# Patient Record
Sex: MALE | Race: WHITE | NOT HISPANIC OR LATINO | Employment: OTHER | URBAN - METROPOLITAN AREA
[De-identification: names, ages, dates, MRNs, and addresses within clinical notes are randomized per-mention and may not be internally consistent; named-entity substitution may affect disease eponyms.]

---

## 2017-01-10 DIAGNOSIS — E11.21 TYPE 2 DIABETES MELLITUS WITH DIABETIC NEPHROPATHY, WITHOUT LONG-TERM CURRENT USE OF INSULIN (H): Primary | ICD-10-CM

## 2017-01-12 ENCOUNTER — TELEPHONE (OUTPATIENT)
Dept: EDUCATION SERVICES | Facility: CLINIC | Age: 77
End: 2017-01-12

## 2017-01-12 ENCOUNTER — ALLIED HEALTH/NURSE VISIT (OUTPATIENT)
Dept: EDUCATION SERVICES | Facility: CLINIC | Age: 77
End: 2017-01-12
Payer: COMMERCIAL

## 2017-01-12 DIAGNOSIS — E11.21 TYPE 2 DIABETES MELLITUS WITH DIABETIC NEPHROPATHY, WITHOUT LONG-TERM CURRENT USE OF INSULIN (H): Primary | ICD-10-CM

## 2017-01-12 PROCEDURE — G0108 DIAB MANAGE TRN  PER INDIV: HCPCS

## 2017-01-12 NOTE — PATIENT INSTRUCTIONS
My Diabetes Care Goals:    Check blood sugars fasting in the morning and before and 2 hours after dinner meal daily.      Follow up:    Call ([phone]), e-mail ([email or web address]), or send [employer] message with questions, concerns or if follow-up is needed.     Bring blood glucose meter and logbook with you to all doctor and follow-up appointments.     [place] [facility] for the [facility] Area:  For Your Diabetes Education and Nutrition Appointments Call:  [phone]   For Diabetes Education or Nutrition Related Questions:   Phone: [phone]  E-mail: [email or web address]  Fax: [phone]   If you need a medication refill please contact your pharmacy. Please allow 3 business days for your refills to be completed.    Instructions for emailing the Diabetes Educators    If you need to communicate a non-urgent message to a Diabetes Educator via email, please send to [email or web address].    Please follow the following email guidelines:    Subject line: Secure: your clinic name (example: Secure: [place])  In the email please include: First name, middle initial, last name and date of birth.    We will be in touch with you within one (1) business day.

## 2017-01-12 NOTE — MR AVS SNAPSHOT
After Visit Summary   1/12/2017    Jose Francisco Gonzaelz    MRN: [id number]           Patient Information     Date Of Birth          1940        Visit Information        Provider Department      1/12/2017 8:30 AM  [facility]        Today's Diagnoses     Type 2 diabetes mellitus with diabetic nephropathy, without long-term current use of insulin (H)    -  1       Care Instructions    My Diabetes Care Goals:    Check blood sugars fasting in the morning and before and 2 hours after dinner meal daily.      Follow up:    Call ([phone]), e-mail ([email or web address]), or send [employer] message with questions, concerns or if follow-up is needed.     Bring blood glucose meter and logbook with you to all doctor and follow-up appointments.     [place] [facility] for the [facility] Area:  For Your Diabetes Education and Nutrition Appointments Call:  [phone]   For Diabetes Education or Nutrition Related Questions:   Phone: [phone]  E-mail: [email or web address]  Fax: [phone]   If you need a medication refill please contact your pharmacy. Please allow 3 business days for your refills to be completed.    Instructions for emailing the Diabetes Educators    If you need to communicate a non-urgent message to a Diabetes Educator via email, please send to [email or web address].    Please follow the following email guidelines:    Subject line: Secure: your clinic name (example: Secure: [place])  In the email please include: First name, middle initial, last name and date of birth.    We will be in touch with you within one (1) business day.           Follow-ups after your visit        Who to contact     If you have questions or need follow up information about today's clinic visit or your schedule please contact [facility] directly at [phone].  Normal or non-critical lab and imaging results will be communicated to  "you by MyChart, letter or phone within 4 business days after the clinic has received the results. If you do not hear from us within 7 days, please contact the clinic through [email or web address] or phone. If you have a critical or abnormal lab result, we will notify you by phone as soon as possible.  Submit refill requests through [employer] or call your pharmacy and they will forward the refill request to us. Please allow 3 business days for your refill to be completed.          Additional Information About Your Visit        [email or web address] Information     [employer] lets you send messages to your doctor, view your test results, renew your prescriptions, schedule appointments and more. To sign up, go to [email or web address] . Click on \"Log in\" on the left side of the screen, which will take you to the Welcome page. Then click on \"Sign up Now\" on the right side of the page.     You will be asked to enter the access code listed below, as well as some personal information. Please follow the directions to create your username and password.     Your access code is: [email or web address]  Expires: 2017  9:07 AM     Your access code will  in 90 days. If you need help or a new code, please call your [place] clinic or [phone].        Care EveryWhere ID     This is your Care EveryWhere ID. This could be used by other organizations to access your [place] medical records  [id number]         Blood Pressure from Last 3 Encounters:   16 118/68   16 124/70   10/19/16 150/80    Weight from Last 3 Encounters:   16 81.194 kg (179 lb)   16 81.194 kg (179 lb)   10/19/16 81.647 kg (180 lb)              Today, you had the following     No orders found for display       Primary Care Provider Office Phone # Fax #    Kvng Richardson [phone]       [facility] [address]  [facility] [address]        Thank you!     Thank you for choosing [facility]  for your care. Our goal is " always to provide you with excellent care. Hearing back from our patients is one way we can continue to improve our services. Please take a few minutes to complete the written survey that you may receive in the mail after your visit with us. Thank you!             Your Updated Medication List - Protect others around you: Learn how to safely use, store and throw away your medicines at www.disposemymeds.org.          This list is accurate as of: 1/12/17  9:07 AM.  Always use your most recent med list.                   Brand Name Dispense Instructions for use    amLODIPine 2.5 MG tablet    NORVASC    90 tablet    Take 1 tablet (2.5 mg) by mouth daily       aspirin 81 MG tablet      Take  by mouth daily.       atorvastatin 20 MG tablet    LIPITOR    90 tablet    TAKE 1 TABLET BY MOUTH EVERY DAY       B-D U/F 31G X 8 MM   Generic drug:  insulin pen needle     300 each    USE DAILY AS DIRECTED       blood glucose lancets standard    no brand specified    1 Box    Use to test blood sugar 2 times daily or as directed.                                        To use with his glucometer       glipiZIDE 10 MG 24 hr tablet    GLUCOTROL XL    90 tablet    Take 1 tablet (10 mg) by mouth daily       HYDROXYCHLOROQUINE SULFATE PO      Taking 2 a day       insulin glargine 100 UNIT/ML injection    LANTUS SOLOSTAR    45 mL    Inject 26 Units Subcutaneous At Bedtime       lisinopril 10 MG tablet    PRINIVIL/ZESTRIL    90 tablet    Take 1 tablet (10 mg) by mouth daily       tamsulosin 0.4 MG capsule    FLOMAX    30 capsule    Take 1 capsule (0.4 mg) by mouth daily

## 2017-01-12 NOTE — TELEPHONE ENCOUNTER
Pt called regarding vm left and scheduling a follow up. Is not sure when he will be back from [place] so will call to schedule at a later date when he knows his schedule more. Has the phone number to schedule and e-mail as well if he has any questions in the meantime.      Aiyana Ko) Alexander, RD LD CDE

## 2017-01-12 NOTE — PROGRESS NOTES
Diabetes Self Management Training: Individual Review Visit    Jose Francisco Gonzalez presents today for education, evaluation of glucose control and initiation of meal time insulin related to Type 2 diabetes.    He is accompanied by self    Patient's diabetes management related comments/concerns: diet changes that he has been making     Patient's emotional response to diabetes: expresses readiness to learn and concern for health and well-being    Patient would like this visit to be focused around the following diabetes-related behaviors and goals: Assistance with making lifestyle changes and Healthy Eating    ASSESSMENT:  Patient Problem List and Family Medical History reviewed for relevant medical history, current medical status, and diabetes risk factors.    Current Diabetes Management per Patient:  Taking diabetes medications?   yes:     Diabetes Medication(s)     Insulin Sig    insulin glargine (LANTUS SOLOSTAR) 100 UNIT/ML PEN Inject 26 Units Subcutaneous At Bedtime -- patient taking 28 units at bedtime    Sulfonylureas Sig    glipiZIDE (GLUCOTROL XL) 10 MG 24 hr tablet Take 1 tablet (10 mg) by mouth daily          Past Diabetes Education: Yes    Patient glucose self monitoring as follows: two times daily.   BG meter: Accu-Chek meter (did not bring today)  BG results: 120-135 in the mornings lately per pt (prior to this month they were higher and over 200 more often) and reports his after dinner numbers are ~160-170 (were closer to 250 previously)    BG values are: In goal  Patient's most recent A1C      9.0   12/7/2016 is not meeting goal of <7.0    Nutrition:  Patient eats 3 meals per day. Loves sweets and sugar and used to eat a lot of bread but has reduced this in the past month. Reports he does not eat much but drinks too much coffee. Eating more fruit or vegetables now.     Breakfast - coffee and roll or donut but has not been doing this for a month and NOW he is having a banana and 1/2 grapefruit and coffee  "with splenda  Lunch - vegetables (carrots, jicama, celery) with sugar fee jello and 1 lena cookie OR pea soup with 4 crackers and cup of tea  Dinner - split pea soup with 10 crackers and vegetables OR 3 tacos with lettuce and cheese and hamburger with water  Snacks - has stopped snacking this month or if he has a snack it is usually nuts    Beverages: water, coffee, tea    Cultural/Sikh diet restrictions: No     Biggest Challenge to Healthy Eating: motivation    Physical Activity:    Plays tennis and golf and vacations in [place] for about 2-3 months. Walks with his wife.     Diabetes Risk Factors:  age over 45 years, hypertension and hyperlipidemia    Diabetes Complications:  Acute Complications: At risk for hypoglycemia? yes  Patient carries a carbohydrate source with them regularly: No     Vitals:  There were no vitals taken for this visit.  Estimated body mass index is 24.98 kg/(m^2) as calculated from the following:    Height as of 12/12/16: 1.803 m (5' 11\").    Weight as of 12/12/16: 81.194 kg (179 lb).   Last 3 BP:   BP Readings from Last 3 Encounters:   12/12/16 118/68   12/07/16 124/70   10/19/16 150/80       History   Smoking status     Former Smoker -- 1.00 packs/day for 20 years     Types: Cigarettes     Quit date: 01/23/1993   Smokeless tobacco     Never Used       Labs:  A1C      9.0   12/7/2016  GLC      356   12/7/2016  LDL       51   1/25/2016  LDL       78   12/9/2014  HDL CHOLESTEROL   Date Value Ref Range Status   01/25/2016 51 >39 mg/dL Final   ]  GFR ESTIMATE   Date Value Ref Range Status   12/07/2016 66 >60 mL/min/1.7m2 Final     GFR ESTIMATE IF BLACK   Date Value Ref Range Status   12/07/2016 80 >60 mL/min/1.7m2 Final     CR     1.08   12/7/2016  No results found for this basename: microalbumin    Socio/Economic Considerations:    Support system: spouse/significant other    Health Beliefs and Attitudes:   Patient Activation Measure Survey Score:  No flowsheet data found.    Stage of " Change: ACTION (Actively working towards change)      Diabetes knowledge and skills assessment:     Patient is knowledgeable in diabetes management concepts related to: Healthy Eating, Being Active, Monitoring and Taking Medication    Patient needs further education on the following diabetes management concepts: Healthy Eating, Being Active, Monitoring, Problem Solving and Reducing Risks    Barriers to Learning Assessment: No Barriers identified    Based on learning assessment above, most appropriate setting for further diabetes education would be: Individual setting.    INTERVENTION:     Education provided today on:  AADE Self-Care Behaviors:  Healthy Eating: consistency in amount, composition, and timing of food intake and portion control. Praised him on his diet changes the past month and encouraged him to continue. He feels motivated to continue changes if it means he can hold off on before meal insulin.     Being Active: relationship to blood glucose. Encouraged him to continue his exercise and activity which he enjoys doing.     Monitoring: individual blood glucose targets and frequency of monitoring. Reviewed that his current blood sugars are much closer to his goals.     Taking Medication: action of prescribed medication, side effects of prescribed medications and when to take medications. Educated on short acting insulin with when to take it, how to store it, expiration of it.     Problem Solving: low blood glucose - causes, signs/symptoms, treatment and prevention and carrying a carbohydrate source at all times.     Opportunities for ongoing education and support in diabetes-self management were discussed.    Pt verbalized understanding of concepts discussed and recommendations provided today.       Education Materials Provided:  Hypoglycemia Signs and Symptoms    PLAN:  See Patient Instructions for co-developed, patient-stated behavior change goals.  Meal Plan Recommendation: eat 3 meals a day, have small  snacks between meals, if needed, use portion control and Aim for 4-5 carb servings at meals and 1-2 carb servings at snacks  Exercise / activity plan: Continue to include activity and exercise as able.   Check blood sugars fasting, before supper, and 2 hours after the start of meals (supper)  Keep a blood glucose record for next visit.  Patient's blood sugars are much improved with diet changes. Will discuss need for short acting insulin with MD and update patient with decision. Pt educated on short acting insulin just in case as well as hypoglycemia treatment and sx during visit today. UPDATE: MD agreed to have pt continue his long acting insulin at this time and hold off on starting meal time insulin given improvement in blood sugar results. Pt updated via VM on cell phone (pt okay with messages being left).   AVS printed and provided to patient today.    FOLLOW-UP:  Chart routed to referring provider.    Ongoing plan for education and support: Written resources (magazines, books, etc.) and follow-up visit with diabetes educator as needed. Provided pt with number to call to schedule follow up appointment and encouraged him to schedule in the next month or two to review his blood sugar results.     Aiyana Munoz RD (Gray) LD CDE    Time Spent: 45 minutes  Encounter Type: Individual

## 2017-01-23 DIAGNOSIS — I10 HYPERTENSION GOAL BP (BLOOD PRESSURE) < 140/80: Primary | Chronic | ICD-10-CM

## 2017-01-24 DIAGNOSIS — E11.21 TYPE 2 DIABETES MELLITUS WITH DIABETIC NEPHROPATHY, WITHOUT LONG-TERM CURRENT USE OF INSULIN (H): Primary | ICD-10-CM

## 2017-01-24 NOTE — TELEPHONE ENCOUNTER
glipiZIDE (GLUCOTROL XL) 10 MG 24 hr tablet         Last Written Prescription Date: 1/25/16  Last Fill Quantity: 90, # refills: 3  Last Office Visit with G, P or [facility] prescribing provider:  12/7/16        BP Readings from Last 3 Encounters:   12/12/16 118/68   12/07/16 124/70   10/19/16 150/80     MICROL       68   9/19/2016  No results found for this basename: microalbumin  CREATININE   Date Value Ref Range Status   12/07/2016 1.08 0.66 - 1.25 mg/dL Final   ]  GFR ESTIMATE   Date Value Ref Range Status   12/07/2016 66 >60 mL/min/1.7m2 Final   09/19/2016 83 >60 mL/min/1.7m2 Final   04/13/2016 42* >60 mL/min/1.7m2 Final     GFR ESTIMATE IF BLACK   Date Value Ref Range Status   12/07/2016 80 >60 mL/min/1.7m2 Final   09/19/2016 >90 >60 mL/min/1.7m2 Final   04/13/2016 51* >60 mL/min/1.7m2 Final     CHOL      118   1/25/2016  HDL       51   1/25/2016  LDL       51   1/25/2016  LDL       78   12/9/2014  TRIG       79   1/25/2016  No results found for this basename: cholhdlratio  AST       31   9/19/2016  ALT       34   9/19/2016  A1C      9.0   12/7/2016  A1C      8.2   9/19/2016  A1C      7.3   4/13/2016  A1C      7.7   1/25/2016  A1C      7.5   9/22/2015  POTASSIUM   Date Value Ref Range Status   12/07/2016 4.6 3.4 - 5.3 mmol/L Final

## 2017-01-25 RX ORDER — LISINOPRIL 10 MG/1
TABLET ORAL
Qty: 90 TABLET | Refills: 2 | Status: SHIPPED | OUTPATIENT
Start: 2017-01-25 | End: 2017-10-13

## 2017-01-25 RX ORDER — GLIPIZIDE 10 MG/1
10 TABLET, FILM COATED, EXTENDED RELEASE ORAL DAILY
Qty: 90 TABLET | Refills: 1 | Status: SHIPPED | OUTPATIENT
Start: 2017-01-25 | End: 2017-08-13

## 2017-01-25 NOTE — TELEPHONE ENCOUNTER
Prescription approved per [facility] Refill Protocol.  Belén Black RN- Triage [employer]

## 2017-01-25 NOTE — TELEPHONE ENCOUNTER
lisinopril (PRINIVIL,ZESTRIL) 10 MG tablet     Last Written Prescription Date: 1/26/16  Last Fill Quantity: 90, # refills: 3  Last Office Visit with [facility] or [facility] prescribing provider: 12/12/16    Prescription approved per [facility] Refill Protocol.  Belén Black RN- Triage [employer]         POTASSIUM   Date Value Ref Range Status   12/07/2016 4.6 3.4 - 5.3 mmol/L Final     CREATININE   Date Value Ref Range Status   12/07/2016 1.08 0.66 - 1.25 mg/dL Final     BP Readings from Last 3 Encounters:   12/12/16 118/68   12/07/16 124/70   10/19/16 150/80

## 2017-02-17 DIAGNOSIS — E78.5 HYPERLIPIDEMIA LDL GOAL <100: ICD-10-CM

## 2017-02-17 RX ORDER — ATORVASTATIN CALCIUM 20 MG/1
TABLET, FILM COATED ORAL
Qty: 90 TABLET | Refills: 0 | Status: SHIPPED | OUTPATIENT
Start: 2017-02-17 | End: 2017-05-02

## 2017-02-17 NOTE — TELEPHONE ENCOUNTER
atorvastatin (LIPITOR) 20 MG     Last Written Prescription Date: 6/15/16  Last Fill Quantity: 90, # refills: 2  Last Office Visit with G, P or [facility] prescribing provider: 12/7/16       Lab Results   Component Value Date    CHOL 118 01/25/2016     Lab Results   Component Value Date    HDL 51 01/25/2016     Lab Results   Component Value Date    LDL 51 01/25/2016    LDL 78 12/09/2014     Lab Results   Component Value Date    TRIG 79 01/25/2016     No results found for: VIVEK

## 2017-02-22 ENCOUNTER — TELEPHONE (OUTPATIENT)
Dept: FAMILY MEDICINE | Facility: CLINIC | Age: 77
End: 2017-02-22

## 2017-02-22 DIAGNOSIS — E11.21 TYPE 2 DIABETES MELLITUS WITH DIABETIC NEPHROPATHY, WITHOUT LONG-TERM CURRENT USE OF INSULIN (H): Primary | ICD-10-CM

## 2017-02-22 NOTE — TELEPHONE ENCOUNTER
Patient was called. Reports he was using samples now needing refills. Test QID per diabetic educators recommendation. Had increased A1C on last OV  With provider.   DME order was printed and given to provider for approval.

## 2017-02-22 NOTE — TELEPHONE ENCOUNTER
Reason for Call: Medication refill:    Do you use a [facility]?  Name of the pharmacy and phone number for the current request: [facility] [address] [place], [place] - [address]    Name of the medication requested: Diabetic Test Strips - Verio One Touch     Other request: Patient states that he has not had a Rx for the test strip in some time, due to him being part of a program that gave him the supplies that he needed. He is currently in [place], however he is requesting that the Rx be sent to the local preferred pharmacy & they will transfer the Rx to the appropriate location.     Can we leave a detailed message on this number? YES    Phone number patient can be reached at: Cell number on file:    Telephone Information:   Mobile [phone]     Best Time: Anytime    Call taken on 2/22/2017 at 9:35 AM by Elvin Cox

## 2017-03-14 ENCOUNTER — TELEPHONE (OUTPATIENT)
Dept: FAMILY MEDICINE | Facility: CLINIC | Age: 77
End: 2017-03-14

## 2017-03-14 NOTE — TELEPHONE ENCOUNTER
Reason for Call:  Form, our goal is to have forms completed with 72 hours, however, some forms may require a visit or additional information.    Type of letter, form or note:  Case    Who is the form from?: Case (if other please explain)    Where did the form come from: form was faxed in    What clinic location was the form placed at?: [facility]    Where the form was placed: Dr's Box: Kvng Richardson MD    What number is listed as a contact on the form?: Case, fax # [phone]       Additional comments: Diabetic Form - Lancets    Call taken on 3/14/2017 at 3:35 PM by PEDRO FISH

## 2017-03-20 NOTE — TELEPHONE ENCOUNTER
Form reviewed, completed, and signed by Dr. Kvng Richardson and faxed to [facility].  [facility].  Emily Soler TC

## 2017-05-02 ENCOUNTER — OFFICE VISIT (OUTPATIENT)
Dept: FAMILY MEDICINE | Facility: CLINIC | Age: 77
End: 2017-05-02
Payer: COMMERCIAL

## 2017-05-02 VITALS
BODY MASS INDEX: 24.78 KG/M2 | SYSTOLIC BLOOD PRESSURE: 136 MMHG | DIASTOLIC BLOOD PRESSURE: 78 MMHG | WEIGHT: 177 LBS | HEIGHT: 71 IN | TEMPERATURE: 97.9 F | OXYGEN SATURATION: 97 % | HEART RATE: 81 BPM | RESPIRATION RATE: 20 BRPM

## 2017-05-02 DIAGNOSIS — I10 HYPERTENSION GOAL BP (BLOOD PRESSURE) < 140/80: Chronic | ICD-10-CM

## 2017-05-02 DIAGNOSIS — N40.1 BENIGN NON-NODULAR PROSTATIC HYPERPLASIA WITH LOWER URINARY TRACT SYMPTOMS: ICD-10-CM

## 2017-05-02 DIAGNOSIS — E78.5 HYPERLIPIDEMIA LDL GOAL <100: ICD-10-CM

## 2017-05-02 DIAGNOSIS — Z79.4 TYPE 2 DIABETES MELLITUS WITH DIABETIC NEPHROPATHY, WITH LONG-TERM CURRENT USE OF INSULIN (H): ICD-10-CM

## 2017-05-02 DIAGNOSIS — Z79.4 TYPE 2 DIABETES MELLITUS WITH DIABETIC NEPHROPATHY, WITH LONG-TERM CURRENT USE OF INSULIN (H): Primary | ICD-10-CM

## 2017-05-02 DIAGNOSIS — E11.21 TYPE 2 DIABETES MELLITUS WITH DIABETIC NEPHROPATHY, WITH LONG-TERM CURRENT USE OF INSULIN (H): ICD-10-CM

## 2017-05-02 DIAGNOSIS — E11.21 TYPE 2 DIABETES MELLITUS WITH DIABETIC NEPHROPATHY, WITH LONG-TERM CURRENT USE OF INSULIN (H): Primary | ICD-10-CM

## 2017-05-02 LAB — HBA1C MFR BLD: 7.4 % (ref 4.3–6)

## 2017-05-02 PROCEDURE — 36415 COLL VENOUS BLD VENIPUNCTURE: CPT | Performed by: INTERNAL MEDICINE

## 2017-05-02 PROCEDURE — 80053 COMPREHEN METABOLIC PANEL: CPT | Performed by: INTERNAL MEDICINE

## 2017-05-02 PROCEDURE — 83721 ASSAY OF BLOOD LIPOPROTEIN: CPT | Performed by: INTERNAL MEDICINE

## 2017-05-02 PROCEDURE — 83036 HEMOGLOBIN GLYCOSYLATED A1C: CPT | Performed by: INTERNAL MEDICINE

## 2017-05-02 PROCEDURE — 99214 OFFICE O/P EST MOD 30 MIN: CPT | Performed by: INTERNAL MEDICINE

## 2017-05-02 RX ORDER — HYDROXYCHLOROQUINE SULFATE 200 MG/1
200 TABLET, FILM COATED ORAL DAILY
Qty: 30 TABLET | COMMUNITY
Start: 2017-05-02 | End: 2021-06-10

## 2017-05-02 RX ORDER — FINASTERIDE 5 MG/1
5 TABLET, FILM COATED ORAL DAILY
Qty: 90 TABLET | Refills: 3 | Status: SHIPPED | OUTPATIENT
Start: 2017-05-02 | End: 2017-11-01

## 2017-05-02 RX ORDER — ATORVASTATIN CALCIUM 20 MG/1
20 TABLET, FILM COATED ORAL DAILY
Qty: 90 TABLET | Refills: 3 | Status: SHIPPED | OUTPATIENT
Start: 2017-05-02 | End: 2018-05-09

## 2017-05-02 RX ORDER — BLOOD SUGAR DIAGNOSTIC
STRIP MISCELLANEOUS
Qty: 200 STRIP | Refills: 11 | OUTPATIENT
Start: 2017-05-02

## 2017-05-02 RX ORDER — TADALAFIL 5 MG/1
5 TABLET ORAL DAILY
Qty: 30 TABLET | Refills: 5 | Status: SHIPPED | OUTPATIENT
Start: 2017-05-02 | End: 2017-11-07

## 2017-05-02 NOTE — LETTER
[facility]  [address]  [address]  [place] [id number] [phone]                                                                                                           Jose Francisco Carlos  [address]  [place] [facility] [address]    May 3, 2017      Dear Jose Francisco,    The results of your recent tests were reviewed and are enclosed.          Your lab results are either good, or else stable, including the electrolytes,cholesterol,kidney and liver function.       Your diabetes control is better.       The A1C of 7.4 correlates to an average blood sugar of approximately 162.      Thank you for choosing [facility].  We appreciate the opportunity to serve you and look forward to supporting your healthcare needs in the future.    If you have any questions or concerns, please call me or my staff at [phone].      Sincerely,    Kvng Richardson MD    Results for orders placed or performed in visit on 05/02/17   Comprehensive metabolic panel (BMP + Alb, Alk Phos, ALT, AST, Total. Bili, TP)   Result Value Ref Range    Sodium 131 (L) 133 - 144 mmol/L    Potassium 4.2 3.4 - 5.3 mmol/L    Chloride 94 94 - 109 mmol/L    Carbon Dioxide 29 20 - 32 mmol/L    Anion Gap 8 3 - 14 mmol/L    Glucose 214 (H) 70 - 99 mg/dL    Urea Nitrogen 9 7 - 30 mg/dL    Creatinine 0.75 0.66 - 1.25 mg/dL    GFR Estimate >90  Non  GFR Calc   >60 mL/min/1.7m2    GFR Estimate If Black >90   GFR Calc   >60 mL/min/1.7m2    Calcium 8.9 8.5 - 10.1 mg/dL    Bilirubin Total 0.4 0.2 - 1.3 mg/dL    Albumin 3.9 3.4 - 5.0 g/dL    Protein Total 7.8 6.8 - 8.8 g/dL    Alkaline Phosphatase 72 40 - 150 U/L    ALT 22 0 - 70 U/L    AST 21 0 - 45 U/L   Hemoglobin A1c   Result Value Ref Range    Hemoglobin A1C 7.4 (H) 4.3 - 6.0 %   LDL cholesterol direct   Result Value Ref Range    LDL Cholesterol Direct 70 <100 mg/dL

## 2017-05-02 NOTE — TELEPHONE ENCOUNTER
One touch verio IQ test strips      Last Written Prescription Date: 5/2/17  Last Fill Quantity: 100,  # refills: 11   Last Office Visit with G, P or [facility] prescribing provider: 5/2/17

## 2017-05-02 NOTE — NURSING NOTE
"Chief Complaint   Patient presents with     Lipids     Hypertension     Diabetes       Initial /78 (BP Location: Right arm, Patient Position: Chair, Cuff Size: Adult Large)  Pulse 81  Temp 97.9  F (36.6  C)  Resp 20  Ht 5' 11\" (1.803 m)  Wt 177 lb (80.3 kg)  SpO2 97%  BMI 24.69 kg/m2 Estimated body mass index is 24.69 kg/(m^2) as calculated from the following:    Height as of this encounter: 5' 11\" (1.803 m).    Weight as of this encounter: 177 lb (80.3 kg).  Medication Reconciliation: complete   Soledad Wong LPN  "

## 2017-05-02 NOTE — PATIENT INSTRUCTIONS
I will let you know your lab results.   Today we added finasteride to gradually reduce the size of your prostate.

## 2017-05-02 NOTE — MR AVS SNAPSHOT
"              After Visit Summary   5/2/2017    Jose Francisco Gonzalez    MRN: [id number]           Patient Information     Date Of Birth          1940        Visit Information        Provider Department      5/2/2017 11:30 AM Kvng Richardson MD [facility]        Today's Diagnoses     Type 2 diabetes mellitus with diabetic nephropathy, with long-term current use of insulin (H)    -  1    Hyperlipidemia LDL goal <100        Benign non-nodular prostatic hyperplasia with lower urinary tract symptoms          Care Instructions    I will let you know your lab results.   Today we added finasteride to gradually reduce the size of your prostate.                  Follow-ups after your visit        Who to contact     If you have questions or need follow up information about today's clinic visit or your schedule please contact [facility] directly at [phone].  Normal or non-critical lab and imaging results will be communicated to you by MyChart, letter or phone within 4 business days after the clinic has received the results. If you do not hear from us within 7 days, please contact the clinic through MyChart or phone. If you have a critical or abnormal lab result, we will notify you by phone as soon as possible.  Submit refill requests through [employer] or call your pharmacy and they will forward the refill request to us. Please allow 3 business days for your refill to be completed.          Additional Information About Your Visit        Care EveryWhere ID     This is your Care EveryWhere ID. This could be used by other organizations to access your [facility] medical records  [id number]        Your Vitals Were     Pulse Temperature Respirations Height Pulse Oximetry BMI (Body Mass Index)    81 97.9  F (36.6  C) 20 5' 11\" (1.803 m) 97% 24.69 kg/m2       Blood Pressure from Last 3 Encounters:   05/02/17 136/78   12/12/16 118/68   12/07/16 124/70    Weight from Last 3 " Encounters:   05/02/17 177 lb (80.3 kg)   12/12/16 179 lb (81.2 kg)   12/07/16 179 lb (81.2 kg)              We Performed the Following     Comprehensive metabolic panel (BMP + Alb, Alk Phos, ALT, AST, Total. Bili, TP)     Hemoglobin A1c     LDL cholesterol direct          Today's Medication Changes          These changes are accurate as of: 5/2/17 12:09 PM.  If you have any questions, ask your nurse or doctor.               Start taking these medicines.        Dose/Directions    blood glucose monitoring test strip   Commonly known as:  ONE TOUCH VERIO IQ   Used for:  Type 2 diabetes mellitus with diabetic nephropathy, with long-term current use of insulin (H)   Started by:  Kvng Richardson MD        Use to test blood sugars 3 times daily or as directed.   Quantity:  100 strip   Refills:  11       finasteride 5 MG tablet   Commonly known as:  PROSCAR   Used for:  Benign non-nodular prostatic hyperplasia with lower urinary tract symptoms   Started by:  Kvng Richardson MD        Dose:  5 mg   Take 1 tablet (5 mg) by mouth daily   Quantity:  90 tablet   Refills:  3       tadalafil 5 MG tablet   Commonly known as:  CIALIS   Used for:  Benign non-nodular prostatic hyperplasia with lower urinary tract symptoms   Started by:  Kvng Richardson MD        Dose:  5 mg   Take 1 tablet (5 mg) by mouth daily Never use with nitroglycerin, terazosin or doxazosin.   Quantity:  30 tablet   Refills:  5         These medicines have changed or have updated prescriptions.        Dose/Directions    atorvastatin 20 MG tablet   Commonly known as:  LIPITOR   This may have changed:  See the new instructions.   Used for:  Hyperlipidemia LDL goal <100   Changed by:  Kvng Richardson MD        Dose:  20 mg   Take 1 tablet (20 mg) by mouth daily   Quantity:  90 tablet   Refills:  3       hydroxychloroquine 200 MG tablet   Commonly known as:  PLAQUENIL   This may have changed:    - medication strength  - how much to take  - when to take this  -  additional instructions   Changed by:  Kvng Richardson MD        Dose:  200 mg   Take 1 tablet (200 mg) by mouth daily   Quantity:  30 tablet   Refills:  0            Where to get your medicines      These medications were sent to [facility] [address] [facility] [address] AT [facility] [address], [facility] [address]     Phone:  [phone]     atorvastatin 20 MG tablet    blood glucose monitoring test strip    finasteride 5 MG tablet    tadalafil 5 MG tablet                Primary Care Provider Office Phone # Fax #    Kvng Richardson [phone]       [facility] [address] [facility] [address]        Thank you!     Thank you for choosing [facility]  for your care. Our goal is always to provide you with excellent care. Hearing back from our patients is one way we can continue to improve our services. Please take a few minutes to complete the written survey that you may receive in the mail after your visit with us. Thank you!             Your Updated Medication List - Protect others around you: Learn how to safely use, store and throw away your medicines at www.disposemymeds.org.          This list is accurate as of: 5/2/17 12:09 PM.  Always use your most recent med list.                   Brand Name Dispense Instructions for use    amLODIPine 2.5 MG tablet    NORVASC    90 tablet    Take 1 tablet (2.5 mg) by mouth daily       aspirin 81 MG tablet      Take  by mouth daily.       atorvastatin 20 MG tablet    LIPITOR    90 tablet    Take 1 tablet (20 mg) by mouth daily       B-D U/F 31G X 8 MM   Generic drug:  insulin pen needle     300 each    USE DAILY AS DIRECTED       blood glucose lancets standard    no brand specified    1 Box    Use to test blood sugar 2 times daily or as directed.                                        To use with his glucometer       blood glucose monitoring test strip     ONE TOUCH VERIO IQ    100 strip    Use to test blood sugars 3 times daily or as directed.       finasteride 5 MG tablet    PROSCAR    90 tablet    Take 1 tablet (5 mg) by mouth daily       glipiZIDE 10 MG 24 hr tablet    GLUCOTROL XL    90 tablet    Take 1 tablet (10 mg) by mouth daily       hydroxychloroquine 200 MG tablet    PLAQUENIL    30 tablet    Take 1 tablet (200 mg) by mouth daily       insulin glargine 100 UNIT/ML injection    LANTUS SOLOSTAR    45 mL    Inject 26 Units Subcutaneous At Bedtime       lisinopril 10 MG tablet    PRINIVIL/ZESTRIL    90 tablet    TAKE 1 TABLET BY MOUTH EVERY DAY       order for DME     400 each    Test strips for pt's glucometer, brand as covered by insurance Test QID and prn. He is on insulin. Patients preferred brand-Verio One Touch.       tadalafil 5 MG tablet    CIALIS    30 tablet    Take 1 tablet (5 mg) by mouth daily Never use with nitroglycerin, terazosin or doxazosin.

## 2017-05-02 NOTE — PROGRESS NOTES
SUBJECTIVE:                                                    Jose Francisco Gonzalez is a 76 year old male who presents to clinic today for the following health issues:        Diabetes Follow-up    Patient is checking blood sugars: twice daily.    Blood sugar testing frequency justification: Uncontrolled diabetes  Results are as follows:         am -          suppertime -     Diabetic concerns: None and other - was in ER in [place] for Hypoglycemia 2/2017- around 70     Symptoms of hypoglycemia (low blood sugar): shaky, dizzy, confusion, sweating     Paresthesias (numbness or burning in feet) or sores: No     Date of last diabetic eye exam: unknown     Hyperlipidemia Follow-Up      Rate your low fat/cholesterol diet?: good    Taking statin?  Yes, no muscle aches from statin    Other lipid medications/supplements?:  none     Hypertension Follow-up      Outpatient blood pressures are being checked at home.  Results are ok.    Low Salt Diet: no added salt         Amount of exercise or physical activity: occ.    Problems taking medications regularly: No    Medication side effects: none    Diet: low salt, low fat/cholesterol and diabetic    Had nocturnal hypoglycemia requiring an ER visit.           Supper had been light that day; and he had not checked glucoses for a couple of days.                       now checking glucoses 2-4 x per day.                      Ongoing urgency,frequency,and nocturia.                  We reviewed urology notes.      Went to [facility]; balance is better.      Problem list and histories reviewed & adjusted, as indicated.  Additional history: as documented    Patient Active Problem List    Diagnosis Date Noted     Personal history of tobacco use, presenting hazards to health 01/23/2014     Priority: High     Quit 1993; CXR 11/10;       US neg for AAA in 2/14       NSAID long-term use 01/22/2014     Priority: High     Chronic aleve; bid     Quit approx 2015; hydroxychlorquine helps        Hyperlipidemia LDL goal <100 11/02/2012     Priority: High     Was on fenofibrate plus simva, in [facility] study; LDL 84 in Lipitor 20 in 05/2012; 97 in 3/13       Hyponatremia 11/02/2012     Priority: High           Chronic;                 Up to 133 in 12/2011 and 134 in 05/2012.   130 in 11/12; 127 in 8/13; 128 in 1/14, 130 in 5/14 and 12/14                      Cortisol and TFT's nml                              Up to 133 in 1/16       Hypertensive heart disease without heart failure 11/02/2012     Priority: High     Neg stress echo in 10/10;              but LVH and BP up to 230 with exercise.        Had STOCK.         Problem list name updated by automated process. Provider to review       Type 2 diabetes mellitus with diabetic nephropathy, with long-term current use of insulin (H) 11/02/2012     Priority: High     Stop metformin in 11/15 due to loose bowels.        Hypertension goal BP (blood pressure) < 140/80 11/02/2012     Priority: High     Mild-moderate LVH on echo in 10/10; mild LVH on echo 1/14; maintain BP < 135/85       Arthropathy      Priority: High     palindromic rheumatism; this is a remote Dx;          In 11/14, Dx of RA; hydroxychloroquine added by Dr. Dior  Problem list name updated by automated process. Provider to review       Urinary urgency 10/21/2016     Priority: Medium     flomax per urology in 10/16;;       if not helpful,RTC for a cysto.           (flomax not helpful)      Cysto neg in 12/16, except for obstructing prostate; try flomax again       Premature atrial contractions 01/25/2016     Priority: Medium     on Holter 7/15, and ECG 1/16       Syncope 07/30/2015     Priority: Medium     Holter shows PAC's,and a 10 beat run of SVT       Chest discomfort 07/30/2015     Priority: Medium     Stress echo nml in 7/15       Sialoadenitis 06/13/2015     Priority: Medium     Microalbuminuria 12/11/2014     Priority: Medium     100 in 12/14;                Resume lisinopril 10 mg per  day in 1/16; 212 in 4/16, 61 in 9/16       Nasal crusting 03/13/2013     Priority: Medium     See 's note 5/14; small anterior perforation, with dessicative rhinitis       Allergic rhinitis 11/02/2012     Priority: Medium     Problem list name updated by automated process. Provider to review       Polyp of nasal cavity 11/02/2012     Priority: Medium     Pyogenic granuloma removed 2009       Achilles bursitis or tendinitis 11/02/2012     Priority: Medium     Impotence of organic origin 11/02/2012     Priority: Medium     Total T 427, free 1.9, in 7/13       Non-toxic nodular goiter 11/02/2012     Priority: Medium     TFT's nml in 7/13  Problem list name updated by automated process. Provider to review       Other psoriasis 11/02/2012     Priority: Medium     Preventive measure 03/13/2013     Priority: Low     APRIMA DATA BASE UNDER THE 3/13/13 NOTE  PSA 0.82 in 12/11,1.56 in 7/13; 1.56 in 9/15, 0.95 in 9/16                  Colonoscopy in 11/10, neg         Past Surgical History:   Procedure Laterality Date     ENT SURGERY  11/2009    nasal; removal of pyogenic granuloma L     EYE SURGERY  murray 15 and22 2009    cataract     HC TOOTH EXTRACTION W/FORCEP       TONSILLECTOMY         Current Outpatient Prescriptions   Medication Sig Dispense Refill     hydroxychloroquine (PLAQUENIL) 200 MG tablet Take 1 tablet (200 mg) by mouth daily 30 tablet      atorvastatin (LIPITOR) 20 MG tablet Take 1 tablet (20 mg) by mouth daily 90 tablet 3     blood glucose monitoring (ONE TOUCH VERIO IQ) test strip Use to test blood sugars 3 times daily or as directed. 100 strip 11     finasteride (PROSCAR) 5 MG tablet Take 1 tablet (5 mg) by mouth daily 90 tablet 3     tadalafil (CIALIS) 5 MG tablet Take 1 tablet (5 mg) by mouth daily Never use with nitroglycerin, terazosin or doxazosin. 30 tablet 5     order for DME Test strips for pt's glucometer, brand as covered by insurance Test QID and prn. He is on insulin. Patients preferred  "brand-Verio One Touch. 400 each 1     lisinopril (PRINIVIL/ZESTRIL) 10 MG tablet TAKE 1 TABLET BY MOUTH EVERY DAY 90 tablet 2     glipiZIDE (GLUCOTROL XL) 10 MG 24 hr tablet Take 1 tablet (10 mg) by mouth daily 90 tablet 1     insulin glargine (LANTUS SOLOSTAR) 100 UNIT/ML PEN Inject 26 Units Subcutaneous At Bedtime 45 mL 3     amLODIPine (NORVASC) 2.5 MG tablet Take 1 tablet (2.5 mg) by mouth daily 90 tablet 3     B-D U/F 31G X 8 MM insulin pen needle USE DAILY AS DIRECTED 300 each 1     blood glucose (NO BRAND SPECIFIED) lancets standard Use to test blood sugar 2 times daily or as directed.                                        To use with his glucometer 1 Box 12     aspirin 81 MG tablet Take  by mouth daily.               No Known Allergies  BP Readings from Last 3 Encounters:   05/02/17 136/78   12/12/16 118/68   12/07/16 124/70    Wt Readings from Last 3 Encounters:   05/02/17 177 lb (80.3 kg)   12/12/16 179 lb (81.2 kg)   12/07/16 179 lb (81.2 kg)                    Reviewed and updated as needed this visit by clinical staff  Tobacco  Allergies  Meds  Med Hx  Surg Hx  Fam Hx  Soc Hx      Reviewed and updated as needed this visit by Provider         ROS:  CONSTITUTIONAL:NEGATIVE for fever, chills, change in weight  RESP:NEGATIVE for significant cough or SOB  CV: NEGATIVE for chest pain, palpitations or peripheral edema    OBJECTIVE:                                                    /78 (BP Location: Right arm, Patient Position: Chair, Cuff Size: Adult Large)  Pulse 81  Temp 97.9  F (36.6  C)  Resp 20  Ht 5' 11\" (1.803 m)  Wt 177 lb (80.3 kg)  SpO2 97%  BMI 24.69 kg/m2  Body mass index is 24.69 kg/(m^2).  GENERAL APPEARANCE: healthy, alert and no distress  CV: regular rates and rhythm, normal S1 S2, no S3 or S4 and no murmur, click or rub  PSYCH: mentation appears normal and affect normal/bright    Diagnostic test results:  pending     ASSESSMENT/PLAN:                                         "                ICD-10-CM    1. Type 2 diabetes mellitus with diabetic nephropathy, with long-term current use of insulin (H) E11.21 blood glucose monitoring (ONE TOUCH VERIO IQ) test strip    Z79.4 Comprehensive metabolic panel (BMP + Alb, Alk Phos, ALT, AST, Total. Bili, TP)     Hemoglobin A1c   2. Hyperlipidemia LDL goal <100 E78.5 atorvastatin (LIPITOR) 20 MG tablet     Comprehensive metabolic panel (BMP + Alb, Alk Phos, ALT, AST, Total. Bili, TP)     LDL cholesterol direct   3. Benign non-nodular prostatic hyperplasia with lower urinary tract symptoms N40.1 finasteride (PROSCAR) 5 MG tablet     tadalafil (CIALIS) 5 MG tablet    see cysto and urology notes fall 2016; flomax not helpful; add finasteride in 5/17   4. Hypertension goal BP (blood pressure) < 140/80 I10        Summary and implications:  We reviewed multiple issues.                    Ok to cut back insulin if has a light supper,etc.       Continue an hs snack.            Check labs.           Follow up with Provider - 6 months or prn   Patient Instructions   I will let you know your lab results.   Today we added finasteride to gradually reduce the size of your prostate.                Kvng Richardson MD  [facility]   Results for orders placed or performed in visit on 05/02/17   Comprehensive metabolic panel (BMP + Alb, Alk Phos, ALT, AST, Total. Bili, TP)   Result Value Ref Range    Sodium 131 (L) 133 - 144 mmol/L    Potassium 4.2 3.4 - 5.3 mmol/L    Chloride 94 94 - 109 mmol/L    Carbon Dioxide 29 20 - 32 mmol/L    Anion Gap 8 3 - 14 mmol/L    Glucose 214 (H) 70 - 99 mg/dL    Urea Nitrogen 9 7 - 30 mg/dL    Creatinine 0.75 0.66 - 1.25 mg/dL    GFR Estimate >90  Non  GFR Calc   >60 mL/min/1.7m2    GFR Estimate If Black >90   GFR Calc   >60 mL/min/1.7m2    Calcium 8.9 8.5 - 10.1 mg/dL    Bilirubin Total 0.4 0.2 - 1.3 mg/dL    Albumin 3.9 3.4 - 5.0 g/dL    Protein Total 7.8 6.8 - 8.8 g/dL     Alkaline Phosphatase 72 40 - 150 U/L    ALT 22 0 - 70 U/L    AST 21 0 - 45 U/L   Hemoglobin A1c   Result Value Ref Range    Hemoglobin A1C 7.4 (H) 4.3 - 6.0 %   LDL cholesterol direct   Result Value Ref Range    LDL Cholesterol Direct 70 <100 mg/dL            Letter sent.                    Your lab results are either good, or else stable, including the electrolytes,cholesterol,kidney and liver function.       Your diabetes control is better.       The A1C of 7.4 correlates to an average blood sugar of approximately 162.

## 2017-05-03 LAB
ALBUMIN SERPL-MCNC: 3.9 G/DL (ref 3.4–5)
ALP SERPL-CCNC: 72 U/L (ref 40–150)
ALT SERPL W P-5'-P-CCNC: 22 U/L (ref 0–70)
ANION GAP SERPL CALCULATED.3IONS-SCNC: 8 MMOL/L (ref 3–14)
AST SERPL W P-5'-P-CCNC: 21 U/L (ref 0–45)
BILIRUB SERPL-MCNC: 0.4 MG/DL (ref 0.2–1.3)
BUN SERPL-MCNC: 9 MG/DL (ref 7–30)
CALCIUM SERPL-MCNC: 8.9 MG/DL (ref 8.5–10.1)
CHLORIDE SERPL-SCNC: 94 MMOL/L (ref 94–109)
CO2 SERPL-SCNC: 29 MMOL/L (ref 20–32)
CREAT SERPL-MCNC: 0.75 MG/DL (ref 0.66–1.25)
GFR SERPL CREATININE-BSD FRML MDRD: ABNORMAL ML/MIN/1.7M2
GLUCOSE SERPL-MCNC: 214 MG/DL (ref 70–99)
LDLC SERPL DIRECT ASSAY-MCNC: 70 MG/DL
POTASSIUM SERPL-SCNC: 4.2 MMOL/L (ref 3.4–5.3)
PROT SERPL-MCNC: 7.8 G/DL (ref 6.8–8.8)
SODIUM SERPL-SCNC: 131 MMOL/L (ref 133–144)

## 2017-05-08 ENCOUNTER — TRANSFERRED RECORDS (OUTPATIENT)
Dept: HEALTH INFORMATION MANAGEMENT | Facility: CLINIC | Age: 77
End: 2017-05-08

## 2017-05-10 ENCOUNTER — TRANSFERRED RECORDS (OUTPATIENT)
Dept: HEALTH INFORMATION MANAGEMENT | Facility: CLINIC | Age: 77
End: 2017-05-10

## 2017-07-01 DIAGNOSIS — E11.9 DIABETES MELLITUS (H): ICD-10-CM

## 2017-07-03 RX ORDER — PEN NEEDLE, DIABETIC 31 GX5/16"
NEEDLE, DISPOSABLE MISCELLANEOUS
Qty: 300 EACH | Refills: 0 | Status: SHIPPED | OUTPATIENT
Start: 2017-07-03 | End: 2018-12-13

## 2017-07-03 NOTE — TELEPHONE ENCOUNTER
Pen needles      Last Written Prescription Date: 7-  Last Fill Quantity: 300,  # refills: 1   Last Office Visit with [facility], [facility] or [facility] prescribing provider: 5-2-17                                             Prescription approved per [facility] Refill Protocol.

## 2017-09-20 DIAGNOSIS — I10 HYPERTENSION GOAL BP (BLOOD PRESSURE) < 140/80: Chronic | ICD-10-CM

## 2017-09-21 RX ORDER — AMLODIPINE BESYLATE 2.5 MG/1
TABLET ORAL
Qty: 90 TABLET | Refills: 1 | Status: SHIPPED | OUTPATIENT
Start: 2017-09-21 | End: 2018-01-30

## 2017-09-21 NOTE — TELEPHONE ENCOUNTER
AMLODIPINE BESYLATE 2.5MG TABLETS      Last Written Prescription Date: 9/19/2016  Last Fill Quantity: 90, # refills: 3  Last Office Visit with G, P or [facility] prescribing provider: 5/2/2017       Potassium   Date Value Ref Range Status   05/02/2017 4.2 3.4 - 5.3 mmol/L Final     Creatinine   Date Value Ref Range Status   05/02/2017 0.75 0.66 - 1.25 mg/dL Final     BP Readings from Last 3 Encounters:   05/02/17 136/78   12/12/16 118/68   12/07/16 124/70

## 2017-10-09 ENCOUNTER — TELEPHONE (OUTPATIENT)
Dept: FAMILY MEDICINE | Facility: CLINIC | Age: 77
End: 2017-10-09

## 2017-10-09 NOTE — TELEPHONE ENCOUNTER
Patient states that he would like to see urology again. He states that he would like to stay with urologic physicians in [place]. Does he need a new referral for this? Please advise and call patient if he does.

## 2017-10-09 NOTE — TELEPHONE ENCOUNTER
Called patient and let him know that he does not need a new referral and that he can call and schedule an appt with them.

## 2017-10-13 DIAGNOSIS — I10 HYPERTENSION GOAL BP (BLOOD PRESSURE) < 140/80: Chronic | ICD-10-CM

## 2017-10-14 NOTE — TELEPHONE ENCOUNTER
lisinopril (PRINIVIL/ZESTRIL) 10 MG tablet     Last Written Prescription Date: 01/25/2017  Last Fill Quantity: 90, # refills: 2  Last Office Visit with G, [facility] or [facility] prescribing provider: 05/02/2017       Potassium   Date Value Ref Range Status   05/02/2017 4.2 3.4 - 5.3 mmol/L Final     Creatinine   Date Value Ref Range Status   05/02/2017 0.75 0.66 - 1.25 mg/dL Final     BP Readings from Last 3 Encounters:   05/02/17 136/78   12/12/16 118/68   12/07/16 124/70

## 2017-10-16 RX ORDER — LISINOPRIL 10 MG/1
TABLET ORAL
Qty: 90 TABLET | Refills: 1 | Status: SHIPPED | OUTPATIENT
Start: 2017-10-16 | End: 2018-04-24

## 2017-11-01 ENCOUNTER — OFFICE VISIT (OUTPATIENT)
Dept: UROLOGY | Facility: CLINIC | Age: 77
End: 2017-11-01
Payer: COMMERCIAL

## 2017-11-01 VITALS
WEIGHT: 175 LBS | HEART RATE: 90 BPM | SYSTOLIC BLOOD PRESSURE: 110 MMHG | BODY MASS INDEX: 23.7 KG/M2 | DIASTOLIC BLOOD PRESSURE: 80 MMHG | HEIGHT: 72 IN

## 2017-11-01 DIAGNOSIS — R39.15 URINARY URGENCY: Primary | ICD-10-CM

## 2017-11-01 LAB
ALBUMIN UR-MCNC: 30 MG/DL
APPEARANCE UR: CLEAR
BILIRUB UR QL STRIP: NEGATIVE
COLOR UR AUTO: YELLOW
GLUCOSE UR STRIP-MCNC: 500 MG/DL
HGB UR QL STRIP: NEGATIVE
KETONES UR STRIP-MCNC: NEGATIVE MG/DL
LEUKOCYTE ESTERASE UR QL STRIP: NEGATIVE
NITRATE UR QL: NEGATIVE
PH UR STRIP: 7 PH (ref 5–7)
RESIDUAL VOLUME (RV) (EXTERNAL): 78
SOURCE: ABNORMAL
SP GR UR STRIP: 1.02 (ref 1–1.03)
UROBILINOGEN UR STRIP-ACNC: 0.2 EU/DL (ref 0.2–1)

## 2017-11-01 PROCEDURE — 81003 URINALYSIS AUTO W/O SCOPE: CPT | Performed by: UROLOGY

## 2017-11-01 PROCEDURE — 99213 OFFICE O/P EST LOW 20 MIN: CPT | Mod: 25 | Performed by: UROLOGY

## 2017-11-01 PROCEDURE — 51798 US URINE CAPACITY MEASURE: CPT | Performed by: UROLOGY

## 2017-11-01 ASSESSMENT — PAIN SCALES - GENERAL: PAINLEVEL: NO PAIN (0)

## 2017-11-01 NOTE — NURSING NOTE
Chief Complaint   Patient presents with     Incontinence     Patient is here to follow up on incontinence.     Estella Mon LPN 3:00 PM November 1, 2017

## 2017-11-01 NOTE — MR AVS SNAPSHOT
After Visit Summary   11/1/2017    Jose Francisco Gonzalez    MRN: [id number]           Patient Information     Date Of Birth          1940        Visit Information        Provider Department      11/1/2017 2:50 PM Kit Landeros MD [facility] [place]        Today's Diagnoses     Urinary urgency    -  1       Follow-ups after your visit        Follow-up notes from your care team     Return in about 1 year (around 11/1/2018) for Physical Exam.      Your next 10 appointments already scheduled     Nov 07, 2017  7:30 AM CST   Office Visit with Kvng Richardson MD   [facility] ([facility]    [address] [place] [id number] [phone]           Bring a current list of meds and any records pertaining to this visit. For Physicals, please bring immunization records and any forms needing to be filled out. Please arrive 10 minutes early to complete paperwork.              Who to contact     If you have questions or need follow up information about today's clinic visit or your schedule please contact [facility] directly at [phone].  Normal or non-critical lab and imaging results will be communicated to you by MyChart, letter or phone within 4 business days after the clinic has received the results. If you do not hear from us within 7 days, please contact the clinic through MyChart or phone. If you have a critical or abnormal lab result, we will notify you by phone as soon as possible.  Submit refill requests through [employer] or call your pharmacy and they will forward the refill request to us. Please allow 3 business days for your refill to be completed.          Additional Information About Your Visit        MyChart Information     [employer] lets you send messages to your doctor, view your test results, renew your prescriptions, schedule  "appointments and more. To sign up, go to [email or web address] . Click on \"Log in\" on the left side of the screen, which will take you to the Welcome page. Then click on \"Sign up Now\" on the right side of the page.     You will be asked to enter the access code listed below, as well as some personal information. Please follow the directions to create your username and password.     Your access code is: [email or web address]  Expires: 2018  3:22 PM     Your access code will  in 90 days. If you need help or a new code, please call your [place] clinic or [phone].        Care EveryWhere ID     This is your Care EveryWhere ID. This could be used by other organizations to access your [place] medical records  [id number]        Your Vitals Were     Pulse Height BMI (Body Mass Index)             90 1.816 m (5' 11.5\") 24.07 kg/m2          Blood Pressure from Last 3 Encounters:   17 110/80   17 136/78   16 118/68    Weight from Last 3 Encounters:   17 79.4 kg (175 lb)   17 80.3 kg (177 lb)   16 81.2 kg (179 lb)              We Performed the Following     MEASURE POST-VOID RESIDUAL URINE/BLADDER CAPACITY, US NON-IMAGING     UA without Microscopic        Primary Care Provider Office Phone # Fax #    Kvng Richardson [phone] [address] [place] [address]        Equal Access to Services     [facility]: Hadii samara rdoriguez hadasho Soomaali, waaxda luqadaha, qaybta kaalmada adeegphyllis, teodora valladares . So [facility] [phone].    ATENCIÓN: Si habla español, tiene a quevedo disposición servicios gratuitos de asistencia lingüística. Llame al [phone].    We comply with applicable federal civil rights laws and [place] laws. We do not discriminate on the basis of race, color, national origin, age, disability, sex, sexual orientation, or gender identity.            Thank you!     Thank you for choosing [facility] " [facility] [place]  for your care. Our goal is always to provide you with excellent care. Hearing back from our patients is one way we can continue to improve our services. Please take a few minutes to complete the written survey that you may receive in the mail after your visit with us. Thank you!             Your Updated Medication List - Protect others around you: Learn how to safely use, store and throw away your medicines at www.disposemymeds.org.          This list is accurate as of: 11/1/17  3:22 PM.  Always use your most recent med list.                   Brand Name Dispense Instructions for use Diagnosis    amLODIPine 2.5 MG tablet    NORVASC    90 tablet    TAKE 1 TABLET BY MOUTH EVERY DAY    Hypertension goal BP (blood pressure) < 140/80       aspirin 81 MG tablet      Take  by mouth daily.        atorvastatin 20 MG tablet    LIPITOR    90 tablet    Take 1 tablet (20 mg) by mouth daily    Hyperlipidemia LDL goal <100       B-D U/F 31G X 8 MM   Generic drug:  insulin pen needle     300 each    USE DAILY AS DIRECTED    Diabetes mellitus (H)       blood glucose lancets standard    no brand specified    1 Box    Use to test blood sugar 2 times daily or as directed.                                        To use with his glucometer    Type 2 diabetes mellitus with diabetic nephropathy (H)       blood glucose monitoring test strip    ONE TOUCH VERIO IQ    100 strip    Use to test blood sugars 3 times daily or as directed.    Type 2 diabetes mellitus with diabetic nephropathy, with long-term current use of insulin (H)       glipiZIDE XL 10 MG 24 hr tablet   Generic drug:  glipiZIDE     90 tablet    TAKE 1 TABLET(10 MG) BY MOUTH DAILY    Type 2 diabetes mellitus with diabetic nephropathy, without long-term current use of insulin (H)       hydroxychloroquine 200 MG tablet    PLAQUENIL    30 tablet    Take 1 tablet (200 mg) by mouth daily        insulin glargine 100 UNIT/ML injection    LANTUS SOLOSTAR     45 mL    Inject 26 Units Subcutaneous At Bedtime    Type 2 diabetes mellitus without complication (H)       lisinopril 10 MG tablet    PRINIVIL/ZESTRIL    90 tablet    TAKE 1 TABLET BY MOUTH EVERY DAY    Hypertension goal BP (blood pressure) < 140/80       order for DME     400 each    Test strips for pt's glucometer, brand as covered by insurance Test QID and prn. He is on insulin. Patients preferred brand-Verio One Touch.    Type 2 diabetes mellitus with diabetic nephropathy, without long-term current use of insulin (H)       tadalafil 5 MG tablet    CIALIS    30 tablet    Take 1 tablet (5 mg) by mouth daily Never use with nitroglycerin, terazosin or doxazosin.    Benign non-nodular prostatic hyperplasia with lower urinary tract symptoms

## 2017-11-01 NOTE — LETTER
11/1/2017       RE: Jose Francisco Gonzalez  [address]  [place] [address]     Dear Colleague,    Thank you for referring your patient, Jose Francisco Gonzalez, to the [facility] at [facility]. Please see a copy of my visit note below.    Jose Francisco Gonzalez is a 77-year-old male with urinary urgency and occasional urge incontinence. His urgency gets severe when he drives in the driveway or hears running water. He is still drinking caffeine. Flomax did not help. He's been taking Proscar without any change. Last year cystoscopy revealed some lateral lobe enlargement of the prostate with some trabeculation of the bladder wall. He has a normal urinalysis today in a 78 cc postvoid residual  Other past medical history: Arthropathy, hyperlipidemia, history of hyponatremia, rheumatoid arthritis, salivary gland inflammation, history of syncope, type 2 diabetes, former smoker  Family history: Coronary artery disease  Medications: Amlodipine, low-dose aspirin, Lipitor, glipizide XL, Plaquenil, insulin, lisinopril, Cialis 5 mg daily  Allergies: None  Exam: Normal appearance, normal vital signs, alert and oriented, normocephalic, normal respirations, neuro grossly intact. Normal sphincter tone, no rectal mass or impaction, benign feeling prostate, normal seminal vesicles  Assessment: Urinary urgency and occasional urge incontinence-discussed eliminating caffeine entirely. Discussed oral medication for overactive bladder muscle. Discussed BPH and changes in bladder wall with aging  Plan: Eliminate caffeine. Trial of tolterodine if needed. Yearly digital rectal exam, urinalysis and postvoid residual otherwise    Again, thank you for allowing me to participate in the care of your patient.      Sincerely,    Kit Landeros MD

## 2017-11-01 NOTE — PROGRESS NOTES
Jose Francisco Gonzalez is a 77-year-old male with urinary urgency and occasional urge incontinence. His urgency gets severe when he drives in the driveway or hears running water. He is still drinking caffeine. Flomax did not help. He's been taking Proscar without any change. Last year cystoscopy revealed some lateral lobe enlargement of the prostate with some trabeculation of the bladder wall. He has a normal urinalysis today in a 78 cc postvoid residual  Other past medical history: Arthropathy, hyperlipidemia, history of hyponatremia, rheumatoid arthritis, salivary gland inflammation, history of syncope, type 2 diabetes, former smoker  Family history: Coronary artery disease  Medications: Amlodipine, low-dose aspirin, Lipitor, glipizide XL, Plaquenil, insulin, lisinopril, Cialis 5 mg daily  Allergies: None  Exam: Normal appearance, normal vital signs, alert and oriented, normocephalic, normal respirations, neuro grossly intact. Normal sphincter tone, no rectal mass or impaction, benign feeling prostate, normal seminal vesicles  Assessment: Urinary urgency and occasional urge incontinence-discussed eliminating caffeine entirely. Discussed oral medication for overactive bladder muscle. Discussed BPH and changes in bladder wall with aging  Plan: Eliminate caffeine. Trial of tolterodine if needed. Yearly digital rectal exam, urinalysis and postvoid residual otherwise

## 2017-11-04 DIAGNOSIS — E11.9 TYPE 2 DIABETES MELLITUS WITHOUT COMPLICATION (H): ICD-10-CM

## 2017-11-07 ENCOUNTER — OFFICE VISIT (OUTPATIENT)
Dept: FAMILY MEDICINE | Facility: CLINIC | Age: 77
End: 2017-11-07
Payer: COMMERCIAL

## 2017-11-07 VITALS
BODY MASS INDEX: 23.94 KG/M2 | WEIGHT: 171 LBS | TEMPERATURE: 97.9 F | HEIGHT: 71 IN | RESPIRATION RATE: 20 BRPM | DIASTOLIC BLOOD PRESSURE: 70 MMHG | HEART RATE: 93 BPM | SYSTOLIC BLOOD PRESSURE: 122 MMHG | OXYGEN SATURATION: 98 %

## 2017-11-07 DIAGNOSIS — N40.1 BENIGN NON-NODULAR PROSTATIC HYPERPLASIA WITH LOWER URINARY TRACT SYMPTOMS: ICD-10-CM

## 2017-11-07 DIAGNOSIS — F43.21 ADJUSTMENT DISORDER WITH DEPRESSED MOOD: ICD-10-CM

## 2017-11-07 DIAGNOSIS — R39.15 URINARY URGENCY: ICD-10-CM

## 2017-11-07 DIAGNOSIS — E11.21 TYPE 2 DIABETES MELLITUS WITH DIABETIC NEPHROPATHY, WITH LONG-TERM CURRENT USE OF INSULIN (H): Primary | ICD-10-CM

## 2017-11-07 DIAGNOSIS — Z23 NEED FOR PROPHYLACTIC VACCINATION AND INOCULATION AGAINST INFLUENZA: ICD-10-CM

## 2017-11-07 DIAGNOSIS — Z79.4 TYPE 2 DIABETES MELLITUS WITH DIABETIC NEPHROPATHY, WITH LONG-TERM CURRENT USE OF INSULIN (H): Primary | ICD-10-CM

## 2017-11-07 DIAGNOSIS — I10 HYPERTENSION GOAL BP (BLOOD PRESSURE) < 140/80: Chronic | ICD-10-CM

## 2017-11-07 LAB
ALBUMIN SERPL-MCNC: 3.8 G/DL (ref 3.4–5)
ALP SERPL-CCNC: 66 U/L (ref 40–150)
ALT SERPL W P-5'-P-CCNC: 27 U/L (ref 0–70)
ANION GAP SERPL CALCULATED.3IONS-SCNC: 9 MMOL/L (ref 3–14)
AST SERPL W P-5'-P-CCNC: 26 U/L (ref 0–45)
BILIRUB SERPL-MCNC: 0.5 MG/DL (ref 0.2–1.3)
BUN SERPL-MCNC: 11 MG/DL (ref 7–30)
CALCIUM SERPL-MCNC: 8.9 MG/DL (ref 8.5–10.1)
CHLORIDE SERPL-SCNC: 92 MMOL/L (ref 94–109)
CO2 SERPL-SCNC: 27 MMOL/L (ref 20–32)
CREAT SERPL-MCNC: 0.7 MG/DL (ref 0.66–1.25)
GFR SERPL CREATININE-BSD FRML MDRD: >90 ML/MIN/1.7M2
GLUCOSE SERPL-MCNC: 177 MG/DL (ref 70–99)
HBA1C MFR BLD: 7.2 % (ref 4.3–6)
POTASSIUM SERPL-SCNC: 4.2 MMOL/L (ref 3.4–5.3)
PROT SERPL-MCNC: 7.6 G/DL (ref 6.8–8.8)
SODIUM SERPL-SCNC: 128 MMOL/L (ref 133–144)

## 2017-11-07 PROCEDURE — 83036 HEMOGLOBIN GLYCOSYLATED A1C: CPT | Performed by: INTERNAL MEDICINE

## 2017-11-07 PROCEDURE — G0008 ADMIN INFLUENZA VIRUS VAC: HCPCS | Performed by: INTERNAL MEDICINE

## 2017-11-07 PROCEDURE — 80053 COMPREHEN METABOLIC PANEL: CPT | Performed by: INTERNAL MEDICINE

## 2017-11-07 PROCEDURE — 90662 IIV NO PRSV INCREASED AG IM: CPT | Performed by: INTERNAL MEDICINE

## 2017-11-07 PROCEDURE — 36415 COLL VENOUS BLD VENIPUNCTURE: CPT | Performed by: INTERNAL MEDICINE

## 2017-11-07 PROCEDURE — 99214 OFFICE O/P EST MOD 30 MIN: CPT | Mod: 25 | Performed by: INTERNAL MEDICINE

## 2017-11-07 ASSESSMENT — PATIENT HEALTH QUESTIONNAIRE - PHQ9: SUM OF ALL RESPONSES TO PHQ QUESTIONS 1-9: 5

## 2017-11-07 NOTE — MR AVS SNAPSHOT
After Visit Summary   11/7/2017    Jose Francisco Gonzalez    MRN: [id number]           Patient Information     Date Of Birth          1940        Visit Information        Provider Department      11/7/2017 7:30 AM Kvng Richardson MD [facility]        Today's Diagnoses     Type 2 diabetes mellitus with diabetic nephropathy, with long-term current use of insulin (H)    -  1    Hypertension goal BP (blood pressure) < 140/80        Adjustment disorder with depressed mood        Need for prophylactic vaccination and inoculation against influenza        Benign non-nodular prostatic hyperplasia with lower urinary tract symptoms          Care Instructions    Call to schedule a mental health referral.                                   I will let you know your lab results.   Consider cutting back your insulin a bit to avoid hypoglycemia.           Follow-ups after your visit        Additional Services     MENTAL HEALTH REFERRAL       Your provider has referred you to: [facility]: [facility] - [facility] [phone]   [email or web address]   *Referral from [facility] Provider required - Consultation Model - medication management & future refills will be returned to [facility] PCP upon completion of evaluation  *Please call to schedule an appointment.    All scheduling is subject to the client's specific insurance plan & benefits, provider/location availability, and provider clinical specialities.  Please arrive 15 minutes early for your first appointment and bring your completed paperwork.    Please be aware that coverage of these services is subject to the terms and limitations of your health insurance plan.  Call member services at your health plan with any benefit or coverage questions.                  Who to contact     If you have questions or need follow  "up information about today's clinic visit or your schedule please contact [facility] directly at [phone].  Normal or non-critical lab and imaging results will be communicated to you by MyChart, letter or phone within 4 business days after the clinic has received the results. If you do not hear from us within 7 days, please contact the clinic through [employer] or phone. If you have a critical or abnormal lab result, we will notify you by phone as soon as possible.  Submit refill requests through [employer] or call your pharmacy and they will forward the refill request to us. Please allow 3 business days for your refill to be completed.          Additional Information About Your Visit        MyCharVertra Information     [employer] lets you send messages to your doctor, view your test results, renew your prescriptions, schedule appointments and more. To sign up, go to [email or web address] . Click on \"Log in\" on the left side of the screen, which will take you to the Welcome page. Then click on \"Sign up Now\" on the right side of the page.     You will be asked to enter the access code listed below, as well as some personal information. Please follow the directions to create your username and password.     Your access code is: [email or web address]  Expires: 2018  2:22 PM     Your access code will  in 90 days. If you need help or a new code, please call your [place] clinic or [phone].        Care EveryWhere ID     This is your Care EveryWhere ID. This could be used by other organizations to access your [place] medical records  [id number]        Your Vitals Were     Pulse Temperature Respirations Height Pulse Oximetry BMI (Body Mass Index)    93 97.9  F (36.6  C) 20 5' 11\" (1.803 m) 98% 23.85 kg/m2       Blood Pressure from Last 3 Encounters:   17 122/70   17 110/80   17 136/78    Weight from Last 3 Encounters:   17 171 lb (77.6 kg)   17 175 lb (79.4 kg) "   05/02/17 177 lb (80.3 kg)              We Performed the Following     Comprehensive metabolic panel (BMP + Alb, Alk Phos, ALT, AST, Total. Bili, TP)     Hemoglobin A1c     MENTAL HEALTH REFERRAL        Primary Care Provider Office Phone # Fax #    Kvng Richardson [phone] [address] [facility] [address]        Equal Access to Services     [facility] : Hadii aad ku hadasho Soomaali, waaxda luqadaha, qaybta kaalmada adeegyada, waxay idiin hayaan adeeg kharash la'aan ah. So [facility] [phone].    ATENCIÓN: Si habla español, tiene a quevedo disposición servicios gratuitos de asistencia lingüística. Llame al [phone].    We comply with applicable federal civil rights laws and [place] laws. We do not discriminate on the basis of race, color, national origin, age, disability, sex, sexual orientation, or gender identity.            Thank you!     Thank you for choosing [facility]  for your care. Our goal is always to provide you with excellent care. Hearing back from our patients is one way we can continue to improve our services. Please take a few minutes to complete the written survey that you may receive in the mail after your visit with us. Thank you!             Your Updated Medication List - Protect others around you: Learn how to safely use, store and throw away your medicines at www.disposemymeds.org.          This list is accurate as of: 11/7/17  8:00 AM.  Always use your most recent med list.                   Brand Name Dispense Instructions for use Diagnosis    amLODIPine 2.5 MG tablet    NORVASC    90 tablet    TAKE 1 TABLET BY MOUTH EVERY DAY    Hypertension goal BP (blood pressure) < 140/80       aspirin 81 MG tablet      Take  by mouth daily.        atorvastatin 20 MG tablet    LIPITOR    90 tablet    Take 1 tablet (20 mg) by mouth daily    Hyperlipidemia LDL goal <100       B-D U/F 31G X 8 MM   Generic drug:  insulin pen needle     300  each    USE DAILY AS DIRECTED    Diabetes mellitus (H)       blood glucose lancets standard    no brand specified    1 Box    Use to test blood sugar 2 times daily or as directed.                                        To use with his glucometer    Type 2 diabetes mellitus with diabetic nephropathy (H)       blood glucose monitoring test strip    ONETOUCH VERIO IQ    100 strip    Use to test blood sugars 3 times daily or as directed.    Type 2 diabetes mellitus with diabetic nephropathy, with long-term current use of insulin (H)       glipiZIDE XL 10 MG 24 hr tablet   Generic drug:  glipiZIDE     90 tablet    TAKE 1 TABLET(10 MG) BY MOUTH DAILY    Type 2 diabetes mellitus with diabetic nephropathy, without long-term current use of insulin (H)       hydroxychloroquine 200 MG tablet    PLAQUENIL    30 tablet    Take 1 tablet (200 mg) by mouth daily        insulin glargine 100 UNIT/ML injection    LANTUS SOLOSTAR    45 mL    Inject 26 Units Subcutaneous At Bedtime    Type 2 diabetes mellitus without complication (H)       lisinopril 10 MG tablet    PRINIVIL/ZESTRIL    90 tablet    TAKE 1 TABLET BY MOUTH EVERY DAY    Hypertension goal BP (blood pressure) < 140/80       order for DME     400 each    Test strips for pt's glucometer, brand as covered by insurance Test QID and prn. He is on insulin. Patients preferred brand-Verio One Touch.    Type 2 diabetes mellitus with diabetic nephropathy, without long-term current use of insulin (H)

## 2017-11-07 NOTE — PATIENT INSTRUCTIONS
Call to schedule a mental health referral.                                   I will let you know your lab results.   Consider cutting back your insulin a bit to avoid hypoglycemia.

## 2017-11-07 NOTE — PROGRESS NOTES
SUBJECTIVE:   Jose Francisco Gonzalez is a 77 year old male who presents to clinic today for the following health issues:      Diabetes Follow-up      Patient is checking blood sugars: every other day    Diabetic concerns: None     Symptoms of hypoglycemia (low blood sugar): none     Paresthesias (numbness or burning in feet) or sores: No     Date of last diabetic eye exam: unknown    Hyperlipidemia Follow-Up      Rate your low fat/cholesterol diet?: good    Taking statin?  Yes, no muscle aches from statin    Other lipid medications/supplements?:  none    Hypertension Follow-up      Outpatient blood pressures are being checked at home.    Low Salt Diet: no added salt        Amount of exercise or physical activity: occ.    Problems taking medications regularly: No    Medication side effects: none    Diet: low salt, low fat/cholesterol and diabetic    Discuss other multiple issues and referral?      Lots of stress; alcoholic son.                 PHQ 9 = 5 today.                He would like to see a counselor.     He has concerns regarding issues of codependency.               He is not suicidal.                                                                   26 units lantus; some recent hypoglycemia; 50 or so.               Home BP is ok.                   BP was higher when stress was higher.                                We reviewed the urology note. He has cut way back on caffeine.      Problem list and histories reviewed & adjusted, as indicated.      Current Outpatient Prescriptions   Medication Sig Dispense Refill     lisinopril (PRINIVIL/ZESTRIL) 10 MG tablet TAKE 1 TABLET BY MOUTH EVERY DAY 90 tablet 1     amLODIPine (NORVASC) 2.5 MG tablet TAKE 1 TABLET BY MOUTH EVERY DAY 90 tablet 1     GLIPIZIDE XL 10 MG 24 hr tablet TAKE 1 TABLET(10 MG) BY MOUTH DAILY 90 tablet 1     B-D U/F 31G X 8 MM insulin pen needle USE DAILY AS DIRECTED 300 each 0     hydroxychloroquine (PLAQUENIL) 200 MG tablet Take 1 tablet (200 mg)  "by mouth daily 30 tablet      atorvastatin (LIPITOR) 20 MG tablet Take 1 tablet (20 mg) by mouth daily 90 tablet 3     blood glucose monitoring (ONE TOUCH VERIO IQ) test strip Use to test blood sugars 3 times daily or as directed. 100 strip 11     order for DME Test strips for pt's glucometer, brand as covered by insurance Test QID and prn. He is on insulin. Patients preferred brand-Verio One Touch. 400 each 1     insulin glargine (LANTUS SOLOSTAR) 100 UNIT/ML PEN Inject 26 Units Subcutaneous At Bedtime 45 mL 3     blood glucose (NO BRAND SPECIFIED) lancets standard Use to test blood sugar 2 times daily or as directed.                                        To use with his glucometer 1 Box 12     aspirin 81 MG tablet Take  by mouth daily.       No Known Allergies  BP Readings from Last 3 Encounters:   11/07/17 122/70   11/01/17 110/80   05/02/17 136/78    Wt Readings from Last 3 Encounters:   11/07/17 171 lb (77.6 kg)   11/01/17 175 lb (79.4 kg)   05/02/17 177 lb (80.3 kg)                      Reviewed and updated as needed this visit by clinical staff       Reviewed and updated as needed this visit by Provider         ROS:  CONSTITUTIONAL:NEGATIVE for fever, chills; weight is down 6 pounds since May  RESP:NEGATIVE for significant cough or SOB  CV: NEGATIVE for chest pain, palpitations or peripheral edema    OBJECTIVE:                                                    /70 (BP Location: Left arm, Patient Position: Chair, Cuff Size: Adult Large)  Pulse 93  Temp 97.9  F (36.6  C)  Resp 20  Ht 5' 11\" (1.803 m)  Wt 171 lb (77.6 kg)  SpO2 98%  BMI 23.85 kg/m2  Body mass index is 23.85 kg/(m^2).  GENERAL APPEARANCE: alert, no distress and fatigued  RESP: no rales or rhonchi  CV: regular rates and rhythm, normal S1 S2, no S3 or S4 and no murmur, click or rub  PSYCH: fatigued and worried    Diagnostic test results:  Pending       ASSESSMENT/PLAN:                                                        ICD-10-CM  "   1. Type 2 diabetes mellitus with diabetic nephropathy, with long-term current use of insulin (H) E11.21 Comprehensive metabolic panel (BMP + Alb, Alk Phos, ALT, AST, Total. Bili, TP)    Z79.4 Hemoglobin A1c   2. Hypertension goal BP (blood pressure) < 140/80 I10    3. Adjustment disorder with depressed mood F43.21 MENTAL HEALTH REFERRAL   4. Benign non-nodular prostatic hyperplasia with lower urinary tract symptoms N40.1    5. Urinary urgency R39.15    6. Need for prophylactic vaccination and inoculation against influenza Z23        Summary and implications:  We reviewed multiple issues.            Check labs and adjust medications as indicated.    Patient Instructions   Call to schedule a mental health referral.                                   I will let you know your lab results.   Consider cutting back your insulin a bit to avoid hypoglycemia.       Kvng Richardson MD  [facility]                   Results for orders placed or performed in visit on 11/07/17   Comprehensive metabolic panel (BMP + Alb, Alk Phos, ALT, AST, Total. Bili, TP)   Result Value Ref Range    Sodium 128 (L) 133 - 144 mmol/L    Potassium 4.2 3.4 - 5.3 mmol/L    Chloride 92 (L) 94 - 109 mmol/L    Carbon Dioxide 27 20 - 32 mmol/L    Anion Gap 9 3 - 14 mmol/L    Glucose 177 (H) 70 - 99 mg/dL    Urea Nitrogen 11 7 - 30 mg/dL    Creatinine 0.70 0.66 - 1.25 mg/dL    GFR Estimate >90 >60 mL/min/1.7m2    GFR Estimate If Black >90 >60 mL/min/1.7m2    Calcium 8.9 8.5 - 10.1 mg/dL    Bilirubin Total 0.5 0.2 - 1.3 mg/dL    Albumin 3.8 3.4 - 5.0 g/dL    Protein Total 7.6 6.8 - 8.8 g/dL    Alkaline Phosphatase 66 40 - 150 U/L    ALT 27 0 - 70 U/L    AST 26 0 - 45 U/L   Hemoglobin A1c   Result Value Ref Range    Hemoglobin A1C 7.2 (H) 4.3 - 6.0 %     Letter sent.                   Your diabetes control is better, and is very adequate for age 77.       The A1C of 7.2 correlates to an average blood sugar of approximately 156.                    Your other lab results are stable. Keep taking the same medications.  As usual, the sodium level is a bit low.           Try to cut back on fluid intake, by 8 ounces per day.

## 2017-11-07 NOTE — PROGRESS NOTES

## 2017-11-07 NOTE — NURSING NOTE
"Chief Complaint   Patient presents with     Lipids     Hypertension     Diabetes       Initial /70 (BP Location: Left arm, Patient Position: Chair, Cuff Size: Adult Large)  Pulse 93  Temp 97.9  F (36.6  C)  Resp 20  Ht 5' 11\" (1.803 m)  Wt 171 lb (77.6 kg)  SpO2 98%  BMI 23.85 kg/m2 Estimated body mass index is 23.85 kg/(m^2) as calculated from the following:    Height as of this encounter: 5' 11\" (1.803 m).    Weight as of this encounter: 171 lb (77.6 kg).  Medication Reconciliation: complete   Soledad Wong LPN  "

## 2017-11-07 NOTE — LETTER
November 8, 2017      Jose Francisco Gonzalez  [address]  [facility] [address]        Dear ,    We are writing to inform you of your test results.              Your diabetes control is better, and is very adequate for age 77.       The A1C of 7.2 correlates to an average blood sugar of approximately 156.                   Your other lab results are stable. Keep taking the same medications.  As usual, the sodium level is a bit low.           Try to cut back on fluid intake, by 8 ounces per day.        Resulted Orders   Comprehensive metabolic panel (BMP + Alb, Alk Phos, ALT, AST, Total. Bili, TP)   Result Value Ref Range    Sodium 128 (L) 133 - 144 mmol/L    Potassium 4.2 3.4 - 5.3 mmol/L    Chloride 92 (L) 94 - 109 mmol/L    Carbon Dioxide 27 20 - 32 mmol/L    Anion Gap 9 3 - 14 mmol/L    Glucose 177 (H) 70 - 99 mg/dL      Comment:      Non Fasting    Urea Nitrogen 11 7 - 30 mg/dL    Creatinine 0.70 0.66 - 1.25 mg/dL    GFR Estimate >90 >60 mL/min/1.7m2      Comment:      Non  GFR Calc    GFR Estimate If Black >90 >60 mL/min/1.7m2      Comment:       GFR Calc    Calcium 8.9 8.5 - 10.1 mg/dL    Bilirubin Total 0.5 0.2 - 1.3 mg/dL    Albumin 3.8 3.4 - 5.0 g/dL    Protein Total 7.6 6.8 - 8.8 g/dL    Alkaline Phosphatase 66 40 - 150 U/L    ALT 27 0 - 70 U/L    AST 26 0 - 45 U/L   Hemoglobin A1c   Result Value Ref Range    Hemoglobin A1C 7.2 (H) 4.3 - 6.0 %       If you have any questions or concerns, please call the clinic at the number listed above.       Sincerely,        Kvng Richardson MD

## 2017-11-10 ENCOUNTER — TELEPHONE (OUTPATIENT)
Dept: FAMILY MEDICINE | Facility: CLINIC | Age: 77
End: 2017-11-10

## 2017-11-10 DIAGNOSIS — E11.21 TYPE 2 DIABETES MELLITUS WITH DIABETIC NEPHROPATHY, WITH LONG-TERM CURRENT USE OF INSULIN (H): Primary | ICD-10-CM

## 2017-11-10 DIAGNOSIS — Z79.4 TYPE 2 DIABETES MELLITUS WITH DIABETIC NEPHROPATHY, WITH LONG-TERM CURRENT USE OF INSULIN (H): Primary | ICD-10-CM

## 2017-11-10 RX ORDER — INSULIN GLARGINE 100 [IU]/ML
INJECTION, SOLUTION SUBCUTANEOUS
Qty: 15 ML | Refills: 11 | Status: SHIPPED | OUTPATIENT
Start: 2017-11-10 | End: 2019-01-07

## 2017-11-27 ENCOUNTER — TELEPHONE (OUTPATIENT)
Dept: FAMILY MEDICINE | Facility: CLINIC | Age: 77
End: 2017-11-27

## 2017-11-27 NOTE — TELEPHONE ENCOUNTER
Reason for Call:  Form, our goal is to have forms completed with 72 hours, however, some forms may require a visit or additional information.    Type of letter, form or note:  medical    Who is the form from?: Home care    Where did the form come from: form was faxed in    What clinic location was the form placed at?: [facility]    Where the form was placed: 's Box: Kvng Richardson MD    What number is listed as a contact on the form?: [phone] phone [phone]       Additional comments: [employer] diabetic form  test strips and lancets     Call taken on 11/27/2017 at 4:32 PM by Ida Aviles

## 2018-01-24 ENCOUNTER — TRANSFERRED RECORDS (OUTPATIENT)
Dept: HEALTH INFORMATION MANAGEMENT | Facility: CLINIC | Age: 78
End: 2018-01-24

## 2018-02-01 ENCOUNTER — TELEPHONE (OUTPATIENT)
Dept: UROLOGY | Facility: CLINIC | Age: 78
End: 2018-02-01

## 2018-02-01 ENCOUNTER — TELEPHONE (OUTPATIENT)
Dept: FAMILY MEDICINE | Facility: CLINIC | Age: 78
End: 2018-02-01

## 2018-02-01 DIAGNOSIS — E11.21 TYPE 2 DIABETES MELLITUS WITH DIABETIC NEPHROPATHY, WITH LONG-TERM CURRENT USE OF INSULIN (H): ICD-10-CM

## 2018-02-01 DIAGNOSIS — Z79.4 TYPE 2 DIABETES MELLITUS WITH DIABETIC NEPHROPATHY, WITH LONG-TERM CURRENT USE OF INSULIN (H): ICD-10-CM

## 2018-02-01 NOTE — TELEPHONE ENCOUNTER
Prior Authorization Request    1. Prior Authorization for the medication Lantus inj solostar        Requesting Provider: Kvng Richardson          Pt name: Jose Francisco Brisenogren        Pt : 1940        Pt MRN: [id number]        Last Office Visit: 2017           Insurance: Payor: TONIE / Plan: BCBS PLATINUM BLUE / Product Type: PPO /         Insurance ID Number: [unfilled]        Prior Auth Contact Phone number:     BIN#:   PCN#:     I believe the formulary is basaglar        To be completed by provider:     2.   Refuse or Start Prior Auth:

## 2018-02-01 NOTE — TELEPHONE ENCOUNTER
I have already ordered Basaglar; as of 11/17.                    Please get more information regarding this issue.

## 2018-02-01 NOTE — TELEPHONE ENCOUNTER
What sort of test strips do they want him to use?    Please get more information regarding this issue.

## 2018-02-01 NOTE — TELEPHONE ENCOUNTER
Patient called nurse line and LM. Returned patient's phone call and LM. Will wait for a return phone call.    Tarsha Vidal LPN

## 2018-02-01 NOTE — TELEPHONE ENCOUNTER
Prior Authorization Request    1. Prior Authorization for the medication blood glucose monitoring (ONE TOUCH VERIO IQ) test strip        Requesting Provider: Kvng Richardson          Pt name: Jose Francisco Gonzalez        Pt : 1940        Pt MRN: [id number]        Last Office Visit: 2018           Insurance: Payor: TONIE / Plan: BCBS PLATINUM BLUE / Product Type: PPO /         Insurance ID Number: [unfilled] [id number]        Prior Auth Contact Phone number:[phone]     BIN#:   PCN#:            To be completed by provider:

## 2018-02-02 DIAGNOSIS — R32 URINARY INCONTINENCE: Primary | ICD-10-CM

## 2018-02-02 RX ORDER — TOLTERODINE 4 MG/1
4 CAPSULE, EXTENDED RELEASE ORAL DAILY
Qty: 90 CAPSULE | Refills: 3 | Status: SHIPPED | OUTPATIENT
Start: 2018-02-02 | End: 2018-04-10

## 2018-02-02 NOTE — TELEPHONE ENCOUNTER
Patient called and spoke with this nurse. He is requesting a RX for Urinary Incontinence. Per last office visit note with MD, patient may start a trial of Tolterodine 4mg. Patient was informed and requested med be sent to his [facility] pharmacy on [address]. Rx was e-prescribed. Per MD- He is aware Med will need a prior authorization, but does not think patient will tolerate Oxybutinen.  Also, per MD-patient should discontinue all caffeine.     Tarsha Vidal LPN

## 2018-02-05 ENCOUNTER — TELEPHONE (OUTPATIENT)
Dept: FAMILY MEDICINE | Facility: CLINIC | Age: 78
End: 2018-02-05

## 2018-02-05 NOTE — TELEPHONE ENCOUNTER
Patient Contact    Attempt # 1    Was call answered?  No.  Left message on voicemail with information to call me back.

## 2018-02-05 NOTE — TELEPHONE ENCOUNTER
atorvastatin (LIPITOR) 20 MG tablet 90 tablet 3 5/2/2017  No      Sig: Take 1 tablet (20 mg) by mouth daily     Class: E-Prescribe     Route: Oral     Order: [id number]     E-Prescribing Status: Receipt confirmed by pharmacy (5/2/2017 12:03 PM CDT)     Patient has enough refills from us through May of this year. Should call pharmacy and ask for a vacation override.

## 2018-02-05 NOTE — TELEPHONE ENCOUNTER
The patient his leaving Thursday for two months.      Can he get enough of his cholesterol medication to take with him?  It is difficult to fill out of state.    His number is [phone].  Please all him back

## 2018-02-05 NOTE — TELEPHONE ENCOUNTER
Patient is taking basaglar. Called pharmacy to verify. Ignoring this PA request as it is not needed.

## 2018-02-06 ENCOUNTER — TELEPHONE (OUTPATIENT)
Dept: UROLOGY | Facility: CLINIC | Age: 78
End: 2018-02-06

## 2018-02-06 NOTE — TELEPHONE ENCOUNTER
Prior Authorization Medication Request:    Medication/Dose: Tolterodine 4mg   Diagnosis/ICD Code: Urinary Urgency  New/Renewal/Insurance Change PA: New  Previously Tried and Failed Therapies: Per MD-patient would not be a good candidate for alternative medication.   Insurance ID (If Provided):   Insurance Phone:    Any other Additional Information from Fax Request:

## 2018-02-06 NOTE — TELEPHONE ENCOUNTER
Patient spoke with this nurse and requested a medication for Urinary Urgency. Per MD's note Tolterodine 4mg, has been sent to preferred pharmacy. (A PA is currently pending).     Tarsha Vidal LPN

## 2018-02-06 NOTE — TELEPHONE ENCOUNTER
Patient Contact    Attempt # 2    Was call answered?  No.  Left message on voicemail with information to call triage back.

## 2018-02-07 NOTE — TELEPHONE ENCOUNTER
Prior Authorization Approval    Authorization Effective Date: 11/9/2017  Authorization Expiration Date: 2/7/2019  Medication: Tolterodine 4mg-APPROVED  Approved Dose/Quantity:  Reference #: [id number]   Insurance Company: [employer] - Phone [phone] Fax [phone]  Expected CoPay: $73.91     CoPay Card Available:      Foundation Assistance Needed:    Which Pharmacy is filling the prescription (Not needed for infusion/clinic administered): [facility] [address] [place] [address] [address] [facility] [address] [facility]  Pharmacy Notified: Yes  Patient Notified: Yes    Please add approval letter when received from insurance.

## 2018-02-22 DIAGNOSIS — E11.9 TYPE 2 DIABETES MELLITUS WITHOUT COMPLICATION (H): ICD-10-CM

## 2018-02-23 RX ORDER — INSULIN GLARGINE 100 [IU]/ML
INJECTION, SOLUTION SUBCUTANEOUS
Qty: 15 ML | Refills: 1 | Status: SHIPPED | OUTPATIENT
Start: 2018-02-23 | End: 2018-03-05

## 2018-02-23 NOTE — TELEPHONE ENCOUNTER
"Requested Prescriptions   Pending Prescriptions Disp Refills     LANTUS SOLOSTAR 100 UNIT/ML soln [Pharmacy Med Name: LANTUS SOLOSTAR PEN INJ 5 X 3ML] 15 mL 0      Last Written Prescription Date:  11/7/17  Last Fill Quantity: 24 ml,  # refills: 1   Last office visit: 11/7/2017 with prescribing provider:     Future Office Visit:     Sig: ADMINISTER 26 UNITS UNDER THE SKIN AT BEDTIME    Long Acting Insulin Protocol Failed    2/22/2018  3:15 PM       Failed - Microalbumin on file in past 12 months    Recent Labs   Lab Test  09/19/16   1547   MICROL  68   UMALCR  61.17*            Passed - Blood pressure less than 140/90 in past 6 months    BP Readings from Last 3 Encounters:   11/07/17 122/70   11/01/17 110/80   05/02/17 136/78                Passed - LDL on file in past 12 months    Recent Labs   Lab Test  05/02/17   1207   LDL  70            Passed - Serum creatinine on file in past 12 months    Recent Labs   Lab Test  11/07/17   0801   CR  0.70            Passed - HgbA1C in past 3 or 6 months    Recent Labs   Lab Test  11/07/17   0801   A1C  7.2*            Passed - Patient is age 18 or older       Passed - Recent visit with authorizing provider's specialty in past 6 months    Patient had office visit in the last 6 months or has a visit in the next 30 days with authorizing provider.  See \"Patient Info\" tab in inbasket, or \"Choose Columns\" in Meds & Orders section of the refill encounter.              "

## 2018-03-03 ENCOUNTER — TELEPHONE (OUTPATIENT)
Dept: FAMILY MEDICINE | Facility: CLINIC | Age: 78
End: 2018-03-03

## 2018-03-03 NOTE — TELEPHONE ENCOUNTER
MESSAGE: DRUG NOT COVERED BY PATIENT PLAN. THE PREFERRED ALTERNATIVE IS LEVEMIRINJFLEXTOUC, BASAGLARINJUINT, TRESIBAFLEXINJUNIT. PLEASE CALL/FAX THE PHARMACY TO CHANGE MEDICATION ALONG WITH STRENGTH, DIRECTIONS, QUANTITY, AND REFILLS.

## 2018-03-04 NOTE — TELEPHONE ENCOUNTER
basaglar is already on his medication list.                    Please get more information regarding this issue.

## 2018-03-05 NOTE — TELEPHONE ENCOUNTER
Verified that Basaglar approval dated 11/2017 was received. Left message asking pt to call traige back. Triage please verify pt is using Basaglar.

## 2018-03-05 NOTE — TELEPHONE ENCOUNTER
Pt states he still has lantus until April when back to [place]. He will fill Basaglar and contact clinic if any side effects or concerns.

## 2018-04-09 ENCOUNTER — TELEPHONE (OUTPATIENT)
Dept: FAMILY MEDICINE | Facility: CLINIC | Age: 78
End: 2018-04-09

## 2018-04-09 NOTE — TELEPHONE ENCOUNTER
Our goal is to have forms completed within 72 hours, however some forms may require a visit or additional information.    What clinic location was the form placed at [facility] [place]    Who is the form from? Insurance  Where did the form come from? Faxed to clinic   The form was placed in the inbox of Kvng Richardson MD      Please fax to [phone]    Additional comments: Case- diabetic written order for test strips- needs missing info on form with initial and date by the changes    Call take on 4/9/2018 at 7:45 AM by Kalie Barnett

## 2018-04-10 ENCOUNTER — OFFICE VISIT (OUTPATIENT)
Dept: FAMILY MEDICINE | Facility: CLINIC | Age: 78
End: 2018-04-10
Payer: COMMERCIAL

## 2018-04-10 ENCOUNTER — RADIANT APPOINTMENT (OUTPATIENT)
Dept: GENERAL RADIOLOGY | Facility: CLINIC | Age: 78
End: 2018-04-10
Attending: INTERNAL MEDICINE
Payer: COMMERCIAL

## 2018-04-10 VITALS
SYSTOLIC BLOOD PRESSURE: 136 MMHG | BODY MASS INDEX: 23.66 KG/M2 | RESPIRATION RATE: 20 BRPM | DIASTOLIC BLOOD PRESSURE: 80 MMHG | WEIGHT: 169 LBS | OXYGEN SATURATION: 100 % | TEMPERATURE: 97.8 F | HEART RATE: 89 BPM | HEIGHT: 71 IN

## 2018-04-10 DIAGNOSIS — R32 URINARY INCONTINENCE, UNSPECIFIED TYPE: ICD-10-CM

## 2018-04-10 DIAGNOSIS — E11.21 TYPE 2 DIABETES MELLITUS WITH DIABETIC NEPHROPATHY, WITH LONG-TERM CURRENT USE OF INSULIN (H): Primary | ICD-10-CM

## 2018-04-10 DIAGNOSIS — S30.0XXA CONTUSION OF LOWER BACK, INITIAL ENCOUNTER: ICD-10-CM

## 2018-04-10 DIAGNOSIS — I10 HYPERTENSION GOAL BP (BLOOD PRESSURE) < 140/80: Chronic | ICD-10-CM

## 2018-04-10 DIAGNOSIS — E78.5 HYPERLIPIDEMIA LDL GOAL <100: Chronic | ICD-10-CM

## 2018-04-10 DIAGNOSIS — E87.1 HYPONATREMIA: ICD-10-CM

## 2018-04-10 DIAGNOSIS — Z79.4 TYPE 2 DIABETES MELLITUS WITH DIABETIC NEPHROPATHY, WITH LONG-TERM CURRENT USE OF INSULIN (H): Primary | ICD-10-CM

## 2018-04-10 DIAGNOSIS — R26.89 POOR BALANCE: ICD-10-CM

## 2018-04-10 LAB — HBA1C MFR BLD: 7.4 % (ref 0–6.4)

## 2018-04-10 PROCEDURE — 80053 COMPREHEN METABOLIC PANEL: CPT | Performed by: INTERNAL MEDICINE

## 2018-04-10 PROCEDURE — 82043 UR ALBUMIN QUANTITATIVE: CPT | Performed by: INTERNAL MEDICINE

## 2018-04-10 PROCEDURE — 99214 OFFICE O/P EST MOD 30 MIN: CPT | Performed by: INTERNAL MEDICINE

## 2018-04-10 PROCEDURE — 72100 X-RAY EXAM L-S SPINE 2/3 VWS: CPT | Mod: FY

## 2018-04-10 PROCEDURE — 83721 ASSAY OF BLOOD LIPOPROTEIN: CPT | Performed by: INTERNAL MEDICINE

## 2018-04-10 PROCEDURE — 83036 HEMOGLOBIN GLYCOSYLATED A1C: CPT | Performed by: INTERNAL MEDICINE

## 2018-04-10 PROCEDURE — 36415 COLL VENOUS BLD VENIPUNCTURE: CPT | Performed by: INTERNAL MEDICINE

## 2018-04-10 PROCEDURE — 84443 ASSAY THYROID STIM HORMONE: CPT | Performed by: INTERNAL MEDICINE

## 2018-04-10 RX ORDER — TOLTERODINE 2 MG/1
2 CAPSULE, EXTENDED RELEASE ORAL DAILY
Qty: 30 CAPSULE | Refills: 11 | Status: SHIPPED | OUTPATIENT
Start: 2018-04-10 | End: 2018-04-10

## 2018-04-10 NOTE — PROGRESS NOTES
"  SUBJECTIVE:   Jose Francisco Gonzalez is a 77 year old male who presents to clinic today for the following health issues:      Diabetes Follow-up      Patient is checking blood sugars:BID-TID    Diabetic concerns: None     Symptoms of hypoglycemia (low blood sugar): none     Paresthesias (numbness or burning in feet) or sores: No     Date of last diabetic eye exam: unknown    Hyperlipidemia Follow-Up      Rate your low fat/cholesterol diet?: good    Taking statin?  Yes, no muscle aches from statin    Other lipid medications/supplements?:  none    Hypertension Follow-up      Outpatient blood pressures are being checked at home.    Low Salt Diet: no added salt    BP Readings from Last 2 Encounters:   11/07/17 122/70   11/01/17 110/80     Hemoglobin A1C (%)   Date Value   11/07/2017 7.2 (H)   05/02/2017 7.4 (H)     LDL Cholesterol Calculated (mg/dL)   Date Value   01/25/2016 51     LDL Cholesterol Direct (mg/dL)   Date Value   05/02/2017 70   12/09/2014 78       Amount of exercise or physical activity: occ.    Problems taking medications regularly: No    Medication side effects: none    Diet: low salt, low fat/cholesterol and diabetic        Also check \"tailbone area\", fell about 2 weeks ago on [place], and having discomfort still.    Problem list and histories reviewed & adjusted, as indicated.                             Adjusts his insulin dose; checks TID, or sometimes BID. Tries to avoid hypoglycemia.                                    C/o dry mouth.   detrol 4 mg.                             He is still having sacral area pain pain after falling onto his bottom on the tennis court about 2 weeks ago.     He wants a referral back to the dizziness and balance clinic, for some further physical therapy.                     He relates that his alcoholic son is in remission.          Current Outpatient Prescriptions   Medication Sig Dispense Refill     glipiZIDE (GLUCOTROL XL) 10 MG 24 hr tablet TAKE 1 TABLET(10 MG) BY " "MOUTH DAILY 90 tablet 1     amLODIPine (NORVASC) 2.5 MG tablet TAKE 1 TABLET BY MOUTH EVERY DAY 90 tablet 2     blood glucose monitoring (ONETOUCH VERIO IQ) test strip Use to test blood sugars 3 times daily or as directed. 300 strip 3     tolterodine (DETROL LA) 4 MG 24 hr capsule Take 1 capsule (4 mg) by mouth daily 90 capsule 3     BASAGLAR 100 UNIT/ML injection 26 units per day or as directed 15 mL 11     lisinopril (PRINIVIL/ZESTRIL) 10 MG tablet TAKE 1 TABLET BY MOUTH EVERY DAY 90 tablet 1     B-D U/F 31G X 8 MM insulin pen needle USE DAILY AS DIRECTED 300 each 0     hydroxychloroquine (PLAQUENIL) 200 MG tablet Take 1 tablet (200 mg) by mouth daily 30 tablet      atorvastatin (LIPITOR) 20 MG tablet Take 1 tablet (20 mg) by mouth daily 90 tablet 3     order for DME Test strips for pt's glucometer, brand as covered by insurance Test QID and prn. He is on insulin. Patients preferred brand-Verio One Touch. 400 each 1     blood glucose (NO BRAND SPECIFIED) lancets standard Use to test blood sugar 2 times daily or as directed.                                        To use with his glucometer 1 Box 12     aspirin 81 MG tablet Take  by mouth daily.       No Known Allergies  BP Readings from Last 3 Encounters:   04/10/18 136/80   11/07/17 122/70   11/01/17 110/80    Wt Readings from Last 3 Encounters:   04/10/18 169 lb (76.7 kg)   11/07/17 171 lb (77.6 kg)   11/01/17 175 lb (79.4 kg)                    Reviewed and updated as needed this visit by clinical staff       Reviewed and updated as needed this visit by Provider         ROS:  CONSTITUTIONAL:NEGATIVE for fever, chills, change in weight  RESP:NEGATIVE for significant cough or SOB  CV: NEGATIVE for chest pain, palpitations or peripheral edema    OBJECTIVE:                                                    /80 (BP Location: Left arm, Patient Position: Chair, Cuff Size: Adult Large)  Pulse 89  Temp 97.8  F (36.6  C)  Resp 20  Ht 5' 11\" (1.803 m)  Wt 169 " lb (76.7 kg)  SpO2 100%  BMI 23.57 kg/m2  Body mass index is 23.57 kg/(m^2).  GENERAL APPEARANCE: alert and no distress  CV: regular rates and rhythm, normal S1 S2, no S3 or S4 and no murmur, click or rub  MS: No tenderness to palpation over the lumbar spine or sacrum    Diagnostic test results:  Pending  Xray -I do not see any sacral fractures or compression fractures, official report pending     ASSESSMENT/PLAN:                                                        ICD-10-CM    1. Type 2 diabetes mellitus with diabetic nephropathy, with long-term current use of insulin (H) E11.21 HEMOGLOBIN A1C    Z79.4 Albumin Random Urine Quantitative with Creat Ratio     Comprehensive metabolic panel (BMP + Alb, Alk Phos, ALT, AST, Total. Bili, TP)   2. Urinary incontinence, unspecified type R32 tolterodine (DETROL LA) 2 MG 24 hr capsule   3. Hyponatremia E87.1    4. Contusion of lower back, initial encounter S30.0XXA XR Lumbar Spine 2/3 Views   5. Hypertension goal BP (blood pressure) < 140/80 I10    6. Poor balance R26.89 NEUROLOGY ADULT REFERRAL    has been to [facility]   7. Hyperlipidemia LDL goal <100 E78.5 TSH WITH FREE T4 REFLEX     LDL cholesterol direct        Summary and implications:  We reviewed multiple issues.          Check labs and adjust medications as indicated.             Patient Instructions   I will let you know your lab results.            Try a donut cushion.                              You are planning to try a lower dose of tolterodine, to see if you have less dry mouth, but still good results with respect to your urinary symptoms.      Kvng Richardson MD  [facility]   Results for orders placed or performed in visit on 04/10/18   HEMOGLOBIN A1C   Result Value Ref Range    Hemoglobin A1C 7.4 (H) 0 - 6.4 %   Albumin Random Urine Quantitative with Creat Ratio   Result Value Ref Range    Creatinine Urine 62 mg/dL    Albumin Urine mg/L 30 mg/L    Albumin Urine mg/g Cr 49.11 (H)  0 - 17 mg/g Cr   TSH WITH FREE T4 REFLEX   Result Value Ref Range    TSH 1.19 0.40 - 4.00 mU/L   Comprehensive metabolic panel (BMP + Alb, Alk Phos, ALT, AST, Total. Bili, TP)   Result Value Ref Range    Sodium 128 (L) 133 - 144 mmol/L    Potassium 4.0 3.4 - 5.3 mmol/L    Chloride 93 (L) 94 - 109 mmol/L    Carbon Dioxide 30 20 - 32 mmol/L    Anion Gap 5 3 - 14 mmol/L    Glucose 128 (H) 70 - 99 mg/dL    Urea Nitrogen 11 7 - 30 mg/dL    Creatinine 0.67 0.66 - 1.25 mg/dL    GFR Estimate >90 >60 mL/min/1.7m2    GFR Estimate If Black >90 >60 mL/min/1.7m2    Calcium 8.8 8.5 - 10.1 mg/dL    Bilirubin Total 0.5 0.2 - 1.3 mg/dL    Albumin 4.0 3.4 - 5.0 g/dL    Protein Total 8.1 6.8 - 8.8 g/dL    Alkaline Phosphatase 95 40 - 150 U/L    ALT 21 0 - 70 U/L    AST 26 0 - 45 U/L   LDL cholesterol direct   Result Value Ref Range    LDL Cholesterol Direct 72 <100 mg/dL         Recent Results (from the past 48 hour(s))   XR Lumbar Spine 2/3 Views    Narrative    XR LUMBAR SPINE 2-3 VIEWS 4/10/2018 4:10 PM    COMPARISON: None.    HISTORY: Low back contusion.      Impression    IMPRESSION: Vertebral heights are preserved without evidence of  fracture. Multilevel bridging endplate spurs are seen. No significant  listhesis identified.    IRWIN MONTANEZ MD             Letter sent.            Your diabetes control is stable and adequate.       The A1C of 7.4 correlates to an average blood sugar of approximately 162.             Most of your other lab results are normal or stable,including the liver,kidney,thyroid,and the LDL cholesterol level.  The sodium remains low; try to restrict your water intake a bit more.

## 2018-04-10 NOTE — NURSING NOTE
"Chief Complaint   Patient presents with     Hypertension     Lipids     Diabetes       Initial /80 (BP Location: Left arm, Patient Position: Chair, Cuff Size: Adult Large)  Pulse 89  Temp 97.8  F (36.6  C)  Resp 20  Ht 5' 11\" (1.803 m)  Wt 169 lb (76.7 kg)  SpO2 100%  BMI 23.57 kg/m2 Estimated body mass index is 23.57 kg/(m^2) as calculated from the following:    Height as of this encounter: 5' 11\" (1.803 m).    Weight as of this encounter: 169 lb (76.7 kg).  Medication Reconciliation: complete   Soledad Wong LPN  "

## 2018-04-10 NOTE — MR AVS SNAPSHOT
After Visit Summary   4/10/2018    Jose Francisco Gonzalez    MRN: [id number]           Patient Information     Date Of Birth          1940        Visit Information        Provider Department      4/10/2018 3:15 PM Kvng Richardson MD [facility]        Today's Diagnoses     Type 2 diabetes mellitus with diabetic nephropathy, with long-term current use of insulin (H)    -  1    Urinary incontinence, unspecified type        Hyponatremia        Contusion of lower back, initial encounter        Hypertension goal BP (blood pressure) < 140/80        Poor balance        Hyperlipidemia LDL goal <100          Care Instructions    I will let you know your lab results.            Try a donut cushion.           Follow-ups after your visit        Additional Services     NEUROLOGY ADULT REFERRAL       Your provider has referred you for the following:   Consult at [facility] [phone]   [email or web address]           PLEASE RESUME ANOTHER COURSE OF PHYSICAL THERAPY; EVALUATE AND TREAT.   Please be aware that coverage of these services is subject to the terms and limitations of your health insurance plan.  Call member services at your health plan with any benefit or coverage questions.      Please bring the following with you to your appointment:    (1) Any X-Rays, CTs or MRIs which have been performed.  Contact the facility where they were done to arrange for  prior to your scheduled appointment.    (2) List of current medications  (3) This referral request   (4) Any documents/labs given to you for this referral                  Who to contact     If you have questions or need follow up information about today's clinic visit or your schedule please contact [facility] directly at [phone].  Normal or non-critical lab and imaging results will be communicated to you by MyChart, letter or phone  "within 4 business days after the clinic has received the results. If you do not hear from us within 7 days, please contact the clinic through Zazoo or phone. If you have a critical or abnormal lab result, we will notify you by phone as soon as possible.  Submit refill requests through Zazoo or call your pharmacy and they will forward the refill request to us. Please allow 3 business days for your refill to be completed.          Additional Information About Your Visit        Zazoo Information     Zazoo lets you send messages to your doctor, view your test results, renew your prescriptions, schedule appointments and more. To sign up, go to [email or web address] . Click on \"Log in\" on the left side of the screen, which will take you to the Welcome page. Then click on \"Sign up Now\" on the right side of the page.     You will be asked to enter the access code listed below, as well as some personal information. Please follow the directions to create your username and password.     Your access code is: [email or web address]  Expires: 2018  4:11 PM     Your access code will  in 90 days. If you need help or a new code, please call your [facility] clinic or [phone].        Care EveryWhere ID     This is your Care EveryWhere ID. This could be used by other organizations to access your [facility] medical records  [id number]        Your Vitals Were     Pulse Temperature Respirations Height Pulse Oximetry BMI (Body Mass Index)    89 97.8  F (36.6  C) 20 5' 11\" (1.803 m) 100% 23.57 kg/m2       Blood Pressure from Last 3 Encounters:   04/10/18 136/80   17 122/70   17 110/80    Weight from Last 3 Encounters:   04/10/18 169 lb (76.7 kg)   17 171 lb (77.6 kg)   17 175 lb (79.4 kg)              We Performed the Following     Albumin Random Urine Quantitative with Creat Ratio     Comprehensive metabolic panel (BMP + Alb, Alk Phos, ALT, AST, Total. Bili, TP)     HEMOGLOBIN A1C     LDL cholesterol " direct     NEUROLOGY ADULT REFERRAL     TSH WITH FREE T4 REFLEX          Today's Medication Changes          These changes are accurate as of 4/10/18  4:11 PM.  If you have any questions, ask your nurse or doctor.               These medicines have changed or have updated prescriptions.        Dose/Directions    tolterodine 2 MG 24 hr capsule   Commonly known as:  DETROL LA   This may have changed:    - medication strength  - how much to take   Used for:  Urinary incontinence, unspecified type   Changed by:  Kvng Richardson MD        Dose:  2 mg   Take 1 capsule (2 mg) by mouth daily   Quantity:  30 capsule   Refills:  11            Where to get your medicines      These medications were sent to [facility] [id number] [place] [address] [address] AT [facility] [address] [facility] [address]     Phone:  [phone]     tolterodine 2 MG 24 hr capsule                Primary Care Provider Office Phone # Fax #    Kvng Richardson [phone] [address] [facility] [address]        Equal Access to Services     [facility]: Hadii aad ku hadasho Soomaali, waaxda luqadaha, qaybta kaalmada adeegyada, waxay georges valladares . So [facility] [phone].    ATENCIÓN: Si habla español, tiene a quevedo disposición servicios grataroldoos de asistencia lingüística. Jair al [phone].    We comply with applicable federal civil rights laws and [place] laws. We do not discriminate on the basis of race, color, national origin, age, disability, sex, sexual orientation, or gender identity.            Thank you!     Thank you for choosing [facility]  for your care. Our goal is always to provide you with excellent care. Hearing back from our patients is one way we can continue to improve our services. Please take a few minutes to complete the written survey that you may receive in the mail after your visit with us. Thank  you!             Your Updated Medication List - Protect others around you: Learn how to safely use, store and throw away your medicines at www.disposemymeds.org.          This list is accurate as of 4/10/18  4:11 PM.  Always use your most recent med list.                   Brand Name Dispense Instructions for use Diagnosis    amLODIPine 2.5 MG tablet    NORVASC    90 tablet    TAKE 1 TABLET BY MOUTH EVERY DAY    Hypertension goal BP (blood pressure) < 140/80       aspirin 81 MG tablet      Take  by mouth daily.        atorvastatin 20 MG tablet    LIPITOR    90 tablet    Take 1 tablet (20 mg) by mouth daily    Hyperlipidemia LDL goal <100       B-D U/F 31G X 8 MM   Generic drug:  insulin pen needle     300 each    USE DAILY AS DIRECTED    Diabetes mellitus (H)       BASAGLAR 100 UNIT/ML injection     15 mL    26 units per day or as directed    Type 2 diabetes mellitus with diabetic nephropathy, with long-term current use of insulin (H)       blood glucose lancets standard    no brand specified    1 Box    Use to test blood sugar 2 times daily or as directed.                                        To use with his glucometer    Type 2 diabetes mellitus with diabetic nephropathy (H)       blood glucose monitoring test strip    ONETOUCH VERIO IQ    300 strip    Use to test blood sugars 3 times daily or as directed.    Type 2 diabetes mellitus with diabetic nephropathy, with long-term current use of insulin (H)       glipiZIDE 10 MG 24 hr tablet    GLUCOTROL XL    90 tablet    TAKE 1 TABLET(10 MG) BY MOUTH DAILY    Type 2 diabetes mellitus with diabetic nephropathy, without long-term current use of insulin (H)       hydroxychloroquine 200 MG tablet    PLAQUENIL    30 tablet    Take 1 tablet (200 mg) by mouth daily        lisinopril 10 MG tablet    PRINIVIL/ZESTRIL    90 tablet    TAKE 1 TABLET BY MOUTH EVERY DAY    Hypertension goal BP (blood pressure) < 140/80       order for DME     400 each    Test strips for pt's  glucometer, brand as covered by insurance Test QID and prn. He is on insulin. Patients preferred brand-Verio One Touch.    Type 2 diabetes mellitus with diabetic nephropathy, without long-term current use of insulin (H)       tolterodine 2 MG 24 hr capsule    DETROL LA    30 capsule    Take 1 capsule (2 mg) by mouth daily    Urinary incontinence, unspecified type

## 2018-04-10 NOTE — LETTER
April 12, 2018      Jose Francisco Gonzalez  [address]  [facility] [address]        Dear ,    We are writing to inform you of your test results.          Your diabetes control is stable and adequate.       The A1C of 7.4 correlates to an average blood sugar of approximately 162.             Most of your other lab results are normal or stable,including the liver,kidney,thyroid,and the LDL cholesterol level.  The sodium remains low; try to restrict your water intake a bit more.     Resulted Orders   HEMOGLOBIN A1C   Result Value Ref Range    Hemoglobin A1C 7.4 (H) 0 - 6.4 %      Comment:      Normal <5.7% Prediabetes 5.7-6.4%  Diabetes 6.5% or higher - adopted from ADA   consensus guidelines.     Albumin Random Urine Quantitative with Creat Ratio   Result Value Ref Range    Creatinine Urine 62 mg/dL    Albumin Urine mg/L 30 mg/L    Albumin Urine mg/g Cr 49.11 (H) 0 - 17 mg/g Cr   TSH WITH FREE T4 REFLEX   Result Value Ref Range    TSH 1.19 0.40 - 4.00 mU/L   Comprehensive metabolic panel (BMP + Alb, Alk Phos, ALT, AST, Total. Bili, TP)   Result Value Ref Range    Sodium 128 (L) 133 - 144 mmol/L    Potassium 4.0 3.4 - 5.3 mmol/L    Chloride 93 (L) 94 - 109 mmol/L    Carbon Dioxide 30 20 - 32 mmol/L    Anion Gap 5 3 - 14 mmol/L    Glucose 128 (H) 70 - 99 mg/dL    Urea Nitrogen 11 7 - 30 mg/dL    Creatinine 0.67 0.66 - 1.25 mg/dL    GFR Estimate >90 >60 mL/min/1.7m2      Comment:      Non  GFR Calc    GFR Estimate If Black >90 >60 mL/min/1.7m2      Comment:       GFR Calc    Calcium 8.8 8.5 - 10.1 mg/dL    Bilirubin Total 0.5 0.2 - 1.3 mg/dL    Albumin 4.0 3.4 - 5.0 g/dL    Protein Total 8.1 6.8 - 8.8 g/dL    Alkaline Phosphatase 95 40 - 150 U/L    ALT 21 0 - 70 U/L    AST 26 0 - 45 U/L   LDL cholesterol direct   Result Value Ref Range    LDL Cholesterol Direct 72 <100 mg/dL      Comment:      Desirable:       <100 mg/dl        Recent Results (from the past 48 hour(s))    XR Lumbar Spine 2/3 Views    Narrative    XR LUMBAR SPINE 2-3 VIEWS 4/10/2018 4:10 PM    COMPARISON: None.    HISTORY: Low back contusion.      Impression    IMPRESSION: Vertebral heights are preserved without evidence of  fracture. Multilevel bridging endplate spurs are seen. No significant  listhesis identified.    IRWIN MONTANEZ MD       If you have any questions or concerns, please call the clinic at the number listed above.       Sincerely,        Kvng Richardson MD

## 2018-04-10 NOTE — PATIENT INSTRUCTIONS
I will let you know your lab results.            Try a donut cushion.                              You are planning to try a lower dose of tolterodine, to see if you have less dry mouth, but still good results with respect to your urinary symptoms.

## 2018-04-11 ENCOUNTER — TELEPHONE (OUTPATIENT)
Dept: FAMILY MEDICINE | Facility: CLINIC | Age: 78
End: 2018-04-11

## 2018-04-11 DIAGNOSIS — R26.89 POOR BALANCE: Primary | ICD-10-CM

## 2018-04-11 RX ORDER — TOLTERODINE 2 MG/1
CAPSULE, EXTENDED RELEASE ORAL
Qty: 90 CAPSULE | Refills: 11 | Status: SHIPPED | OUTPATIENT
Start: 2018-04-11 | End: 2019-04-24

## 2018-04-11 NOTE — TELEPHONE ENCOUNTER
I called [facility] and they state that the patient has to be re-evaluated before they start or consider PT. Please advise if patient should go through with this or go elsewhere for PT.

## 2018-04-11 NOTE — TELEPHONE ENCOUNTER
He only wants physical therapy.  He has been evaluated by their physician in the past.  He does not want to go through the entire evaluation process again.Please let them know.

## 2018-04-11 NOTE — TELEPHONE ENCOUNTER
"Requested Prescriptions   Pending Prescriptions Disp Refills     tolterodine (DETROL LA) 2 MG 24 hr capsule [Pharmacy Med Name: TOLTERODINE TART 2MG ER CAPSULES] 90 capsule 11    Last Written Prescription Date:  04/10/2018  Last Fill Quantity: 30,  # refills: 11   Last office visit: 4/10/2018 with prescribing provider:  Dr. Richardson   Future Office Visit:   Sig: TAKE ONE CAPSULE BY MOUTH DAILY.    Muscarinic Antagonists (Urinary Incontinence Agents) Passed    4/10/2018  4:16 PM       Passed - Recent (12 mo) or future (30 days) visit within the authorizing provider's specialty    Patient had office visit in the last 12 months or has a visit in the next 30 days with authorizing provider or within the authorizing provider's specialty.  See \"Patient Info\" tab in inbasket, or \"Choose Columns\" in Meds & Orders section of the refill encounter.           Passed - Patient does not have a diagnosis of glaucoma on the problem list    If glaucoma diagnosis is new, refer refill to physician.         Passed - Patient is 18 years of age or older        "

## 2018-04-12 LAB
ALBUMIN SERPL-MCNC: 4 G/DL (ref 3.4–5)
ALP SERPL-CCNC: 95 U/L (ref 40–150)
ALT SERPL W P-5'-P-CCNC: 21 U/L (ref 0–70)
ANION GAP SERPL CALCULATED.3IONS-SCNC: 5 MMOL/L (ref 3–14)
AST SERPL W P-5'-P-CCNC: 26 U/L (ref 0–45)
BILIRUB SERPL-MCNC: 0.5 MG/DL (ref 0.2–1.3)
BUN SERPL-MCNC: 11 MG/DL (ref 7–30)
CALCIUM SERPL-MCNC: 8.8 MG/DL (ref 8.5–10.1)
CHLORIDE SERPL-SCNC: 93 MMOL/L (ref 94–109)
CO2 SERPL-SCNC: 30 MMOL/L (ref 20–32)
CREAT SERPL-MCNC: 0.67 MG/DL (ref 0.66–1.25)
CREAT UR-MCNC: 62 MG/DL
GFR SERPL CREATININE-BSD FRML MDRD: >90 ML/MIN/1.7M2
GLUCOSE SERPL-MCNC: 128 MG/DL (ref 70–99)
LDLC SERPL DIRECT ASSAY-MCNC: 72 MG/DL
MICROALBUMIN UR-MCNC: 30 MG/L
MICROALBUMIN/CREAT UR: 49.11 MG/G CR (ref 0–17)
POTASSIUM SERPL-SCNC: 4 MMOL/L (ref 3.4–5.3)
PROT SERPL-MCNC: 8.1 G/DL (ref 6.8–8.8)
SODIUM SERPL-SCNC: 128 MMOL/L (ref 133–144)
TSH SERPL DL<=0.005 MIU/L-ACNC: 1.19 MU/L (ref 0.4–4)

## 2018-04-24 DIAGNOSIS — I10 HYPERTENSION GOAL BP (BLOOD PRESSURE) < 140/80: Chronic | ICD-10-CM

## 2018-04-25 RX ORDER — LISINOPRIL 10 MG/1
TABLET ORAL
Qty: 90 TABLET | Refills: 3 | Status: SHIPPED | OUTPATIENT
Start: 2018-04-25 | End: 2021-06-10

## 2018-04-25 NOTE — TELEPHONE ENCOUNTER
"Requested Prescriptions   Pending Prescriptions Disp Refills     lisinopril (PRINIVIL/ZESTRIL) 10 MG tablet [Pharmacy Med Name: LISINOPRIL 10MG TABLETS] 90 tablet 0    Last Written Prescription Date:  10/16/2017  Last Fill Quantity: 90,  # refills: 1   Last office visit: 4/10/2018 with prescribing provider:  Dr. Richardson   Future Office Visit:   Sig: TAKE 1 TABLET BY MOUTH EVERY DAY    ACE Inhibitors (Including Combos) Protocol Passed    4/24/2018  9:48 AM       Passed - Blood pressure under 140/90 in past 12 months    BP Readings from Last 3 Encounters:   04/10/18 136/80   11/07/17 122/70   11/01/17 110/80                Passed - Recent (12 mo) or future (30 days) visit within the authorizing provider's specialty    Patient had office visit in the last 12 months or has a visit in the next 30 days with authorizing provider or within the authorizing provider's specialty.  See \"Patient Info\" tab in inbasket, or \"Choose Columns\" in Meds & Orders section of the refill encounter.           Passed - Patient is age 18 or older       Passed - Normal serum creatinine on file in past 12 months    Recent Labs   Lab Test  04/10/18   1609   CR  0.67            Passed - Normal serum potassium on file in past 12 months    Recent Labs   Lab Test  04/10/18   1609   POTASSIUM  4.0             "

## 2018-05-09 DIAGNOSIS — E78.5 HYPERLIPIDEMIA LDL GOAL <100: ICD-10-CM

## 2018-05-09 NOTE — TELEPHONE ENCOUNTER
"Requested Prescriptions   Pending Prescriptions Disp Refills     atorvastatin (LIPITOR) 20 MG tablet [Pharmacy Med Name: ATORVASTATIN 20MG TABLETS]  Last Written Prescription Date:  5/2/17  Last Fill Quantity: 90,  # refills: 3   Last office visit: 4/10/2018 with prescribing provider:  Debra   Future Office Visit:     90 tablet 0     Sig: TAKE 1 TABLET(20 MG) BY MOUTH DAILY    Statins Protocol Passed    5/9/2018  9:27 AM       Passed - LDL on file in past 12 months    Recent Labs   Lab Test  04/10/18   1609   LDL  72            Passed - No abnormal creatine kinase in past 12 months    No lab results found.            Passed - Recent (12 mo) or future (30 days) visit within the authorizing provider's specialty    Patient had office visit in the last 12 months or has a visit in the next 30 days with authorizing provider or within the authorizing provider's specialty.  See \"Patient Info\" tab in inbasket, or \"Choose Columns\" in Meds & Orders section of the refill encounter.           Passed - Patient is age 18 or older          "

## 2018-05-10 RX ORDER — ATORVASTATIN CALCIUM 20 MG/1
TABLET, FILM COATED ORAL
Qty: 90 TABLET | Refills: 3 | Status: SHIPPED | OUTPATIENT
Start: 2018-05-10 | End: 2019-05-29

## 2018-06-21 ENCOUNTER — TRANSFERRED RECORDS (OUTPATIENT)
Dept: HEALTH INFORMATION MANAGEMENT | Facility: CLINIC | Age: 78
End: 2018-06-21

## 2018-10-09 ENCOUNTER — OFFICE VISIT (OUTPATIENT)
Dept: FAMILY MEDICINE | Facility: CLINIC | Age: 78
End: 2018-10-09
Payer: COMMERCIAL

## 2018-10-09 VITALS
HEIGHT: 71 IN | HEART RATE: 86 BPM | RESPIRATION RATE: 20 BRPM | WEIGHT: 175 LBS | TEMPERATURE: 97.5 F | DIASTOLIC BLOOD PRESSURE: 78 MMHG | SYSTOLIC BLOOD PRESSURE: 130 MMHG | OXYGEN SATURATION: 97 % | BODY MASS INDEX: 24.5 KG/M2

## 2018-10-09 DIAGNOSIS — E87.1 HYPONATREMIA: ICD-10-CM

## 2018-10-09 DIAGNOSIS — Z79.4 TYPE 2 DIABETES MELLITUS WITH DIABETIC NEPHROPATHY, WITH LONG-TERM CURRENT USE OF INSULIN (H): Primary | ICD-10-CM

## 2018-10-09 DIAGNOSIS — Z23 NEED FOR PROPHYLACTIC VACCINATION AND INOCULATION AGAINST INFLUENZA: ICD-10-CM

## 2018-10-09 DIAGNOSIS — R39.15 URINARY URGENCY: ICD-10-CM

## 2018-10-09 DIAGNOSIS — E11.21 TYPE 2 DIABETES MELLITUS WITH DIABETIC NEPHROPATHY, WITH LONG-TERM CURRENT USE OF INSULIN (H): Primary | ICD-10-CM

## 2018-10-09 DIAGNOSIS — I10 HYPERTENSION GOAL BP (BLOOD PRESSURE) < 140/80: Chronic | ICD-10-CM

## 2018-10-09 LAB
HBA1C MFR BLD: 7.7 % (ref 0–5.6)
VIT B12 SERPL-MCNC: 388 PG/ML (ref 193–986)

## 2018-10-09 PROCEDURE — G0008 ADMIN INFLUENZA VIRUS VAC: HCPCS | Performed by: INTERNAL MEDICINE

## 2018-10-09 PROCEDURE — 90662 IIV NO PRSV INCREASED AG IM: CPT | Performed by: INTERNAL MEDICINE

## 2018-10-09 PROCEDURE — 83036 HEMOGLOBIN GLYCOSYLATED A1C: CPT | Performed by: INTERNAL MEDICINE

## 2018-10-09 PROCEDURE — 99214 OFFICE O/P EST MOD 30 MIN: CPT | Performed by: INTERNAL MEDICINE

## 2018-10-09 PROCEDURE — 80053 COMPREHEN METABOLIC PANEL: CPT | Performed by: INTERNAL MEDICINE

## 2018-10-09 PROCEDURE — 82607 VITAMIN B-12: CPT | Performed by: INTERNAL MEDICINE

## 2018-10-09 PROCEDURE — 36415 COLL VENOUS BLD VENIPUNCTURE: CPT | Performed by: INTERNAL MEDICINE

## 2018-10-09 RX ORDER — ANTIOX #8/OM3/DHA/EPA/LUT/ZEAX 250-2.5 MG
2 CAPSULE ORAL DAILY
COMMUNITY
Start: 2018-10-09 | End: 2023-01-01

## 2018-10-09 NOTE — PROGRESS NOTES
SUBJECTIVE:   Jose Francisco Gonzalez is a 78 year old male who presents to clinic today for the following health issues:      Diabetes Follow-up    Patient is checking blood sugars: 2 times daily.    Blood sugar testing frequency justification: Uncontrolled diabetes  Results are as follows:         am - ok              suppertime - variable    Diabetic concerns: None     Symptoms of hypoglycemia (low blood sugar): none     Paresthesias (numbness or burning in feet) or sores: No     Date of last diabetic eye exam: 10/8/2018    Diabetes Management Resources    Hyperlipidemia Follow-Up      Rate your low fat/cholesterol diet?: good    Taking statin?  Yes, no muscle aches from statin    Other lipid medications/supplements?:  none    Hypertension Follow-up      Outpatient blood pressures are being checked at home.  Results are good.    Low Salt Diet: no added salt    BP Readings from Last 2 Encounters:   04/10/18 136/80   11/07/17 122/70     Hemoglobin A1C (%)   Date Value   04/10/2018 7.4 (H)   11/07/2017 7.2 (H)     LDL Cholesterol Calculated (mg/dL)   Date Value   01/25/2016 51     LDL Cholesterol Direct (mg/dL)   Date Value   04/10/2018 72   05/02/2017 70       Amount of exercise or physical activity: None    Problems taking medications regularly: No    Medication side effects: none    Diet: low salt, low fat/cholesterol and diabetic                        78th birthday [place]          Going to [facility] soon.        Balance problems.         Plans on [employer].        Tennis is worse; golf is ok.                                     Basaglar dose variable.                                  Has ? about 81 mg ASA.               Still takes hydroxychloroquine; eye exam yesterday.        Alcoholic son is doing well.                                                                      Problem list and histories reviewed & adjusted, as indicated.      Current Outpatient Prescriptions   Medication Sig Dispense Refill      amLODIPine (NORVASC) 2.5 MG tablet TAKE 1 TABLET BY MOUTH EVERY DAY 90 tablet 2     aspirin 81 MG tablet Take  by mouth daily.       atorvastatin (LIPITOR) 20 MG tablet TAKE 1 TABLET(20 MG) BY MOUTH DAILY 90 tablet 3     B-D U/F 31G X 8 MM insulin pen needle USE DAILY AS DIRECTED 300 each 0     BASAGLAR 100 UNIT/ML injection 26 units per day or as directed 15 mL 11     blood glucose (NO BRAND SPECIFIED) lancets standard Use to test blood sugar 2 times daily or as directed.                                        To use with his glucometer 1 Box 12     blood glucose monitoring (ONETOUCH VERIO IQ) test strip Use to test blood sugars 3 times daily or as directed. 300 strip 3     glipiZIDE (GLUCOTROL XL) 10 MG 24 hr tablet TAKE 1 TABLET(10 MG) BY MOUTH DAILY 90 tablet 1     hydroxychloroquine (PLAQUENIL) 200 MG tablet Take 1 tablet (200 mg) by mouth daily 30 tablet      lisinopril (PRINIVIL/ZESTRIL) 10 MG tablet TAKE 1 TABLET BY MOUTH EVERY DAY 90 tablet 3     Multiple Vitamins-Minerals (PRESERVISION AREDS 2) CAPS 2 per day       order for DME Test strips for pt's glucometer, brand as covered by insurance Test QID and prn. He is on insulin. Patients preferred brand-Verio One Touch. 400 each 1     tolterodine (DETROL LA) 2 MG 24 hr capsule TAKE ONE CAPSULE BY MOUTH DAILY. 90 capsule 11     No Known Allergies  Recent Labs   Lab Test  04/10/18   1609  11/07/17   0801  05/02/17   1207   01/25/16   0948   12/14/11   1518   A1C  7.4*  7.2*  7.4*   < >  7.7*   < >   --    LDL  72   --   70   --   51   < >  68.8   HDL   --    --    --    --   51   --   49   TRIG   --    --    --    --   79   --   86   ALT  21  27  22   < >  22   < >   --    CR  0.67  0.70  0.75   < >  1.00   < >   --    GFRESTIMATED  >90  >90  >90  Non African American GFR Calc     < >  72   < >   --    GFRESTBLACK  >90  >90  >90  African American GFR Calc     < >  88   < >   --    POTASSIUM  4.0  4.2  4.2   < >  4.1   < >   --    TSH  1.19   --    --    --    "1.53   < >   --     < > = values in this interval not displayed.      BP Readings from Last 3 Encounters:   10/09/18 130/78   04/10/18 136/80   11/07/17 122/70    Wt Readings from Last 3 Encounters:   10/09/18 175 lb (79.4 kg)   04/10/18 169 lb (76.7 kg)   11/07/17 171 lb (77.6 kg)                    Reviewed and updated as needed this visit by clinical staff       Reviewed and updated as needed this visit by Provider         ROS:  CONSTITUTIONAL:NEGATIVE for fever, chills, change in weight  RESP:NEGATIVE for significant cough or SOB  CV: NEGATIVE for chest pain, palpitations or peripheral edema    OBJECTIVE:                                                    /78 (BP Location: Left arm, Patient Position: Chair, Cuff Size: Adult Large)  Pulse 86  Temp 97.5  F (36.4  C)  Resp 20  Ht 5' 11\" (1.803 m)  Wt 175 lb (79.4 kg)  SpO2 97%  BMI 24.41 kg/m2  Body mass index is 24.41 kg/(m^2).  GENERAL APPEARANCE: alert and no distress  RESP: lungs clear to auscultation - no rales, rhonchi or wheezes  CV: regular rates and rhythm, normal S1 S2, no S3 or S4 and no murmur, click or rub    Diagnostic test results:  pending     ASSESSMENT/PLAN:                                                        ICD-10-CM    1. Type 2 diabetes mellitus with diabetic nephropathy, with long-term current use of insulin (H) E11.21     Z79.4    2. Hypertension goal BP (blood pressure) < 140/80 I10    3. Hyponatremia E87.1    4. Urinary urgency R39.15    5. Screening for diabetic peripheral neuropathy Z13.89 FOOT EXAM  NO CHARGE [06726.114]   6. Need for prophylactic vaccination and inoculation against influenza Z23        Summary and implications:  Multiple issues.               Overall stable.  Check labs and adjust medications as indicated.    Follow up with Provider - 6 months   Patient Instructions   I will let you know your lab results.                                                                                                     "                                     Consider getting the shingles vaccine (Shingrix) at your pharmacy.     Have a good,safe winter.                                        Kvng Richardson MD  [facility]          Results for orders placed or performed in visit on 10/09/18   HEMOGLOBIN A1C   Result Value Ref Range    Hemoglobin A1C 7.7 (H) 0 - 5.6 %   Comprehensive metabolic panel (BMP + Alb, Alk Phos, ALT, AST, Total. Bili, TP)   Result Value Ref Range    Sodium 127 (L) 133 - 144 mmol/L    Potassium 4.2 3.4 - 5.3 mmol/L    Chloride 91 (L) 94 - 109 mmol/L    Carbon Dioxide 29 20 - 32 mmol/L    Anion Gap 7 3 - 14 mmol/L    Glucose 240 (H) 70 - 99 mg/dL    Urea Nitrogen 13 7 - 30 mg/dL    Creatinine 0.71 0.66 - 1.25 mg/dL    GFR Estimate >90 >60 mL/min/1.7m2    GFR Estimate If Black >90 >60 mL/min/1.7m2    Calcium 8.8 8.5 - 10.1 mg/dL    Bilirubin Total 0.7 0.2 - 1.3 mg/dL    Albumin 3.8 3.4 - 5.0 g/dL    Protein Total 8.0 6.8 - 8.8 g/dL    Alkaline Phosphatase 72 40 - 150 U/L    ALT 24 0 - 70 U/L    AST 26 0 - 45 U/L   Vitamin B12   Result Value Ref Range    Vitamin B12 388 193 - 986 pg/mL     Letter sent.        Your diabetes control is not quite as good.       The A1C of 7.7 correlates to an average blood sugar of approximately 171.       This is considered acceptable, however, for age 78.        Your other lab results are normal or stable,including the liver,kidney, B12, and potassium levels.            The sodium level is chronically low.        Please remember to minimize your water intake.

## 2018-10-09 NOTE — NURSING NOTE
Prior to injection verified patient identity using patient's name and date of birth.  Due to injection administration, patient instructed to remain in clinic for 15 minutes  afterwards, and to report any adverse reaction to me immediately.  Soledad Wong LPN

## 2018-10-09 NOTE — PATIENT INSTRUCTIONS
I will let you know your lab results.                                                                                                                                         Consider getting the shingles vaccine (Shingrix) at your pharmacy.     Have a good,safe winter.

## 2018-10-09 NOTE — LETTER
October 10, 2018      Jose Francisco Gonzalez  [address]  [facility] [address]        Dear ,    We are writing to inform you of your test results.            Your diabetes control is not quite as good.       The A1C of 7.7 correlates to an average blood sugar of approximately 171.       This is considered acceptable, however, for age 78.                  Your other lab results are normal or stable,including the liver,kidney, B12, and potassium levels.            The sodium level is chronically low.        Please remember to minimize your water intake.    Resulted Orders   HEMOGLOBIN A1C   Result Value Ref Range    Hemoglobin A1C 7.7 (H) 0 - 5.6 %      Comment:      Normal <5.7% Prediabetes 5.7-6.4%  Diabetes 6.5% or higher - adopted from ADA   consensus guidelines.     Comprehensive metabolic panel (BMP + Alb, Alk Phos, ALT, AST, Total. Bili, TP)   Result Value Ref Range    Sodium 127 (L) 133 - 144 mmol/L    Potassium 4.2 3.4 - 5.3 mmol/L    Chloride 91 (L) 94 - 109 mmol/L    Carbon Dioxide 29 20 - 32 mmol/L    Anion Gap 7 3 - 14 mmol/L    Glucose 240 (H) 70 - 99 mg/dL    Urea Nitrogen 13 7 - 30 mg/dL    Creatinine 0.71 0.66 - 1.25 mg/dL    GFR Estimate >90 >60 mL/min/1.7m2      Comment:      Non  GFR Calc    GFR Estimate If Black >90 >60 mL/min/1.7m2      Comment:       GFR Calc    Calcium 8.8 8.5 - 10.1 mg/dL    Bilirubin Total 0.7 0.2 - 1.3 mg/dL    Albumin 3.8 3.4 - 5.0 g/dL    Protein Total 8.0 6.8 - 8.8 g/dL    Alkaline Phosphatase 72 40 - 150 U/L    ALT 24 0 - 70 U/L    AST 26 0 - 45 U/L   Vitamin B12   Result Value Ref Range    Vitamin B12 388 193 - 986 pg/mL       If you have any questions or concerns, please call the clinic at the number listed above.       Sincerely,        Kvng Richardson MD

## 2018-10-09 NOTE — MR AVS SNAPSHOT
After Visit Summary   10/9/2018    Jose Francisco Gonzalez    MRN: [id number]           Patient Information     Date Of Birth          1940        Visit Information        Provider Department      10/9/2018 9:30 AM Kvng Richardson MD [facility]        Today's Diagnoses     Type 2 diabetes mellitus with diabetic nephropathy, with long-term current use of insulin (H)    -  1    Hypertension goal BP (blood pressure) < 140/80        Hyponatremia        Urinary urgency        Need for prophylactic vaccination and inoculation against influenza          Care Instructions    I will let you know your lab results.                                                                                                                                         Consider getting the shingles vaccine (Shingrix) at your pharmacy.     Have a good,safe winter.                                            Follow-ups after your visit        Follow-up notes from your care team     Return in about 6 months (around 4/9/2019) for BP Recheck, diabetes, labs will be needed.      Who to contact     If you have questions or need follow up information about today's clinic visit or your schedule please contact [facility] directly at [phone].  Normal or non-critical lab and imaging results will be communicated to you by MyChart, letter or phone within 4 business days after the clinic has received the results. If you do not hear from us within 7 days, please contact the clinic through MyChart or phone. If you have a critical or abnormal lab result, we will notify you by phone as soon as possible.  Submit refill requests through [employer] or call your pharmacy and they will forward the refill request to us. Please allow 3 business days for your refill to be completed.          Additional Information About Your Visit        Care EveryWhere ID     This is your Care EveryWhere ID. This  "could be used by other organizations to access your [facility] medical records  [id number]        Your Vitals Were     Pulse Temperature Respirations Height Pulse Oximetry BMI (Body Mass Index)    86 97.5  F (36.4  C) 20 5' 11\" (1.803 m) 97% 24.41 kg/m2       Blood Pressure from Last 3 Encounters:   10/09/18 130/78   04/10/18 136/80   11/07/17 122/70    Weight from Last 3 Encounters:   10/09/18 175 lb (79.4 kg)   04/10/18 169 lb (76.7 kg)   11/07/17 171 lb (77.6 kg)              We Performed the Following     Comprehensive metabolic panel (BMP + Alb, Alk Phos, ALT, AST, Total. Bili, TP)     HEMOGLOBIN A1C     Vitamin B12        Primary Care Provider Office Phone # Fax #    Kvng Richardson [phone] [address] [facility] [address]        Equal Access to Services     WAYNE MAYORGA : Hadii aad ku hadasho Soomaali, waaxda luqadaha, qaybta kaalmada adeegyada, waxay idiin hayaan helena manceraarahector la'josey . So [facility] [phone].    ATENCIÓN: Si habla español, tiene a quevedo disposición servicios gratuitos de asistencia lingüística. Jair al [phone].    We comply with applicable federal civil rights laws and [place] laws. We do not discriminate on the basis of race, color, national origin, age, disability, sex, sexual orientation, or gender identity.            Thank you!     Thank you for choosing [facility]  for your care. Our goal is always to provide you with excellent care. Hearing back from our patients is one way we can continue to improve our services. Please take a few minutes to complete the written survey that you may receive in the mail after your visit with us. Thank you!             Your Updated Medication List - Protect others around you: Learn how to safely use, store and throw away your medicines at www.disposemymeds.org.          This list is accurate as of 10/9/18  9:51 AM.  Always use your most recent med list.                   Brand Name " Dispense Instructions for use Diagnosis    amLODIPine 2.5 MG tablet    NORVASC    90 tablet    TAKE 1 TABLET BY MOUTH EVERY DAY    Hypertension goal BP (blood pressure) < 140/80       aspirin 81 MG tablet      Take  by mouth daily.        atorvastatin 20 MG tablet    LIPITOR    90 tablet    TAKE 1 TABLET(20 MG) BY MOUTH DAILY    Hyperlipidemia LDL goal <100       B-D U/F 31G X 8 MM   Generic drug:  insulin pen needle     300 each    USE DAILY AS DIRECTED    Diabetes mellitus (H)       BASAGLAR 100 UNIT/ML injection     15 mL    26 units per day or as directed    Type 2 diabetes mellitus with diabetic nephropathy, with long-term current use of insulin (H)       blood glucose lancets standard    no brand specified    1 Box    Use to test blood sugar 2 times daily or as directed.                                        To use with his glucometer    Type 2 diabetes mellitus with diabetic nephropathy (H)       blood glucose monitoring test strip    ONETOUCH VERIO IQ    300 strip    Use to test blood sugars 3 times daily or as directed.    Type 2 diabetes mellitus with diabetic nephropathy, with long-term current use of insulin (H)       glipiZIDE 10 MG 24 hr tablet    GLUCOTROL XL    90 tablet    TAKE 1 TABLET(10 MG) BY MOUTH DAILY    Type 2 diabetes mellitus with diabetic nephropathy, without long-term current use of insulin (H)       hydroxychloroquine 200 MG tablet    PLAQUENIL    30 tablet    Take 1 tablet (200 mg) by mouth daily        lisinopril 10 MG tablet    PRINIVIL/ZESTRIL    90 tablet    TAKE 1 TABLET BY MOUTH EVERY DAY    Hypertension goal BP (blood pressure) < 140/80       order for DME     400 each    Test strips for pt's glucometer, brand as covered by insurance Test QID and prn. He is on insulin. Patients preferred brand-Verio One Touch.    Type 2 diabetes mellitus with diabetic nephropathy, without long-term current use of insulin (H)       PRESERVISION AREDS 2 Caps      2 per day        tolterodine 2 MG  24 hr capsule    DETROL LA    90 capsule    TAKE ONE CAPSULE BY MOUTH DAILY.    Urinary incontinence, unspecified type

## 2018-10-10 LAB
ALBUMIN SERPL-MCNC: 3.8 G/DL (ref 3.4–5)
ALP SERPL-CCNC: 72 U/L (ref 40–150)
ALT SERPL W P-5'-P-CCNC: 24 U/L (ref 0–70)
ANION GAP SERPL CALCULATED.3IONS-SCNC: 7 MMOL/L (ref 3–14)
AST SERPL W P-5'-P-CCNC: 26 U/L (ref 0–45)
BILIRUB SERPL-MCNC: 0.7 MG/DL (ref 0.2–1.3)
BUN SERPL-MCNC: 13 MG/DL (ref 7–30)
CALCIUM SERPL-MCNC: 8.8 MG/DL (ref 8.5–10.1)
CHLORIDE SERPL-SCNC: 91 MMOL/L (ref 94–109)
CO2 SERPL-SCNC: 29 MMOL/L (ref 20–32)
CREAT SERPL-MCNC: 0.71 MG/DL (ref 0.66–1.25)
GFR SERPL CREATININE-BSD FRML MDRD: >90 ML/MIN/1.7M2
GLUCOSE SERPL-MCNC: 240 MG/DL (ref 70–99)
POTASSIUM SERPL-SCNC: 4.2 MMOL/L (ref 3.4–5.3)
PROT SERPL-MCNC: 8 G/DL (ref 6.8–8.8)
SODIUM SERPL-SCNC: 127 MMOL/L (ref 133–144)

## 2018-12-13 DIAGNOSIS — E11.9 DIABETES MELLITUS (H): ICD-10-CM

## 2018-12-13 NOTE — TELEPHONE ENCOUNTER
"Requested Prescriptions   Pending Prescriptions Disp Refills     insulin pen needle (B-D U/F) 31G X 8 MM miscellaneous  Last Written Prescription Date:  7/3/2017  Last Fill Quantity: 300 each,  # refills: 0   Last office visit: 10/9/2018 with prescribing provider:  Debra   Future Office Visit:   300 each 0     Sig: Use pen needles daily or as directed.    Diabetic Supplies Protocol Passed - 12/13/2018  9:58 AM       Passed - Patient is 18 years of age or older       Passed - Recent (6 mo) or future (30 days) visit within the authorizing provider's specialty    Patient had office visit in the last 6 months or has a visit in the next 30 days with authorizing provider.  See \"Patient Info\" tab in inbasket, or \"Choose Columns\" in Meds & Orders section of the refill encounter.               "

## 2019-01-07 DIAGNOSIS — Z79.4 TYPE 2 DIABETES MELLITUS WITH DIABETIC NEPHROPATHY, WITH LONG-TERM CURRENT USE OF INSULIN (H): ICD-10-CM

## 2019-01-07 DIAGNOSIS — E11.21 TYPE 2 DIABETES MELLITUS WITH DIABETIC NEPHROPATHY, WITH LONG-TERM CURRENT USE OF INSULIN (H): ICD-10-CM

## 2019-01-08 NOTE — TELEPHONE ENCOUNTER
"BASAGLAR 100 U/ML KWIKPEN INJ 3ML  Last Written Prescription Date:  11/10/17  Last Fill Quantity: 15ml,  # refills: 11   Last office visit: 10/9/2018 with prescribing provider:  10/09/18   Future Office Visit:    Requested Prescriptions   Pending Prescriptions Disp Refills     insulin glargine (BASAGLAR KWIKPEN) 100 UNIT/ML pen [Pharmacy Med Name: BASAGLAR 100 U/ML KWIKPEN INJ 3ML]  0     Sig: INJECT 26 UNITS DAILY OR AS DIRECTED    Long Acting Insulin Protocol Passed - 1/7/2019 10:54 AM       Passed - Blood pressure less than 140/90 in past 6 months    BP Readings from Last 3 Encounters:   10/09/18 130/78   04/10/18 136/80   11/07/17 122/70                Passed - LDL on file in past 12 months    Recent Labs   Lab Test 04/10/18  1609   LDL 72            Passed - Microalbumin on file in past 12 months    Recent Labs   Lab Test 04/10/18  1623   MICROL 30   UMALCR 49.11*            Passed - Serum creatinine on file in past 12 months    Recent Labs   Lab Test 10/09/18  1000   CR 0.71            Passed - HgbA1C in past 3 or 6 months    If HgbA1C is 8 or greater, it needs to be on file within the past 3 months.  If less than 8, must be on file within the past 6 months.     Recent Labs   Lab Test 10/09/18  1000   A1C 7.7*            Passed - Medication is active on med list       Passed - Patient is age 18 or older       Passed - Recent (6 mo) or future (30 days) visit within the authorizing provider's specialty    Patient had office visit in the last 6 months or has a visit in the next 30 days with authorizing provider or within the authorizing provider's specialty.  See \"Patient Info\" tab in inbasket, or \"Choose Columns\" in Meds & Orders section of the refill encounter.              "

## 2019-01-10 RX ORDER — INSULIN GLARGINE 100 [IU]/ML
INJECTION, SOLUTION SUBCUTANEOUS
Qty: 15 ML | Refills: 1 | Status: SHIPPED | OUTPATIENT
Start: 2019-01-10 | End: 2019-06-11

## 2019-04-24 ENCOUNTER — OFFICE VISIT (OUTPATIENT)
Dept: FAMILY MEDICINE | Facility: CLINIC | Age: 79
End: 2019-04-24
Payer: COMMERCIAL

## 2019-04-24 VITALS
DIASTOLIC BLOOD PRESSURE: 80 MMHG | HEART RATE: 79 BPM | SYSTOLIC BLOOD PRESSURE: 120 MMHG | RESPIRATION RATE: 20 BRPM | OXYGEN SATURATION: 100 % | BODY MASS INDEX: 24.22 KG/M2 | TEMPERATURE: 97.5 F | WEIGHT: 173 LBS | HEIGHT: 71 IN

## 2019-04-24 DIAGNOSIS — Z13.89 SCREENING FOR DIABETIC PERIPHERAL NEUROPATHY: ICD-10-CM

## 2019-04-24 DIAGNOSIS — E11.21 TYPE 2 DIABETES MELLITUS WITH DIABETIC NEPHROPATHY, WITH LONG-TERM CURRENT USE OF INSULIN (H): Primary | ICD-10-CM

## 2019-04-24 DIAGNOSIS — I10 HYPERTENSION GOAL BP (BLOOD PRESSURE) < 140/80: Chronic | ICD-10-CM

## 2019-04-24 DIAGNOSIS — R26.89 POOR BALANCE: ICD-10-CM

## 2019-04-24 DIAGNOSIS — Z79.4 TYPE 2 DIABETES MELLITUS WITH DIABETIC NEPHROPATHY, WITH LONG-TERM CURRENT USE OF INSULIN (H): Primary | ICD-10-CM

## 2019-04-24 DIAGNOSIS — E87.1 HYPONATREMIA: ICD-10-CM

## 2019-04-24 LAB — HBA1C MFR BLD: 7 % (ref 0–5.6)

## 2019-04-24 PROCEDURE — 36415 COLL VENOUS BLD VENIPUNCTURE: CPT | Performed by: INTERNAL MEDICINE

## 2019-04-24 PROCEDURE — 99207 C FOOT EXAM  NO CHARGE: CPT | Performed by: INTERNAL MEDICINE

## 2019-04-24 PROCEDURE — 99214 OFFICE O/P EST MOD 30 MIN: CPT | Performed by: INTERNAL MEDICINE

## 2019-04-24 PROCEDURE — 80053 COMPREHEN METABOLIC PANEL: CPT | Performed by: INTERNAL MEDICINE

## 2019-04-24 PROCEDURE — 83036 HEMOGLOBIN GLYCOSYLATED A1C: CPT | Performed by: INTERNAL MEDICINE

## 2019-04-24 ASSESSMENT — MIFFLIN-ST. JEOR: SCORE: 1516.85

## 2019-04-24 NOTE — LETTER
April 25, 2019      Jose Francisco Gonzalez  [address]  [facility] [address]        Dear ,    We are writing to inform you of your test results.           Your diabetes control is better, and is acceptable.       The A1C of 7 correlates to an average blood sugar of approximately 150.               The sodium level is better;you should keep eating pretzels and similar foods.     Your other lab results are normal,including the liver,kidney,and potassium levels.      Results for orders placed or performed in visit on 04/24/19   Comprehensive metabolic panel (BMP + Alb, Alk Phos, ALT, AST, Total. Bili, TP)   Result Value Ref Range    Sodium 132 (L) 133 - 144 mmol/L    Potassium 4.1 3.4 - 5.3 mmol/L    Chloride 96 94 - 109 mmol/L    Carbon Dioxide 28 20 - 32 mmol/L    Anion Gap 8 3 - 14 mmol/L    Glucose 112 (H) 70 - 99 mg/dL    Urea Nitrogen 10 7 - 30 mg/dL    Creatinine 0.76 0.66 - 1.25 mg/dL    GFR Estimate 85 >60 mL/min/[1.73_m2]    GFR Estimate If Black >90 >60 mL/min/[1.73_m2]    Calcium 9.2 8.5 - 10.1 mg/dL    Bilirubin Total 0.6 0.2 - 1.3 mg/dL    Albumin 4.0 3.4 - 5.0 g/dL    Protein Total 8.0 6.8 - 8.8 g/dL    Alkaline Phosphatase 62 40 - 150 U/L    ALT 27 0 - 70 U/L    AST 30 0 - 45 U/L   Hemoglobin A1c   Result Value Ref Range    Hemoglobin A1C 7.0 (H) 0 - 5.6 %         If you have any questions or concerns, please call the clinic at the number listed above.       Sincerely,        Kvng Richardson MD

## 2019-04-24 NOTE — PROGRESS NOTES
"  SUBJECTIVE:   Jose Francisco Gonzalez is a 78 year old male who presents to clinic today for the following   health issues:      Diabetes Follow-up    Patient is checking blood sugars: two times daily.    Blood sugar testing frequency justification: Uncontrolled diabetes          Diabetic concerns: None     Symptoms of hypoglycemia (low blood sugar): none     Paresthesias (numbness or burning in feet) or sores: No, but \"cold sensation\"     Date of last diabetic eye exam: 10/2018    BP Readings from Last 2 Encounters:   10/09/18 130/78   04/10/18 136/80     Hemoglobin A1C (%)   Date Value   10/09/2018 7.7 (H)   04/10/2018 7.4 (H)     LDL Cholesterol Calculated (mg/dL)   Date Value   01/25/2016 51     LDL Cholesterol Direct (mg/dL)   Date Value   04/10/2018 72   05/02/2017 70       Diabetes Management Resources    Amount of exercise or physical activity: None    Problems taking medications regularly: No    Medication side effects: none    Diet: low salt, low fat/cholesterol and diabetic        Hyperlipidemia Follow-Up      Rate your low fat/cholesterol diet?: good    Taking statin?  Yes, no muscle aches from statin    Other lipid medications/supplements?:  none    Hypertension Follow-up      Outpatient blood pressures are being checked at home- very occ.    Low Salt Diet: no added salt              Balance is poor.                    Stopped playing tennis.      Walks and golfs.               Quit taking detrol; SE of dry mouth.                       Glucoses are \"good\".          Has cut back Basaglar to as low as 15 units; usually 20 or so.                       Sometime the full 26 units.                     Has not gone back to [facility].     He does some of their exercises.              He also went to [employer] class.                       Reviewed  and updated as needed this visit by clinical staff  Allergies         Reviewed and updated as needed this visit by Provider         Current Outpatient Medications   Medication " Sig Dispense Refill     amLODIPine (NORVASC) 2.5 MG tablet TAKE 1 TABLET BY MOUTH EVERY DAY 90 tablet 2     aspirin 81 MG tablet Take  by mouth daily.       atorvastatin (LIPITOR) 20 MG tablet TAKE 1 TABLET(20 MG) BY MOUTH DAILY 90 tablet 3     blood glucose (NO BRAND SPECIFIED) lancets standard Use to test blood sugar 2 times daily or as directed.                                        To use with his glucometer 1 Box 12     blood glucose monitoring (ONETOUCH VERIO IQ) test strip Use to test blood sugars 3 times daily or as directed. 300 strip 3     glipiZIDE (GLUCOTROL XL) 10 MG 24 hr tablet TAKE 1 TABLET(10 MG) BY MOUTH DAILY 90 tablet 0     hydroxychloroquine (PLAQUENIL) 200 MG tablet Take 1 tablet (200 mg) by mouth daily 30 tablet      insulin glargine (BASAGLAR KWIKPEN) 100 UNIT/ML pen INJECT 26 UNITS DAILY OR AS DIRECTED 15 mL 1     insulin pen needle (B-D U/F) 31G X 8 MM miscellaneous USE DAILY AS DIRECTED 50 each 0     lisinopril (PRINIVIL/ZESTRIL) 10 MG tablet TAKE 1 TABLET BY MOUTH EVERY DAY 90 tablet 3     Multiple Vitamins-Minerals (PRESERVISION AREDS 2) CAPS 2 per day       order for DME Test strips for pt's glucometer, brand as covered by insurance Test QID and prn. He is on insulin. Patients preferred brand-Verio One Touch. 400 each 1     No Known Allergies  Recent Labs   Lab Test 10/09/18  1000 04/10/18  1609 11/07/17  0801 05/02/17  1207  01/25/16  0948  12/14/11  1518   A1C 7.7* 7.4* 7.2* 7.4*   < > 7.7*   < >  --    LDL  --  72  --  70  --  51   < > 68.8   HDL  --   --   --   --   --  51  --  49   TRIG  --   --   --   --   --  79  --  86   ALT 24 21 27 22   < > 22   < >  --    CR 0.71 0.67 0.70 0.75   < > 1.00   < >  --    GFRESTIMATED >90 >90 >90 >90  Non African American GFR Calc     < > 72   < >  --    GFRESTBLACK >90 >90 >90 >90  African American GFR Calc     < > 88   < >  --    POTASSIUM 4.2 4.0 4.2 4.2   < > 4.1   < >  --    TSH  --  1.19  --   --   --  1.53   < >  --     < > = values in  "this interval not displayed.      BP Readings from Last 3 Encounters:   04/24/19 120/80   10/09/18 130/78   04/10/18 136/80    Wt Readings from Last 3 Encounters:   04/24/19 78.5 kg (173 lb)   10/09/18 79.4 kg (175 lb)   04/10/18 76.7 kg (169 lb)                    ROS:  Constitutional, HEENT, cardiovascular, pulmonary, gi and gu systems are negative, except as otherwise noted.    OBJECTIVE:                                                    /80 (BP Location: Left arm, Patient Position: Chair, Cuff Size: Adult Regular)   Pulse 79   Temp 97.5  F (36.4  C)   Resp 20   Ht 1.803 m (5' 11\")   Wt 78.5 kg (173 lb)   SpO2 100%   BMI 24.13 kg/m    Body mass index is 24.13 kg/m .  GENERAL APPEARANCE: healthy, alert and no distress  RESP: lungs clear to auscultation - no rales, rhonchi or wheezes  CV: regular rates and rhythm, normal S1 S2, no S3 or S4 and no murmur, click or rub  DIABETIC FOOT EXAM: normal DP and PT pulses and no trophic changes or ulcerative lesions    Diagnostic test results:    pending     ASSESSMENT/PLAN:                                                        ICD-10-CM    1. Type 2 diabetes mellitus with diabetic nephropathy, with long-term current use of insulin (H) E11.21 Comprehensive metabolic panel (BMP + Alb, Alk Phos, ALT, AST, Total. Bili, TP)    Z79.4 Hemoglobin A1c   2. Hyponatremia E87.1 Comprehensive metabolic panel (BMP + Alb, Alk Phos, ALT, AST, Total. Bili, TP)   3. Hypertension goal BP (blood pressure) < 140/80 I10    4. Screening for diabetic peripheral neuropathy Z13.89 FOOT EXAM  NO CHARGE [73269.114]       Summary and implications:  We reviewed multiple issues.           We reviewed all of the issues on the diagnoses list.           Overall, he is stable.           Check labs and adjust medications as indicated.        Continue balance exercises.    He plans to see Rheumatology soon.                      Patient Instructions   I will let you know your lab results.  Have a " good spring and summer!      Return in about 6 months (around 10/24/2019) for diabetes follow up, BP Recheck, labs will be needed.      Kvng Richardson MD  [facility]   Results for orders placed or performed in visit on 04/24/19   Comprehensive metabolic panel (BMP + Alb, Alk Phos, ALT, AST, Total. Bili, TP)   Result Value Ref Range    Sodium 132 (L) 133 - 144 mmol/L    Potassium 4.1 3.4 - 5.3 mmol/L    Chloride 96 94 - 109 mmol/L    Carbon Dioxide 28 20 - 32 mmol/L    Anion Gap 8 3 - 14 mmol/L    Glucose 112 (H) 70 - 99 mg/dL    Urea Nitrogen 10 7 - 30 mg/dL    Creatinine 0.76 0.66 - 1.25 mg/dL    GFR Estimate 85 >60 mL/min/[1.73_m2]    GFR Estimate If Black >90 >60 mL/min/[1.73_m2]    Calcium 9.2 8.5 - 10.1 mg/dL    Bilirubin Total 0.6 0.2 - 1.3 mg/dL    Albumin 4.0 3.4 - 5.0 g/dL    Protein Total 8.0 6.8 - 8.8 g/dL    Alkaline Phosphatase 62 40 - 150 U/L    ALT 27 0 - 70 U/L    AST 30 0 - 45 U/L   Hemoglobin A1c   Result Value Ref Range    Hemoglobin A1C 7.0 (H) 0 - 5.6 %     Letter sent.                              Your diabetes control is better, and is acceptable.       The A1C of 7 correlates to an average blood sugar of approximately 150.               The sodium level is better;you should keep eating pretzels and similar foods.     Your other lab results are normal,including the liver,kidney,and potassium levels.

## 2019-04-25 LAB
ALBUMIN SERPL-MCNC: 4 G/DL (ref 3.4–5)
ALP SERPL-CCNC: 62 U/L (ref 40–150)
ALT SERPL W P-5'-P-CCNC: 27 U/L (ref 0–70)
ANION GAP SERPL CALCULATED.3IONS-SCNC: 8 MMOL/L (ref 3–14)
AST SERPL W P-5'-P-CCNC: 30 U/L (ref 0–45)
BILIRUB SERPL-MCNC: 0.6 MG/DL (ref 0.2–1.3)
BUN SERPL-MCNC: 10 MG/DL (ref 7–30)
CALCIUM SERPL-MCNC: 9.2 MG/DL (ref 8.5–10.1)
CHLORIDE SERPL-SCNC: 96 MMOL/L (ref 94–109)
CO2 SERPL-SCNC: 28 MMOL/L (ref 20–32)
CREAT SERPL-MCNC: 0.76 MG/DL (ref 0.66–1.25)
GFR SERPL CREATININE-BSD FRML MDRD: 85 ML/MIN/{1.73_M2}
GLUCOSE SERPL-MCNC: 112 MG/DL (ref 70–99)
POTASSIUM SERPL-SCNC: 4.1 MMOL/L (ref 3.4–5.3)
PROT SERPL-MCNC: 8 G/DL (ref 6.8–8.8)
SODIUM SERPL-SCNC: 132 MMOL/L (ref 133–144)

## 2019-05-09 ENCOUNTER — TRANSFERRED RECORDS (OUTPATIENT)
Dept: HEALTH INFORMATION MANAGEMENT | Facility: CLINIC | Age: 79
End: 2019-05-09

## 2019-05-09 LAB — RETINOPATHY: NEGATIVE

## 2019-05-13 DIAGNOSIS — E11.21 TYPE 2 DIABETES MELLITUS WITH DIABETIC NEPHROPATHY, WITHOUT LONG-TERM CURRENT USE OF INSULIN (H): ICD-10-CM

## 2019-05-13 NOTE — TELEPHONE ENCOUNTER
"GLIPIZIDE ER 10MG TABLETS  Last Written Prescription Date:  02/07/2019  Last Fill Quantity: 90,  # refills: 0   Last office visit: 4/24/2019 with prescribing provider:  04/24/2019   Future Office Visit:    Requested Prescriptions   Pending Prescriptions Disp Refills     glipiZIDE (GLUCOTROL XL) 10 MG 24 hr tablet [Pharmacy Med Name: GLIPIZIDE ER 10MG TABLETS] 90 tablet 0     Sig: TAKE 1 TABLET(10 MG) BY MOUTH DAILY       Sulfonylurea Agents Failed - 5/13/2019 10:47 AM        Failed - Patient has documented LDL within the past 12 mos.     Recent Labs   Lab Test 04/10/18  1609   LDL 72             Passed - Blood pressure less than 140/90 in past 6 months     BP Readings from Last 3 Encounters:   04/24/19 120/80   10/09/18 130/78   04/10/18 136/80                 Passed - Patient has had a Microalbumin in the past 15 mos.     Recent Labs   Lab Test 04/10/18  1623   MICROL 30   UMALCR 49.11*             Passed - Patient has documented A1c within the specified period of time.     If HgbA1C is 8 or greater, it needs to be on file within the past 3 months.  If less than 8, must be on file within the past 6 months.     Recent Labs   Lab Test 04/24/19  1338   A1C 7.0*             Passed - Medication is active on med list        Passed - Patient is age 18 or older        Passed - Patient has a recent creatinine (normal) within the past 12 mos.     Recent Labs   Lab Test 04/24/19  1338   CR 0.76             Passed - Recent (6 mo) or future (30 days) visit within the authorizing provider's specialty     Patient had office visit in the last 6 months or has a visit in the next 30 days with authorizing provider or within the authorizing provider's specialty.  See \"Patient Info\" tab in inbasket, or \"Choose Columns\" in Meds & Orders section of the refill encounter.              "

## 2019-05-15 RX ORDER — GLIPIZIDE 10 MG/1
TABLET, FILM COATED, EXTENDED RELEASE ORAL
Qty: 90 TABLET | Refills: 4 | Status: SHIPPED | OUTPATIENT
Start: 2019-05-15 | End: 2020-07-20

## 2019-05-17 ENCOUNTER — NURSE TRIAGE (OUTPATIENT)
Dept: FAMILY MEDICINE | Facility: CLINIC | Age: 79
End: 2019-05-17

## 2019-05-17 NOTE — TELEPHONE ENCOUNTER
Additional Information    Negative: Passed out (i.e., fainted, collapsed and was not responding)    Negative: Shock suspected (e.g., cold/pale/clammy skin, too weak to stand, low BP, rapid pulse)    Negative: Sounds like a life-threatening emergency to the triager    Negative: Major injury to the back (e.g., MVA, fall > 10 feet or 3 meters, penetrating injury, etc.)    Negative: Pain in the upper back over the ribs (rib cage) that radiates (travels) into the chest    Negative: Pain in the upper back over the ribs (rib cage) and worsened by coughing (or clearly increases with breathing)    Negative: SEVERE back pain of sudden onset and age > 60    Negative: SEVERE abdominal pain (e.g., excruciating)    Negative: Abdominal pain and age > 60    Negative: Unable to urinate (or only a few drops) and bladder feels very full    Negative: Loss of bladder or bowel control (urine or bowel incontinence; wetting self, leaking stool) of new onset    Negative: Numbness (loss of sensation) in groin or rectal area    Negative: Pain radiates into groin, scrotum    Negative: Blood in urine (red, pink, or tea-colored)    Negative: Vomiting and pain over lower ribs of back (i.e., flank - kidney area)    Negative: Weakness of a leg or foot (e.g., unable to bear weight, dragging foot)    Negative: Patient sounds very sick or weak to the triager    Negative: Fever > 100.5 F (38.1 C) and flank pain    Negative: Pain or burning with passing urine (urination)    Negative: SEVERE back pain (e.g., excruciating, unable to do any normal activities) and not improved after pain medicine and CARE ADVICE    Negative: Numbness in an arm or hand (i.e., loss of sensation) and upper back pain    Negative: Numbness in a leg or foot (i.e., loss of sensation)    Negative: High-risk adult (e.g., history of cancer, history of HIV, or history of IV drug abuse)    Negative: Painful rash with multiple small blisters grouped together (i.e., dermatomal  "distribution or 'band' or 'stripe')    Negative: Pain radiates into the thigh or further down the leg, and in both legs    Negative: Age > 50 and no history of prior similar back pain    MODERATE back pain (e.g., interferes with normal activities) and present > 3 days    Answer Assessment - Initial Assessment Questions  1. ONSET: \"When did the pain begin?\"       Monday and had BM 4 days ago  2. LOCATION: \"Where does it hurt?\" (upper, mid or lower back)      Low back  3. SEVERITY: \"How bad is the pain?\"  (e.g., Scale 1-10; mild, moderate, or severe)    - MILD (1-3): doesn't interfere with normal activities     - MODERATE (4-7): interferes with normal activities or awakens from sleep     - SEVERE (8-10): excruciating pain, unable to do any normal activities       5- moderate  4. PATTERN: \"Is the pain constant?\" (e.g., yes, no; constant, intermittent)       constant  5. RADIATION: \"Does the pain shoot into your legs or elsewhere?\"      none  6. CAUSE:  \"What do you think is causing the back pain?\"       constipation  7. BACK OVERUSE:  \"Any recent lifting of heavy objects, strenuous work or exercise?\"      Works when trying to defecate.  8. MEDICATIONS: \"What have you taken so far for the pain?\" (e.g., nothing, acetaminophen, NSAIDS)      Ibuprofen and aleve  9. NEUROLOGIC SYMPTOMS: \"Do you have any weakness, numbness, or problems with bowel/bladder control?\"      Denies numbness or weaknesss   10. OTHER SYMPTOMS: \"Do you have any other symptoms?\" (e.g., fever, abdominal pain, burning with urination, blood in urine)        Urinary frequency  11. PREGNANCY: \"Is there any chance you are pregnant?\" (e.g., yes, no; LMP)        No    Protocols used: BACK PAIN-A-OH    Pt reports back pain and last BM 05/13/2018. Also notes constipation and moderate pain when defecating. He has passed gas and denies abdominal distention. He has tried laxative an suppository without relief. Pt felt warm this morning but unclear of temperature. " Huddled with Physician Shayna Evans, pt was advised to try OTC mag citrate and advised he seek care at ED if symptoms fail to improve in the next 2 hours or before if worsening pain. Pt verbalized understanding and agreement with plan.

## 2019-05-17 NOTE — TELEPHONE ENCOUNTER
Call back from patient this afternoon.     States he did complete the medication regimen. He has had full relief after taking the medication with multiple BM's. Talked with patient about long term home care things he can use to keep him regular. If he has not had a BM x 2-3 days he needs to update provider ASAP.     No further follow up needed at this time.

## 2019-05-22 ENCOUNTER — OFFICE VISIT (OUTPATIENT)
Dept: FAMILY MEDICINE | Facility: CLINIC | Age: 79
End: 2019-05-22
Payer: COMMERCIAL

## 2019-05-22 ENCOUNTER — TELEPHONE (OUTPATIENT)
Dept: FAMILY MEDICINE | Facility: CLINIC | Age: 79
End: 2019-05-22

## 2019-05-22 VITALS
HEART RATE: 103 BPM | BODY MASS INDEX: 23.87 KG/M2 | SYSTOLIC BLOOD PRESSURE: 124 MMHG | RESPIRATION RATE: 14 BRPM | HEIGHT: 71 IN | TEMPERATURE: 98.2 F | WEIGHT: 170.5 LBS | DIASTOLIC BLOOD PRESSURE: 66 MMHG | OXYGEN SATURATION: 98 %

## 2019-05-22 DIAGNOSIS — S39.012A STRAIN OF MUSCLE, FASCIA AND TENDON OF LOWER BACK, INITIAL ENCOUNTER: ICD-10-CM

## 2019-05-22 DIAGNOSIS — Z79.4 TYPE 2 DIABETES MELLITUS WITH DIABETIC NEPHROPATHY, WITH LONG-TERM CURRENT USE OF INSULIN (H): ICD-10-CM

## 2019-05-22 DIAGNOSIS — E11.21 TYPE 2 DIABETES MELLITUS WITH DIABETIC NEPHROPATHY, WITH LONG-TERM CURRENT USE OF INSULIN (H): ICD-10-CM

## 2019-05-22 DIAGNOSIS — K59.00 CONSTIPATION, UNSPECIFIED CONSTIPATION TYPE: Primary | ICD-10-CM

## 2019-05-22 PROCEDURE — 99213 OFFICE O/P EST LOW 20 MIN: CPT | Performed by: FAMILY MEDICINE

## 2019-05-22 RX ORDER — PSYLLIUM HUSK/CALCIUM CARB 1 G-60 MG
0.5 CAPSULE ORAL
COMMUNITY

## 2019-05-22 ASSESSMENT — MIFFLIN-ST. JEOR: SCORE: 1505.51

## 2019-05-22 NOTE — TELEPHONE ENCOUNTER
"JUDIE Temple called complaining of back pain/ pressure. Last week Thursday he called reporting constipation. He was advised to take mag citrate and reports he was able to have a BM and had relief. Over the weekend he felt \"I was stopped again\" and took remaining mag citrate solution. Last BM was this morning but notes gas and pressure pain pressure on back. Also note urinary frequency. OV was scheduled today. JUDIE temple has OV today at 4:30 PM  "

## 2019-05-22 NOTE — PATIENT INSTRUCTIONS
Patient Education     Constipation (Adult)  Constipation means that you have bowel movements that are less frequent than usual. Stools often become very hard and difficult to pass.  Constipation is very common. At some point in life, it affects almost everyone. Since everyone's bowel habits are different, what is constipation to one person may not be to another. Your healthcare provider may do tests to diagnose constipation. It depends on what he or she finds when evaluating you.    Symptoms of constipation include:    Abdominal pain    Bloating    Vomiting    Painful bowel movements    Itching, swelling, bleeding, or pain around the anus  Causes  Constipation can have many causes. These include:    Diet low in fiber    Too much dairy    Not drinking enough liquids    Lack of exercise or physical activity (especially true for older adults)    Changes in lifestyle or daily routine, including pregnancy, aging, work, and travel    Frequent use or misuse of laxatives    Ignoring the urge to have a bowel movement or delaying it until later    Medicines, such as certain prescription pain medicines, iron supplements, antacids, certain antidepressants, and calcium supplements    Diseases like irritable bowel syndrome, bowel obstructions, stroke, diabetes, thyroid disease, Parkinson disease, hemorrhoids, and colon cancer  Complications  Potential complications of constipation can include:    Hemorrhoids    Rectal bleeding from hemorrhoids or anal fissures (skin tears)    Hernias    Dependency on laxatives    Chronic constipation    Fecal impaction, a severe form of constipation in which a large amount of hard stool is in your rectum that you can't pass    Bowel obstruction or perforation  Home care  All treatment should be done after talking with your healthcare provider. This is especially true if you have another medical problems, are taking prescription medicines, or are an older adult. Treatment most often involves  lifestyle changes. You may also need medicines. Your healthcare provider will tell you which will work best for you. Follow the advice below to help avoid this problem in the future.  Lifestyle changes  These lifestyle changes can help prevent constipation:    Diet. Eat a high-fiber diet, with fresh fruit and vegetables, and reduce dairy intake, meats, and processed foods    Fluids. It's important to get enough fluids each day. Drink plenty of water when you eat more fiber. If you are on diet that limits the amount of fluid you can have, talk about this with your healthcare provider.    Regular exercise. Check with your healthcare provider first.  Medicines  Take any medicines as directed. Some laxatives are safe to use only every now and then. Others can be taken on a regular basis. While laxatives don't cause bowel dependence, they are treating the symptoms. So your constipation may return if you don't make other changes. Talk with your healthcare provider or pharmacist if you have questions.  Prescription pain medicines can cause constipation. If you are taking this kind of medicine, ask your healthcare provider if you should also take a stool softener.  Medicines you may take to treat constipation include:    Fiber supplements    Stool softeners    Laxatives    Enemas    Rectal suppositories  Follow-up care  Follow up with your healthcare provider if symptoms don't get better in the next few days. You may need to have more tests or see a specialist.  Call 911  Call 911 if any of these occur:    Trouble breathing    Stiff, rigid abdomen that is severely painful to touch    Confusion    Fainting or loss of consciousness    Rapid heart rate    Chest pain  When to seek medical advice  Call your healthcare provider right away if any of these occur:    Fever of 100.4 F (38 C) or higher, or as directed by your healthcare provider    Failure to resume normal bowel movements    Pain in your abdomen or back gets  worse    Nausea or vomiting    Swelling in your abdomen    Blood in the stool    Black, tarry stool    Involuntary weight loss    Weakness  Date Last Reviewed: 6/1/2018 2000-2018 [employer]. [address] [place] [address], [place] [address]. All rights reserved. This information is not intended as a substitute for professional medical care. Always follow your healthcare professional's instructions.           Patient Education     Discharge Instructions: Eating a High-Fiber Diet    Your healthcare provider has prescribed a high-fiber diet for you. Fiber is what gives strength and structure to plants. Most grains, beans, vegetables, and fruits contain fiber. Foods rich in fiber are often low in calories and fat, but they fill you up more. These foods may also reduce the risk of certain health problems.  There are two types of fiber:    Insoluble fiber. This is found in whole-grains, cereals, and certain fruits and vegetables (such as apple skins, corn, and beans). Insoluble fiber is made up mainly of plant cell walls. It may prevent constipation and reduce the risk of certain types of cancer.    Soluble fiber. This type of fiber is found in oats, beans, nuts, and certain fruits and vegetables (such as strawberries and peas). Soluble fiber turns to gel in the digestive system, slowing the movement of the digestive tract. It helps control blood sugar levels and can reduce cholesterol, which may help lower the risk of heart disease. Soluble fiber can also help control appetite.   Home care    Know how much fiber you need a day. The recommended daily amount of fiber is 25 grams for women and 38 grams for men. After age 50, daily fiber needs drop to 21 grams for women and 30 grams for men.    Ask your healthcare provider about a fiber supplement. (Always take fiber supplements with a large glass of water.)    Keep track of how much fiber you eat.    Eat a variety of foods high in fiber.    Learn to read and  understand food labels.    Ask your healthcare provider how much water you should be drinking.    Look for these high-fiber foods:  ? Whole-grain breads and cereals    6 ounces a day give you about 18 grams of fiber (1 ounce is equal to 1 slice of bread, 1 cup of dry cereal, or 1/2 cup of cooked rice).    Include wheat and oat bran cereals, whole-wheat muffins or toast, and corn tortillas in your meals.  ? Fruits     2 cups a day give you about 8 grams of fiber.    Apples, oranges, strawberries, pears, and bananas are good sources.    Fruit juice does not contain as much fiber as the fruit it was made from.  ? Vegetables    2  cups a day give you about 11 grams of fiber. Add asparagus, carrots, broccoli, peas, and corn to your meals.  ? Legumes    1/4 cup a day (in place of meat) gives you about 4 grams of fiber. Try navy beans, lentils, chickpeas, and soybeans.  ? Seeds     A small handful of seeds gives you about 3 grams of fiber. Try sunflower seeds.  Follow-up  Make a follow-up appointment, or as advised. Ask your healthcare provider if seeing a registered dietitian may help you plan a high fiber diet.  Date Last Reviewed: 6/1/2017 2000-2018 [employer]. [address] [place] [address]. All rights reserved. This information is not intended as a substitute for professional medical care. Always follow your healthcare professional's instructions.           Patient Education     Polyethylene Glycol powder  Brand Names: GaviLax, GIALAX, GlycoLax, MiraLax, Smooth LAX, [employer]  What is this medicine?  POLYETHYLENE GLYCOL 3350 (avila ee ETH i ranjith; GLYE col) powder is a laxative used to treat constipation. It increases the amount of water in the stool. Bowel movements become easier and more frequent.  How should I use this medicine?  Take this medicine by mouth. The bottle has a measuring cap that is marked with a line. Pour the powder into the cap up to the marked line (the dose is about 1  heaping tablespoon). Add the powder in the cap to a full glass (4 to 8 ounces or 120 to 240 ml) of water, juice, soda, coffee or tea. Mix the powder well. Drink the solution. Take exactly as directed. Do not take your medicine more often than directed.  Talk to your pediatrician regarding the use of this medicine in children. Special care may be needed.  What side effects may I notice from receiving this medicine?  Side effects that you should report to your doctor or health care professional as soon as possible:    diarrhea    difficulty breathing    itching of the skin, hives, or skin rash    severe bloating, pain, or distension of the stomach    vomiting  Side effects that usually do not require medical attention (report to your doctor or health care professional if they continue or are bothersome):    bloating or gas    lower abdominal discomfort or cramps    nausea  What may interact with this medicine?  Interactions are not expected.  What if I miss a dose?  If you miss a dose, take it as soon as you can. If it is almost time for your next dose, take only that dose. Do not take double or extra doses.  Where should I keep my medicine?  Keep out of the reach of children.  Store between 15 and 30 degrees C (59 and 86 degrees F). Throw away any unused medicine after the expiration date.  What should I tell my health care provider before I take this medicine?  They need to know if you have any of these conditions:    a history of blockage of the stomach or intestine    current abdomen distension or pain    difficulty swallowing    diverticulitis, ulcerative colitis, or other chronic bowel disease    phenylketonuria    an unusual or allergic reaction to polyethylene glycol, other medicines, dyes, or preservatives    pregnant or trying to get pregnant    breast-feeding  What should I watch for while using this medicine?  Do not use for more than 2 weeks without advice from your doctor or health care professional. It  can take 2 to 4 days to have a bowel movement and to experience improvement in constipation. See your health care professional for any changes in bowel habits, including constipation, that are severe or last longer than three weeks.  Always take this medicine with plenty of water.  NOTE:This sheet is a summary. It may not cover all possible information. If you have questions about this medicine, talk to your doctor, pharmacist, or health care provider. Copyright  2018 [employer]

## 2019-05-22 NOTE — TELEPHONE ENCOUNTER
Reason for Call:  Other call back    Detailed comments: Patient talked with nurse last week about symptoms. The patient did what he was told for relief. Patient got relief but states now it has come back. Please call to advise. Patient states he did the medicine again had a bowel movement but now feels like everything is building back up. Patient states lots of gas    Phone Number Patient can be reached at: Cell number on file:    Telephone Information:   Mobile [phone]       Best Time: any    Can we leave a detailed message on this number? YES    Call taken on 5/22/2019 at 2:09 PM by CUONG CARUSO

## 2019-05-22 NOTE — LETTER
My Depression Action Plan  Name: Jose Francisco Gonzalez   Date of Birth 1940  Date: 5/22/2019    My doctor: Kvng Richardson   My clinic: [address] [address]  [phone]          GREEN    ZONE   Good Control    What it looks like:     Things are going generally well. You have normal up s and down s. You may even feel depressed from time to time, but bad moods usually last less than a day.   What you need to do:  1. Continue to care for yourself (see self care plan)  2. Check your depression survival kit and update it as needed  3. Follow your physician s recommendations including any medication.  4. Do not stop taking medication unless you consult with your physician first.           YELLOW         ZONE Getting Worse    What it looks like:     Depression is starting to interfere with your life.     It may be hard to get out of bed; you may be starting to isolate yourself from others.    Symptoms of depression are starting to last most all day and this has happened for several days.     You may have suicidal thoughts but they are not constant.   What you need to do:     1. Call your care team, your response to treatment will improve if you keep your care team informed of your progress. Yellow periods are signs an adjustment may need to be made.     2. Continue your self-care, even if you have to fake it!    3. Talk to someone in your support network    4. Open up your depression survival kit           RED    ZONE Medical Alert - Get Help    What it looks like:     Depression is seriously interfering with your life.     You may experience these or other symptoms: You can t get out of bed most days, can t work or engage in other necessary activities, you have trouble taking care of basic hygiene, or basic responsibilities, thoughts of suicide or death that will not go away, self-injurious behavior.     What you need to  do:  1. Call your care team and request a same-day appointment. If they are not available (weekends or after hours) call your local crisis line, emergency room or 911.            Depression Self Care Plan / Survival Kit    Self-Care for Depression  Here s the deal. Your body and mind are really not as separate as most people think.  What you do and think affects how you feel and how you feel influences what you do and think. This means if you do things that people who feel good do, it will help you feel better.  Sometimes this is all it takes.  There is also a place for medication and therapy depending on how severe your depression is, so be sure to consult with your medical provider and/ or Behavioral Health Consultant if your symptoms are worsening or not improving.     In order to better manage my stress, I will:    Exercise  Get some form of exercise, every day. This will help reduce pain and release endorphins, the  feel good  chemicals in your brain. This is almost as good as taking antidepressants!  This is not the same as joining a gym and then never going! (they count on that by the way ) It can be as simple as just going for a walk or doing some gardening, anything that will get you moving.      Hygiene   Maintain good hygiene (Get out of bed in the morning, Make your bed, Brush your teeth, Take a shower, and Get dressed like you were going to work, even if you are unemployed).  If your clothes don't fit try to get ones that do.    Diet  I will strive to eat foods that are good for me, drink plenty of water, and avoid excessive sugar, caffeine, alcohol, and other mood-altering substances.  Some foods that are helpful in depression are: complex carbohydrates, B vitamins, flaxseed, fish or fish oil, fresh fruits and vegetables.    Psychotherapy  I agree to participate in Individual Therapy (if recommended).    Medication  If prescribed medications, I agree to take them.  Missing doses can result in serious  side effects.  I understand that drinking alcohol, or other illicit drug use, may cause potential side effects.  I will not stop my medication abruptly without first discussing it with my provider.    Staying Connected With Others  I will stay in touch with my friends, family members, and my primary care provider/team.    Use your imagination  Be creative.  We all have a creative side; it doesn t matter if it s oil painting, sand castles, or mud pies! This will also kick up the endorphins.    Witness Beauty  (AKA stop and smell the roses) Take a look outside, even in mid-winter. Notice colors, textures. Watch the squirrels and birds.     Service to others  Be of service to others.  There is always someone else in need.  By helping others we can  get out of ourselves  and remember the really important things.  This also provides opportunities for practicing all the other parts of the program.    Humor  Laugh and be silly!  Adjust your TV habits for less news and crime-drama and more comedy.    Control your stress  Try breathing deep, massage therapy, biofeedback, and meditation. Find time to relax each day.     My support system    Clinic Contact:  Phone number:    Contact 1:  Phone number:    Contact 2:  Phone number:    Taoism/:  Phone number:    Therapist:  Phone number:    Local crisis center:    Phone number:    Other community support:  Phone number:

## 2019-05-22 NOTE — PROGRESS NOTES
Subjective     Jose Francisco Gonzalez is a 78 year old male who presents to clinic today for the following health issues:    HPI   Constipation      Duration: Ongoing problem    Description:       Frequency of bowel movements: usually daily with changes recently       Consistency of stool: Hard followed by loose stools    Intensity:  moderate    Accompanying signs and symptoms: Low back pain       Abdominal pain: YES--low abdominal discomfort last week       Rectal pain: no        Blood in stool: no        Nausea/vomitting: no     History:        Similar problems in past: YES    Precipitating or alleviating factors: Metamucil nightly       Medications worsening symptoms: no     Therapies tried and outcome: Magnesium Citrate       Chronic laxative use: YES    Constipation better over the past couple of days but still having small/hard stools.  Mag citrate helped when he used it yesterday.  Stools are not painful when defecating.  However, feels some relief with his back pain when he stools.  Has arthritis in his hands but does not think he has ever had arthritis issues in his spine or back.    Reviewed and updated as needed this visit by Provider  Tobacco  Allergies  Meds  Problems  Med Hx  Surg Hx  Fam Hx         Review of Systems   ROS COMP: CONSTITUTIONAL: NEGATIVE for fever, chills, change in weight  INTEGUMENTARY/SKIN: NEGATIVE for worrisome rashes, moles or lesions  EYES: NEGATIVE for vision changes or irritation  ENT/MOUTH: NEGATIVE for ear, mouth and throat problems  RESP: NEGATIVE for significant cough or SOB  CV: NEGATIVE for chest pain, palpitations or peripheral edema  GI: NEGATIVE for nausea or heartburn.    : NEGATIVE for frequency, dysuria, or hematuria  MUSCULOSKELETAL: +low back pain, arthritis pain in hands/fingers  NEURO: NEGATIVE for weakness, dizziness or paresthesias  HEME: NEGATIVE for bleeding problems      Objective    /66 (Patient Position: Sitting, Cuff Size: Adult Regular)    "Pulse 103   Temp 98.2  F (36.8  C) (Tympanic)   Resp 14   Ht 1.803 m (5' 11\")   Wt 77.3 kg (170 lb 8 oz)   SpO2 98%   BMI 23.78 kg/m    Body mass index is 23.78 kg/m .  Physical Exam   GENERAL: healthy, alert and no distress  EYES: Eyes grossly normal to inspection, PERRL and conjunctivae and sclerae normal  HENT: ear canals and TM's normal, nose and mouth without ulcers or lesions  NECK: no adenopathy, no asymmetry, masses, or scars and thyroid normal to palpation  RESP: lungs clear to auscultation - no rales, rhonchi or wheezes  CV: regular rate and rhythm, normal S1 S2, no S3 or S4, no murmur, click or rub, no peripheral edema and peripheral pulses strong  ABDOMEN: soft, without hepatosplenomegaly or masses and bowel sounds normal.  +tenderness diffusely with moderate palpation; no rebound tenderness or peritoneal signs.   MS: no gross musculoskeletal defects noted, no edema.  +tenderness in lumbar paraspinal musculature (mostly on left side).  No spinal tenderness or palpable step-offs.   SKIN: no suspicious lesions or rashes  NEURO: Mentation intact and speech normal  PSYCH: mentation appears normal, affect normal/bright    Diagnostic Test Results:  Labs reviewed in Epic  none         Assessment & Plan   Problem List Items Addressed This Visit     Type 2 diabetes mellitus with diabetic nephropathy, with long-term current use of insulin (H)      Other Visit Diagnoses     Constipation, unspecified constipation type    -  Primary    Relevant Medications    Psyllium-Calcium (METAMUCIL PLUS CALCIUM) CAPS    Strain of muscle, fascia and tendon of lower back, initial encounter             1.  Constipation: acute on chronic  Still making gas and having bowel movements.  Discussed obtaining xray of abdomen but pt elected to defer it to next OV if he is still having issues.  Recommended that he continue Metamucil but add Miralax daily (and may increase it to BID if needed)  Will stop Miralax if he starts to get " diarrhea.  Will also keep up fluids/hydration and get regular exercise to help keep bowels moving.  Will also add probiotics/yogurt to diet.      2.  Diabetes, HgbA1c 7%  DM appears to be well controlled.  Though would consider gastroparesis may be contributing to #1.    3.  Low back muscular strain: new?  Recommended RICE therapy with frequent warm compress applications to the low back, Tylenol PRN, Icy Hot, and light/easy stretching of low back (demonstrated knee to chest stretches during the OV)  RTC in 1 week to re-check.        See Patient Instructions  Return in about 1 week (around 5/29/2019) for constipation, back pain.    Roselyn Guzman, DO  [facility]

## 2019-05-28 ENCOUNTER — OFFICE VISIT (OUTPATIENT)
Dept: FAMILY MEDICINE | Facility: CLINIC | Age: 79
End: 2019-05-28
Payer: COMMERCIAL

## 2019-05-28 VITALS
DIASTOLIC BLOOD PRESSURE: 80 MMHG | HEART RATE: 94 BPM | TEMPERATURE: 98.4 F | SYSTOLIC BLOOD PRESSURE: 136 MMHG | HEIGHT: 71 IN | WEIGHT: 167 LBS | OXYGEN SATURATION: 98 % | RESPIRATION RATE: 16 BRPM | BODY MASS INDEX: 23.38 KG/M2

## 2019-05-28 DIAGNOSIS — R19.4 CHANGE IN BOWEL HABITS: ICD-10-CM

## 2019-05-28 DIAGNOSIS — F33.42 RECURRENT MAJOR DEPRESSION IN COMPLETE REMISSION (H): ICD-10-CM

## 2019-05-28 DIAGNOSIS — Z00.00 ENCOUNTER FOR MEDICARE ANNUAL WELLNESS EXAM: Primary | ICD-10-CM

## 2019-05-28 PROCEDURE — 99397 PER PM REEVAL EST PAT 65+ YR: CPT | Performed by: FAMILY MEDICINE

## 2019-05-28 PROCEDURE — 99213 OFFICE O/P EST LOW 20 MIN: CPT | Mod: 25 | Performed by: FAMILY MEDICINE

## 2019-05-28 ASSESSMENT — ACTIVITIES OF DAILY LIVING (ADL): CURRENT_FUNCTION: NO ASSISTANCE NEEDED

## 2019-05-28 ASSESSMENT — PATIENT HEALTH QUESTIONNAIRE - PHQ9: SUM OF ALL RESPONSES TO PHQ QUESTIONS 1-9: 4

## 2019-05-28 ASSESSMENT — MIFFLIN-ST. JEOR: SCORE: 1489.64

## 2019-05-28 NOTE — PROGRESS NOTES
"colonosSUBJECTIVE:   Jose Francisco Gonzalez is a 78 year old male who presents for Preventive Visit.    Are you in the first 12 months of your Medicare coverage?  No    Healthy Habits:     In general, how would you rate your overall health?  Fair    Frequency of exercise:  1 day/week    Duration of exercise:  30-45 minutes    Do you usually eat at least 4 servings of fruit and vegetables a day, include whole grains    & fiber and avoid regularly eating high fat or \"junk\" foods?  Yes    Taking medications regularly:  Yes    Barriers to taking medications:  None    Medication side effects:  None    Ability to successfully perform activities of daily living:  No assistance needed    Home Safety:  No safety concerns identified    Hearing Impairment:  Find that men's voices are easier to understand than woman's and difficulty understanding soft or whispered speech    In the past 6 months, have you been bothered by leaking of urine? Yes    In general, how would you rate your overall mental or emotional health?  Good      PHQ-2 Total Score: 0    Additional concerns today:  Yes (Constipation)    Do you feel safe in your environment? Yes    Do you have a Health Care Directive? Yes: Advance Directive has been received and scanned.    Fall risk     Cognitive Screening   1) Repeat 3 items (Leader, Season, Table)    2) Clock draw: NORMAL  3) 3 item recall: Recalls 3 objects  Results: 3 items recalled: COGNITIVE IMPAIRMENT LESS LIKELY    Mini-CogTM Copyright KENDRICK Alexandre. Licensed by the author for use in [facility]; reprinted with permission ([email or web address]). All rights reserved.      Do you have sleep apnea, excessive snoring or daytime drowsiness?: yes    Reviewed and updated as needed this visit by clinical staff  Tobacco  Allergies  Meds  Problems  Med Hx  Surg Hx  Fam Hx  Soc Hx          Reviewed and updated as needed this visit by Provider        Social History     Tobacco Use     Smoking status: Former Smoker     " Packs/day: 1.00     Years: 20.00     Pack years: 20.00     Types: Cigarettes     Last attempt to quit: 1993     Years since quittin.3     Smokeless tobacco: Never Used   Substance Use Topics     Alcohol use: Yes     Comment: occ     Alcohol Use 2019   Prescreen: >3 drinks/day or >7 drinks/week? No   No flowsheet data found.      Constipation/Diarrhea:Change in Bowel Habits for 2 weeks        Description:       Frequency of bowel movements: 2-3 days       Colonoscopy neg  ?   consistency of stool: Loose and Explosive post Mag citrate and conts to a lesser degree     Intensity:  moderate    Accompanying signs and symptoms: Back Pain and Gas       Abdominal pain: YES       Rectal pain: no        Blood in stool: no        Nausea/vomitting: no     History:        Similar problems in past: no     Precipitating or alleviating factors: None        Medications worsening symptoms: YES    Therapies tried and outcome: Stool Softener, Metamucil-2 caps a d        Chronic laxative use: no     Depression and Anxiety Follow-Up    How are you doing with your depression since your last visit? Improved remission    How are you doing with your anxiety since your last visit?  Improved     Are you having other symptoms that might be associated with depression or anxiety? No    Have you had a significant life event? No     Do you have any concerns with your use of alcohol or other drugs? No    Social History     Tobacco Use     Smoking status: Former Smoker     Packs/day: 1.00     Years: 20.00     Pack years: 20.00     Types: Cigarettes     Last attempt to quit: 1993     Years since quittin.3     Smokeless tobacco: Never Used   Substance Use Topics     Alcohol use: Yes     Comment: occ     Drug use: No     PHQ 2017   PHQ-9 Total Score 5 4   Q9: Thoughts of better off dead/self-harm past 2 weeks Not at all Not at all     No flowsheet data found.            Current providers sharing in care for  "this patient include:   Patient Care Team:  Kvng Richardson MD as PCP - General (Internal Medicine)  Kvng Richardson MD as Assigned PCP    The following health maintenance items are reviewed in Epic and correct as of today:  Health Maintenance   Topic Date Due     ADVANCED DIRECTIVE PLANNING  1940     ZOSTER IMMUNIZATION (1 of 2) 06/03/1988     MEDICARE ANNUAL WELLNESS VISIT  06/03/2003     DTAP/TDAP/TD IMMUNIZATION (2 - Td) 02/26/2016     LIPID  01/25/2017     PHQ-9  05/07/2018     DIABETIC EYE EXAM  01/24/2019     MICROALBUMIN  04/10/2019     A1C  10/24/2019     TSH W/FREE T4 REFLEX  04/10/2020     BMP  04/24/2020     DIABETIC FOOT EXAM  04/24/2020     FALL RISK ASSESSMENT  05/22/2020     DEPRESSION ACTION PLAN  Completed     INFLUENZA VACCINE  Completed     PNEUMOCOCCAL IMMUNIZATION 65+ LOW/MEDIUM RISK  Completed     IPV IMMUNIZATION  Aged Out     MENINGITIS IMMUNIZATION  Aged Out           Review of Systems  CONSTITUTIONAL: NEGATIVE for fever, chills, change in weight  INTEGUMENTARY/SKIN: NEGATIVE for worrisome rashes, moles or lesions  EYES: NEGATIVE for vision changes or irritation  ENT/MOUTH: NEGATIVE for ear, mouth and throat problems  RESP: NEGATIVE for significant cough or SOB  BREAST: NEGATIVE for masses, tenderness or discharge  CV: NEGATIVE for chest pain, palpitations or peripheral edema  GI: NEGATIVE for nausea, abdominal pain, heartburn, POS change in bowel habits  : NEGATIVE for frequency, dysuria, or hematuria  MUSCULOSKELETAL: NEGATIVE for significant arthralgias or myalgia  NEURO: NEGATIVE for weakness, dizziness or paresthesias  ENDOCRINE: NEGATIVE for temperature intolerance, skin/hair changes  HEME: NEGATIVE for bleeding problems  PSYCHIATRIC: NEGATIVE for changes in mood or affect    OBJECTIVE:   There were no vitals taken for this visit. Estimated body mass index is 23.78 kg/m  as calculated from the following:    Height as of 5/22/19: 1.803 m (5' 11\").    Weight as of 5/22/19: " 77.3 kg (170 lb 8 oz).  Physical Exam  GENERAL: healthy, alert and no distress  EYES: Eyes grossly normal to inspection, PERRL and conjunctivae and sclerae normal  NECK: no adenopathy, no asymmetry, masses, or scars and thyroid normal to palpation  RESP: lungs clear to auscultation - no rales, rhonchi or wheezes  BREAST: normal without masses, tenderness or nipple discharge and no palpable axillary masses or adenopathy  CV: regular rate and rhythm, normal S1 S2, no S3 or S4, no murmur, click or rub, no peripheral edema and peripheral pulses strong  ABDOMEN: soft, nontender, no hepatosplenomegaly, no masses and bowel sounds normal  MS: no gross musculoskeletal defects noted, no edema  SKIN: no suspicious lesions or rashes  NEURO: Normal strength and tone, mentation intact and speech normal  PSYCH: mentation appears normal, affect normal/bright  LYMPH: no cervical, supraclavicular, axillary, or inguinal adenopathy    Diagnostic Test Results:  Labs reviewed in Epic  Results for orders placed or performed in visit on 04/24/19   Comprehensive metabolic panel (BMP + Alb, Alk Phos, ALT, AST, Total. Bili, TP)   Result Value Ref Range    Sodium 132 (L) 133 - 144 mmol/L    Potassium 4.1 3.4 - 5.3 mmol/L    Chloride 96 94 - 109 mmol/L    Carbon Dioxide 28 20 - 32 mmol/L    Anion Gap 8 3 - 14 mmol/L    Glucose 112 (H) 70 - 99 mg/dL    Urea Nitrogen 10 7 - 30 mg/dL    Creatinine 0.76 0.66 - 1.25 mg/dL    GFR Estimate 85 >60 mL/min/[1.73_m2]    GFR Estimate If Black >90 >60 mL/min/[1.73_m2]    Calcium 9.2 8.5 - 10.1 mg/dL    Bilirubin Total 0.6 0.2 - 1.3 mg/dL    Albumin 4.0 3.4 - 5.0 g/dL    Protein Total 8.0 6.8 - 8.8 g/dL    Alkaline Phosphatase 62 40 - 150 U/L    ALT 27 0 - 70 U/L    AST 30 0 - 45 U/L   Hemoglobin A1c   Result Value Ref Range    Hemoglobin A1C 7.0 (H) 0 - 5.6 %       ASSESSMENT / PLAN:       ICD-10-CM    1. Encounter for Medicare annual wellness exam Z00.00    2. Change in bowel habits since mid May , 2019  "R19.4 GASTROENTEROLOGY ADULT REF PROCEDURE ONLY     CANCELED: GASTROENTEROLOGY ADULT REF PROCEDURE ONLY   3. 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  We need a Dx by then     End of Life Planning:  Patient currently has an advanced directive: Yes.  Practitioner is supportive of decision.    COUNSELING:  Reviewed preventive health counseling, as reflected in patient instructions       Regular exercise       Healthy diet/nutrition       Vision screening    Estimated body mass index is 23.78 kg/m  as calculated from the following:    Height as of 5/22/19: 1.803 m (5' 11\").    Weight as of 5/22/19: 77.3 kg (170 lb 8 oz).         reports that he quit smoking about 26 years ago. His smoking use included cigarettes. He has a 20.00 pack-year smoking history. He has never used smokeless tobacco.      Appropriate preventive services were discussed with this patient, including applicable screening as appropriate for cardiovascular disease, diabetes, osteopenia/osteoporosis, and glaucoma.  As appropriate for age/gender, discussed screening for colorectal cancer, prostate cancer, breast cancer, and cervical cancer. Checklist reviewing preventive services available has been given to the patient.    Reviewed patients plan of care and provided an AVS. The Basic Care Plan (routine screening as documented in Health Maintenance) for Jose Francisco meets the Care Plan requirement. This Care Plan has been established and reviewed with the Patient    Patient Instructions       Patient Education   Personalized Prevention Plan  You are due for the preventive services outlined below.  Your care team is available to assist you in scheduling these services.  If you have already completed any of these items, please share that information with your care team to update in your medical record.  Health Maintenance Due   Topic Date Due     " Discuss Advance Directive Planning  1940     Zoster (Shingles) Vaccine (1 of 2) 06/03/1988     Annual Wellness Visit  06/03/2003     Diptheria Tetanus Pertussis (DTAP/TDAP/TD) Vaccine (2 - Td) 02/26/2016     Cholesterol Lab  01/25/2017     Depression Assessment  05/07/2018     Eye Exam  01/24/2019     Kidney Function Urine Test  04/10/2019     Preventive Health Recommendations  See your health care provider every year to    Review health changes.     Discuss preventive care.      Review your medicines if your doctor has prescribed any.    Talk with your health care provider about whether you should have a test to screen for prostate cancer (PSA).    Every 3 years, have a diabetes test (fasting glucose). If you are at risk for diabetes, you should have this test more often.    Every 5 years, have a cholesterol test. Have this test more often if you are at risk for high cholesterol or heart disease.     Every 10 years, have a colonoscopy. Or, have a yearly FIT test (stool test). These exams will check for colon cancer.    Talk to with your health care provider about screening for Abdominal Aortic Aneurysm if you have a family history of AAA or have a history of smoking.    Shots:     Get a flu shot each year.     Get a tetanus shot every 10 years.     Talk to your doctor about your pneumonia vaccines. There are now two you should receive - Pneumovax (PPSV 23) and Prevnar (PCV 13).    Talk to your pharmacist about a shingles vaccine.     Talk to your doctor about the hepatitis B vaccine.    Nutrition:     Eat at least 5 servings of fruits and vegetables each day.     Eat whole-grain bread, whole-wheat pasta and brown rice instead of white grains and rice.     Get adequate Calcium and Vitamin D.     Lifestyle    Exercise for at least 150 minutes a week (30 minutes a day, 5 days a week). This will help you control your weight and prevent disease.     Limit alcohol to one drink per day.     No smoking.     Wear  sunscreen to prevent skin cancer.     See your dentist every six months for an exam and cleaning.     See your eye doctor every 1 to 2 years to screen for conditions such as glaucoma, macular degeneration and cataracts.    Personalized Prevention Plan  You are due for the preventive services outlined below.  Your care team is available to assist you in scheduling these services.  If you have already completed any of these items, please share that information with your care team to update in your medical record.  Health Maintenance Due   Topic Date Due     Discuss Advance Directive Planning  1940     Zoster (Shingles) Vaccine (1 of 2) 06/03/1988     Annual Wellness Visit  06/03/2003     Diptheria Tetanus Pertussis (DTAP/TDAP/TD) Vaccine (2 - Td) 02/26/2016     Cholesterol Lab  01/25/2017     Depression Assessment  05/07/2018     Eye Exam  01/24/2019     Kidney Function Urine Test  04/10/2019      1. .Please do your breast exam every mo, when you  Change the  calendar page or set an alarm on your cell phone Do a  visual check for dimples, inversion or indentation or any different position of the nipple Feel manually  for any 1cm or larger  size mass ie about the size of an almond Be sure to cover the entire area of both breasts : this extends back to the back on either side and from the collar bone to the bottom of the breasts where you can begin to feel ribs.  Men are advised to do this exam also as they now have a higher rate of breast cancer , like women do .     2.     .    Counseling Resources:  ATP IV Guidelines  Pooled Cohorts Equation Calculator  Breast Cancer Risk Calculator  FRAX Risk Assessment  ICSI Preventive Guidelines  Dietary Guidelines for Americans, 2010  USDA's MyPlate  ASA Prophylaxis  Lung CA Screening    Claudine Lee MD  [facility]    Identified Health Risks:

## 2019-05-28 NOTE — NURSING NOTE
"Chief Complaint   Patient presents with     Medicare Visit     Constipation     /80   Pulse 94   Temp 98.4  F (36.9  C) (Tympanic)   Resp 16   Ht 1.803 m (5' 11\")   Wt 75.8 kg (167 lb)   SpO2 98%   BMI 23.29 kg/m   Estimated body mass index is 23.29 kg/m  as calculated from the following:    Height as of this encounter: 1.803 m (5' 11\").    Weight as of this encounter: 75.8 kg (167 lb).  BP completed using cuff size: regular   Lary Patterson CMA    Health Maintenance Due   Topic Date Due     ADVANCED DIRECTIVE PLANNING  1940     ZOSTER IMMUNIZATION (1 of 2) 06/03/1988     MEDICARE ANNUAL WELLNESS VISIT  06/03/2003     DTAP/TDAP/TD IMMUNIZATION (2 - Td) 02/26/2016     LIPID  01/25/2017     PHQ-9  05/07/2018     DIABETIC EYE EXAM  01/24/2019     MICROALBUMIN  04/10/2019     Health Maintenance reviewed at today's visit patient asked to schedule/complete:   Routine Health Visit: Completed at today's visit.  Depression:  Completed at today's visit.  Diabetes:  Patient agrees to schedule  Immunizations:  Patient agrees to schedule    "

## 2019-05-28 NOTE — PATIENT INSTRUCTIONS
Patient Education   Personalized Prevention Plan  You are due for the preventive services outlined below.  Your care team is available to assist you in scheduling these services.  If you have already completed any of these items, please share that information with your care team to update in your medical record.  Health Maintenance Due   Topic Date Due     Discuss Advance Directive Planning  1940     Zoster (Shingles) Vaccine (1 of 2) 06/03/1988     Annual Wellness Visit  06/03/2003     Diptheria Tetanus Pertussis (DTAP/TDAP/TD) Vaccine (2 - Td) 02/26/2016     Cholesterol Lab  01/25/2017     Depression Assessment  05/07/2018     Eye Exam  01/24/2019     Kidney Function Urine Test  04/10/2019     Preventive Health Recommendations  See your health care provider every year to    Review health changes.     Discuss preventive care.      Review your medicines if your doctor has prescribed any.    Talk with your health care provider about whether you should have a test to screen for prostate cancer (PSA).    Every 3 years, have a diabetes test (fasting glucose). If you are at risk for diabetes, you should have this test more often.    Every 5 years, have a cholesterol test. Have this test more often if you are at risk for high cholesterol or heart disease.     Every 10 years, have a colonoscopy. Or, have a yearly FIT test (stool test). These exams will check for colon cancer.    Talk to with your health care provider about screening for Abdominal Aortic Aneurysm if you have a family history of AAA or have a history of smoking.    Shots:     Get a flu shot each year.     Get a tetanus shot every 10 years.     Talk to your doctor about your pneumonia vaccines. There are now two you should receive - Pneumovax (PPSV 23) and Prevnar (PCV 13).    Talk to your pharmacist about a shingles vaccine.     Talk to your doctor about the hepatitis B vaccine.    Nutrition:     Eat at least 5 servings of fruits and vegetables each  day.     Eat whole-grain bread, whole-wheat pasta and brown rice instead of white grains and rice.     Get adequate Calcium and Vitamin D.     Lifestyle    Exercise for at least 150 minutes a week (30 minutes a day, 5 days a week). This will help you control your weight and prevent disease.     Limit alcohol to one drink per day.     No smoking.     Wear sunscreen to prevent skin cancer.     See your dentist every six months for an exam and cleaning.     See your eye doctor every 1 to 2 years to screen for conditions such as glaucoma, macular degeneration and cataracts.    Personalized Prevention Plan  You are due for the preventive services outlined below.  Your care team is available to assist you in scheduling these services.  If you have already completed any of these items, please share that information with your care team to update in your medical record.  Health Maintenance Due   Topic Date Due     Discuss Advance Directive Planning  1940     Zoster (Shingles) Vaccine (1 of 2) 06/03/1988     Annual Wellness Visit  06/03/2003     Diptheria Tetanus Pertussis (DTAP/TDAP/TD) Vaccine (2 - Td) 02/26/2016     Cholesterol Lab  01/25/2017     Depression Assessment  05/07/2018     Eye Exam  01/24/2019     Kidney Function Urine Test  04/10/2019      1. .Please do your breast exam every mo, when you  Change the  calendar page or set an alarm on your cell phone Do a  visual check for dimples, inversion or indentation or any different position of the nipple Feel manually  for any 1cm or larger  size mass ie about the size of an almond Be sure to cover the entire area of both breasts : this extends back to the back on either side and from the collar bone to the bottom of the breasts where you can begin to feel ribs.  Men are advised to do this exam also as they now have a higher rate of breast cancer , like women do .     2.

## 2019-05-29 DIAGNOSIS — E78.5 HYPERLIPIDEMIA LDL GOAL <100: ICD-10-CM

## 2019-05-29 PROBLEM — R19.4 CHANGE IN BOWEL HABITS: Status: ACTIVE | Noted: 2019-05-29

## 2019-05-29 PROBLEM — F33.42 RECURRENT MAJOR DEPRESSION IN COMPLETE REMISSION (H): Status: ACTIVE | Noted: 2019-05-29

## 2019-05-29 NOTE — TELEPHONE ENCOUNTER
"Requested Prescriptions   Pending Prescriptions Disp Refills     atorvastatin (LIPITOR) 20 MG tablet [Pharmacy Med Name: ATORVASTATIN 20MG TABLETS]  Last Written Prescription Date:  5/10/2018  Last Fill Quantity: 90 tablet,  # refills: 3   Last office visit: 5/28/2019 with prescribing provider:  CELESTE Lee   Future Office Visit:     90 tablet 0     Sig: TAKE 1 TABLET(20 MG) BY MOUTH DAILY       Statins Protocol Failed - 5/29/2019 10:39 AM        Failed - LDL on file in past 12 months     Recent Labs   Lab Test 04/10/18  1609   LDL 72             Passed - No abnormal creatine kinase in past 12 months     No lab results found.             Passed - Recent (12 mo) or future (30 days) visit within the authorizing provider's specialty     Patient had office visit in the last 12 months or has a visit in the next 30 days with authorizing provider or within the authorizing provider's specialty.  See \"Patient Info\" tab in inbasket, or \"Choose Columns\" in Meds & Orders section of the refill encounter.              Passed - Medication is active on med list        Passed - Patient is age 18 or older           "

## 2019-05-31 DIAGNOSIS — E78.5 HYPERLIPIDEMIA LDL GOAL <100: ICD-10-CM

## 2019-05-31 RX ORDER — ATORVASTATIN CALCIUM 20 MG/1
TABLET, FILM COATED ORAL
Qty: 30 TABLET | Refills: 0 | Status: SHIPPED | OUTPATIENT
Start: 2019-05-31 | End: 2019-05-31

## 2019-05-31 RX ORDER — ATORVASTATIN CALCIUM 20 MG/1
TABLET, FILM COATED ORAL
Qty: 90 TABLET | Refills: 4 | Status: SHIPPED | OUTPATIENT
Start: 2019-05-31 | End: 2020-05-25

## 2019-05-31 NOTE — TELEPHONE ENCOUNTER
"Requested Prescriptions   Pending Prescriptions Disp Refills     atorvastatin (LIPITOR) 20 MG tablet [Pharmacy Med Name: ATORVASTATIN 20MG TABLETS]  Last Written Prescription Date:  5-31-19  Last Fill Quantity: 30tab,  # refills: 0   Last office visit: 5/28/2019 with prescribing provider:     Future Office Visit:     90 tablet 0     Sig: TAKE 1 TABLET(20MG) BY MOUTH DAILY       Statins Protocol Failed - 5/31/2019  9:55 AM        Failed - LDL on file in past 12 months     Recent Labs   Lab Test 04/10/18  1609   LDL 72             Passed - No abnormal creatine kinase in past 12 months     No lab results found.             Passed - Recent (12 mo) or future (30 days) visit within the authorizing provider's specialty     Patient had office visit in the last 12 months or has a visit in the next 30 days with authorizing provider or within the authorizing provider's specialty.  See \"Patient Info\" tab in inbasket, or \"Choose Columns\" in Meds & Orders section of the refill encounter.              Passed - Medication is active on med list        Passed - Patient is age 18 or older          "

## 2019-05-31 NOTE — TELEPHONE ENCOUNTER
Pt is overdue for fasting labs. Juli refill given for 30 day supply. Pharmacy is requesting approval for 90 day supply. Please advice on refill.

## 2019-06-07 ENCOUNTER — THERAPY VISIT (OUTPATIENT)
Dept: PHYSICAL THERAPY | Facility: CLINIC | Age: 79
End: 2019-06-07
Payer: COMMERCIAL

## 2019-06-07 ENCOUNTER — OFFICE VISIT (OUTPATIENT)
Dept: FAMILY MEDICINE | Facility: CLINIC | Age: 79
End: 2019-06-07
Payer: COMMERCIAL

## 2019-06-07 VITALS
HEIGHT: 71 IN | TEMPERATURE: 96.5 F | BODY MASS INDEX: 23.1 KG/M2 | SYSTOLIC BLOOD PRESSURE: 138 MMHG | DIASTOLIC BLOOD PRESSURE: 80 MMHG | RESPIRATION RATE: 18 BRPM | OXYGEN SATURATION: 98 % | HEART RATE: 113 BPM | WEIGHT: 165 LBS

## 2019-06-07 DIAGNOSIS — M54.50 ACUTE BILATERAL LOW BACK PAIN WITHOUT SCIATICA: ICD-10-CM

## 2019-06-07 DIAGNOSIS — R80.9 MICROALBUMINURIA: ICD-10-CM

## 2019-06-07 DIAGNOSIS — Z12.5 SCREENING FOR PROSTATE CANCER: ICD-10-CM

## 2019-06-07 DIAGNOSIS — M54.50 ACUTE BILATERAL LOW BACK PAIN WITHOUT SCIATICA: Primary | ICD-10-CM

## 2019-06-07 DIAGNOSIS — E78.00 HYPERCHOLESTEROLEMIA: ICD-10-CM

## 2019-06-07 DIAGNOSIS — F33.42 RECURRENT MAJOR DEPRESSION IN COMPLETE REMISSION (H): ICD-10-CM

## 2019-06-07 DIAGNOSIS — I10 BENIGN ESSENTIAL HYPERTENSION: ICD-10-CM

## 2019-06-07 DIAGNOSIS — R19.4 CHANGE IN BOWEL HABITS: ICD-10-CM

## 2019-06-07 LAB
ALT SERPL W P-5'-P-CCNC: 27 U/L (ref 0–70)
BASOPHILS # BLD AUTO: 0 10E9/L (ref 0–0.2)
BASOPHILS NFR BLD AUTO: 0.2 %
CHOLEST SERPL-MCNC: 131 MG/DL
CREAT UR-MCNC: 50 MG/DL
DIFFERENTIAL METHOD BLD: ABNORMAL
EOSINOPHIL # BLD AUTO: 0 10E9/L (ref 0–0.7)
EOSINOPHIL NFR BLD AUTO: 0.3 %
ERYTHROCYTE [DISTWIDTH] IN BLOOD BY AUTOMATED COUNT: 12.6 % (ref 10–15)
HCT VFR BLD AUTO: 44.8 % (ref 40–53)
HDLC SERPL-MCNC: 67 MG/DL
HGB BLD-MCNC: 16.2 G/DL (ref 13.3–17.7)
LDLC SERPL CALC-MCNC: 53 MG/DL
LYMPHOCYTES # BLD AUTO: 0.7 10E9/L (ref 0.8–5.3)
LYMPHOCYTES NFR BLD AUTO: 7.9 %
MCH RBC QN AUTO: 31.3 PG (ref 26.5–33)
MCHC RBC AUTO-ENTMCNC: 36.2 G/DL (ref 31.5–36.5)
MCV RBC AUTO: 87 FL (ref 78–100)
MICROALBUMIN UR-MCNC: 68 MG/L
MICROALBUMIN/CREAT UR: 135.82 MG/G CR (ref 0–17)
MONOCYTES # BLD AUTO: 0.7 10E9/L (ref 0–1.3)
MONOCYTES NFR BLD AUTO: 7.2 %
NEUTROPHILS # BLD AUTO: 7.6 10E9/L (ref 1.6–8.3)
NEUTROPHILS NFR BLD AUTO: 84.4 %
NONHDLC SERPL-MCNC: 64 MG/DL
PLATELET # BLD AUTO: 321 10E9/L (ref 150–450)
PSA SERPL-ACNC: 1.47 UG/L (ref 0–4)
RBC # BLD AUTO: 5.18 10E12/L (ref 4.4–5.9)
TRIGL SERPL-MCNC: 55 MG/DL
WBC # BLD AUTO: 9 10E9/L (ref 4–11)

## 2019-06-07 PROCEDURE — 82043 UR ALBUMIN QUANTITATIVE: CPT | Performed by: FAMILY MEDICINE

## 2019-06-07 PROCEDURE — 84460 ALANINE AMINO (ALT) (SGPT): CPT | Performed by: FAMILY MEDICINE

## 2019-06-07 PROCEDURE — 85025 COMPLETE CBC W/AUTO DIFF WBC: CPT | Performed by: FAMILY MEDICINE

## 2019-06-07 PROCEDURE — G0103 PSA SCREENING: HCPCS | Performed by: FAMILY MEDICINE

## 2019-06-07 PROCEDURE — 97110 THERAPEUTIC EXERCISES: CPT | Mod: GP | Performed by: PHYSICAL THERAPIST

## 2019-06-07 PROCEDURE — 36415 COLL VENOUS BLD VENIPUNCTURE: CPT | Performed by: FAMILY MEDICINE

## 2019-06-07 PROCEDURE — 99214 OFFICE O/P EST MOD 30 MIN: CPT | Performed by: FAMILY MEDICINE

## 2019-06-07 PROCEDURE — 97161 PT EVAL LOW COMPLEX 20 MIN: CPT | Mod: GP | Performed by: PHYSICAL THERAPIST

## 2019-06-07 PROCEDURE — 80061 LIPID PANEL: CPT | Performed by: FAMILY MEDICINE

## 2019-06-07 ASSESSMENT — PATIENT HEALTH QUESTIONNAIRE - PHQ9
5. POOR APPETITE OR OVEREATING: SEVERAL DAYS
SUM OF ALL RESPONSES TO PHQ QUESTIONS 1-9: 3

## 2019-06-07 ASSESSMENT — ANXIETY QUESTIONNAIRES
1. FEELING NERVOUS, ANXIOUS, OR ON EDGE: SEVERAL DAYS
3. WORRYING TOO MUCH ABOUT DIFFERENT THINGS: NOT AT ALL
2. NOT BEING ABLE TO STOP OR CONTROL WORRYING: NOT AT ALL
5. BEING SO RESTLESS THAT IT IS HARD TO SIT STILL: NOT AT ALL
6. BECOMING EASILY ANNOYED OR IRRITABLE: SEVERAL DAYS
IF YOU CHECKED OFF ANY PROBLEMS ON THIS QUESTIONNAIRE, HOW DIFFICULT HAVE THESE PROBLEMS MADE IT FOR YOU TO DO YOUR WORK, TAKE CARE OF THINGS AT HOME, OR GET ALONG WITH OTHER PEOPLE: SOMEWHAT DIFFICULT
7. FEELING AFRAID AS IF SOMETHING AWFUL MIGHT HAPPEN: NOT AT ALL
GAD7 TOTAL SCORE: 3

## 2019-06-07 ASSESSMENT — MIFFLIN-ST. JEOR: SCORE: 1475.57

## 2019-06-07 NOTE — LETTER
June 12, 2019      Jose Francisco Gonzalez  [address]  [facility] [id number]        Dear ,    We are writing to inform you of your test results.      They are all normal     THE FOLLOWING ARE EXPLANATIONS OF SOME OF OUR LAB TESTS     YOU DID NOT NECESSARILY HAVE ALL OF THESE DONE     Hgb is the blood iron level   WBC means White Blood Cells   Platelets are small blood    cells that help with forming the blood clots along with other blood factors.   Electrolytes are Sodium, Potassium, Calcium, Magnesium, Phosphorus.   Liver tests are: AST, ALT, Bilirubin, Alkaline Phosphatase.   Kidney tests are Creatinine, GFR.###your urine shows protein from aging and the mild effects of medical diseases     HDL Choles   terol - is the good cholesterol and it is good to have it high.   LDL cholesterol is the bad cholesterol and it is good to have it low.   It is recommended to have LDL less than 130 for people with hypertension and to have it less than 100 for people with   heart disease, diabetes and chronic kidney disease.   Triglycerides are another type of lipid that can cause heart disease, like the cholesterol and should be kept low   Thyroid tests are TSH, T4, T3   Glucose is sugar.   A1c is a test that gives us an idea    about how well was controlled the diabetes for the last 3 months.   PSA stands for Prostate Specific Antigen and it can be elevated with prostate cancer or prostate inflammation.     Please continue on the same medications  Resulted Orders   Lipid panel reflex to direct LDL Fasting   Result Value Ref Range    Cholesterol 131 <200 mg/dL    Triglycerides 55 <150 mg/dL      Comment:      Non Fasting    HDL Cholesterol 67 >39 mg/dL    LDL Cholesterol Calculated 53 <100 mg/dL      Comment:      Desirable:       <100 mg/dl    Non HDL Cholesterol 64 <130 mg/dL   Albumin Random Urine Quantitative with Creat Ratio   Result Value Ref Range    Creatinine Urine 50 mg/dL    Albumin Urine mg/L 68 mg/L     Albumin Urine mg/g Cr 135.82 (H) 0 - 17 mg/g Cr   ALT   Result Value Ref Range    ALT 27 0 - 70 U/L   Prostate spec antigen screen   Result Value Ref Range    PSA 1.47 0 - 4 ug/L      Comment:      Assay Method:  Chemiluminescence using Siemens Vista analyzer   CBC with platelets differential   Result Value Ref Range    WBC 9.0 4.0 - 11.0 10e9/L    RBC Count 5.18 4.4 - 5.9 10e12/L    Hemoglobin 16.2 13.3 - 17.7 g/dL    Hematocrit 44.8 40.0 - 53.0 %    MCV 87 78 - 100 fl    MCH 31.3 26.5 - 33.0 pg    MCHC 36.2 31.5 - 36.5 g/dL    RDW 12.6 10.0 - 15.0 %    Platelet Count 321 150 - 450 10e9/L    % Neutrophils 84.4 %    % Lymphocytes 7.9 %    % Monocytes 7.2 %    % Eosinophils 0.3 %    % Basophils 0.2 %    Absolute Neutrophil 7.6 1.6 - 8.3 10e9/L    Absolute Lymphocytes 0.7 (L) 0.8 - 5.3 10e9/L    Absolute Monocytes 0.7 0.0 - 1.3 10e9/L    Absolute Eosinophils 0.0 0.0 - 0.7 10e9/L    Absolute Basophils 0.0 0.0 - 0.2 10e9/L    Diff Method Automated Method        If you have any questions or concerns, please call the clinic at the number listed above.       Sincerely,        Claudine Lee MD

## 2019-06-07 NOTE — PROGRESS NOTES
[facility] Initial Evaluation  Subjective:  The history is provided by the patient.   Jose Francisco Gonzalez is a 79 year old male with a lumbar condition.  Condition occurred with:  Insidious onset (Maybe from fall on May 12th).  Condition occurred: for unknown reasons (Patient has had some GI issues. ).  This is a new condition  Order 6/7/2019  Date of onset 3.5 weeks ago  .    Patient reports pain:  Lumbar spine right and lumbar spine left.  Radiates to:  No radiation.  Pain is described as aching and is intermittent and reported as 2/10.  Associated symptoms:  Loss of balance and loss of motion/stiffness. Pain is worse in the A.M..  Symptoms are exacerbated by lying down, carrying, lifting, sitting and standing Relieved by: Tylenol.  Since onset symptoms are gradually worsening.  Special tests:  X-ray (previous).      General health as reported by patient is good.  Pertinent medical history includes:  Concussions/dizziness, diabetes and rheumatoid arthritis (changes in bowel and bladder).  Medical allergies: no.  Other surgeries include:  No.  Current medications:  None as reported by the patient.  Current occupation is Retired  .        Barriers include:  None as reported by the patient.    Red flags:  None as reported by the patient (Patient has been dealing with constipation. ).                        Objective:  System         Lumbar/SI Evaluation    Lumbar Myotomes:    T12-L3 (Hip Flex):  Left: 5-    Right: 5-  L2-4 (Quads):  Left:  5    Right:  5  L4 (Ankle DF):  Left:  5    Right:  5  L5 (Great Toe Ext): Left: 5    Right: 5   S1 (Toe Raise):  Left: 5    Right: 5  Lumbar DTR's:    L4 (Quad):  Left:  1   Right:  1  S1 (Achilles):  Left:  1   Right:  1    Lumbar Dermtomes:        L2 Left:  Normal-light touch     L2 Right:  Normal-light touch  L3 Left:  Normal-light touch     L3 Right:  Normal-light touch  L4 Left:  Normal-light touch       L4 Right:  Normal-light touch  L5 Left:  Normal-light  touch     L5 Right:  Normal-light touch  S1 Left:  Normal-light touch     S1 Right:  Normal-light touch                                                           Anita Lumbar Evaluation    Posture:    Standing: fair  Lordosis: Reduced  Lateral Shift: no      Movement Loss:  Flexion (Flex): mod  Extension (EXT): mod  Side Glide R (SG R): mod  Side Glide L (SG L): mod  Test Movements:  FIS: During: no effect  After: no effect      EIS: During: increases  After: no worse      THALIA: During: increases  After: no worse    Repeat THALIA: During: increases  After: no worse  Mechanical Response: IncROM          Conclusion: derangement and other  Principle of Treatment:    Flexion: RFIL 1 x 10 reps, 3x per day                                             ROS    Assessment/Plan:    Patient is a 79 year old male with lumbar complaints.    Patient has the following significant findings with corresponding treatment plan.                Diagnosis 1:  Low back pain without sciatica   Pain -  hot/cold therapy, manual therapy and home program  Decreased ROM/flexibility - manual therapy, therapeutic exercise and home program  Decreased joint mobility - manual therapy, therapeutic exercise and home program  Decreased strength - therapeutic exercise, therapeutic activities and home program  Impaired muscle performance - neuro re-education and home program  Decreased function - therapeutic activities and home program    Therapy Evaluation Codes:   1) History comprised of:   Personal factors that impact the plan of care:      Age.    Comorbidity factors that impact the plan of care are:      Bowel/bladder changes, Diabetes, Dizziness and Rheumatoid arthritis.     Medications impacting care: None.  2) Examination of Body Systems comprised of:   Body structures and functions that impact the plan of care:      Lumbar spine.   Activity limitations that impact the plan of care are:      Bending, Lifting, Sitting, Squatting/kneeling and  Standing.  3) Clinical presentation characteristics are:   Stable/Uncomplicated.  4) Decision-Making    Low complexity using standardized patient assessment instrument and/or measureable assessment of functional outcome.  Cumulative Therapy Evaluation is: Low complexity.    Previous and current functional limitations:  (See Goal Flow Sheet for this information)    Short term and Long term goals: (See Goal Flow Sheet for this information)     Communication ability:  Patient appears to be able to clearly communicate and understand verbal and written communication and follow directions correctly.  Treatment Explanation - The following has been discussed with the patient:   RX ordered/plan of care  Anticipated outcomes  Possible risks and side effects  This patient would benefit from PT intervention to resume normal activities.   Rehab potential is good.    Frequency:  1 X week, once daily  Duration:  for 6 weeks  Discharge Plan:  Achieve all LTG.  Independent in home treatment program.  Reach maximal therapeutic benefit.    Please refer to the daily flowsheet for treatment today, total treatment time and time spent performing 1:1 timed codes.

## 2019-06-07 NOTE — PATIENT INSTRUCTIONS
1. Walking realigns the back    2. ICE  decreases inflammation & kills the pain coming from the nerves     WARM SOAKS/PACKS  Relax & loosen up tight muscles to reduce the pain of the        muscle tightness & spasm    3. No difference was  Noted by patients in a double blind study when given codeine, tylenol ( acetaminophen) or ibuprofen (all in identical pills). They felt no difference in pain relief. Since ibuprofen and the NSAIDs  causes kidney damage, esophageal damage with heartburn, and can increase the risk of esophageal and stomach cancer and ulcers,and colonic strictures. They also cause increased risk of heart attack .   I recommend that you use tylenol(acetaminophen) for pain. Use the acetaminophen ES  Which has 500mgm/tablet You can take up to 2 tablets 4 times a day as need for pain.  If this is not enough, you can add in ibuprofen or aleve(naprosyn) with 2 glasses of fluid and some food-to protect the stomach and esophagus. Please let us know if you are continuing to take ibuprofen or aleve, as we will need to periodically check your kidney function with a blood test.     3. Take the miralax every day to be sure and increase as need     4. You have the colonoscopy in 8-19

## 2019-06-07 NOTE — PROGRESS NOTES
Subjective     Jose Francisco Gonzalez is a 79 year old male who presents to clinic today for the following health issues:    HPI     Constipation/Diarrhea:      Description:       Change in Bowel Habits for 3-4 weeks       Frequency of bowel movements: 2-3 days  Then explosive when takes suppos       Colonoscopy neg  ? 2007  consistency of stool: Loose and Explosive post Mag citrate     Intensity:  moderate    Accompanying signs and symptoms: Lower Back Pain, Bloating and Gas       Abdominal pain: YES       Rectal pain: no        Blood in stool: no        Nausea/vomitting: no     History:        Similar problems in past: no     Precipitating or alleviating factors: None        Medications worsening symptoms: YES    Therapies tried and outcome: Stool Softener, Metamucil-2 caps a d        Chronic laxative use: no     Back Pain : Bilat Lat MM       Duration: since mid 5-19         Specific cause: none known     Description:   Location of pain: low back bilateral  Character of pain: dull ache  Pain radiation:none  New numbness or weakness in legs, not attributed to pain:  no     Intensity: Currently 3/10    History:   Pain interferes with job: Not applicable,   History of back problems: no prior back problems  Contusion from a fall in 4-18 without residual   Any previous MRI or X-rays: Yes--at here .  Date 4-18: normal with only slight spurring   Sees a specialist for back pain:  No  Therapies tried without relief: 0    Alleviating factors:   Improved by: 0      Precipitating factors:  Worsened by: Lifting, Bending, Standing and Sitting          Accompanying Signs & Symptoms:  Risk of Fracture:  None  Risk of Cauda Equina:  None  Risk of Infection:  None  Risk of Cancer:  None  Risk of Ankylosing Spondylitis:  Onset at age <35, male, AND morning back stiffness. no                   Depression and Anxiety Follow-Up      How are you doing with your depression since your last visit? Improved remission    How are you doing with  your anxiety since your last visit?  Improved     Are you having other symptoms that might be associated with depression or anxiety? No    Have you had a significant life event? No     Do you have any concerns with your use of alcohol or other drugs? No    Social History     Tobacco Use     Smoking status: Former Smoker     Packs/day: 1.00     Years: 20.00     Pack years: 20.00     Types: Cigarettes     Last attempt to quit: 1993     Years since quittin.3     Smokeless tobacco: Never Used   Substance Use Topics     Alcohol use: Yes     Comment: occ     Drug use: No     PHQ 2017   PHQ-9 Total Score 5 4                          3   Q9: Thoughts of better off dead/self-harm past 2 weeks Not at all Not at all   CARIDAD                                                                                                                                              3          Hypertension Follow-up      Do you check your blood pressure regularly outside of the clinic? Yes     microalbuminuria    Are you following a low salt diet? Yes    Are your blood pressures ever more than 140 on the top number (systolic) OR more   than 90 on the bottom number (diastolic), for example 140/90? No  BP Readings from Last 2 Encounters:   19 136/80   19 124/66     Hemoglobin A1C (%)   Date Value   2019 7.0 (H)   10/09/2018 7.7 (H)     LDL Cholesterol Calculated (mg/dL)   Date Value   2016 51     LDL Cholesterol Direct (mg/dL)   Date Value   04/10/2018 72   2017 70       Amount of exercise or physical activity: 6-7 days/week for an average of greater than 60 minutes    Problems taking medications regularly: No    Medication side effects: none    Diet: diabetic    Hyperlipidemia Follow-Up      Are you having any of the following symptoms? (Select all that apply)  No complaints of shortness of breath, chest pain or pressure.  No increased sweating or nausea with activity.  No  left-sided neck or arm pain.  No complaints of pain in calves when walking 1-2 blocks.    Are you regularly taking any medication or supplement to lower your cholesterol?   Yes- artoravastatin 20mgm    Are you having muscle aches or other side effects that you think could be caused by your cholesterol lowering medication?  No              Reviewed and updated as needed this visit by Provider         Review of Systems   ROS COMP: CONSTITUTIONAL: NEGATIVE for fever, chills, change in weight  INTEGUMENTARY/SKIN: NEGATIVE for worrisome rashes, moles or lesions  EYES: NEGATIVE for vision changes or irritation  ENT/MOUTH: NEGATIVE for ear, mouth and throat problems  RESP: NEGATIVE for significant cough or SOB  BREAST: NEGATIVE for masses, tenderness or discharge  CV: NEGATIVE for chest pain, palpitations or peripheral edema  GI: NEGATIVE for nausea, abdominal pain, heartburn, POS for  change in bowel habits, POSITIVE for , constipation, diarrhea and gas or bloating  : NEGATIVE for frequency, dysuria, or hematuria  MUSCULOSKELETAL:NEGATIVE for significant arthralgias or myalgia, POSITIVE  for  and back pain  NEURO: NEGATIVE for weakness, dizziness or paresthesias  ENDOCRINE: NEGATIVE for temperature intolerance, skin/hair changes  HEME: NEGATIVE for bleeding problems  PSYCHIATRIC: NEGATIVE for changes in mood or affect      Objective    There were no vitals taken for this visit.  There is no height or weight on file to calculate BMI.  Physical Exam   GENERAL: healthy, alert, LBP = distress, frail and elderly  EYES: Eyes grossly normal to inspection, PERRL and conjunctivae and sclerae normal  RESP: lungs clear to auscultation - no rales, rhonchi or wheezes  CV: regular rate and rhythm, normal S1 S2, no S3 or S4, no murmur, click or rub, no peripheral edema and peripheral pulses strong  MS: no gross musculoskeletal defects noted, no edema  POS stiff back on arising   SKIN: no suspicious lesions or rashes  NEURO: Normal  strength and tone, mentation intact and speech normal  PSYCH: mentation appears normal, confused at times and slow to comprehend , affect normal/bright, anxious, fatigued and appearance well groomed    Diagnostic Test Results:  Labs reviewed in Epic  Results for orders placed or performed in visit on 04/24/19   Comprehensive metabolic panel (BMP + Alb, Alk Phos, ALT, AST, Total. Bili, TP)   Result Value Ref Range    Sodium 132 (L) 133 - 144 mmol/L    Potassium 4.1 3.4 - 5.3 mmol/L    Chloride 96 94 - 109 mmol/L    Carbon Dioxide 28 20 - 32 mmol/L    Anion Gap 8 3 - 14 mmol/L    Glucose 112 (H) 70 - 99 mg/dL    Urea Nitrogen 10 7 - 30 mg/dL    Creatinine 0.76 0.66 - 1.25 mg/dL    GFR Estimate 85 >60 mL/min/[1.73_m2]    GFR Estimate If Black >90 >60 mL/min/[1.73_m2]    Calcium 9.2 8.5 - 10.1 mg/dL    Bilirubin Total 0.6 0.2 - 1.3 mg/dL    Albumin 4.0 3.4 - 5.0 g/dL    Protein Total 8.0 6.8 - 8.8 g/dL    Alkaline Phosphatase 62 40 - 150 U/L    ALT 27 0 - 70 U/L    AST 30 0 - 45 U/L   Hemoglobin A1c   Result Value Ref Range    Hemoglobin A1C 7.0 (H) 0 - 5.6 %           Assessment & Plan       ICD-10-CM    1. Acute bilateral low back pain without sciatica since mid May, 2019  M54.5 [facility]   2. Change in bowel habits since mid May , 2019 R19.4    3. Hypercholesterolemia E78.00 Lipid panel reflex to direct LDL Fasting     ALT   4. Microalbuminuria R80.9 Albumin Random Urine Quantitative with Creat Ratio   5. Benign essential hypertension I10 CBC with platelets differential   6. Screening for prostate cancer Z12.5 Prostate spec antigen screen   7. 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 Walking realigns the back    2. ICE  decreases inflammation & kills the pain coming from the nerves     WARM SOAKS/PACKS  Relax & loosen up tight muscles to reduce the pain of the        muscle tightness & spasm    3. No difference was  Noted by patients  in a double blind study when given codeine, tylenol ( acetaminophen) or ibuprofen (all in identical pills). They felt no difference in pain relief. Since ibuprofen and the NSAIDs  causes kidney damage, esophageal damage with heartburn, and can increase the risk of esophageal and stomach cancer and ulcers,and colonic strictures. They also cause increased risk of heart attack .   I recommend that you use tylenol(acetaminophen) for pain. Use the acetaminophen ES  Which has 500mgm/tablet You can take up to 2 tablets 4 times a day as need for pain.  If this is not enough, you can add in ibuprofen or aleve(naprosyn) with 2 glasses of fluid and some food-to protect the stomach and esophagus. Please let us know if you are continuing to take ibuprofen or aleve, as we will need to periodically check your kidney function with a blood test.     3. Take the miralax every day to be sure and increase as need     4. You have the colonoscopy in 8-19       he needs to get in to PT a sap as has a flight to [place] in 9 d     No follow-ups on file.    Claudine Lee MD  [facility]

## 2019-06-07 NOTE — NURSING NOTE
"Chief Complaint   Patient presents with     Back Pain     /80   Pulse 113   Temp 96.5  F (35.8  C) (Tympanic)   Resp 18   Ht 1.803 m (5' 11\")   Wt 74.8 kg (165 lb)   SpO2 98%   BMI 23.01 kg/m   Estimated body mass index is 23.01 kg/m  as calculated from the following:    Height as of this encounter: 1.803 m (5' 11\").    Weight as of this encounter: 74.8 kg (165 lb).  BP completed using cuff size: regular   Lary Patterson CMA    Health Maintenance Due   Topic Date Due     CARIDAD ASSESSMENT  1940     ADVANCED DIRECTIVE PLANNING  1940     ZOSTER IMMUNIZATION (1 of 2) 06/03/1988     DTAP/TDAP/TD IMMUNIZATION (2 - Td) 02/26/2016     LIPID  01/25/2017     MICROALBUMIN  04/10/2019     Health Maintenance reviewed at today's visit patient asked to schedule/complete:   Immunizations:  Patient agrees to schedule    "

## 2019-06-08 ASSESSMENT — ANXIETY QUESTIONNAIRES: GAD7 TOTAL SCORE: 3

## 2019-06-11 ENCOUNTER — ANCILLARY PROCEDURE (OUTPATIENT)
Dept: GENERAL RADIOLOGY | Facility: CLINIC | Age: 79
End: 2019-06-11
Attending: INTERNAL MEDICINE
Payer: COMMERCIAL

## 2019-06-11 ENCOUNTER — OFFICE VISIT (OUTPATIENT)
Dept: FAMILY MEDICINE | Facility: CLINIC | Age: 79
End: 2019-06-11
Payer: COMMERCIAL

## 2019-06-11 VITALS
DIASTOLIC BLOOD PRESSURE: 80 MMHG | HEART RATE: 94 BPM | WEIGHT: 165 LBS | HEIGHT: 71 IN | BODY MASS INDEX: 23.1 KG/M2 | TEMPERATURE: 97.5 F | RESPIRATION RATE: 20 BRPM | OXYGEN SATURATION: 96 % | SYSTOLIC BLOOD PRESSURE: 160 MMHG

## 2019-06-11 DIAGNOSIS — S32.010A CLOSED COMPRESSION FRACTURE OF FIRST LUMBAR VERTEBRA, INITIAL ENCOUNTER: ICD-10-CM

## 2019-06-11 DIAGNOSIS — I10 HYPERTENSION GOAL BP (BLOOD PRESSURE) < 140/80: Chronic | ICD-10-CM

## 2019-06-11 DIAGNOSIS — R10.84 ABDOMINAL PAIN, GENERALIZED: ICD-10-CM

## 2019-06-11 DIAGNOSIS — R19.4 CHANGE IN BOWEL HABITS: ICD-10-CM

## 2019-06-11 DIAGNOSIS — M54.50 ACUTE BILATERAL LOW BACK PAIN WITHOUT SCIATICA: ICD-10-CM

## 2019-06-11 DIAGNOSIS — Z79.4 TYPE 2 DIABETES MELLITUS WITH DIABETIC NEPHROPATHY, WITH LONG-TERM CURRENT USE OF INSULIN (H): ICD-10-CM

## 2019-06-11 DIAGNOSIS — S39.012D LOW BACK STRAIN, SUBSEQUENT ENCOUNTER: ICD-10-CM

## 2019-06-11 DIAGNOSIS — M54.50 ACUTE BILATERAL LOW BACK PAIN WITHOUT SCIATICA: Primary | ICD-10-CM

## 2019-06-11 DIAGNOSIS — E11.21 TYPE 2 DIABETES MELLITUS WITH DIABETIC NEPHROPATHY, WITH LONG-TERM CURRENT USE OF INSULIN (H): ICD-10-CM

## 2019-06-11 LAB
ALBUMIN UR-MCNC: ABNORMAL MG/DL
APPEARANCE UR: CLEAR
BILIRUB UR QL STRIP: NEGATIVE
COLOR UR AUTO: YELLOW
GLUCOSE UR STRIP-MCNC: NEGATIVE MG/DL
HGB UR QL STRIP: NEGATIVE
KETONES UR STRIP-MCNC: NEGATIVE MG/DL
LEUKOCYTE ESTERASE UR QL STRIP: NEGATIVE
NITRATE UR QL: NEGATIVE
PH UR STRIP: 7 PH (ref 5–7)
RBC #/AREA URNS AUTO: ABNORMAL /HPF
SOURCE: ABNORMAL
SP GR UR STRIP: 1.01 (ref 1–1.03)
UROBILINOGEN UR STRIP-ACNC: 0.2 EU/DL (ref 0.2–1)
WBC #/AREA URNS AUTO: ABNORMAL /HPF

## 2019-06-11 PROCEDURE — 81001 URINALYSIS AUTO W/SCOPE: CPT | Performed by: INTERNAL MEDICINE

## 2019-06-11 PROCEDURE — 72100 X-RAY EXAM L-S SPINE 2/3 VWS: CPT | Mod: FY

## 2019-06-11 PROCEDURE — 99214 OFFICE O/P EST MOD 30 MIN: CPT | Performed by: INTERNAL MEDICINE

## 2019-06-11 RX ORDER — METOPROLOL SUCCINATE 50 MG/1
50 TABLET, EXTENDED RELEASE ORAL DAILY
Qty: 90 TABLET | Refills: 3 | Status: SHIPPED | OUTPATIENT
Start: 2019-06-11 | End: 2020-05-04

## 2019-06-11 RX ORDER — TRAMADOL HYDROCHLORIDE 50 MG/1
50 TABLET ORAL EVERY 6 HOURS PRN
Qty: 30 TABLET | Refills: 1 | Status: SHIPPED | OUTPATIENT
Start: 2019-06-11 | End: 2019-06-25

## 2019-06-11 ASSESSMENT — MIFFLIN-ST. JEOR: SCORE: 1475.57

## 2019-06-11 NOTE — RESULT ENCOUNTER NOTE
.  Please see attached lab results  They are all normal     THE FOLLOWING ARE EXPLANATIONS OF SOME OF OUR LAB TESTS     YOU DID NOT NECESSARILY HAVE ALL OF THESE DONE     Hgb is the blood iron level  WBC means White Blood Cells  Platelets are small blood   cells that help with forming the blood clots along with other blood factors.  Electrolytes are Sodium, Potassium, Calcium, Magnesium, Phosphorus.  Liver tests are: AST, ALT, Bilirubin, Alkaline Phosphatase.  Kidney tests are Creatinine, GFR.###your urine shows protein from aging and the mild effects of medical diseases     HDL Choles  terol - is the good cholesterol and it is good to have it high.  LDL cholesterol is the bad cholesterol and it is good to have it low.  It is recommended to have LDL less than 130 for people with hypertension and to have it less than 100 for people with   heart disease, diabetes and chronic kidney disease.  Triglycerides are another type of lipid that can cause heart disease, like the cholesterol and should be kept low   Thyroid tests are TSH, T4, T3  Glucose is sugar.  A1c is a test that gives us an idea   about how well was controlled the diabetes for the last 3 months.   PSA stands for Prostate Specific Antigen and it can be elevated with prostate cancer or prostate inflammation.    Please continue on the same medications

## 2019-06-11 NOTE — PATIENT INSTRUCTIONS
Let's do this: to keep your bowels moving, use both metamucil and the miralax.                             Add tramadol 50 mg with one Tylenol tablet, every 6 hrs as needed for pain relief.                    Add metoprolol to lower your blood pressure and slow your pulse a bit.                                Call for a CT scan appointment;         [phone].

## 2019-06-11 NOTE — PROGRESS NOTES
[facility] Initial Evaluation  Subjective:                                       Pertinent medical history includes:  Diabetes, concussions/dizziness and rheumatoid arthritis.  Medical allergies: no.        Employment status: Retired.                                  Objective:  System    Physical Exam    General     ROS    Assessment/Plan:

## 2019-06-11 NOTE — TELEPHONE ENCOUNTER
"Requested Prescriptions   Pending Prescriptions Disp Refills     insulin glargine (BASAGLAR KWIKPEN) 100 UNIT/ML pen [Pharmacy Med Name: BASAGLAR 100 U/ML KWIKPEN INJ 3ML]  Last Written Prescription Date:  1/10/2019  Last Fill Quantity: 15 mL,  # refills: 1   Last office visit: 6/11/2019 with prescribing provider:  Debra   Future Office Visit:      0     Sig: INJECT 26 UNITS UNDER THE SKIN DAILY OR AS DIRECTED       Long Acting Insulin Protocol Failed - 6/11/2019  2:42 PM        Failed - Blood pressure less than 140/90 in past 6 months     BP Readings from Last 3 Encounters:   06/11/19 160/80   06/07/19 138/80   05/28/19 136/80                 Passed - LDL on file in past 12 months     Recent Labs   Lab Test 06/07/19  0945   LDL 53             Passed - Microalbumin on file in past 12 months     Recent Labs   Lab Test 06/07/19  0944   MICROL 68   UMALCR 135.82*             Passed - Serum creatinine on file in past 12 months     Recent Labs   Lab Test 04/24/19  1338   CR 0.76             Passed - HgbA1C in past 3 or 6 months     If HgbA1C is 8 or greater, it needs to be on file within the past 3 months.  If less than 8, must be on file within the past 6 months.     Recent Labs   Lab Test 04/24/19  1338   A1C 7.0*             Passed - Medication is active on med list        Passed - Patient is age 18 or older        Passed - Recent (6 mo) or future (30 days) visit within the authorizing provider's specialty     Patient had office visit in the last 6 months or has a visit in the next 30 days with authorizing provider or within the authorizing provider's specialty.  See \"Patient Info\" tab in inbasket, or \"Choose Columns\" in Meds & Orders section of the refill encounter.               "

## 2019-06-12 ENCOUNTER — HOSPITAL ENCOUNTER (OUTPATIENT)
Dept: CT IMAGING | Facility: CLINIC | Age: 79
Discharge: HOME | End: 2019-06-12
Attending: INTERNAL MEDICINE | Admitting: INTERNAL MEDICINE
Payer: COMMERCIAL

## 2019-06-12 DIAGNOSIS — R19.4 CHANGE IN BOWEL HABITS: ICD-10-CM

## 2019-06-12 DIAGNOSIS — M54.50 ACUTE BILATERAL LOW BACK PAIN WITHOUT SCIATICA: ICD-10-CM

## 2019-06-12 DIAGNOSIS — R10.84 ABDOMINAL PAIN, GENERALIZED: ICD-10-CM

## 2019-06-12 LAB
CREAT BLD-MCNC: 0.7 MG/DL (ref 0.66–1.25)
GFR SERPL CREATININE-BSD FRML MDRD: >90 ML/MIN/{1.73_M2}

## 2019-06-12 PROCEDURE — 74177 CT ABD & PELVIS W/CONTRAST: CPT

## 2019-06-12 PROCEDURE — 82565 ASSAY OF CREATININE: CPT

## 2019-06-12 PROCEDURE — 25000128 H RX IP 250 OP 636: Performed by: INTERNAL MEDICINE

## 2019-06-12 PROCEDURE — 25000125 ZZHC RX 250: Performed by: INTERNAL MEDICINE

## 2019-06-12 RX ORDER — IOPAMIDOL 755 MG/ML
83 INJECTION, SOLUTION INTRAVASCULAR ONCE
Status: COMPLETED | OUTPATIENT
Start: 2019-06-12 | End: 2019-06-12

## 2019-06-12 RX ADMIN — SODIUM CHLORIDE 65 ML: 9 INJECTION, SOLUTION INTRAVENOUS at 18:59

## 2019-06-12 RX ADMIN — IOPAMIDOL 83 ML: 755 INJECTION, SOLUTION INTRAVENOUS at 18:59

## 2019-06-13 ENCOUNTER — THERAPY VISIT (OUTPATIENT)
Dept: PHYSICAL THERAPY | Facility: CLINIC | Age: 79
End: 2019-06-13
Payer: COMMERCIAL

## 2019-06-13 ENCOUNTER — TELEPHONE (OUTPATIENT)
Dept: FAMILY MEDICINE | Facility: CLINIC | Age: 79
End: 2019-06-13

## 2019-06-13 DIAGNOSIS — M54.50 ACUTE BILATERAL LOW BACK PAIN WITHOUT SCIATICA: Primary | ICD-10-CM

## 2019-06-13 PROCEDURE — 97140 MANUAL THERAPY 1/> REGIONS: CPT | Mod: GP | Performed by: PHYSICAL THERAPIST

## 2019-06-13 PROCEDURE — 97112 NEUROMUSCULAR REEDUCATION: CPT | Mod: GP | Performed by: PHYSICAL THERAPIST

## 2019-06-13 PROCEDURE — 97110 THERAPEUTIC EXERCISES: CPT | Mod: GP | Performed by: PHYSICAL THERAPIST

## 2019-06-13 NOTE — TELEPHONE ENCOUNTER
CT scan interpretation shows  Acute vs. subacute compression deformity of L1. Some increased  sclerosis is seen in the vertebral body    This shows a compression of vertebrae but not sure if new or old    Dr Richardson is out of clinic next week

## 2019-06-13 NOTE — TELEPHONE ENCOUNTER
Reason for Call:  Request for results:    Name of test or procedure:   CT scan of abdomen    Date of test of procedure: 06/12/2019    Location of the test or procedure:   [facility]    OK to leave the result message on voice mail or with a family member? YES    Phone number Patient can be reached at:  Cell number on file:    Telephone Information:   Mobile [phone]       Additional comments:   Patient stated Dr Richardson told him he would keep an eye out for the results        Call taken on 6/13/2019 at 9:58 AM by PRAMOD SOTO

## 2019-06-14 RX ORDER — INSULIN GLARGINE 100 [IU]/ML
INJECTION, SOLUTION SUBCUTANEOUS
Qty: 30 ML | Refills: 11 | Status: SHIPPED | OUTPATIENT
Start: 2019-06-14 | End: 2019-07-24

## 2019-06-14 NOTE — TELEPHONE ENCOUNTER
Routing refill request to provider for review/approval because:  BP out of range:  Above 140/90

## 2019-06-18 ENCOUNTER — HOSPITAL ENCOUNTER (OUTPATIENT)
Dept: MRI IMAGING | Facility: CLINIC | Age: 79
Discharge: HOME | End: 2019-06-18
Attending: INTERNAL MEDICINE | Admitting: INTERNAL MEDICINE
Payer: COMMERCIAL

## 2019-06-18 DIAGNOSIS — S32.010D CLOSED COMPRESSION FRACTURE OF L1 LUMBAR VERTEBRA WITH ROUTINE HEALING, SUBSEQUENT ENCOUNTER: Primary | ICD-10-CM

## 2019-06-18 DIAGNOSIS — S32.010A CLOSED COMPRESSION FRACTURE OF FIRST LUMBAR VERTEBRA, INITIAL ENCOUNTER: ICD-10-CM

## 2019-06-18 PROCEDURE — 72148 MRI LUMBAR SPINE W/O DYE: CPT

## 2019-06-20 ENCOUNTER — TRANSFERRED RECORDS (OUTPATIENT)
Dept: HEALTH INFORMATION MANAGEMENT | Facility: CLINIC | Age: 79
End: 2019-06-20

## 2019-06-24 DIAGNOSIS — E11.21 TYPE 2 DIABETES MELLITUS WITH DIABETIC NEPHROPATHY, WITH LONG-TERM CURRENT USE OF INSULIN (H): ICD-10-CM

## 2019-06-24 DIAGNOSIS — Z79.4 TYPE 2 DIABETES MELLITUS WITH DIABETIC NEPHROPATHY, WITH LONG-TERM CURRENT USE OF INSULIN (H): ICD-10-CM

## 2019-06-25 DIAGNOSIS — M54.50 ACUTE BILATERAL LOW BACK PAIN WITHOUT SCIATICA: ICD-10-CM

## 2019-06-25 DIAGNOSIS — S39.012D LOW BACK STRAIN, SUBSEQUENT ENCOUNTER: ICD-10-CM

## 2019-06-25 RX ORDER — TRAMADOL HYDROCHLORIDE 50 MG/1
50 TABLET ORAL EVERY 6 HOURS PRN
Qty: 30 TABLET | Refills: 1 | Status: SHIPPED | OUTPATIENT
Start: 2019-06-25 | End: 2019-10-02

## 2019-06-25 NOTE — TELEPHONE ENCOUNTER
"Requested Prescriptions   Pending Prescriptions Disp Refills     blood glucose (ONETOUCH VERIO IQ) test strip [Pharmacy Med Name: ONE TOUCH VERIO TEST ST(NEW)100'S] 300 strip 0     Sig: USE TO TEST BLOOD SUGARS THREE TIMES DAILY OR AS DIRECTED   Last Written Prescription Date:  2/2/2018  Last Fill Quantity: 300,  # refills: 3   Last office visit: 6/11/2019 with prescribing provider:  6/11/2019   Future Office Visit:   Next 5 appointments (look out 90 days)    Jul 02, 2019 10:00 AM CDT  [facility] [address] [place] [id number]  [phone]             Diabetic Supplies Protocol Passed - 6/24/2019  7:23 PM        Passed - Medication is active on med list        Passed - Patient is 18 years of age or older        Passed - Recent (6 mo) or future (30 days) visit within the authorizing provider's specialty     Patient had office visit in the last 6 months or has a visit in the next 30 days with authorizing provider.  See \"Patient Info\" tab in inbasket, or \"Choose Columns\" in Meds & Orders section of the refill encounter.              "

## 2019-06-25 NOTE — TELEPHONE ENCOUNTER
Patient states that he has 9 pills left, but is still in significant pain. Would PCP like patient to return to clinic?     RX [place] reviewed:  reviewed- no concerns    [place]:  [email or web address]  Routing refill request to provider for review/approval because:  Drug not on the [facility] refill protocol

## 2019-06-25 NOTE — TELEPHONE ENCOUNTER
Reason for Call:  Medication or medication refill:    Do you use a [facility]?  Name of the pharmacy and phone number for the current request:  [facility]    Name of the medication requested: traMADol (ULTRAM) 50 MG tablet ()    Other request: states has 9 left but is still in significant pain    Can we leave a detailed message on this number? YES    Phone number patient can be reached at: Home number on file [phone] (home)    Best Time:     Call taken on 2019 at 1:43 PM by COLLIN MROA

## 2019-06-26 NOTE — TELEPHONE ENCOUNTER
Prescription approved per [facility] Refill Protocol.    Rx printed, signed by Dr. Swanson, and faxed to pharmacy.

## 2019-06-27 ENCOUNTER — TELEPHONE (OUTPATIENT)
Dept: FAMILY MEDICINE | Facility: CLINIC | Age: 79
End: 2019-06-27

## 2019-06-27 NOTE — TELEPHONE ENCOUNTER
Prior Authorization Retail Medication Request    Medication/Dose:   ICD code (if different than what is on RX):     Previously Tried and Failed:     Rationale:       Insurance Name:     Insurance ID:         Pharmacy Information (if different than what is on RX)  Name:  [facility]  Phone:  [phone]    [facility] KUNZ: [id number]

## 2019-07-01 ENCOUNTER — TELEPHONE (OUTPATIENT)
Dept: FAMILY MEDICINE | Facility: CLINIC | Age: 79
End: 2019-07-01

## 2019-07-01 NOTE — TELEPHONE ENCOUNTER
blood glucose (ONETOUCH VERIO IQ) test strip    ASCENIA IS THE 2019 PREFERRED PRODUCT    PA WAS PUT IN  ALREADY

## 2019-07-02 NOTE — TELEPHONE ENCOUNTER
PA Initiation    Medication: blood glucose (ONETOUCH VERIO IQ) test strip  Insurance Company: [employer] - Phone [phone] Fax [phone]  Pharmacy Filling the Rx: [facility] [address] [place] [address] [address] AT [facility] OF [address]  Filling Pharmacy Phone:  [phone]  Filling Pharmacy Fax:    Start Date: 7/2/2019

## 2019-07-02 NOTE — TELEPHONE ENCOUNTER
Will contact patient to verify change, but is there any reason patient should not switch test strips? Please advise.

## 2019-07-02 NOTE — TELEPHONE ENCOUNTER
Pt very reluctant to switch test strips as he states that he is comfortable using these and is afraid to switch. Please start PA. Thanks so much!!

## 2019-07-02 NOTE — TELEPHONE ENCOUNTER
Insurance prefers Contour test strips - can the patient change? Will need a new meter and lancets then. If not, please provide rationale and I will submit with prior authorization.

## 2019-07-03 NOTE — TELEPHONE ENCOUNTER
DASH Diet: Care Instructions  Your Care Instructions  The DASH diet is an eating plan that can help lower your blood pressure. DASH stands for Dietary Approaches to Stop Hypertension. Hypertension is high blood pressure. The DASH diet focuses on eating foods that are high in calcium, potassium, and magnesium. These nutrients can lower blood pressure. The foods that are highest in these nutrients are fruits, vegetables, low-fat dairy products, nuts, seeds, and legumes. But taking calcium, potassium, and magnesium supplements instead of eating foods that are high in those nutrients does not have the same effect. The DASH diet also includes whole grains, fish, and poultry. The DASH diet is one of several lifestyle changes your doctor may recommend to lower your high blood pressure. Your doctor may also want you to decrease the amount of sodium in your diet. Lowering sodium while following the DASH diet can lower blood pressure even further than just the DASH diet alone. Follow-up care is a key part of your treatment and safety. Be sure to make and go to all appointments, and call your doctor if you are having problems. It's also a good idea to know your test results and keep a list of the medicines you take. How can you care for yourself at home? Following the DASH diet  · Eat 4 to 5 servings of fruit each day. A serving is 1 medium-sized piece of fruit, ½ cup chopped or canned fruit, 1/4 cup dried fruit, or 4 ounces (½ cup) of fruit juice. Choose fruit more often than fruit juice. · Eat 4 to 5 servings of vegetables each day. A serving is 1 cup of lettuce or raw leafy vegetables, ½ cup of chopped or cooked vegetables, or 4 ounces (½ cup) of vegetable juice. Choose vegetables more often than vegetable juice. · Get 2 to 3 servings of low-fat and fat-free dairy each day. A serving is 8 ounces of milk, 1 cup of yogurt, or 1 ½ ounces of cheese. · Eat 6 to 8 servings of grains each day.  A serving is 1 slice of [employer] is stating this is not review able by them. Submitted online through myprime.com   bread, 1 ounce of dry cereal, or ½ cup of cooked rice, pasta, or cooked cereal. Try to choose whole-grain products as much as possible. · Limit lean meat, poultry, and fish to 2 servings each day. A serving is 3 ounces, about the size of a deck of cards. · Eat 4 to 5 servings of nuts, seeds, and legumes (cooked dried beans, lentils, and split peas) each week. A serving is 1/3 cup of nuts, 2 tablespoons of seeds, or ½ cup of cooked beans or peas. · Limit fats and oils to 2 to 3 servings each day. A serving is 1 teaspoon of vegetable oil or 2 tablespoons of salad dressing. · Limit sweets and added sugars to 5 servings or less a week. A serving is 1 tablespoon jelly or jam, ½ cup sorbet, or 1 cup of lemonade. · Eat less than 2,300 milligrams (mg) of sodium a day. If you limit your sodium to 1,500 mg a day, you can lower your blood pressure even more. Tips for success  · Start small. Do not try to make dramatic changes to your diet all at once. You might feel that you are missing out on your favorite foods and then be more likely to not follow the plan. Make small changes, and stick with them. Once those changes become habit, add a few more changes. · Try some of the following:  ¨ Make it a goal to eat a fruit or vegetable at every meal and at snacks. This will make it easy to get the recommended amount of fruits and vegetables each day. ¨ Try yogurt topped with fruit and nuts for a snack or healthy dessert. ¨ Add lettuce, tomato, cucumber, and onion to sandwiches. ¨ Combine a ready-made pizza crust with low-fat mozzarella cheese and lots of vegetable toppings. Try using tomatoes, squash, spinach, broccoli, carrots, cauliflower, and onions. ¨ Have a variety of cut-up vegetables with a low-fat dip as an appetizer instead of chips and dip. ¨ Sprinkle sunflower seeds or chopped almonds over salads. Or try adding chopped walnuts or almonds to cooked vegetables. ¨ Try some vegetarian meals using beans and peas. Add garbanzo or kidney beans to salads. Make burritos and tacos with mashed sandoval beans or black beans. Where can you learn more? Go to http://chalo-tahir.info/. Enter N554 in the search box to learn more about \"DASH Diet: Care Instructions. \"  Current as of: April 3, 2017  Content Version: 11.3  © 7779-5681 [employer]. Care instructions adapted under license by [employer] (which disclaims liability or warranty for this information). If you have questions about a medical condition or this instruction, always ask your healthcare professional. [address] any warranty or liability for your use of this information. The problem of recurrent insomnia is discussed. Avoidance of caffeine sources is strongly encouraged. Sleep hygiene issues are reviewed. The use of sedative hypnotics for temporary relief is appropriate; we discussed the addictive nature of these drugs, and a one-time only prescription for prn use of a hypnotic is given, to use no more than 3 times per week for 2-3 weeks.

## 2019-07-08 NOTE — TELEPHONE ENCOUNTER
Prior Authorization Approval    Authorization Effective Date: 7/2/2019  Authorization Expiration Date: 9/29/2019  Medication: blood glucose (ONETOUCH VERIO IQ) test strip  Approved Dose/Quantity:   Reference #: [id number]   Insurance Company: [employer] - Phone [phone] Fax [phone]  Expected CoPay:       CoPay Card Available:      Foundation Assistance Needed:    Which Pharmacy is filling the prescription (Not needed for infusion/clinic administered): [facility] [address] [place] [address] [address] AT [facility] OF [address] [facility]  Pharmacy Notified: Yes  Patient Notified: No

## 2019-07-10 ENCOUNTER — HOSPITAL ENCOUNTER (EMERGENCY)
Facility: CLINIC | Age: 79
Discharge: HOME | End: 2019-07-10
Attending: EMERGENCY MEDICINE | Admitting: EMERGENCY MEDICINE
Payer: COMMERCIAL

## 2019-07-10 VITALS
SYSTOLIC BLOOD PRESSURE: 164 MMHG | HEART RATE: 74 BPM | HEIGHT: 71 IN | DIASTOLIC BLOOD PRESSURE: 83 MMHG | OXYGEN SATURATION: 99 % | BODY MASS INDEX: 23.8 KG/M2 | WEIGHT: 170 LBS | RESPIRATION RATE: 16 BRPM | TEMPERATURE: 98.5 F

## 2019-07-10 DIAGNOSIS — S32.010G CLOSED COMPRESSION FRACTURE OF L1 LUMBAR VERTEBRA WITH DELAYED HEALING, SUBSEQUENT ENCOUNTER: ICD-10-CM

## 2019-07-10 DIAGNOSIS — M54.9 BACK PAIN, UNSPECIFIED BACK LOCATION, UNSPECIFIED BACK PAIN LATERALITY, UNSPECIFIED CHRONICITY: ICD-10-CM

## 2019-07-10 PROCEDURE — 25000132 ZZH RX MED GY IP 250 OP 250 PS 637: Performed by: EMERGENCY MEDICINE

## 2019-07-10 PROCEDURE — 99283 EMERGENCY DEPT VISIT LOW MDM: CPT

## 2019-07-10 RX ORDER — OXYCODONE AND ACETAMINOPHEN 5; 325 MG/1; MG/1
2 TABLET ORAL ONCE
Status: COMPLETED | OUTPATIENT
Start: 2019-07-10 | End: 2019-07-10

## 2019-07-10 RX ORDER — OXYCODONE AND ACETAMINOPHEN 5; 325 MG/1; MG/1
1-2 TABLET ORAL EVERY 6 HOURS PRN
Qty: 12 TABLET | Refills: 0 | Status: SHIPPED | OUTPATIENT
Start: 2019-07-10 | End: 2019-10-02

## 2019-07-10 RX ORDER — SENNA AND DOCUSATE SODIUM 50; 8.6 MG/1; MG/1
TABLET, FILM COATED ORAL
Qty: 15 TABLET | Refills: 0 | Status: SHIPPED | OUTPATIENT
Start: 2019-07-10 | End: 2022-01-01

## 2019-07-10 RX ADMIN — OXYCODONE HYDROCHLORIDE AND ACETAMINOPHEN 2 TABLET: 5; 325 TABLET ORAL at 06:31

## 2019-07-10 ASSESSMENT — ENCOUNTER SYMPTOMS
WEAKNESS: 0
CONSTIPATION: 1
FEVER: 0
BACK PAIN: 1
HEMATURIA: 0
NUMBNESS: 0
DIFFICULTY URINATING: 0
DYSURIA: 0

## 2019-07-10 ASSESSMENT — MIFFLIN-ST. JEOR: SCORE: 1498.24

## 2019-07-10 NOTE — ED AVS SNAPSHOT
Emergency Department  [address] [place] [address]  Phone:  [phone]  Fax:  [phone]                                    Jose Francisco Gonzalez   MRN: [id number]    Department:   Emergency Department   Date of Visit:  7/10/2019           After Visit Summary Signature Page    I have received my discharge instructions, and my questions have been answered. I have discussed any challenges I see with this plan with the nurse or doctor.    ..........................................................................................................................................  Patient/Patient Representative Signature      ..........................................................................................................................................  Patient Representative Print Name and Relationship to Patient    ..................................................               ................................................  Date                                   Time    ..........................................................................................................................................  Reviewed by Signature/Title    ...................................................              ..............................................  Date                                               Time          22EPIC Rev 08/18

## 2019-07-10 NOTE — ED PROVIDER NOTES
"  History     Chief Complaint:  Back Pain    HPI   Jose Francisco Gonzalez is a 79 year old male with a history of hypertension, hyperlipidemia, type II diabetes, rheumatoid arthritis, and L1 compression fracture who presents to the ED for evaluation of back pain. The patient reports that he fell earlier last month and has been experiencing bilateral low back pain ever since. He was diagnosed with an L1 compression fracture by MRI on 6/18, several weeks following his fall, and last saw Kendra Coto NP with [facility], on 6/20. He has been taking Tramadol and Tylenol as prescribed without much relief, and thus it was recommended he be fitted for a back brace this morning secondary to lack of improvement. The patient states that his discomfort is typically minimal during the daytime, although yesterday evening his back pain began to worsen. He kept waking over the course of the night secondary to discomfort and took a pain pill at 0400 this morning without relief, prompting his visit to the ED early this morning. Here in the ED, the patient states that this feels similar to his usual discomfort but is more severe. He reports some baseline urinary urgency but denies any new incontinence, dysuria, hematuria, extremity weakness, numbness, or fevers. The patient denies any new falls or trauma, and he has been limiting heavy lifting since the injury. He does note that he has been constipated of late, which leads to abdominal \"pressure\" that exacerbates his back pain. However, he has been taking Miralax nightly with relief.     MR Lumbar Spine w/o Contrast (6/18/19)  1. Acute L1 compression fracture extending into the left pedicle. Mild  height loss. No retropulsion of fragments. No significant spinal canal  stenosis.  2. Nondisplaced fracture of the T12 spinous process.  3. Subtle T2 hyperintensity is present within the interspinous  ligament at T11-T12 suggesting ligamentous strain.  4. Subtle T2 hyperintensity is " "present within the epidural fat  anterior to the ligamentum flavum, likely posttraumatic edema. No  evidence of associated ligamentous disruption.    Allergies:  NKDA     Medications:    Amlodipine  Aspirin 81 mg  Lipitor  Glipizide  Plaquenil  Basaglar  Lisinopril  Metoprolol ER  Metamucil  Tramadol    Past Medical History:    Hyperlipidemia  Hyponatremia  Hypertensive heart disease  Allergic rhinitis  Nasal cavity polyp  DM2 with diabetic nephropathy  Achilles bursitis  Hypertension  Non-toxic nodular goiter  Psoriasis  Arthropathy  Microalbuminuria  Sialoadenitis  Syncope  PAC's  BPH  Adjustment disorder  Depression  L1 compression fracture  Rheumatoid arthritis     Past Surgical History:    Pyogenic granuloma removal  Cataract  Tooth extraction  Tonsillectomy    Family History:    Heart disease  CAD  Alzheimer disease    Social History:  Smoking Status: Former smoker  Smokeless tobacco: Former user  Alcohol Use: Yes (occasional)  Negative for drug use.   Marital Status:   [2]     Review of Systems   Constitutional: Negative for fever.   Gastrointestinal: Positive for constipation.   Genitourinary: Positive for urgency. Negative for difficulty urinating, dysuria and hematuria.   Musculoskeletal: Positive for back pain.   Neurological: Negative for weakness and numbness.   All other systems reviewed and are negative.    Physical Exam     Patient Vitals for the past 24 hrs:   BP Temp Temp src Pulse Heart Rate Resp SpO2 Height Weight   07/10/19 0723 -- -- -- 74 -- 16 99 % -- --   07/10/19 0602 164/83 98.5  F (36.9  C) Oral -- 77 14 98 % 1.803 m (5' 11\") 77.1 kg (170 lb)        Physical Exam  VS: Reviewed per above  HENT: Mucous membranes moist  EYES: sclera anicteric  CV: Rate as noted, regular rhythm.   RESP: Effort normal. Breath sounds are normal bilaterally.  GI: no rebound, guarding or tenderness, not distended.  NEURO: Alert, moving all extremities  MSK: No deformity of the extremities, no clear " reproducible midline or flank ttp  SKIN: Warm and dry, no rash      Emergency Department Course   Interventions:  0631 Percocet 5-325 mg per tablet, 2 tablets PO     Emergency Department Course:  Nursing notes and vitals reviewed. (0628) I performed an exam of the patient as documented above.     Medicine administered as documented above.     (0715) I rechecked the patient and discussed the results of his workup thus far. He felt improved after the above interventions and felt comfortable with discharge to home.    Findings and plan explained to the Patient. Patient discharged home with instructions regarding supportive care, medications, and reasons to return. The importance of close follow-up was reviewed. The patient was prescribed Percocet and Senna.    I personally reviewed the exam findings with the Patient and answered all related questions prior to discharge.      Impression & Plan    Medical Decision Making:  Jose Francisco Gonzalez presented with back pain.  The pain has improved with interventions in the emergency department.  Initial vital signs are within normal limits.  Exam does not reveal any neurologic deficits in the lower extremities.  Patient has a known recent L1 compression fracture with pain today that is similar in character but more persistent than prior.  Pt did not sustain any new focal trauma, therefore x-rays/CT are not necessary due to the low likelihood of new fracture or subluxation. Pt has no back pain red flags on history or exam to suggest spinal cord compression syndrome (cauda equina syndrome, spinal/epidural space hematoma), spinal column infection (epidural abscess, discitis, osteomyelitis), nor malignancy/metastatic disease.  I considered occult pyelonephritis/UTI/renal colic/aortic dissection, but given history and exam findings, I have less suspicion for these processes.  Due to improvement with Percocet, he was discharged with a small prescription of Percocet.  He was also  discharged with docusate senna in order to help with constipation.  He plans to have a lumbar brace fitted this morning.  I encouraged him to follow-up with orthopedics if symptoms are still severe after the weekend.  Close return precautions discussed prior to discharge.      Diagnosis:    ICD-10-CM    1. Closed compression fracture of L1 lumbar vertebra with delayed healing, subsequent encounter S32.010G    2. Back pain, unspecified back location, unspecified back pain laterality, unspecified chronicity M54.9        Disposition:  discharged to home    Discharge Medications:     Medication List      Started    oxyCODONE-acetaminophen 5-325 MG tablet  Commonly known as:  PERCOCET  1-2 tablets, Oral, EVERY 6 HOURS PRN     SENNA-docusate sodium 8.6-50 MG tablet  Commonly known as:  SENNA S  Take daily while using percocet to prevent constipation            Scribe Disclosure:  I, Taylor Suh, am serving as a scribe on 7/10/2019 at 6:28 AM to personally document services performed by Jhonny Horne MD based on my observations and the provider's statements to me.      Taylor Suh  7/10/2019    EMERGENCY DEPARTMENT       Jhonny Horne MD  07/10/19 0758

## 2019-07-16 ENCOUNTER — TRANSFERRED RECORDS (OUTPATIENT)
Dept: HEALTH INFORMATION MANAGEMENT | Facility: CLINIC | Age: 79
End: 2019-07-16

## 2019-07-22 DIAGNOSIS — E11.21 TYPE 2 DIABETES MELLITUS WITH DIABETIC NEPHROPATHY, WITH LONG-TERM CURRENT USE OF INSULIN (H): ICD-10-CM

## 2019-07-22 DIAGNOSIS — Z79.4 TYPE 2 DIABETES MELLITUS WITH DIABETIC NEPHROPATHY, WITH LONG-TERM CURRENT USE OF INSULIN (H): ICD-10-CM

## 2019-07-22 NOTE — TELEPHONE ENCOUNTER
"Requested Prescriptions   Pending Prescriptions Disp Refills     insulin glargine (BASAGLAR KWIKPEN) 100 UNIT/ML pen  Last Written Prescription Date:  6/14/2019  Last Fill Quantity: 30ml,  # refills: 11   Last office visit: 6/11/2019 with prescribing provider:  Dr Richardson   Future Office Visit:     30 mL 11     Sig: INJECT 26 UNITS UNDER THE SKIN DAILY OR AS DIRECTED       Long Acting Insulin Protocol Failed - 7/22/2019 12:50 PM        Failed - Blood pressure less than 140/90 in past 6 months     BP Readings from Last 3 Encounters:   07/10/19 164/83   06/11/19 160/80   06/07/19 138/80                 Passed - LDL on file in past 12 months     Recent Labs   Lab Test 06/07/19  0945   LDL 53             Passed - Microalbumin on file in past 12 months     Recent Labs   Lab Test 06/07/19  0944   MICROL 68   UMALCR 135.82*             Passed - Serum creatinine on file in past 12 months     Recent Labs   Lab Test 06/12/19  1854 04/24/19  1338   CR  --  0.76   CREAT 0.7  --              Passed - HgbA1C in past 3 or 6 months     If HgbA1C is 8 or greater, it needs to be on file within the past 3 months.  If less than 8, must be on file within the past 6 months.     Recent Labs   Lab Test 04/24/19  1338   A1C 7.0*             Passed - Medication is active on med list        Passed - Patient is age 18 or older        Passed - Recent (6 mo) or future (30 days) visit within the authorizing provider's specialty     Patient had office visit in the last 6 months or has a visit in the next 30 days with authorizing provider or within the authorizing provider's specialty.  See \"Patient Info\" tab in inbasket, or \"Choose Columns\" in Meds & Orders section of the refill encounter.              "

## 2019-07-22 NOTE — TELEPHONE ENCOUNTER
Reason for Call:  Medication or medication refill:    Do you use a [facility]?  Name of the pharmacy and phone number for the current request:  [facility]    Name of the medication requested: insulin glargine (BASAGLAR KWIKPEN) 100 UNIT/ML pen  Other request: pt is out of town and needs new script sent to this pharmacy    Can we leave a detailed message on this number? YES    Phone number patient can be reached at: Cell number on file:    Telephone Information:   Mobile [phone]       Best Time:     Call taken on 7/22/2019 at 12:48 PM by COLLIN MORA

## 2019-07-24 DIAGNOSIS — M80.00XD OSTEOPOROSIS WITH CURRENT PATHOLOGICAL FRACTURE WITH ROUTINE HEALING, UNSPECIFIED OSTEOPOROSIS TYPE, SUBSEQUENT ENCOUNTER: Primary | ICD-10-CM

## 2019-07-24 DIAGNOSIS — S32.010D CLOSED COMPRESSION FRACTURE OF L1 LUMBAR VERTEBRA WITH ROUTINE HEALING, SUBSEQUENT ENCOUNTER: ICD-10-CM

## 2019-07-24 DIAGNOSIS — M80.08XA AGE-RELATED OSTEOPOROSIS WITH CURRENT PATHOLOGICAL FRACTURE OF VERTEBRA, INITIAL ENCOUNTER (H): ICD-10-CM

## 2019-07-24 PROBLEM — S32.010A COMPRESSION FRACTURE OF L1 LUMBAR VERTEBRA (H): Status: ACTIVE | Noted: 2019-06-01

## 2019-07-24 RX ORDER — INSULIN GLARGINE 100 [IU]/ML
INJECTION, SOLUTION SUBCUTANEOUS
Qty: 30 ML | Refills: 11 | Status: SHIPPED | OUTPATIENT
Start: 2019-07-24 | End: 2020-09-23

## 2019-07-24 NOTE — TELEPHONE ENCOUNTER
Routing refill request to provider for review/approval because:  BP out of range. Provider approval needed.

## 2019-08-26 ENCOUNTER — ANCILLARY PROCEDURE (OUTPATIENT)
Dept: BONE DENSITY | Facility: CLINIC | Age: 79
End: 2019-08-26
Attending: INTERNAL MEDICINE
Payer: COMMERCIAL

## 2019-08-26 DIAGNOSIS — M80.08XA AGE-RELATED OSTEOPOROSIS WITH CURRENT PATHOLOGICAL FRACTURE OF VERTEBRA, INITIAL ENCOUNTER (H): ICD-10-CM

## 2019-08-26 DIAGNOSIS — S32.010D CLOSED COMPRESSION FRACTURE OF L1 LUMBAR VERTEBRA WITH ROUTINE HEALING, SUBSEQUENT ENCOUNTER: ICD-10-CM

## 2019-08-26 DIAGNOSIS — M80.00XD OSTEOPOROSIS WITH CURRENT PATHOLOGICAL FRACTURE WITH ROUTINE HEALING, UNSPECIFIED OSTEOPOROSIS TYPE, SUBSEQUENT ENCOUNTER: ICD-10-CM

## 2019-08-26 PROCEDURE — 77080 DXA BONE DENSITY AXIAL: CPT | Performed by: FAMILY MEDICINE

## 2019-09-04 ENCOUNTER — TELEPHONE (OUTPATIENT)
Dept: FAMILY MEDICINE | Facility: CLINIC | Age: 79
End: 2019-09-04

## 2019-09-04 ENCOUNTER — OFFICE VISIT (OUTPATIENT)
Dept: FAMILY MEDICINE | Facility: CLINIC | Age: 79
End: 2019-09-04
Payer: COMMERCIAL

## 2019-09-04 VITALS
TEMPERATURE: 97.6 F | DIASTOLIC BLOOD PRESSURE: 82 MMHG | HEART RATE: 94 BPM | SYSTOLIC BLOOD PRESSURE: 140 MMHG | WEIGHT: 156 LBS | RESPIRATION RATE: 20 BRPM | HEIGHT: 71 IN | BODY MASS INDEX: 21.84 KG/M2 | OXYGEN SATURATION: 98 %

## 2019-09-04 DIAGNOSIS — Z79.4 TYPE 2 DIABETES MELLITUS WITH DIABETIC NEPHROPATHY, WITH LONG-TERM CURRENT USE OF INSULIN (H): ICD-10-CM

## 2019-09-04 DIAGNOSIS — F43.23 ADJUSTMENT DISORDER WITH MIXED ANXIETY AND DEPRESSED MOOD: ICD-10-CM

## 2019-09-04 DIAGNOSIS — R63.4 WEIGHT LOSS: Primary | ICD-10-CM

## 2019-09-04 DIAGNOSIS — S32.010G CLOSED COMPRESSION FRACTURE OF L1 LUMBAR VERTEBRA WITH DELAYED HEALING, SUBSEQUENT ENCOUNTER: ICD-10-CM

## 2019-09-04 DIAGNOSIS — M80.00XD AGE-RELATED OSTEOPOROSIS WITH CURRENT PATHOLOGICAL FRACTURE WITH ROUTINE HEALING: ICD-10-CM

## 2019-09-04 DIAGNOSIS — E11.21 TYPE 2 DIABETES MELLITUS WITH DIABETIC NEPHROPATHY, WITH LONG-TERM CURRENT USE OF INSULIN (H): ICD-10-CM

## 2019-09-04 LAB — HBA1C MFR BLD: 6.6 % (ref 0–5.6)

## 2019-09-04 PROCEDURE — 99214 OFFICE O/P EST MOD 30 MIN: CPT | Performed by: INTERNAL MEDICINE

## 2019-09-04 PROCEDURE — 80053 COMPREHEN METABOLIC PANEL: CPT | Performed by: INTERNAL MEDICINE

## 2019-09-04 PROCEDURE — 83036 HEMOGLOBIN GLYCOSYLATED A1C: CPT | Performed by: INTERNAL MEDICINE

## 2019-09-04 PROCEDURE — 84439 ASSAY OF FREE THYROXINE: CPT | Performed by: INTERNAL MEDICINE

## 2019-09-04 PROCEDURE — 82784 ASSAY IGA/IGD/IGG/IGM EACH: CPT | Performed by: INTERNAL MEDICINE

## 2019-09-04 PROCEDURE — 36415 COLL VENOUS BLD VENIPUNCTURE: CPT | Performed by: INTERNAL MEDICINE

## 2019-09-04 PROCEDURE — 86334 IMMUNOFIX E-PHORESIS SERUM: CPT | Performed by: INTERNAL MEDICINE

## 2019-09-04 PROCEDURE — 84443 ASSAY THYROID STIM HORMONE: CPT | Performed by: INTERNAL MEDICINE

## 2019-09-04 PROCEDURE — 84165 PROTEIN E-PHORESIS SERUM: CPT | Performed by: INTERNAL MEDICINE

## 2019-09-04 PROCEDURE — 00000402 ZZHCL STATISTIC TOTAL PROTEIN: Performed by: INTERNAL MEDICINE

## 2019-09-04 RX ORDER — MIRTAZAPINE 7.5 MG/1
7.5 TABLET, FILM COATED ORAL AT BEDTIME
Qty: 30 TABLET | Refills: 5 | Status: SHIPPED | OUTPATIENT
Start: 2019-09-04 | End: 2019-09-04

## 2019-09-04 ASSESSMENT — MIFFLIN-ST. JEOR: SCORE: 1434.74

## 2019-09-04 NOTE — TELEPHONE ENCOUNTER
Reason for Call:  Other call back    Detailed comments: The patient called and stated that he has an appointment today with Dr. Richardson but he thinks he may need to go to the hospital instead. He stated that he is feeling very weak, has intense pain in his right hip and diarrhea. He stated he had a bad night last night and did not get much sleep. He would like a call back to discuss if he should come into his appointment or if he should indeed go to the hospital.     Phone Number Patient can be reached at: Cell number on file:    Telephone Information:   Mobile [phone]       Best Time: Any    Can we leave a detailed message on this number? YES    Call taken on 9/4/2019 at 9:00 AM by Mary Cooney

## 2019-09-04 NOTE — PROGRESS NOTES
Subjective     Jose Francisco Gonzalez is a 79 year old male who presents to clinic today for the following health issues:    HPI   Discuss multiple health issues today. Glucose level this morning-257 after eating and last night. BP-190/90? this morning.                        Here with his wife.                      Ongoing back pain.             Uses occasional tramadol.                 Sees ortho tomorrow.                    Planning another spine xray tomorrow.                              He had wanted to go to the emergency room this morning.            His blood pressure was high, he has been losing weight with poor appetite, he had a couple episodes of diarrhea, and is having ongoing back pain.                      He already had an appointment here,  during which we were planning to discuss pharmacologic treatment for his osteoporosis.           His wife encouraged him to come here instead of going to the emergency room.               I told them that that was a good decision.                            He has been taking MiraLAX daily since his compression fracture over 3 months ago.            We discussed that a side effect of MiraLAX would be diarrhea. In addition, he has started drinking one bottle of Ensure Plus every day, and we discussed that that also can cause loose bowels (in addition to higher blood sugar, which he states has been happening lately also. He is using  Up to 26 units of a Basaglar insulin daily.)            He continues to have low back pain. He is wearing the brace sometimes, such as when he goes for a walk. However when he wears it continuously, this seems to aggravate some abdominal distress.                  He does have a colonoscopy planned for one month from now.             No melena or hematochezia.                    He admits to feeling anxious and discouraged.                                    Patient Active Problem List    Diagnosis Date Noted     Age-related osteoporosis  with current pathological fracture with routine healing 08/30/2019     Priority: High     T - 2.7 L, -3.6 R fem neck; +0.4 lumbar spine, but degenerative changes.        Personal history of tobacco use, presenting hazards to health 01/23/2014     Priority: High     Quit 1993; CXR 11/10;       US neg for AAA in 2/14       Screening for prostate cancer 01/22/2014     Priority: High     Chronic aleve; bid     Quit approx 2015; hydroxychlorquine helps       Hypercholesterolemia 11/02/2012     Priority: High     Was on fenofibrate plus simva, in [facility] study; LDL 84 in Lipitor 20 in 05/2012; 97 in 3/13       Hyponatremia 11/02/2012     Priority: High           Chronic;                 Up to 133 in 12/2011 and 134 in 05/2012.   130 in 11/12; 127 in 8/13; 128 in 1/14, 130 in 5/14 and 12/14                      Cortisol and TFT's nml                              Up to 133 in 1/16       Hypertensive heart disease without heart failure 11/02/2012     Priority: High     Neg stress echo in 10/10;              but LVH and BP up to 230 with exercise.        Had STOCK.         Problem list name updated by automated process. Provider to review       Type 2 diabetes mellitus with diabetic nephropathy, with long-term current use of insulin (H) 11/02/2012     Priority: High     Stop metformin in 11/15 due to loose bowels.        Hypertension goal BP (blood pressure) < 140/80 11/02/2012     Priority: High     Mild-moderate LVH on echo in 10/10; mild LVH on echo 1/14; maintain BP < 135/85       Arthropathy      Priority: High     palindromic rheumatism; this is a remote Dx;          In 11/14, Dx of RA; hydroxychloroquine added by Dr. Dior  Problem list name updated by automated process. Provider to review       Abdominal pain, generalized 06/11/2019     Priority: Medium     Compression fracture of L1 lumbar vertebra (H) 06/01/2019     Priority: Medium     see MRI       Recurrent major depression in complete remission (H) 05/29/2019      "Priority: Medium     Change in bowel habits since mid May , 2019 05/29/2019     Priority: Medium     Poor balance 04/11/2018     Priority: Medium     Adjustment disorder with depressed mood 11/07/2017     Priority: Medium     Benign non-nodular prostatic hyperplasia with lower urinary tract symptoms 05/02/2017     Priority: Medium     see cysto and urology notes fall 2016; flomax not helpful; add finasteride in 5/17; stopped by pt; not helpful.          Urinary urgency 10/21/2016     Priority: Medium     flomax per urology in 10/16;;       if not helpful,RTC for a cysto.           (flomax not helpful)      Cysto neg in 12/16, except for obstructing prostate; try flomax again.              In 11/17, \"benign feeling prostate\"; PVR equals 78 cc ;consider detrol       Premature atrial contractions 01/25/2016     Priority: Medium     on Holter 7/15, and ECG 1/16       Syncope 07/30/2015     Priority: Medium     Holter shows PAC's,and a 10 beat run of SVT       Chest discomfort 07/30/2015     Priority: Medium     Stress echo nml in 7/15       Sialoadenitis 06/13/2015     Priority: Medium     Microalbuminuria 12/11/2014     Priority: Medium     100 in 12/14;                Resume lisinopril 10 mg per day in 1/16; 212 in 4/16, 61 in 9/16       Nasal crusting 03/13/2013     Priority: Medium     See 's note 5/14; small anterior perforation, with dessicative rhinitis       Allergic rhinitis 11/02/2012     Priority: Medium     Problem list name updated by automated process. Provider to review       Polyp of nasal cavity 11/02/2012     Priority: Medium     Pyogenic granuloma removed 2009       Achilles bursitis or tendinitis 11/02/2012     Priority: Medium     Impotence of organic origin 11/02/2012     Priority: Medium     Total T 427, free 1.9, in 7/13       Non-toxic nodular goiter 11/02/2012     Priority: Medium     TFT's nml in 7/13  Problem list name updated by automated process. Provider to review       " Other psoriasis 11/02/2012     Priority: Medium     Preventive measure 03/13/2013     Priority: Low     APRIMA DATA BASE UNDER THE 3/13/13 NOTE  PSA 0.82 in 12/11,1.56 in 7/13; 1.56 in 9/15, 0.95 in 9/16                  Colonoscopy in 11/10, neg         Past Surgical History:   Procedure Laterality Date     ENT SURGERY  11/2009    nasal; removal of pyogenic granuloma L     EYE SURGERY  murray 15 and22 2009    cataract     HC TOOTH EXTRACTION W/FORCEP       TONSILLECTOMY         Current Outpatient Medications   Medication Sig Dispense Refill     amLODIPine (NORVASC) 2.5 MG tablet TAKE 1 TABLET BY MOUTH EVERY DAY 90 tablet 2     aspirin 81 MG tablet Take  by mouth daily.       atorvastatin (LIPITOR) 20 MG tablet TAKE 1 TABLET(20MG) BY MOUTH DAILY 90 tablet 4     blood glucose (NO BRAND SPECIFIED) lancets standard Use to test blood sugar 2 times daily or as directed.                                        To use with his glucometer 1 Box 12     blood glucose (ONETOUCH VERIO IQ) test strip USE TO TEST BLOOD SUGARS THREE TIMES DAILY OR AS DIRECTED 300 strip 1     glipiZIDE (GLUCOTROL XL) 10 MG 24 hr tablet TAKE 1 TABLET(10 MG) BY MOUTH DAILY 90 tablet 4     hydroxychloroquine (PLAQUENIL) 200 MG tablet Take 1 tablet (200 mg) by mouth daily 30 tablet      insulin glargine (BASAGLAR KWIKPEN) 100 UNIT/ML pen INJECT 26 UNITS UNDER THE SKIN DAILY OR AS DIRECTED 30 mL 11     insulin pen needle (B-D U/F) 31G X 8 MM miscellaneous USE DAILY AS DIRECTED 50 each 0     lisinopril (PRINIVIL/ZESTRIL) 10 MG tablet TAKE 1 TABLET BY MOUTH EVERY DAY 90 tablet 3     metoprolol succinate ER (TOPROL-XL) 50 MG 24 hr tablet Take 1 tablet (50 mg) by mouth daily 90 tablet 3     Multiple Vitamins-Minerals (PRESERVISION AREDS 2) CAPS 2 per day       order for DME Test strips for pt's glucometer, brand as covered by insurance Test QID and prn. He is on insulin. Patients preferred brand-Verio One Touch. 400 each 1     oxyCODONE-acetaminophen (PERCOCET)  5-325 MG tablet Take 1-2 tablets by mouth every 6 hours as needed for severe pain 12 tablet 0     Psyllium-Calcium (METAMUCIL PLUS CALCIUM) CAPS Take 2 capsules by mouth At Bedtime       SENNA-docusate sodium (SENNA S) 8.6-50 MG tablet Take daily while using percocet to prevent constipation 15 tablet 0     traMADol (ULTRAM) 50 MG tablet Take 1 tablet (50 mg) by mouth every 6 hours as needed for severe pain 30 tablet 1     No Known Allergies  Recent Labs   Lab Test 06/12/19  1854 06/07/19  0945 04/24/19  1338 10/09/18  1000 04/10/18  1609  05/02/17  1207  01/25/16  0948  12/14/11  1518   A1C  --   --  7.0* 7.7* 7.4*   < > 7.4*   < > 7.7*   < >  --    LDL  --  53  --   --  72  --  70  --  51   < > 68.8   HDL  --  67  --   --   --   --   --   --  51  --  49   TRIG  --  55  --   --   --   --   --   --  79  --  86   ALT  --  27 27 24 21   < > 22   < > 22   < >  --    CR  --   --  0.76 0.71 0.67   < > 0.75   < > 1.00   < >  --    GFRESTIMATED >90  --  85 >90 >90   < > >90  Non  GFR Calc     < > 72   < >  --    GFRESTBLACK >90  --  >90 >90 >90   < > >90  African American GFR Calc     < > 88   < >  --    POTASSIUM  --   --  4.1 4.2 4.0   < > 4.2   < > 4.1   < >  --    TSH  --   --   --   --  1.19  --   --   --  1.53   < >  --     < > = values in this interval not displayed.      BP Readings from Last 3 Encounters:   09/04/19 (!) 140/82   07/10/19 164/83   06/11/19 160/80    Wt Readings from Last 3 Encounters:   09/04/19 70.8 kg (156 lb)   07/10/19 77.1 kg (170 lb)   06/11/19 74.8 kg (165 lb)                      Reviewed and updated as needed this visit by Provider         Review of Systems see above plus  ROS COMP: CONSTITUTIONAL:POSITIVE  for fatigue and malaise and NEGATIVE  for chills and fever   RESP:NEGATIVE for significant cough or SOB  CV: NEGATIVE for chest pain, palpitations or peripheral edema  ENDOCRINE: NEGATIVE for polydipsia and polyuria  PSYCHIATRIC: NEGATIVE for alcohol abuse and drug  "usage      Objective    BP (!) 140/82 (BP Location: Right arm, Patient Position: Chair, Cuff Size: Adult Regular)   Pulse 94   Temp 97.6  F (36.4  C)   Resp 20   Ht 1.803 m (5' 11\")   Wt 70.8 kg (156 lb)   SpO2 98%   BMI 21.76 kg/m    Body mass index is 21.76 kg/m .  Physical Exam   GENERAL APPEARANCE: alert, fatigued and Anxious  CV: regular rates and rhythm and normal S1 S2, no S3 or S4  MS: decreased range of motion Lumbar spine  PSYCH: anxious    Diagnostic Test Results:  Pending        Assessment & Plan     Jose Francisco was seen today for consult.    Diagnoses and all orders for this visit:    Weight loss  -     Comprehensive metabolic panel (BMP + Alb, Alk Phos, ALT, AST, Total. Bili, TP)  -     TSH  -     T4, free    Type 2 diabetes mellitus with diabetic nephropathy, with long-term current use of insulin (H)  -     Comprehensive metabolic panel (BMP + Alb, Alk Phos, ALT, AST, Total. Bili, TP)  -     Hemoglobin A1c    Closed compression fracture of L1 lumbar vertebra with delayed healing, subsequent encounter  -     Protein electrophoresis  -     Protein Immunofixation Serum    Age-related osteoporosis with current pathological fracture with routine healing  -     Protein electrophoresis  -     Protein Immunofixation Serum    Adjustment disorder with mixed anxiety and depressed mood  -     mirtazapine (REMERON) 7.5 MG tablet; Take 1 tablet (7.5 mg) by mouth At Bedtime     Summary and implications:  We reviewed multiple issues.           We reviewed all of the issues on the diagnoses list.                         We discussed his situation at length.                       I have ordered further laboratory evaluation regarding his osteoporosis.                 Low dose anxiolytic/antidepressant added.  This may help with his appetite. Await orthopedic follow-up including follow-up spine x-ray.                                 We discussed that his abdominal CT did not show any intra-abdominal pathology. He " will go ahead with colonoscopy next month as scheduled.                                                                   Later, we can discuss pharmacologic treatment for his osteoporosis.  Patient Instructions   Let's do this:     Work with the Miralax.                  Cut back the dose of Miralax.                Use the Ensure; 1/2 bottle twice per day.                                Today we added a low dose of a new medication called mirtazapine,which can help with appetite,sleep,depression, and anxiety.        Return in about 27 days (around 10/1/2019) for pre-op visit.                   Kvng Richardson MD  [facility]    Results for orders placed or performed in visit on 09/04/19   Comprehensive metabolic panel (BMP + Alb, Alk Phos, ALT, AST, Total. Bili, TP)   Result Value Ref Range    Sodium 126 (L) 133 - 144 mmol/L    Potassium 4.0 3.4 - 5.3 mmol/L    Chloride 92 (L) 94 - 109 mmol/L    Carbon Dioxide 26 20 - 32 mmol/L    Anion Gap 8 3 - 14 mmol/L    Glucose 143 (H) 70 - 99 mg/dL    Urea Nitrogen 14 7 - 30 mg/dL    Creatinine 0.58 (L) 0.66 - 1.25 mg/dL    GFR Estimate >90 >60 mL/min/[1.73_m2]    GFR Estimate If Black >90 >60 mL/min/[1.73_m2]    Calcium 8.8 8.5 - 10.1 mg/dL    Bilirubin Total 0.6 0.2 - 1.3 mg/dL    Albumin 3.9 3.4 - 5.0 g/dL    Protein Total 7.9 6.8 - 8.8 g/dL    Alkaline Phosphatase 71 40 - 150 U/L    ALT 24 0 - 70 U/L    AST 21 0 - 45 U/L   Hemoglobin A1c   Result Value Ref Range    Hemoglobin A1C 6.6 (H) 0 - 5.6 %   TSH   Result Value Ref Range    TSH 0.89 0.40 - 4.00 mU/L   T4, free   Result Value Ref Range    T4 Free 1.17 0.76 - 1.46 ng/dL   Protein electrophoresis   Result Value Ref Range    Albumin Fraction 4.2 3.7 - 5.1 g/dL    Alpha 1 Fraction 0.3 0.2 - 0.4 g/dL    Alpha 2 Fraction 0.6 0.5 - 0.9 g/dL    Beta Fraction 1.0 0.6 - 1.0 g/dL    Gamma Fraction 1.5 0.7 - 1.6 g/dL    Monoclonal Peak 0.0 0.0 g/dL    ELP Interpretation:       Essentially normal  electrophoretic pattern.  No monoclonal protein seen. Pathologic   significance requires clinical correlation. Iman Abrams M.D., Ph.D.      Protein Immunofixation Serum   Result Value Ref Range    Immunofixation ELP       No monoclonal protein seen on immunofixation.  Pathological significance requires clinical   correlation.      IGG 1,410 695 - 1,620 mg/dL     (H) 70 - 380 mg/dL    IGM 57 (L) 60 - 265 mg/dL     Phone: NA.              MLOAM.            Increase salt, cut back H2O.                          Letter sent.       As per my phone message, most of your lab results are good so far.                            The sodium level has been low for many years.          It is a bit lower now than usual.           Please increase your sodium intake, and cut back your water intake.                We should recheck some of these labs again at your next appointment.                   Your diabetes control is good.       The A1C of 6.6 correlates to an average blood sugar of approximately 138.

## 2019-09-04 NOTE — LETTER
September 5, 2019      Jose Francisco Gonzalez  [address]  [facility] [address]        Dear ,    We are writing to inform you of your test results.             As per my phone message, most of your lab results are good so far.                            The sodium level has been low for many years.          It is a bit lower now than usual.           Please increase your sodium intake, and cut back your water intake.                We should recheck some of these labs again at your next appointment.                   Your diabetes control is good.       The A1C of 6.6 correlates to an average blood sugar of approximately 138.    Results for orders placed or performed in visit on 09/04/19   Comprehensive metabolic panel (BMP + Alb, Alk Phos, ALT, AST, Total. Bili, TP)   Result Value Ref Range    Sodium 126 (L) 133 - 144 mmol/L    Potassium 4.0 3.4 - 5.3 mmol/L    Chloride 92 (L) 94 - 109 mmol/L    Carbon Dioxide 26 20 - 32 mmol/L    Anion Gap 8 3 - 14 mmol/L    Glucose 143 (H) 70 - 99 mg/dL    Urea Nitrogen 14 7 - 30 mg/dL    Creatinine 0.58 (L) 0.66 - 1.25 mg/dL    GFR Estimate >90 >60 mL/min/[1.73_m2]    GFR Estimate If Black >90 >60 mL/min/[1.73_m2]    Calcium 8.8 8.5 - 10.1 mg/dL    Bilirubin Total 0.6 0.2 - 1.3 mg/dL    Albumin 3.9 3.4 - 5.0 g/dL    Protein Total 7.9 6.8 - 8.8 g/dL    Alkaline Phosphatase 71 40 - 150 U/L    ALT 24 0 - 70 U/L    AST 21 0 - 45 U/L   Hemoglobin A1c   Result Value Ref Range    Hemoglobin A1C 6.6 (H) 0 - 5.6 %   TSH   Result Value Ref Range    TSH 0.89 0.40 - 4.00 mU/L   T4, free   Result Value Ref Range    T4 Free 1.17 0.76 - 1.46 ng/dL         If you have any questions or concerns, please call the clinic at the number listed above.       Sincerely,        Kvng Richardson MD

## 2019-09-04 NOTE — TELEPHONE ENCOUNTER
Pt wants to make PCP aware he lost 13 lbs in two months. He is drinking ensure. Had two episodes of diarrhea in the last two days. Notes he has been taking Miralax at night to help with constipation. Also reports dry mouth and dizziness while standing. BP this morning is 192/99 and pulse 93 and denies fever. Complains of constant right hip pain. Advised he push fluids, hold miralax and see ED today for evaluation symptoms. Pt would like a call back if provider would agree he see ED or keep appt with him today. Please advice.

## 2019-09-04 NOTE — TELEPHONE ENCOUNTER
"Requested Prescriptions   Pending Prescriptions Disp Refills     mirtazapine (REMERON) 7.5 MG tablet [Pharmacy Med Name: MIRTAZAPINE 7.5MG TABLETS]  Last Written Prescription Date:  9/4/2019  Last Fill Quantity: 30 tablet,  # refills: 5   Last office visit: 9/4/2019 with prescribing provider:  Debra   Future Office Visit:   Next 5 appointments (look out 90 days)    Oct 02, 2019 10:00 AM CDT  Pre-Op physical with Kvng Richardson MD  [facility] [address] [place] [id number]  [phone]          90 tablet 5     Sig: TAKE 1 TABLET(7.5 MG) BY MOUTH AT BEDTIME       Atypical Antidepressants Protocol Passed - 9/4/2019  2:35 PM        Passed - Patient has PHQ-9 score less than 5 in past 6 months.     Please review last PHQ-9 score.   PHQ-9 SCORE 11/7/2017 5/28/2019 6/7/2019   PHQ-9 Total Score 5 4 3     CARIDAD-7 SCORE 6/7/2019   Total Score 3             Passed - Medication active on med list        Passed - Patient is age 18 or older        Passed - Recent (6 mo) or future (30 days) visit within the authorizing provider's specialty     Patient had office visit in the last 6 months or has a visit in the next 30 days with authorizing provider or within the authorizing provider's specialty.  See \"Patient Info\" tab in inbasket, or \"Choose Columns\" in Meds & Orders section of the refill encounter.               "

## 2019-09-04 NOTE — PATIENT INSTRUCTIONS
Let's do this:     Work with the Miralax.                  Cut back the dose of Miralax.                Use the Ensure; 1/2 bottle twice per day.                                Today we added a low dose of a new medication called mirtazapine,which can help with appetite,sleep,depression, and anxiety.

## 2019-09-05 ENCOUNTER — TRANSFERRED RECORDS (OUTPATIENT)
Dept: HEALTH INFORMATION MANAGEMENT | Facility: CLINIC | Age: 79
End: 2019-09-05

## 2019-09-05 PROBLEM — M05.741 RHEUMATOID ARTHRITIS INVOLVING BOTH HANDS WITH POSITIVE RHEUMATOID FACTOR (H): Status: ACTIVE | Noted: 2019-09-05

## 2019-09-05 PROBLEM — M05.742 RHEUMATOID ARTHRITIS INVOLVING BOTH HANDS WITH POSITIVE RHEUMATOID FACTOR (H): Status: ACTIVE | Noted: 2019-09-05

## 2019-09-05 LAB
ALBUMIN SERPL ELPH-MCNC: 4.2 G/DL (ref 3.7–5.1)
ALBUMIN SERPL-MCNC: 3.9 G/DL (ref 3.4–5)
ALP SERPL-CCNC: 71 U/L (ref 40–150)
ALPHA1 GLOB SERPL ELPH-MCNC: 0.3 G/DL (ref 0.2–0.4)
ALPHA2 GLOB SERPL ELPH-MCNC: 0.6 G/DL (ref 0.5–0.9)
ALT SERPL W P-5'-P-CCNC: 24 U/L (ref 0–70)
ANION GAP SERPL CALCULATED.3IONS-SCNC: 8 MMOL/L (ref 3–14)
AST SERPL W P-5'-P-CCNC: 21 U/L (ref 0–45)
B-GLOBULIN SERPL ELPH-MCNC: 1 G/DL (ref 0.6–1)
BILIRUB SERPL-MCNC: 0.6 MG/DL (ref 0.2–1.3)
BUN SERPL-MCNC: 14 MG/DL (ref 7–30)
CALCIUM SERPL-MCNC: 8.8 MG/DL (ref 8.5–10.1)
CHLORIDE SERPL-SCNC: 92 MMOL/L (ref 94–109)
CO2 SERPL-SCNC: 26 MMOL/L (ref 20–32)
CREAT SERPL-MCNC: 0.58 MG/DL (ref 0.66–1.25)
GAMMA GLOB SERPL ELPH-MCNC: 1.5 G/DL (ref 0.7–1.6)
GFR SERPL CREATININE-BSD FRML MDRD: >90 ML/MIN/{1.73_M2}
GLUCOSE SERPL-MCNC: 143 MG/DL (ref 70–99)
M PROTEIN SERPL ELPH-MCNC: 0 G/DL
POTASSIUM SERPL-SCNC: 4 MMOL/L (ref 3.4–5.3)
PROT PATTERN SERPL ELPH-IMP: NORMAL
PROT SERPL-MCNC: 7.9 G/DL (ref 6.8–8.8)
SODIUM SERPL-SCNC: 126 MMOL/L (ref 133–144)
T4 FREE SERPL-MCNC: 1.17 NG/DL (ref 0.76–1.46)
TSH SERPL DL<=0.005 MIU/L-ACNC: 0.89 MU/L (ref 0.4–4)

## 2019-09-06 LAB
IGA SERPL-MCNC: 692 MG/DL (ref 70–380)
IGG SERPL-MCNC: 1410 MG/DL (ref 695–1620)
IGM SERPL-MCNC: 57 MG/DL (ref 60–265)
PROT PATTERN SERPL IFE-IMP: ABNORMAL

## 2019-09-06 RX ORDER — MIRTAZAPINE 7.5 MG/1
TABLET, FILM COATED ORAL
Qty: 90 TABLET | Refills: 2 | Status: SHIPPED | OUTPATIENT
Start: 2019-09-06 | End: 2019-12-04

## 2019-09-06 NOTE — TELEPHONE ENCOUNTER
Prescription approved per [facility] Refill Protocol for 6 months of refills since last appointment, which was 9/4/2019    PHQ-9 SCORE 11/7/2017 5/28/2019 6/7/2019   PHQ-9 Total Score 5 4 3

## 2019-09-09 ENCOUNTER — THERAPY VISIT (OUTPATIENT)
Dept: PHYSICAL THERAPY | Facility: CLINIC | Age: 79
End: 2019-09-09
Payer: COMMERCIAL

## 2019-09-09 DIAGNOSIS — M54.50 CHRONIC BILATERAL LOW BACK PAIN WITHOUT SCIATICA: Primary | ICD-10-CM

## 2019-09-09 DIAGNOSIS — G89.29 CHRONIC BILATERAL LOW BACK PAIN WITHOUT SCIATICA: Primary | ICD-10-CM

## 2019-09-09 PROCEDURE — 97530 THERAPEUTIC ACTIVITIES: CPT | Mod: GP | Performed by: PHYSICAL THERAPIST

## 2019-09-09 PROCEDURE — 97112 NEUROMUSCULAR REEDUCATION: CPT | Mod: GP | Performed by: PHYSICAL THERAPIST

## 2019-09-09 PROCEDURE — 97110 THERAPEUTIC EXERCISES: CPT | Mod: GP | Performed by: PHYSICAL THERAPIST

## 2019-09-09 NOTE — PROGRESS NOTES
Subjective:  HPI                    Objective:  System    Physical Exam    General     ROS    Assessment/Plan:    PROGRESS  REPORT    Progress reporting period is from 6-7-19 to 9-9-19.       SUBJECTIVE  Subjective changes noted by patient:   Pt. has returned to PT today for the first time in almost 3 months as his last PT visit was 6/13. He was told by his MD to hold on PT for several months due to confirming the diagnosis of L1 conpression fx. Pt. was given a back brace and told to mostly bedrest which is what he has done the past few months. He has less back pain now but feels very week and deconditioned. He has very low energy and endurance with any activity. He is anxious to begin PT to work on strengthening and endurance exercise.       Current pain level is 1/10.     Previous pain level was  2/10.   Changes in function:  Yes (See Goal flowsheet attached for changes in current functional level)  Adverse reaction to treatment or activity: None    OBJECTIVE  Changes noted in objective findings:  ROM lumbar flex normal; ext 75%; B Rot 90%. Strength - lower abdominals 4-/5; B quad 4-/5; hams 4/5; hip flex 4/5; abd 4-/5; ext 4-/5     ASSESSMENT/PLAN  Updated problem list and treatment plan: Diagnosis 1:  L1 compression fx  Pain -  self management and education  Decreased ROM/flexibility - manual therapy and therapeutic exercise  Decreased strength - therapeutic exercise and therapeutic activities  Impaired muscle performance - neuro re-education  Decreased function - therapeutic activities  STG/LTGs have been met or progress has been made towards goals:  Yes (See Goal flow sheet completed today.)  Assessment of Progress: The patient's condition is improving.  Self Management Plans:  Patient has been instructed in a home treatment program.  Patient  has been instructed in self management of symptoms.  I have re-evaluated this patient and find that the nature, scope, duration and intensity of the therapy is  appropriate for the medical condition of the patient.  Jose Francisco continues to require the following intervention to meet STG and LTG's:  PT    Recommendations:  This patient would benefit from continued therapy.     Frequency:  1 X week, once daily  Duration:  for 6 weeks        Please refer to the daily flowsheet for treatment today, total treatment time and time spent performing 1:1 timed codes.

## 2019-09-11 ENCOUNTER — HOSPITAL ENCOUNTER (EMERGENCY)
Facility: CLINIC | Age: 79
Discharge: HOME | End: 2019-09-11
Attending: EMERGENCY MEDICINE | Admitting: EMERGENCY MEDICINE
Payer: COMMERCIAL

## 2019-09-11 ENCOUNTER — NURSE TRIAGE (OUTPATIENT)
Dept: NURSING | Facility: CLINIC | Age: 79
End: 2019-09-11

## 2019-09-11 VITALS
BODY MASS INDEX: 29.64 KG/M2 | WEIGHT: 157 LBS | DIASTOLIC BLOOD PRESSURE: 60 MMHG | HEIGHT: 61 IN | HEART RATE: 107 BPM | OXYGEN SATURATION: 96 % | SYSTOLIC BLOOD PRESSURE: 136 MMHG | TEMPERATURE: 97.8 F | RESPIRATION RATE: 16 BRPM

## 2019-09-11 DIAGNOSIS — M54.50 MIDLINE LOW BACK PAIN WITHOUT SCIATICA, UNSPECIFIED CHRONICITY: ICD-10-CM

## 2019-09-11 LAB
ALBUMIN UR-MCNC: NEGATIVE MG/DL
APPEARANCE UR: CLEAR
BILIRUB UR QL STRIP: NEGATIVE
COLOR UR AUTO: YELLOW
GLUCOSE UR STRIP-MCNC: NEGATIVE MG/DL
HGB UR QL STRIP: NEGATIVE
KETONES UR STRIP-MCNC: NEGATIVE MG/DL
LEUKOCYTE ESTERASE UR QL STRIP: NEGATIVE
MUCOUS THREADS #/AREA URNS LPF: PRESENT /LPF
NITRATE UR QL: NEGATIVE
PH UR STRIP: 7 PH (ref 5–7)
RBC #/AREA URNS AUTO: 0 /HPF (ref 0–2)
SOURCE: ABNORMAL
SP GR UR STRIP: 1.01 (ref 1–1.03)
UROBILINOGEN UR STRIP-MCNC: NORMAL MG/DL (ref 0–2)
WBC #/AREA URNS AUTO: <1 /HPF (ref 0–5)

## 2019-09-11 PROCEDURE — 99283 EMERGENCY DEPT VISIT LOW MDM: CPT

## 2019-09-11 PROCEDURE — 81001 URINALYSIS AUTO W/SCOPE: CPT | Performed by: EMERGENCY MEDICINE

## 2019-09-11 RX ORDER — DIAZEPAM 5 MG
5 TABLET ORAL EVERY 6 HOURS PRN
Qty: 20 TABLET | Refills: 0 | Status: SHIPPED | OUTPATIENT
Start: 2019-09-11 | End: 2019-12-04

## 2019-09-11 ASSESSMENT — ENCOUNTER SYMPTOMS
DYSURIA: 0
BACK PAIN: 1

## 2019-09-11 ASSESSMENT — MIFFLIN-ST. JEOR: SCORE: 1285.61

## 2019-09-11 NOTE — ED AVS SNAPSHOT
Emergency Department  [address] [place] [id number]  Phone:  [phone]  Fax:  [phone]                                    Jose Francisco Gonzalez   MRN: [id number]    Department:   Emergency Department   Date of Visit:  9/11/2019           After Visit Summary Signature Page    I have received my discharge instructions, and my questions have been answered. I have discussed any challenges I see with this plan with the nurse or doctor.    ..........................................................................................................................................  Patient/Patient Representative Signature      ..........................................................................................................................................  Patient Representative Print Name and Relationship to Patient    ..................................................               ................................................  Date                                   Time    ..........................................................................................................................................  Reviewed by Signature/Title    ...................................................              ..............................................  Date                                               Time          22EPIC Rev 08/18

## 2019-09-11 NOTE — TELEPHONE ENCOUNTER
Treating for compression fracture in the back, trouble with bowel movments. Woke 5 a.m. with pain in lower back shifting to right hip. Took tramadol for pain. 5:30 extreme pain took oxycodone.  Oxycodone started working some, still bad pain. Took b/p 195/105 pulse 92. Started PT as part of recovery.  He will have his wife bring him in to the ER. I advised 911 if he can't make it to the car.  Carolyn Cardona RN-[facility]      Reason for Disposition    [1] SEVERE back pain (e.g., excruciating) AND [2] sudden onset AND [3] age > 60     Pain at 8    Additional Information    Negative: Passed out (i.e., lost consciousness, collapsed and was not responding)    Negative: Shock suspected (e.g., cold/pale/clammy skin, too weak to stand, low BP, rapid pulse)    Negative: Sounds like a life-threatening emergency to the triager    Negative: Major injury to the back (e.g., MVA, fall > 10 feet or 3 meters, penetrating injury, etc.)    Negative: Followed a tailbone injury    Negative: [1] Pain in the upper back over the ribs (rib cage) AND [2] radiates (travels, goes) into chest    Negative: [1] Pain in the upper back over the ribs (rib cage) AND [2] worsened by coughing (or clearly increases with breathing)    Negative: Back pain during pregnancy    Negative: Pain mainly in flank (i.e., in the side, over the lower ribs or just below the ribs)    Protocols used: BACK PAIN-A-AH

## 2019-09-11 NOTE — ED PROVIDER NOTES
History     Chief Complaint:  Back pain     HPI   Jose Francisco Gonzalez is a 79 year old male with a history of compression fracture of thoracic and lumbar vertebra, RA, diabetes mellitus, among others who presents with back pain. The patient woke up this morning around 0500 with severe lower back pain and took Tramadol without alleviation. He went to the bathroom in an attempt to provide some alleviation. He checked his BP (195/105) and his pulse (92). He called a nursing line who recommended he visit the ED. Here, his wife states a similar episode happened 1 week ago but this was not has severe in pain. He denies any dysuria. The patient did recently receive a brace for his back and has started physical therapy in the last week per recommendation of his orthopedic NP. Also, the patient was seen here at [facility] on 7/10/2019 (2 months ago) for back pain and constipation and had alleviation with Percocet in ED. He was discharged home with Percocet and Senna.       Recent imaging:       MR Lumbar spine w/o contrast (6/18/2019):   1. Acute L1 compression fracture extending into the left pedicle. Mild  height loss. No retropulsion of fragments. No significant spinal canal  stenosis.  2. Nondisplaced fracture of the T12 spinous process.  3. Subtle T2 hyperintensity is present within the interspinous  ligament at T11-T12 suggesting ligamentous strain.  4. Subtle T2 hyperintensity is present within the epidural fat  anterior to the ligamentum flavum, likely posttraumatic edema. No  evidence of associated ligamentous disruption. as per radiology    CT Abdomen/Pelvis with IV contrast (6/12/2019):   1. Acute vs. subacute compression deformity of L1. Some increased  sclerosis is seen in the vertebral body, consider lumbar spine MRI for  further evaluation of an underlying lesion.  2. Small to moderate amount of stool throughout colon. as per radiology.    Allergies:  The patient has no known drug  "allergies.    Medications:    Norvasc   Aspirin   Lipitor   Glucotrol   Plaquenil   Prinivil   Toprol   Remeron   Percocet   Senna  Ultram     Past Medical History:    Arthropathy   Compression fracture of body of thoracic vertebra   Compression fracture of L1 lumbar vertebra   Hyperlipidemia  Hypertension    Hyponatremia   RA  Sialoadenitis   Syncope   Diabetes mellitus II  Adjustment disorder with depressed mood   Benign prostatic hyperplasia    Past Surgical History:    ENT surgery   Eye surgery   HC tooth extraction   Tonsillectomy    Family History:    Heart disease   Coronary artery disease   Alzheimer disease   Substance abuse     Social History:  Marital Status:     Smoker:   Former   Smokeless:   Former   Alcohol:   Yes, occasional   Drugs:   No   Arrives with:   Wife      Review of Systems   Genitourinary: Negative for dysuria.   Musculoskeletal: Positive for back pain.   All other systems reviewed and are negative.    Physical Exam     Patient Vitals for the past 24 hrs:   BP Temp Temp src Pulse Resp SpO2 Height Weight   09/11/19 0830 136/60 -- -- 107 -- -- -- --   09/11/19 0815 (!) 131/93 -- -- 66 -- -- -- --   09/11/19 0800 128/86 -- -- 68 -- -- -- --   09/11/19 0745 136/79 -- -- 81 -- -- -- --   09/11/19 0718 (!) 147/109 -- -- 73 -- 96 % -- --   09/11/19 0716 (!) 147/109 97.8  F (36.6  C) Oral 79 16 96 % 1.558 m (5' 1.32\") 71.2 kg (157 lb)     Physical Exam  Constitutional: Thin elderly male supine   HENT: No signs of trauma.   Eyes: EOM are normal. Pupils are equal, round, and reactive to light.   Neck: Normal range of motion. No JVD present. No cervical adenopathy.  Cardiovascular: Regular rhythm.  Exam reveals no gallop and no friction rub.    No murmur heard.  Pulmonary/Chest: Bilateral breath sounds normal. No wheezes, rhonchi or rales.  Abdominal: Soft. No tenderness. No rebound or guarding. 2+ femoral pulses  Musculoskeletal: No edema. No tenderness to palpation thoracic or lumbar spine. " Negative straight leg raising.  Lymphadenopathy: No lymphadenopathy.   Neurological: Alert and oriented to person, place, and time. Normal strength. Coordination normal. Normal sensation to light touch. Symmetric but diminished reflexes knee and ankle.   Skin: Skin is warm and dry.  Superficial rash bilateral  upper back    Emergency Department Course   Laboratory:  UA with Microscopic: mucous present, o/w WNL    Emergency Department Course:  0728 I performed an exam of the patient as documented above.   0903 I rechecked the patient and discussed the results of their workup thus far.     Findings and plan explained. Patient discharged home with instructions regarding supportive care, medications, and reasons to return. The importance of close follow-up was reviewed. I personally reviewed the laboratory results with them and answered all related questions prior to discharge.    Impression & Plan    Medical Decision Making:  Jose Francisco Gonzalez is an 79 years old male with back pain exacerbation who is known to have L1 compression fracture and a T12 spinous process fracture after a fall in June. He was seen here for similar pain in July and was going to get a back brace. He had an MRI in June. He also had a CT of the abdomen in June which showed no aortic disease. The patient was doing better and went to physical therapy and was just told that he can stop wearing the brace. The patient woke up with pain this morning, he took Tramadol and didn't get relief so took one of his Oxycodone. He contacted the nurse and noted his blood pressure was up and she told him to come in and be seen. By the time he arrives here, his pain is pretty much resolved. He denies any abdominal pain, current back pain, lower extremity numbness, weakness, or bladder/bowel issues. On my exam, there is no reproducible tenderness to palpation. We did watch his blood pressure for an hour. It has gradually come down to normal. I did send off a urine.  The patient will be discharged home. I have given him a prescription for some valium to use for muscle spasms in case this may be the problem. He has some Oxycodone left and some Ultram as well. He should follow up with his primary care physician or back doctor if these symptoms reoccur.     Diagnosis:    ICD-10-CM    1. Midline low back pain without sciatica, unspecified chronicity M54.5 UA with Microscopic reflex to Culture     Disposition:  discharged to home with Valium.     Discharge Medications:  Discharge Medication List as of 9/11/2019  8:59 AM      START taking these medications    Details   diazepam (VALIUM) 5 MG tablet Take 1 tablet (5 mg) by mouth every 6 hours as needed for anxiety (MUSCLE SPASM), Disp-20 tablet, R-0, Local Print           Scribe Disclosure:  I, Mauricio Bowser, am serving as a scribe on 9/11/2019 at 7:28 AM to personally document services performed by Jerry Morejon MD based on my observations and the provider's statements to me.     Mauricio Bowser  9/11/2019    EMERGENCY DEPARTMENT       Jerry Morejon MD  09/11/19 [id number]

## 2019-09-16 ENCOUNTER — THERAPY VISIT (OUTPATIENT)
Dept: PHYSICAL THERAPY | Facility: CLINIC | Age: 79
End: 2019-09-16
Payer: COMMERCIAL

## 2019-09-16 DIAGNOSIS — G89.29 CHRONIC BILATERAL LOW BACK PAIN WITHOUT SCIATICA: Primary | ICD-10-CM

## 2019-09-16 DIAGNOSIS — M54.50 CHRONIC BILATERAL LOW BACK PAIN WITHOUT SCIATICA: Primary | ICD-10-CM

## 2019-09-16 PROCEDURE — 97110 THERAPEUTIC EXERCISES: CPT | Mod: GP | Performed by: PHYSICAL THERAPIST

## 2019-09-16 PROCEDURE — 97530 THERAPEUTIC ACTIVITIES: CPT | Mod: GP | Performed by: PHYSICAL THERAPIST

## 2019-09-16 NOTE — PROGRESS NOTES
Subjective:  HPI                    Objective:  System    Physical Exam    General     ROS    Assessment/Plan:    SUBJECTIVE  Subjective changes as noted by pt:   Pt. had an episode of severe LBP on 9-11 with a trip to the ER. Since then the pain has calmed down to it's normal status. Pt. still has mod constant LBP. He feels the ex's are going well.    Current pain level:  1/10   Changes in function:  Yes (See Goal flowsheet attached for changes in current functional level)     Adverse reaction to treatment or activity:  None    OBJECTIVE  Changes in objective findings:  ROM lumbar ext 75%. Strength - lower abdominals 4-/5; hip flex 4-/5; abd 4-/5; ext 4-/5     ASSESSMENT  Jose Francisco continues to require intervention to meet STG and LTG's: PT  Patient is progressing as expected.  Response to therapy has shown an improvement in  pain level  Progress made towards STG/LTG?  Yes (See Goal flowsheet attached for updates on achievement of STG and LTG)    PLAN  Current treatment program is being advanced to more complex exercises.    PTA/ATC plan:  N/A    Please refer to the daily flowsheet for treatment today, total treatment time and time spent performing 1:1 timed codes.

## 2019-09-24 ENCOUNTER — THERAPY VISIT (OUTPATIENT)
Dept: PHYSICAL THERAPY | Facility: CLINIC | Age: 79
End: 2019-09-24
Payer: COMMERCIAL

## 2019-09-24 DIAGNOSIS — M54.50 CHRONIC BILATERAL LOW BACK PAIN WITHOUT SCIATICA: Primary | ICD-10-CM

## 2019-09-24 DIAGNOSIS — G89.29 CHRONIC BILATERAL LOW BACK PAIN WITHOUT SCIATICA: Primary | ICD-10-CM

## 2019-09-24 PROCEDURE — 97112 NEUROMUSCULAR REEDUCATION: CPT | Mod: GP | Performed by: PHYSICAL THERAPIST

## 2019-09-24 PROCEDURE — 97530 THERAPEUTIC ACTIVITIES: CPT | Mod: GP | Performed by: PHYSICAL THERAPIST

## 2019-09-24 PROCEDURE — 97110 THERAPEUTIC EXERCISES: CPT | Mod: GP | Performed by: PHYSICAL THERAPIST

## 2019-09-24 NOTE — PROGRESS NOTES
Subjective:  HPI                    Objective:  System    Physical Exam    General     ROS    Assessment/Plan:    SUBJECTIVE  Subjective changes as noted by pt:   Pt. had increased back pain yesterday but otherwise this past week has been fairly good    Current pain level:  1/10   Changes in function:  Yes (See Goal flowsheet attached for changes in current functional level)     Adverse reaction to treatment or activity:  None    OBJECTIVE  Changes in objective findings:  Strength - lower abdominals 4/5; extensors 4-/5     ASSESSMENT  Jose Francisco continues to require intervention to meet STG and LTG's: PT  Patient is progressing as expected.  Response to therapy has shown an improvement in  pain level, ROM  and strength  Progress made towards STG/LTG?  Yes (See Goal flowsheet attached for updates on achievement of STG and LTG)    PLAN  Current treatment program is being advanced to more complex exercises.    PTA/ATC plan:  N/A    Please refer to the daily flowsheet for treatment today, total treatment time and time spent performing 1:1 timed codes.

## 2019-10-02 ENCOUNTER — OFFICE VISIT (OUTPATIENT)
Dept: FAMILY MEDICINE | Facility: CLINIC | Age: 79
End: 2019-10-02
Payer: COMMERCIAL

## 2019-10-02 VITALS
BODY MASS INDEX: 22.26 KG/M2 | HEIGHT: 71 IN | HEART RATE: 80 BPM | SYSTOLIC BLOOD PRESSURE: 138 MMHG | WEIGHT: 159 LBS | TEMPERATURE: 97.4 F | RESPIRATION RATE: 20 BRPM | DIASTOLIC BLOOD PRESSURE: 80 MMHG | OXYGEN SATURATION: 99 %

## 2019-10-02 DIAGNOSIS — E87.1 HYPONATREMIA: ICD-10-CM

## 2019-10-02 DIAGNOSIS — S32.010D CLOSED COMPRESSION FRACTURE OF L1 LUMBAR VERTEBRA WITH ROUTINE HEALING, SUBSEQUENT ENCOUNTER: ICD-10-CM

## 2019-10-02 DIAGNOSIS — R19.4 CHANGE IN BOWEL HABITS: ICD-10-CM

## 2019-10-02 DIAGNOSIS — F43.23 ADJUSTMENT DISORDER WITH MIXED ANXIETY AND DEPRESSED MOOD: ICD-10-CM

## 2019-10-02 DIAGNOSIS — R10.84 ABDOMINAL PAIN, GENERALIZED: ICD-10-CM

## 2019-10-02 DIAGNOSIS — Z01.818 PREOP GENERAL PHYSICAL EXAM: Primary | ICD-10-CM

## 2019-10-02 DIAGNOSIS — M80.00XD AGE-RELATED OSTEOPOROSIS WITH CURRENT PATHOLOGICAL FRACTURE WITH ROUTINE HEALING: ICD-10-CM

## 2019-10-02 DIAGNOSIS — Z79.4 TYPE 2 DIABETES MELLITUS WITH DIABETIC NEPHROPATHY, WITH LONG-TERM CURRENT USE OF INSULIN (H): ICD-10-CM

## 2019-10-02 DIAGNOSIS — I10 HYPERTENSION GOAL BP (BLOOD PRESSURE) < 140/80: Chronic | ICD-10-CM

## 2019-10-02 DIAGNOSIS — E11.21 TYPE 2 DIABETES MELLITUS WITH DIABETIC NEPHROPATHY, WITH LONG-TERM CURRENT USE OF INSULIN (H): ICD-10-CM

## 2019-10-02 LAB
BASOPHILS # BLD AUTO: 0 10E9/L (ref 0–0.2)
BASOPHILS NFR BLD AUTO: 0.3 %
CORTIS SERPL-MCNC: 13.4 UG/DL (ref 4–22)
DIFFERENTIAL METHOD BLD: NORMAL
EOSINOPHIL # BLD AUTO: 0.1 10E9/L (ref 0–0.7)
EOSINOPHIL NFR BLD AUTO: 1 %
ERYTHROCYTE [DISTWIDTH] IN BLOOD BY AUTOMATED COUNT: 13.4 % (ref 10–15)
HCT VFR BLD AUTO: 43.5 % (ref 40–53)
HGB BLD-MCNC: 15.1 G/DL (ref 13.3–17.7)
LYMPHOCYTES # BLD AUTO: 0.8 10E9/L (ref 0.8–5.3)
LYMPHOCYTES NFR BLD AUTO: 11.6 %
MCH RBC QN AUTO: 31.5 PG (ref 26.5–33)
MCHC RBC AUTO-ENTMCNC: 34.7 G/DL (ref 31.5–36.5)
MCV RBC AUTO: 91 FL (ref 78–100)
MONOCYTES # BLD AUTO: 0.6 10E9/L (ref 0–1.3)
MONOCYTES NFR BLD AUTO: 8 %
NEUTROPHILS # BLD AUTO: 5.4 10E9/L (ref 1.6–8.3)
NEUTROPHILS NFR BLD AUTO: 79.1 %
PLATELET # BLD AUTO: 245 10E9/L (ref 150–450)
RBC # BLD AUTO: 4.8 10E12/L (ref 4.4–5.9)
WBC # BLD AUTO: 6.9 10E9/L (ref 4–11)

## 2019-10-02 PROCEDURE — 36415 COLL VENOUS BLD VENIPUNCTURE: CPT | Performed by: INTERNAL MEDICINE

## 2019-10-02 PROCEDURE — 82533 TOTAL CORTISOL: CPT | Performed by: INTERNAL MEDICINE

## 2019-10-02 PROCEDURE — 93000 ELECTROCARDIOGRAM COMPLETE: CPT | Performed by: INTERNAL MEDICINE

## 2019-10-02 PROCEDURE — 80048 BASIC METABOLIC PNL TOTAL CA: CPT | Performed by: INTERNAL MEDICINE

## 2019-10-02 PROCEDURE — 85025 COMPLETE CBC W/AUTO DIFF WBC: CPT | Performed by: INTERNAL MEDICINE

## 2019-10-02 PROCEDURE — 99214 OFFICE O/P EST MOD 30 MIN: CPT | Mod: 25 | Performed by: INTERNAL MEDICINE

## 2019-10-02 RX ORDER — TRAMADOL HYDROCHLORIDE 50 MG/1
50 TABLET ORAL EVERY 6 HOURS PRN
Qty: 30 TABLET | Refills: 1 | Status: SHIPPED | OUTPATIENT
Start: 2019-10-02 | End: 2019-12-04

## 2019-10-02 ASSESSMENT — MIFFLIN-ST. JEOR: SCORE: 1448.35

## 2019-10-02 NOTE — PROGRESS NOTES
[facility]  [address] [facility] [address]  [facility] [address]  [phone]  Dept: [phone]    PRE-OP EVALUATION:  Today's date: 10/2/2019    Jose Francisco Gonzalez (: 1940) presents for pre-operative evaluation assessment as requested by Dr. lawrence.  He requires evaluation and anesthesia risk assessment prior to undergoing surgery/procedure for treatment of screening .    Proposed Surgery/ Procedure: Colonoscopy  Date of Surgery/ Procedure: 10/7/2019  Time of Surgery/ Procedure: unknown  Hospital/Surgical Facility: [facility]    Primary Physician: Kvng Richardson  Type of Anesthesia Anticipated: to be determined    Patient has a Health Care Directive or Living Will:  YES - will bring in copy    1. NO - Do you have a history of heart attack, stroke, stent, bypass or surgery on an artery in the head, neck, heart or legs?  2. NO - Do you ever have any pain or discomfort in your chest?  3. NO - Do you have a history of  Heart Failure?  4. NO - Are you troubled by shortness of breath when: walking on the level, up a slight hill or at night?  5. NO - Do you currently have a cold, bronchitis or other respiratory infection?  6. NO - Do you have a cough, shortness of breath or wheezing?  7. NO - Do you sometimes get pains in the calves of your legs when you walk?  8. NO - Do you or anyone in your family have previous history of blood clots?  9. NO - Do you or does anyone in your family have a serious bleeding problem such as prolonged bleeding following surgeries or cuts?  10. NO - Have you ever had problems with anemia or been told to take iron pills?  11. NO - Have you had any abnormal blood loss such as black, tarry or bloody stools, or abnormal vaginal bleeding?  12. NO - Have you ever had a blood transfusion?  13. NO - Have you or any of your relatives ever had problems with anesthesia?  14. NO - Do you have sleep apnea, excessive snoring or daytime drowsiness?  15. NO - Do  you have any prosthetic heart valves?  16. NO - Do you have prosthetic joints?        HPI:     HPI related to upcoming procedure:  Has ongoing lower abdominal soreness, since his lumbar compression fx in 6/19.                    Has alternating constipation and diarrhea.     Uses Miralax.                                    Takes tramadol; 50 mg every 2 or 3 days.                   CT abdomen was negative (other than lumbar compression fx).               He has had more lower abdominal sx lately than back pain.                                                                                                                                                                                    Leaving for [place] Nov 1.                                MEDICAL HISTORY:     Patient Active Problem List    Diagnosis Date Noted     Rheumatoid arthritis involving both hands with positive rheumatoid factor (H) 09/05/2019     Priority: High     Dx 2014; Dr Dior; Rx hydroxychloroquine       Age-related osteoporosis with current pathological fracture with routine healing 08/30/2019     Priority: High     T - 2.7 L, -3.6 R fem neck; +0.4 lumbar spine, but degenerative changes.            SPEP and immunofixation no monoclonal protein       Personal history of tobacco use, presenting hazards to health 01/23/2014     Priority: High     Quit 1993; CXR 11/10;       US neg for AAA in 2/14       Hypercholesterolemia 11/02/2012     Priority: High     Was on fenofibrate plus simva, in [facility] study; LDL 84 in Lipitor 20 in 05/2012; 97 in 3/13       Hyponatremia 11/02/2012     Priority: High           Chronic;                 Up to 133 in 12/2011 and 134 in 05/2012.   130 in 11/12; 127 in 8/13; 128 in 1/14, 130 in 5/14 and 12/14                      Cortisol and TFT's nml                              Up to 133 in 1/16       Hypertensive heart disease without heart failure 11/02/2012     Priority: High     Neg stress echo in 10/10;               but LVH and BP up to 230 with exercise.        Had STOCK.         Problem list name updated by automated process. Provider to review       Type 2 diabetes mellitus with diabetic nephropathy, with long-term current use of insulin (H) 11/02/2012     Priority: High     DX approx 2001;          Stop metformin in 11/15 due to loose bowels.        Hypertension goal BP (blood pressure) < 140/80 11/02/2012     Priority: High     Mild-moderate LVH on echo in 10/10; mild LVH on echo 1/14; maintain BP < 135/85       Arthropathy      Priority: High     palindromic rheumatism; this is a remote Dx;          In 11/14, Dx of RA; hydroxychloroquine added by Dr. Dior  Problem list name updated by automated process. Provider to review       Adjustment disorder with mixed anxiety and depressed mood 09/04/2019     Priority: Medium     Add low dose mirtazapine in 8/19       Weight loss 09/04/2019     Priority: Medium     Abdominal pain, generalized 06/11/2019     Priority: Medium     CT neg except for lumbar compression fx in 6/19;                                                                   IMPRESSION:   1. Acute vs. subacute compression deformity of L1. Some increased  sclerosis is seen in the vertebral body, consider lumbar spine MRI for  further evaluation of an underlying lesion.  2. Small to moderate amount of stool throughout colon.       Compression fracture of L1 lumbar vertebra (H) 06/01/2019     Priority: Medium     see MRI       Recurrent major depression in complete remission (H) 05/29/2019     Priority: Medium     Change in bowel habits since mid May , 2019 05/29/2019     Priority: Medium     CT abd 6/19: IMPRESSION:   1. Acute vs. subacute compression deformity of L1. Some increased  sclerosis is seen in the vertebral body, consider lumbar spine MRI for  further evaluation of an underlying lesion.  2. Small to moderate amount of stool throughout colon.       Poor balance 04/11/2018     Priority: Medium     Adjustment  "disorder with depressed mood 11/07/2017     Priority: Medium     Benign non-nodular prostatic hyperplasia with lower urinary tract symptoms 05/02/2017     Priority: Medium     see cysto and urology notes fall 2016; flomax not helpful; add finasteride in 5/17; stopped by pt; not helpful.          Urinary urgency 10/21/2016     Priority: Medium     flomax per urology in 10/16;;       if not helpful,RTC for a cysto.           (flomax not helpful)      Cysto neg in 12/16, except for obstructing prostate; try flomax again.              In 11/17, \"benign feeling prostate\"; PVR equals 78 cc ;consider detrol       Premature atrial contractions 01/25/2016     Priority: Medium     on Holter 7/15, and ECG 1/16       Syncope 07/30/2015     Priority: Medium     Holter shows PAC's,and a 10 beat run of SVT       Chest discomfort 07/30/2015     Priority: Medium     Stress echo nml in 7/15       Sialoadenitis 06/13/2015     Priority: Medium     Microalbuminuria 12/11/2014     Priority: Medium     100 in 12/14;                Resume lisinopril 10 mg per day in 1/16; 212 in 4/16, 61 in 9/16       Screening for prostate cancer 01/22/2014     Priority: Medium     Nasal crusting 03/13/2013     Priority: Medium     See 's note 5/14; small anterior perforation, with dessicative rhinitis       Allergic rhinitis 11/02/2012     Priority: Medium     Problem list name updated by automated process. Provider to review       Polyp of nasal cavity 11/02/2012     Priority: Medium     Pyogenic granuloma removed 2009       Achilles bursitis or tendinitis 11/02/2012     Priority: Medium     Impotence of organic origin 11/02/2012     Priority: Medium     Total T 427, free 1.9, in 7/13       Non-toxic nodular goiter 11/02/2012     Priority: Medium     TFT's nml in 7/13  Problem list name updated by automated process. Provider to review       Other psoriasis 11/02/2012     Priority: Medium     Preventive measure 03/13/2013     Priority: Low "     [facility] DATA BASE UNDER THE 3/13/13 NOTE  PSA 0.82 in 12/11,1.56 in 7/13; 1.56 in 9/15, 0.95 in 9/16                  Colonoscopy in 11/10, neg            Past Medical History:   Diagnosis Date     Arthropathy, unspecified, site unspecified     palindromic rheumatism; takes aleve bid      Compression fracture of body of thoracic vertebra (H)      Compression fracture of L1 lumbar vertebra (H) 06/2019    see MRI     Hyperlipidemia LDL goal <100 11/2/2012    Was on fenofibrate plus simva, in [facility] study; LDL 84 in Lipitor 20 in 05/2012; 97 in 3/13     Hyponatremia      Rheumatoid arthritis (H)      Sialoadenitis 6/13/2015     Syncope      Type 2 diabetes, HbA1C goal < 8% (H) 11/2/2012     Past Surgical History:   Procedure Laterality Date     ENT SURGERY  11/2009    nasal; removal of pyogenic granuloma L     EYE SURGERY  murray 15 and22 2009    cataract     HC TOOTH EXTRACTION W/FORCEP       TONSILLECTOMY       Current Outpatient Medications   Medication Sig Dispense Refill     amLODIPine (NORVASC) 2.5 MG tablet TAKE 1 TABLET BY MOUTH EVERY DAY 90 tablet 0     aspirin 81 MG tablet Take  by mouth daily.       atorvastatin (LIPITOR) 20 MG tablet TAKE 1 TABLET(20MG) BY MOUTH DAILY 90 tablet 4     blood glucose (NO BRAND SPECIFIED) lancets standard Use to test blood sugar 2 times daily or as directed.                                        To use with his glucometer 1 Box 12     blood glucose (ONETOUCH VERIO IQ) test strip USE TO TEST BLOOD SUGARS THREE TIMES DAILY OR AS DIRECTED 300 strip 1     glipiZIDE (GLUCOTROL XL) 10 MG 24 hr tablet TAKE 1 TABLET(10 MG) BY MOUTH DAILY 90 tablet 4     hydroxychloroquine (PLAQUENIL) 200 MG tablet Take 1 tablet (200 mg) by mouth daily 30 tablet      insulin glargine (BASAGLAR KWIKPEN) 100 UNIT/ML pen INJECT 26 UNITS UNDER THE SKIN DAILY OR AS DIRECTED 30 mL 11     insulin pen needle (B-D U/F) 31G X 8 MM miscellaneous USE DAILY AS DIRECTED 50 each 0     lisinopril (PRINIVIL/ZESTRIL) 10  MG tablet TAKE 1 TABLET BY MOUTH EVERY DAY 90 tablet 3     metoprolol succinate ER (TOPROL-XL) 50 MG 24 hr tablet Take 1 tablet (50 mg) by mouth daily 90 tablet 3     mirtazapine (REMERON) 7.5 MG tablet TAKE 1 TABLET(7.5 MG) BY MOUTH AT BEDTIME 90 tablet 2     Multiple Vitamins-Minerals (PRESERVISION AREDS 2) CAPS 2 per day       order for DME Test strips for pt's glucometer, brand as covered by insurance Test QID and prn. He is on insulin. Patients preferred brand-Verio One Touch. 400 each 1     Psyllium-Calcium (METAMUCIL PLUS CALCIUM) CAPS Take 2 capsules by mouth At Bedtime       SENNA-docusate sodium (SENNA S) 8.6-50 MG tablet Take daily while using percocet to prevent constipation 15 tablet 0     traMADol (ULTRAM) 50 MG tablet Take 1 tablet (50 mg) by mouth every 6 hours as needed for severe pain 30 tablet 1     OTC products: None, except as noted above    No Known Allergies   Latex Allergy: NO    Social History     Tobacco Use     Smoking status: Former Smoker     Packs/day: 1.00     Years: 20.00     Pack years: 20.00     Types: Cigarettes     Last attempt to quit: 1993     Years since quittin.7     Smokeless tobacco: Former User   Substance Use Topics     Alcohol use: Yes     Comment: occ     History   Drug Use No       REVIEW OF SYSTEMS:   CONSTITUTIONAL:NEGATIVE  for chills and fever   EYES: NEGATIVE for vision changes or irritation  ENT/MOUTH: NEGATIVE for ear, mouth and throat problems  RESP: NEGATIVE for significant cough or SOB  CV: NEGATIVE for chest pain, palpitations or peripheral edema  GI: NEGATIVE for hematochezia and melena  : NEGATIVE for frequency, dysuria, or hematuria  MUSCULOSKELETAL:arthralgias  and back pain  NEURO: NEGATIVE for HX CVA  HEME: NEGATIVE for bleeding problems  PSYCHIATRIC: anxiety, depressed mood and fatigue  Wt Readings from Last 4 Encounters:   10/02/19 72.1 kg (159 lb)   19 71.2 kg (157 lb)   19 70.8 kg (156 lb)   07/10/19 77.1 kg (170 lb)  "      EXAM:   /80   Pulse 80   Temp 97.4  F (36.3  C)   Resp 20   Ht 1.803 m (5' 11\")   Wt 72.1 kg (159 lb)   SpO2 99%   BMI 22.18 kg/m      GENERAL APPEARANCE: alert and no distress     EYES: Eyes grossly normal to inspection     HENT: nose and mouth without ulcers or lesions     NECK: no adenopathy, no asymmetry, masses, or scars and thyroid normal to palpation     RESP: lungs clear to auscultation - no rales, rhonchi or wheezes     CV: regular rates and rhythm, normal S1 S2, no S3 or S4 and no murmur, click or rub     ABDOMEN: soft, nontender, without hepatosplenomegaly or masses     NEURO: speech normal     PSYCH: anxious     LYMPHATICS: No cervical adenopathy    DIAGNOSTICS:     EKG: Normal Sinus Rhythm, slow R wave progression , Premature Atrial Contractions (PAC) noted, unchanged from previous tracings (compare to the 7/30/15 ECG)  Labs Resulted Today:   Results for orders placed or performed in visit on 10/02/19   Basic metabolic panel  (Ca, Cl, CO2, Creat, Gluc, K, Na, BUN)   Result Value Ref Range    Sodium 131 (L) 133 - 144 mmol/L    Potassium 4.4 3.4 - 5.3 mmol/L    Chloride 96 94 - 109 mmol/L    Carbon Dioxide 27 20 - 32 mmol/L    Anion Gap 8 3 - 14 mmol/L    Glucose 125 (H) 70 - 99 mg/dL    Urea Nitrogen 14 7 - 30 mg/dL    Creatinine 0.66 0.66 - 1.25 mg/dL    GFR Estimate >90 >60 mL/min/[1.73_m2]    GFR Estimate If Black >90 >60 mL/min/[1.73_m2]    Calcium 8.9 8.5 - 10.1 mg/dL   Cortisol   Result Value Ref Range    Cortisol Serum 13.4 4 - 22 ug/dL   CBC with platelets and differential   Result Value Ref Range    WBC 6.9 4.0 - 11.0 10e9/L    RBC Count 4.80 4.4 - 5.9 10e12/L    Hemoglobin 15.1 13.3 - 17.7 g/dL    Hematocrit 43.5 40.0 - 53.0 %    MCV 91 78 - 100 fl    MCH 31.5 26.5 - 33.0 pg    MCHC 34.7 31.5 - 36.5 g/dL    RDW 13.4 10.0 - 15.0 %    Platelet Count 245 150 - 450 10e9/L    % Neutrophils 79.1 %    % Lymphocytes 11.6 %    % Monocytes 8.0 %    % Eosinophils 1.0 %    % Basophils " 0.3 %    Absolute Neutrophil 5.4 1.6 - 8.3 10e9/L    Absolute Lymphocytes 0.8 0.8 - 5.3 10e9/L    Absolute Monocytes 0.6 0.0 - 1.3 10e9/L    Absolute Eosinophils 0.1 0.0 - 0.7 10e9/L    Absolute Basophils 0.0 0.0 - 0.2 10e9/L    Diff Method Automated Method            Recent Labs   Lab Test 09/04/19  1415 06/07/19  1003 04/24/19  1338  09/19/16  1541   HGB  --  16.2  --   --  15.7   PLT  --  321  --   --  255   *  --  132*   < > 127*   POTASSIUM 4.0  --  4.1   < > 4.4   CR 0.58*  --  0.76   < > 0.89   A1C 6.6*  --  7.0*   < > 8.2*    < > = values in this interval not displayed.        IMPRESSION:   Reason for surgery/procedure: need for GI evaluation  Diagnosis/reason for consult: need for pre-op evaluation    The proposed surgical procedure is considered LOW risk.    REVISED CARDIAC RISK INDEX  The patient has the following serious cardiovascular risks for perioperative complications:  Diabetes Mellitus (on Insulin)      The patient has the following additional risks for perioperative complications:  He is somewhat frail, with decline in function since having a lumbar compression fracture in 6/19.       ICD-10-CM    1. Preop general physical exam Z01.818 EKG 12-lead complete w/read - Clinics     CBC with platelets and differential   2. Change in bowel habits since mid May , 2019 R19.4 CBC with platelets and differential   3. Hyponatremia E87.1 Basic metabolic panel  (Ca, Cl, CO2, Creat, Gluc, K, Na, BUN)     Cortisol   4. Type 2 diabetes mellitus with diabetic nephropathy, with long-term current use of insulin (H) E11.21 Basic metabolic panel  (Ca, Cl, CO2, Creat, Gluc, K, Na, BUN)    Z79.4 EKG 12-lead complete w/read - Clinics   5. Hypertension goal BP (blood pressure) < 140/80 I10 EKG 12-lead complete w/read - Clinics   6. Adjustment disorder with mixed anxiety and depressed mood F43.23    7. Age-related osteoporosis with current pathological fracture with routine healing M80.00XD    8. Abdominal pain,  generalized R10.84 CBC with platelets and differential   9. Closed compression fracture of L1 lumbar vertebra with routine healing, subsequent encounter S32.010D        RECOMMENDATIONS:     Comments:  Overall stable.              Hyponatremia is chronic.            Weight seems to have stabilized, after initial weight loss after compression fracture.                          Patient Instructions    On the day before, and on the day of the colonoscopy, do not take glipizide or lisinopril or hydroxychloroquine.                              On the evening before your colonoscopy, just take 12 units of insulin.                              APPROVAL GIVEN to proceed with proposed procedure, without further diagnostic evaluation        Signed Electronically by: Kvng Richardson MD    Copy of this evaluation report is provided to requesting physician.    [facility] Preop Guidelines    Revised Cardiac Risk Index

## 2019-10-02 NOTE — PATIENT INSTRUCTIONS
On the day before, and on the day of the colonoscopy, do not take glipizide or lisinopril or hydroxychloroquine.                              On the evening before your colonoscopy, just take 12 units of insulin.

## 2019-10-03 LAB
ANION GAP SERPL CALCULATED.3IONS-SCNC: 8 MMOL/L (ref 3–14)
BUN SERPL-MCNC: 14 MG/DL (ref 7–30)
CALCIUM SERPL-MCNC: 8.9 MG/DL (ref 8.5–10.1)
CHLORIDE SERPL-SCNC: 96 MMOL/L (ref 94–109)
CO2 SERPL-SCNC: 27 MMOL/L (ref 20–32)
CREAT SERPL-MCNC: 0.66 MG/DL (ref 0.66–1.25)
GFR SERPL CREATININE-BSD FRML MDRD: >90 ML/MIN/{1.73_M2}
GLUCOSE SERPL-MCNC: 125 MG/DL (ref 70–99)
POTASSIUM SERPL-SCNC: 4.4 MMOL/L (ref 3.4–5.3)
SODIUM SERPL-SCNC: 131 MMOL/L (ref 133–144)

## 2019-10-07 ENCOUNTER — HOSPITAL ENCOUNTER (OUTPATIENT)
Facility: CLINIC | Age: 79
Discharge: HOME | End: 2019-10-07
Attending: INTERNAL MEDICINE | Admitting: INTERNAL MEDICINE
Payer: COMMERCIAL

## 2019-10-07 ENCOUNTER — ANESTHESIA (OUTPATIENT)
Dept: GASTROENTEROLOGY | Facility: CLINIC | Age: 79
End: 2019-10-07
Payer: COMMERCIAL

## 2019-10-07 ENCOUNTER — ANESTHESIA EVENT (OUTPATIENT)
Dept: GASTROENTEROLOGY | Facility: CLINIC | Age: 79
End: 2019-10-07
Payer: COMMERCIAL

## 2019-10-07 VITALS
HEART RATE: 71 BPM | DIASTOLIC BLOOD PRESSURE: 68 MMHG | RESPIRATION RATE: 17 BRPM | SYSTOLIC BLOOD PRESSURE: 100 MMHG | OXYGEN SATURATION: 97 %

## 2019-10-07 LAB — COLONOSCOPY: NORMAL

## 2019-10-07 PROCEDURE — 25800030 ZZH RX IP 258 OP 636: Performed by: NURSE ANESTHETIST, CERTIFIED REGISTERED

## 2019-10-07 PROCEDURE — 37000008 ZZH ANESTHESIA TECHNICAL FEE, 1ST 30 MIN: Performed by: INTERNAL MEDICINE

## 2019-10-07 PROCEDURE — 25000128 H RX IP 250 OP 636: Performed by: NURSE ANESTHETIST, CERTIFIED REGISTERED

## 2019-10-07 PROCEDURE — 40000010 ZZH STATISTIC ANES STAT CODE-CRNA PER MINUTE: Performed by: INTERNAL MEDICINE

## 2019-10-07 PROCEDURE — 25000125 ZZHC RX 250: Performed by: NURSE ANESTHETIST, CERTIFIED REGISTERED

## 2019-10-07 PROCEDURE — 37000009 ZZH ANESTHESIA TECHNICAL FEE, EACH ADDTL 15 MIN: Performed by: INTERNAL MEDICINE

## 2019-10-07 PROCEDURE — 88305 TISSUE EXAM BY PATHOLOGIST: CPT | Mod: 26 | Performed by: INTERNAL MEDICINE

## 2019-10-07 PROCEDURE — 88305 TISSUE EXAM BY PATHOLOGIST: CPT | Performed by: INTERNAL MEDICINE

## 2019-10-07 PROCEDURE — 45385 COLONOSCOPY W/LESION REMOVAL: CPT | Performed by: INTERNAL MEDICINE

## 2019-10-07 RX ORDER — EPHEDRINE SULFATE 50 MG/ML
INJECTION, SOLUTION INTRAMUSCULAR; INTRAVENOUS; SUBCUTANEOUS PRN
Status: DISCONTINUED | OUTPATIENT
Start: 2019-10-07 | End: 2019-10-07

## 2019-10-07 RX ORDER — PROPOFOL 10 MG/ML
INJECTION, EMULSION INTRAVENOUS CONTINUOUS PRN
Status: DISCONTINUED | OUTPATIENT
Start: 2019-10-07 | End: 2019-10-07

## 2019-10-07 RX ORDER — ONDANSETRON 2 MG/ML
4 INJECTION INTRAMUSCULAR; INTRAVENOUS EVERY 6 HOURS PRN
Status: DISCONTINUED | OUTPATIENT
Start: 2019-10-07 | End: 2019-10-07 | Stop reason: HOSPADM

## 2019-10-07 RX ORDER — FLUMAZENIL 0.1 MG/ML
0.2 INJECTION, SOLUTION INTRAVENOUS
Status: DISCONTINUED | OUTPATIENT
Start: 2019-10-07 | End: 2019-10-07 | Stop reason: HOSPADM

## 2019-10-07 RX ORDER — SODIUM CHLORIDE, SODIUM LACTATE, POTASSIUM CHLORIDE, CALCIUM CHLORIDE 600; 310; 30; 20 MG/100ML; MG/100ML; MG/100ML; MG/100ML
INJECTION, SOLUTION INTRAVENOUS CONTINUOUS PRN
Status: DISCONTINUED | OUTPATIENT
Start: 2019-10-07 | End: 2019-10-07

## 2019-10-07 RX ORDER — LIDOCAINE 40 MG/G
CREAM TOPICAL
Status: DISCONTINUED | OUTPATIENT
Start: 2019-10-07 | End: 2019-10-07 | Stop reason: HOSPADM

## 2019-10-07 RX ORDER — ONDANSETRON 2 MG/ML
4 INJECTION INTRAMUSCULAR; INTRAVENOUS
Status: DISCONTINUED | OUTPATIENT
Start: 2019-10-07 | End: 2019-10-07 | Stop reason: HOSPADM

## 2019-10-07 RX ORDER — NALOXONE HYDROCHLORIDE 0.4 MG/ML
.1-.4 INJECTION, SOLUTION INTRAMUSCULAR; INTRAVENOUS; SUBCUTANEOUS
Status: DISCONTINUED | OUTPATIENT
Start: 2019-10-07 | End: 2019-10-07 | Stop reason: HOSPADM

## 2019-10-07 RX ORDER — ONDANSETRON 4 MG/1
4 TABLET, ORALLY DISINTEGRATING ORAL EVERY 6 HOURS PRN
Status: DISCONTINUED | OUTPATIENT
Start: 2019-10-07 | End: 2019-10-07 | Stop reason: HOSPADM

## 2019-10-07 RX ADMIN — SODIUM CHLORIDE, POTASSIUM CHLORIDE, SODIUM LACTATE AND CALCIUM CHLORIDE: 600; 310; 30; 20 INJECTION, SOLUTION INTRAVENOUS at 11:04

## 2019-10-07 RX ADMIN — PHENYLEPHRINE HYDROCHLORIDE 100 MCG: 10 INJECTION INTRAVENOUS at 11:23

## 2019-10-07 RX ADMIN — DEXMEDETOMIDINE HYDROCHLORIDE 8 MCG: 100 INJECTION, SOLUTION INTRAVENOUS at 11:06

## 2019-10-07 RX ADMIN — PHENYLEPHRINE HYDROCHLORIDE 100 MCG: 10 INJECTION INTRAVENOUS at 11:32

## 2019-10-07 RX ADMIN — Medication 5 MG: at 11:22

## 2019-10-07 RX ADMIN — PHENYLEPHRINE HYDROCHLORIDE 100 MCG: 10 INJECTION INTRAVENOUS at 11:15

## 2019-10-07 RX ADMIN — PHENYLEPHRINE HYDROCHLORIDE 100 MCG: 10 INJECTION INTRAVENOUS at 11:44

## 2019-10-07 RX ADMIN — DEXMEDETOMIDINE HYDROCHLORIDE 4 MCG: 100 INJECTION, SOLUTION INTRAVENOUS at 11:10

## 2019-10-07 RX ADMIN — DEXMEDETOMIDINE HYDROCHLORIDE 4 MCG: 100 INJECTION, SOLUTION INTRAVENOUS at 11:25

## 2019-10-07 RX ADMIN — PROPOFOL 80 MCG/KG/MIN: 10 INJECTION, EMULSION INTRAVENOUS at 11:06

## 2019-10-07 RX ADMIN — Medication 5 MG: at 11:41

## 2019-10-07 ASSESSMENT — ENCOUNTER SYMPTOMS: SEIZURES: 0

## 2019-10-07 ASSESSMENT — LIFESTYLE VARIABLES: TOBACCO_USE: 1

## 2019-10-07 NOTE — ANESTHESIA PREPROCEDURE EVALUATION
Anesthesia Pre-Procedure Evaluation    Patient: Jose Francisco Gonzalez   MRN: [id number] : 1940          Preoperative Diagnosis: CHANGE IN BOWEL HABITS    Procedure(s):  COLONOSCOPY    Past Medical History:   Diagnosis Date     Arthropathy, unspecified, site unspecified     palindromic rheumatism; takes aleve bid      Compression fracture of body of thoracic vertebra (H)      Compression fracture of L1 lumbar vertebra (H) 2019    see MRI     Hyperlipidemia LDL goal <100 2012    Was on fenofibrate plus simva, in [facility] study; LDL 84 in Lipitor 20 in 2012; 97 in 3/13     Hyponatremia      Rheumatoid arthritis (H)      Sialoadenitis 2015     Syncope      Type 2 diabetes, HbA1C goal < 8% (H) 2012     Past Surgical History:   Procedure Laterality Date     ENT SURGERY  2009    nasal; removal of pyogenic granuloma L     EYE SURGERY  murray 15 and2009    cataract     HC TOOTH EXTRACTION W/FORCEP       TONSILLECTOMY         Anesthesia Evaluation     . Pt has had prior anesthetic.     No history of anesthetic complications          ROS/MED HX    ENT/Pulmonary:     (+)tobacco use, Past use , . .   (-) asthma and sleep apnea   Neurologic:      (-) seizures and CVA   Cardiovascular:     (+) hypertension----. : . . fainting (syncope). :. .       METS/Exercise Tolerance:     Hematologic:         Musculoskeletal:         GI/Hepatic:        (-) GERD   Renal/Genitourinary:     (+) chronic renal disease,       Endo:     (+) type II DM thyroid problem .      Psychiatric:         Infectious Disease:         Malignancy:         Other:                          Physical Exam  Normal systems: dental    Airway   Mallampati: I  TM distance: >3 FB  Neck ROM: full    Dental     Cardiovascular   Rhythm and rate: regular and normal      Pulmonary    breath sounds clear to auscultation            Lab Results   Component Value Date    WBC 6.9 10/02/2019    HGB 15.1 10/02/2019    HCT 43.5 10/02/2019     10/02/2019     " (L) 10/02/2019    POTASSIUM 4.4 10/02/2019    CHLORIDE 96 10/02/2019    CO2 27 10/02/2019    BUN 14 10/02/2019    CR 0.66 10/02/2019     (H) 10/02/2019    DENISE 8.9 10/02/2019    ALBUMIN 3.9 09/04/2019    PROTTOTAL 7.9 09/04/2019    ALT 24 09/04/2019    AST 21 09/04/2019    ALKPHOS 71 09/04/2019    BILITOTAL 0.6 09/04/2019    TSH 0.89 09/04/2019    T4 1.17 09/04/2019       Preop Vitals  BP Readings from Last 3 Encounters:   10/07/19 125/80   10/02/19 138/80   09/11/19 136/60    Pulse Readings from Last 3 Encounters:   10/07/19 81   10/02/19 80   09/11/19 107      Resp Readings from Last 3 Encounters:   10/07/19 18   10/02/19 20   09/11/19 16    SpO2 Readings from Last 3 Encounters:   10/07/19 99%   10/02/19 99%   09/11/19 96%      Temp Readings from Last 1 Encounters:   10/02/19 36.3  C (97.4  F)    Ht Readings from Last 1 Encounters:   10/02/19 1.803 m (5' 11\")      Wt Readings from Last 1 Encounters:   10/02/19 72.1 kg (159 lb)    Estimated body mass index is 22.18 kg/m  as calculated from the following:    Height as of 10/2/19: 1.803 m (5' 11\").    Weight as of 10/2/19: 72.1 kg (159 lb).       Anesthesia Plan      History & Physical Review  History and physical reviewed and following examination; no interval change.    ASA Status:  3 .        Plan for MAC with Intravenous induction.          Postoperative Care  Postoperative pain management:  IV analgesics.      Consents  Anesthetic plan, risks, benefits and alternatives discussed with:  Patient..                 Iman Castillo  "

## 2019-10-07 NOTE — CONSULTS
Pre-Endoscopy History and Physical     Jose Francisco Gonzalez MRN# [id number]   YOB: 1940 Age: 79 year old     Date of Procedure: 10/7/2019  Primary care provider: Kvng Richardson  Type of Endoscopy: colonoscopy  Reason for Procedure: change in BM  Type of Anesthesia Anticipated: MAC    HPI:    Jose Francisco is a 79 year old male who will be undergoing the above procedure.      A history and physical has been performed. The patient's medications and allergies have been reviewed. The risks and benefits of the procedure and the sedation options and risks were discussed with the patient.  All questions were answered and informed consent was obtained.      No Known Allergies     No current facility-administered medications for this encounter.        Patient Active Problem List   Diagnosis     Hypercholesterolemia     Hyponatremia     Hypertensive heart disease without heart failure     Allergic rhinitis     Polyp of nasal cavity     Type 2 diabetes mellitus with diabetic nephropathy, with long-term current use of insulin (H)     Achilles bursitis or tendinitis     Hypertension goal BP (blood pressure) < 140/80     Impotence of organic origin     Non-toxic nodular goiter     Other psoriasis     Arthropathy     Nasal crusting     Preventive measure     Screening for prostate cancer     Personal history of tobacco use, presenting hazards to health     Microalbuminuria     Sialoadenitis     Syncope     Chest discomfort     Premature atrial contractions     Urinary urgency     Benign non-nodular prostatic hyperplasia with lower urinary tract symptoms     Adjustment disorder with depressed mood     Poor balance     Recurrent major depression in complete remission (H)     Change in bowel habits since mid May , 2019     Abdominal pain, generalized     Compression fracture of L1 lumbar vertebra (H)     Age-related osteoporosis with current pathological fracture with routine healing     Adjustment disorder with mixed anxiety  "and depressed mood     Weight loss     Rheumatoid arthritis involving both hands with positive rheumatoid factor (H)        Past Medical History:   Diagnosis Date     Arthropathy, unspecified, site unspecified     palindromic rheumatism; takes aleve bid      Compression fracture of body of thoracic vertebra (H)      Compression fracture of L1 lumbar vertebra (H) 2019    see MRI     Hyperlipidemia LDL goal <100 2012    Was on fenofibrate plus simva, in [facility] study; LDL 84 in Lipitor 20 in 2012; 97 in 3/13     Hyponatremia      Rheumatoid arthritis (H)      Sialoadenitis 2015     Syncope      Type 2 diabetes, HbA1C goal < 8% (H) 2012        Past Surgical History:   Procedure Laterality Date     ENT SURGERY  2009    nasal; removal of pyogenic granuloma L     EYE SURGERY  murray 15 and22 2009    cataract     HC TOOTH EXTRACTION W/FORCEP       TONSILLECTOMY         Social History     Tobacco Use     Smoking status: Former Smoker     Packs/day: 1.00     Years: 20.00     Pack years: 20.00     Types: Cigarettes     Last attempt to quit: 1993     Years since quittin.7     Smokeless tobacco: Former User   Substance Use Topics     Alcohol use: Yes     Comment: occ       Family History   Problem Relation Age of Onset     Heart Disease Father      Coronary Artery Disease Father      Heart Disease Brother      Coronary Artery Disease Brother      Alzheimer Disease Sister      No Known Problems Mother      Substance Abuse Son         alcohol         PHYSICAL EXAM:   There were no vitals taken for this visit. Estimated body mass index is 22.18 kg/m  as calculated from the following:    Height as of 10/2/19: 1.803 m (5' 11\").    Weight as of 10/2/19: 72.1 kg (159 lb).   RESP: lungs clear to auscultation - no rales, rhonchi or wheezes  CV: regular rates and rhythm    IMPRESSION   ASA Class 2 - Mild systemic disease      Signed Electronically by: Dolores Seth MD  2019    .          "

## 2019-10-07 NOTE — ANESTHESIA CARE TRANSFER NOTE
Patient: Jose Francisco Gonzalez    Procedure(s):  COLONOSCOPY, FLEXIBLE, WITH LESION REMOVAL USING SNARE    Diagnosis: CHANGE IN BOWEL HABITS  Diagnosis Additional Information: No value filed.    Anesthesia Type:   MAC     Note:  Airway :Face Mask  Patient transferred to:PACU  Handoff Report: Identifed the Patient, Identified the Reponsible Provider, Reviewed the pertinent medical history, Discussed the surgical course, Reviewed Intra-OP anesthesia mangement and issues during anesthesia, Set expectations for post-procedure period and Allowed opportunity for questions and acknowledgement of understanding      Vitals: (Last set prior to Anesthesia Care Transfer)    CRNA VITALS  10/7/2019 1116 - 10/7/2019 1150      10/7/2019             Resp Rate (set):  10                Electronically Signed By: DEE DEE Alejandre CRNA  October 7, 2019  11:50 AM

## 2019-10-07 NOTE — ANESTHESIA POSTPROCEDURE EVALUATION
Patient: Jose Francisco Gonzalez    Procedure(s):  COLONOSCOPY, FLEXIBLE, WITH LESION REMOVAL USING SNARE    Diagnosis:CHANGE IN BOWEL HABITS  Diagnosis Additional Information: No value filed.    Anesthesia Type:  MAC    Note:  Anesthesia Post Evaluation    Patient location during evaluation: PACU  Patient participation: Able to fully participate in evaluation  Level of consciousness: awake  Pain management: adequate  Airway patency: patent  Cardiovascular status: acceptable  Respiratory status: acceptable  Hydration status: acceptable  PONV: none             Last vitals:  Vitals:    10/07/19 1150 10/07/19 1200 10/07/19 1210   BP: 119/69 100/65 100/68   Pulse:  89 71   Resp: 13 21 17   SpO2: 100% 97% 97%         Electronically Signed By: Iman Castillo  October 7, 2019  5:50 PM

## 2019-10-08 LAB — COPATH REPORT: NORMAL

## 2019-10-11 ENCOUNTER — THERAPY VISIT (OUTPATIENT)
Dept: PHYSICAL THERAPY | Facility: CLINIC | Age: 79
End: 2019-10-11
Payer: COMMERCIAL

## 2019-10-11 DIAGNOSIS — G89.29 CHRONIC BILATERAL LOW BACK PAIN WITHOUT SCIATICA: Primary | ICD-10-CM

## 2019-10-11 DIAGNOSIS — M54.50 CHRONIC BILATERAL LOW BACK PAIN WITHOUT SCIATICA: Primary | ICD-10-CM

## 2019-10-11 PROCEDURE — 97530 THERAPEUTIC ACTIVITIES: CPT | Mod: GP | Performed by: PHYSICAL THERAPIST

## 2019-10-11 PROCEDURE — 97112 NEUROMUSCULAR REEDUCATION: CPT | Mod: GP | Performed by: PHYSICAL THERAPIST

## 2019-10-11 PROCEDURE — 97110 THERAPEUTIC EXERCISES: CPT | Mod: GP | Performed by: PHYSICAL THERAPIST

## 2019-10-11 NOTE — PROGRESS NOTES
Subjective:  HPI                    Objective:  System    Physical Exam    General     ROS    Assessment/Plan:    SUBJECTIVE  Subjective changes as noted by pt:   Pt. cont to note decreasing LBP. He feels he is getting stronger.    Current pain level:  1/10   Changes in function:  Yes (See Goal flowsheet attached for changes in current functional level)     Adverse reaction to treatment or activity:  None    OBJECTIVE  Changes in objective findings:  Strength - lower abdominals 4/5; B hip flex 4/5; abd 4-/5     ASSESSMENT  Jose Francisco continues to require intervention to meet STG and LTG's: PT  Patient is progressing as expected.  Response to therapy has shown an improvement in  pain level, strength and function  Progress made towards STG/LTG?  Yes (See Goal flowsheet attached for updates on achievement of STG and LTG)    PLAN  Current treatment program is being advanced to more complex exercises.    PTA/ATC plan:  N/A    Please refer to the daily flowsheet for treatment today, total treatment time and time spent performing 1:1 timed codes.

## 2019-10-21 ENCOUNTER — OFFICE VISIT (OUTPATIENT)
Dept: FAMILY MEDICINE | Facility: CLINIC | Age: 79
End: 2019-10-21
Payer: COMMERCIAL

## 2019-10-21 VITALS
BODY MASS INDEX: 22.73 KG/M2 | OXYGEN SATURATION: 99 % | SYSTOLIC BLOOD PRESSURE: 108 MMHG | DIASTOLIC BLOOD PRESSURE: 68 MMHG | HEART RATE: 71 BPM | TEMPERATURE: 97.7 F | WEIGHT: 163 LBS | RESPIRATION RATE: 14 BRPM

## 2019-10-21 DIAGNOSIS — R10.32 LLQ ABDOMINAL PAIN: ICD-10-CM

## 2019-10-21 DIAGNOSIS — R10.31 RLQ ABDOMINAL PAIN: ICD-10-CM

## 2019-10-21 DIAGNOSIS — R21 RASH: Primary | ICD-10-CM

## 2019-10-21 DIAGNOSIS — Z23 NEED FOR PROPHYLACTIC VACCINATION AND INOCULATION AGAINST INFLUENZA: ICD-10-CM

## 2019-10-21 PROCEDURE — 90662 IIV NO PRSV INCREASED AG IM: CPT | Performed by: PHYSICIAN ASSISTANT

## 2019-10-21 PROCEDURE — 99214 OFFICE O/P EST MOD 30 MIN: CPT | Mod: 25 | Performed by: PHYSICIAN ASSISTANT

## 2019-10-21 PROCEDURE — G0008 ADMIN INFLUENZA VIRUS VAC: HCPCS | Performed by: PHYSICIAN ASSISTANT

## 2019-10-21 RX ORDER — DICYCLOMINE HCL 20 MG
20 TABLET ORAL EVERY 6 HOURS
Qty: 120 TABLET | Refills: 3 | Status: SHIPPED | OUTPATIENT
Start: 2019-10-21 | End: 2019-12-04

## 2019-10-21 RX ORDER — CLOTRIMAZOLE AND BETAMETHASONE DIPROPIONATE 10; .64 MG/G; MG/G
CREAM TOPICAL 2 TIMES DAILY
Qty: 45 G | Refills: 1 | Status: SHIPPED | OUTPATIENT
Start: 2019-10-21 | End: 2019-12-04

## 2019-10-21 NOTE — PROGRESS NOTES
Subjective     Jose Francisco Gonzalez is a 79 year old male who presents to clinic today for the following health issues:    HPI   ABDOMINAL   PAIN     Onset: ongoing issue    Description:   Character: Sharp  Location: right lower quadrant left lower quadrant, mostly left  Radiation: None    Intensity: moderate    Progression of Symptoms:  worsening and same    Accompanying Signs & Symptoms:  Fever/Chills?: no   Gas/Bloating: YES  Nausea: no   Vomitting: no   Diarrhea?: no   Constipation:YES  Dysuria or Hematuria: no    History:   Trauma: no   Previous similar pain: YES- ongoing issue   Previous tests done: Colonoscopy    Precipitating factors:   Does the pain change with:     Food: no      BM: YES- feels better after     Urination: no     Alleviating factors:      Therapies Tried and outcome: miralax    LMP:  not applicable     Concern - derm problem  Onset: ongoing since summer    Description:   Patient states that he has multiple sores on back, dry skin and now developing a rash around the collar line of his neck. Thinks that maybe these have been caused by being on his back after a fracture this summer    Intensity: mild, moderate    Progression of Symptoms:  worsening and same    Accompanying Signs & Symptoms:  See above, no drainage of the spots    Previous history of similar problem:       Precipitating factors:   Worsened by:     Alleviating factors:  Improved by: hydrocortisone and lotion with no     Therapies Tried and outcome: lotion, anti itch cream    Recent Labs   Lab Test 10/02/19  1042 09/04/19  1415  06/07/19  0945 04/24/19  1338 10/09/18  1000 04/10/18  1609  05/02/17  1207  01/25/16  0948  12/14/11  1518   A1C  --  6.6*  --   --  7.0* 7.7* 7.4*   < > 7.4*   < > 7.7*   < >  --    LDL  --   --   --  53  --   --  72  --  70  --  51   < > 68.8   HDL  --   --   --  67  --   --   --   --   --   --  51  --  49   TRIG  --   --   --  55  --   --   --   --   --   --  79  --  86   ALT  --  24  --  27 27 24 21   <  > 22   < > 22   < >  --    CR 0.66 0.58*  --   --  0.76 0.71 0.67   < > 0.75   < > 1.00   < >  --    GFRESTIMATED >90 >90   < >  --  85 >90 >90   < > >90  Non  GFR Calc     < > 72   < >  --    GFRESTBLACK >90 >90   < >  --  >90 >90 >90   < > >90  African American GFR Calc     < > 88   < >  --    POTASSIUM 4.4 4.0  --   --  4.1 4.2 4.0   < > 4.2   < > 4.1   < >  --    TSH  --  0.89  --   --   --   --  1.19  --   --   --  1.53   < >  --     < > = values in this interval not displayed.      BP Readings from Last 3 Encounters:   10/21/19 108/68   10/07/19 100/68   10/02/19 138/80    Wt Readings from Last 3 Encounters:   10/21/19 73.9 kg (163 lb)   10/02/19 72.1 kg (159 lb)   09/11/19 71.2 kg (157 lb)         Reviewed and updated as needed this visit by Provider  Tobacco  Allergies  Meds  Problems  Med Hx  Surg Hx  Fam Hx  Soc Hx          Additional complaints: None    HPI additional notes: Jose Francisco presents today with   Chief Complaint   Patient presents with     Derm Problem     Abdominal Pain            Review of Systems   C: Negative for fever, chills, recent change in weight  Skin: POSITIVE for rash and pruritis of the chest and back. Negative for swelling and discharge  ENT: Negative for ear, mouth and throat problems  Resp: Negative for significant cough or SOB  CV: Negative for chest pain or peripheral edema  GI:POSITIVE for abdominal pain RLQ and LLQ and diarrhea  MS: Negative for significant arthralgias or myalgias  P: Negative for changes in mood or affect  ROS all other systems negative.        Objective    /68   Pulse 71   Temp 97.7  F (36.5  C) (Tympanic)   Resp 14   Wt 73.9 kg (163 lb)   SpO2 99%   BMI 22.73 kg/m    Body mass index is 22.73 kg/m .  Physical Exam   GENERAL: healthy, alert, in no acute distress  SKIN: erythematous patches on upper chest and generalized on back, no swelling or warmth.  PSYCH: Alert and oriented times 3;  Able to articulate logical thoughts.  Affect is normal.    Diagnostic test results: none         Assessment & Plan       ICD-10-CM    1. Rash R21 clotrimazole-betamethasone (LOTRISONE) 1-0.05 % external cream   2. RLQ abdominal pain R10.31 dicyclomine (BENTYL) 20 MG tablet   3. LLQ abdominal pain R10.32 dicyclomine (BENTYL) 20 MG tablet   4. Need for prophylactic vaccination and inoculation against influenza Z23 FLU VACCINE, INCREASED ANTIGEN, PRESV FREE, AGE 65+ [41651]     ADMIN INFLUENZA  (For MEDICARE Patients ONLY) []     Will try lotrisone to see if helps with erythematous patches on chest and back as likely fungal infection vs atopic dermatitis.  If not improving in 2 weeks, will see dermatology when he gets back from [place] (leaving in 10 days, will be gone for a month).    Will try adding on metamucil to see if helps with stomach cramping and diarrhea.  Will also try bentyl, looks like that will need a PA.  Has had a recent colonoscopy for his stomach symptoms and a cause was not found.    Please see patient instructions for treatment details.    Return in about 2 weeks (around 11/4/2019) for phone call to clinic, Recheck if not improving.    Zoie Perez PA-C  [facility]

## 2019-10-22 ENCOUNTER — TELEPHONE (OUTPATIENT)
Dept: FAMILY MEDICINE | Facility: CLINIC | Age: 79
End: 2019-10-22

## 2019-10-22 NOTE — TELEPHONE ENCOUNTER
Reason for Call:  Other call back    Detailed comments: Navya from [facility] called with questions regarding a prior authorization for dicyclomine (BENTYL) 20 MG tablet that has already been started. She would like a call back to discuss these questions. The reference number for this is [id number].    Phone Number Patient can be reached at: Other phone number: [phone] option 3    Best Time: Any    Can we leave a detailed message on this number? Not Applicable    Call taken on 10/22/2019 at 3:09 PM by Mary Cooney

## 2019-10-22 NOTE — TELEPHONE ENCOUNTER
Prior Authorization Retail Medication Request    Medication/Dose: dicyclomine (BENTYL) 20 MG tablet   ICD code (if different than what is on RX):     Previously Tried and Failed:     Rationale:       Insurance Name:      Insurance ID:  [id number]      Pharmacy Information (if different than what is on RX)  Name:  [facility]  Phone:  [phone]

## 2019-10-22 NOTE — TELEPHONE ENCOUNTER
I did not order this medication.                        The questions should go to the provider who ordered the medication.

## 2019-10-22 NOTE — TELEPHONE ENCOUNTER
Central Prior Authorization Team   Phone: [phone]      PA Initiation    Medication: dicyclomine (BENTYL) 20 MG tablet  Insurance Company: [employer] [place] - Phone [phone] Fax [phone]  Pharmacy Filling the Rx: [id number] [place] [id number] AT [facility] [address]  Filling Pharmacy Phone: [phone]  Filling Pharmacy Fax:    Start Date: 10/22/2019

## 2019-10-22 NOTE — TELEPHONE ENCOUNTER
[employer] Contact    [facility]. They need to know the answers to these questions re: Bentyl 20 MG.    1. Has the prescriber indicated that the benefits outweigh the risk?  2. Has the provider discussed risk and potential side effects with the patient?     Routing to provider to advise.

## 2019-10-23 NOTE — TELEPHONE ENCOUNTER
Form filled out by Zoie Perez on 10/23/2019 at 8:30 am.     Faxed back (see below for info) and sent to [facility]

## 2019-10-23 NOTE — TELEPHONE ENCOUNTER
Prior Authorization Approval    Authorization Effective Date:    Authorization Expiration Date:    Medication: dicyclomine (BENTYL) 20 MG tablet  Approved Dose/Quantity:    Reference #:     Insurance Company: [employer] [place] - Phone [phone] Fax [phone]  Expected CoPay:       CoPay Card Available:      Foundation Assistance Needed:    Which Pharmacy is filling the prescription (Not needed for infusion/clinic administered): [id number] [place], [place] - [address] AT [facility] [address] [facility]  Pharmacy Notified: Yes  Patient Notified: Yes **Instructed pharmacy to notify patient when script is ready to /ship.**

## 2019-10-28 ENCOUNTER — TELEPHONE (OUTPATIENT)
Dept: FAMILY MEDICINE | Facility: CLINIC | Age: 79
End: 2019-10-28

## 2019-10-28 ENCOUNTER — TRANSFERRED RECORDS (OUTPATIENT)
Dept: HEALTH INFORMATION MANAGEMENT | Facility: CLINIC | Age: 79
End: 2019-10-28

## 2019-10-28 NOTE — TELEPHONE ENCOUNTER
Reason for Call:  Other call back    Detailed comments: Jose Francisco called asking which Urologist did he see 2 or 3 years ago?    Phone Number Patient can be reached at: Cell number on file:    Telephone Information:   Mobile [phone]       Best Time: any    Can we leave a detailed message on this number? YES    Call taken on 10/28/2019 at 2:31 PM by Ritika Rey

## 2019-12-04 ENCOUNTER — OFFICE VISIT (OUTPATIENT)
Dept: FAMILY MEDICINE | Facility: CLINIC | Age: 79
End: 2019-12-04
Payer: COMMERCIAL

## 2019-12-04 VITALS
SYSTOLIC BLOOD PRESSURE: 136 MMHG | HEART RATE: 75 BPM | RESPIRATION RATE: 20 BRPM | OXYGEN SATURATION: 97 % | WEIGHT: 175 LBS | DIASTOLIC BLOOD PRESSURE: 78 MMHG | BODY MASS INDEX: 24.41 KG/M2

## 2019-12-04 DIAGNOSIS — M80.00XD AGE-RELATED OSTEOPOROSIS WITH CURRENT PATHOLOGICAL FRACTURE WITH ROUTINE HEALING: Primary | ICD-10-CM

## 2019-12-04 DIAGNOSIS — K58.9 IRRITABLE BOWEL SYNDROME, UNSPECIFIED TYPE: ICD-10-CM

## 2019-12-04 DIAGNOSIS — F43.23 ADJUSTMENT DISORDER WITH MIXED ANXIETY AND DEPRESSED MOOD: ICD-10-CM

## 2019-12-04 PROCEDURE — 99214 OFFICE O/P EST MOD 30 MIN: CPT | Performed by: INTERNAL MEDICINE

## 2019-12-04 RX ORDER — MIRTAZAPINE 7.5 MG/1
TABLET, FILM COATED ORAL
Qty: 90 TABLET | Refills: 2 | COMMUNITY
Start: 2019-12-04 | End: 2020-06-02

## 2019-12-04 RX ORDER — ALENDRONATE SODIUM 70 MG/1
70 TABLET ORAL
Qty: 12 TABLET | Refills: 3 | Status: SHIPPED | OUTPATIENT
Start: 2019-12-04 | End: 2020-11-24

## 2019-12-04 RX ORDER — DICYCLOMINE HCL 20 MG
20 TABLET ORAL 2 TIMES DAILY
Qty: 120 TABLET | Refills: 3 | COMMUNITY
Start: 2019-12-04 | End: 2020-06-02

## 2019-12-04 NOTE — PROGRESS NOTES
Subjective     Jose Francisco Gonzalez is a 79 year old male who presents to clinic today for the following health issues:    HPI   Follow up on low back pain, and some other medical issues. Including discuss meds- Bentyl, Remeron, and Tolterodine.    Has been in [place]; going back Jan 11 until April.                        He tells me that he is feeling very well.  Within a day of starting  dicyclomine 20 mg 3 times daily or 4 times daily, all of his abdominal symptoms resolved.  This was first suggested by 1 of the nurse practitioners here, and then ordered by a nurse practitioner at [facility].                 He seems to be tolerating it without side effects.  He does describe a long history of a bit of urinary retention, but he does not believe that gotten any worse.             He is seeing urology next week.           His appetite is obviously much better, and he has gained 16 pounds in 2 months.           Of note, is that he is still taking mirtazapine, which I ordered for him, and he would like to be able to stop this, as he does not feel that it has needed any longer.                                    In addition, we discussed his osteoporosis, and my recommendation that he start pharmacologic treatment.  He is not planning any major dental work, and he does not have reflux symptoms.  His renal function has been good.      Current Outpatient Medications   Medication Sig Dispense Refill     amLODIPine (NORVASC) 2.5 MG tablet TAKE 1 TABLET BY MOUTH EVERY DAY 90 tablet 0     aspirin 81 MG tablet Take  by mouth daily.       atorvastatin (LIPITOR) 20 MG tablet TAKE 1 TABLET(20MG) BY MOUTH DAILY 90 tablet 4     blood glucose (NO BRAND SPECIFIED) lancets standard Use to test blood sugar 2 times daily or as directed.                                        To use with his glucometer 1 Box 12     blood glucose (ONETOUCH VERIO IQ) test strip USE TO TEST BLOOD SUGARS THREE TIMES DAILY OR AS DIRECTED 300 strip 1     dicyclomine  (BENTYL) 20 MG tablet Take 1 tablet (20 mg) by mouth every 6 hours 120 tablet 3     glipiZIDE (GLUCOTROL XL) 10 MG 24 hr tablet TAKE 1 TABLET(10 MG) BY MOUTH DAILY 90 tablet 4     hydroxychloroquine (PLAQUENIL) 200 MG tablet Take 1 tablet (200 mg) by mouth daily 30 tablet      insulin glargine (BASAGLAR KWIKPEN) 100 UNIT/ML pen INJECT 26 UNITS UNDER THE SKIN DAILY OR AS DIRECTED 30 mL 11     insulin pen needle (B-D U/F) 31G X 8 MM miscellaneous USE DAILY AS DIRECTED 50 each 0     lisinopril (PRINIVIL/ZESTRIL) 10 MG tablet TAKE 1 TABLET BY MOUTH EVERY DAY 90 tablet 3     metoprolol succinate ER (TOPROL-XL) 50 MG 24 hr tablet Take 1 tablet (50 mg) by mouth daily 90 tablet 3     mirtazapine (REMERON) 7.5 MG tablet TAKE 1 TABLET(7.5 MG) BY MOUTH AT BEDTIME 90 tablet 2     Multiple Vitamins-Minerals (PRESERVISION AREDS 2) CAPS 2 per day       order for DME Test strips for pt's glucometer, brand as covered by insurance Test QID and prn. He is on insulin. Patients preferred brand-Verio One Touch. 400 each 1     Psyllium-Calcium (METAMUCIL PLUS CALCIUM) CAPS Take 2 capsules by mouth At Bedtime       SENNA-docusate sodium (SENNA S) 8.6-50 MG tablet Take daily while using percocet to prevent constipation 15 tablet 0     Recent Labs   Lab Test 10/02/19  1042 09/04/19  1415  06/07/19  0945 04/24/19  1338 10/09/18  1000 04/10/18  1609  05/02/17  1207  01/25/16  0948  12/14/11  1518   A1C  --  6.6*  --   --  7.0* 7.7* 7.4*   < > 7.4*   < > 7.7*   < >  --    LDL  --   --   --  53  --   --  72  --  70  --  51   < > 68.8   HDL  --   --   --  67  --   --   --   --   --   --  51  --  49   TRIG  --   --   --  55  --   --   --   --   --   --  79  --  86   ALT  --  24  --  27 27 24 21   < > 22   < > 22   < >  --    CR 0.66 0.58*  --   --  0.76 0.71 0.67   < > 0.75   < > 1.00   < >  --    GFRESTIMATED >90 >90   < >  --  85 >90 >90   < > >90  Non  GFR Calc     < > 72   < >  --    GFRESTBLACK >90 >90   < >  --  >90 >90 >90    < > >90   GFR Calc     < > 88   < >  --    POTASSIUM 4.4 4.0  --   --  4.1 4.2 4.0   < > 4.2   < > 4.1   < >  --    TSH  --  0.89  --   --   --   --  1.19  --   --   --  1.53   < >  --     < > = values in this interval not displayed.      BP Readings from Last 3 Encounters:   12/04/19 136/78   10/21/19 108/68   10/07/19 100/68    Wt Readings from Last 3 Encounters:   12/04/19 79.4 kg (175 lb)   10/21/19 73.9 kg (163 lb)   10/02/19 72.1 kg (159 lb)                      Reviewed and updated as needed this visit by Provider         Review of Systems see above plus  ROS COMP: CONSTITUTIONAL:weight gain  RESP:NEGATIVE for significant cough or SOB  CV: NEGATIVE for chest pain, palpitations or peripheral edema  GI: Having frequent bowel movements, but he is still taking some MiraLAX  MUSCULOSKELETAL: Back pain has resolved      Objective    /78 (BP Location: Left arm, Patient Position: Chair, Cuff Size: Adult Regular)   Pulse 75   Resp 20   Wt 79.4 kg (175 lb)   SpO2 97%   BMI 24.41 kg/m    Body mass index is 24.41 kg/m .  Physical Exam   GENERAL APPEARANCE: healthy, alert and no distress  CV: regular rates and rhythm  ABDOMEN: soft, nontender, without hepatosplenomegaly or masses  PSYCH: mentation appears normal and affect normal/bright    Diagnostic Test Results:  none         Assessment & Plan     Jose Francisco was seen today for consult.    Diagnoses and all orders for this visit:    Age-related osteoporosis with current pathological fracture with routine healing  -     alendronate (FOSAMAX) 70 MG tablet; Take 1 tablet (70 mg) by mouth every 7 days    Irritable bowel syndrome, unspecified type    Adjustment disorder with mixed anxiety and depressed mood           Summary and implications:  We reviewed multiple issues.           We reviewed all of the issues on the diagnoses list.                       Thankfully, his health has recovered nicely.   We discussed that now would be a good time to try  and taper the dicyclomine.  He wants to taper and stop the mirtazapine.  After full discussion, he agrees to add alendronate    .  Patient Instructions   You are planning to cut back the dicyclomine to twice per day.              You are also planning to cut back the mirtazapine to every other evening for a few weeks, before stopping.                 I have sent an Rx to your pharmacy for alendronate, to reduce your future risk of more fractures.                   Follow the directions for proper usage.              Consider getting the shingles vaccine (Shingrix) at your pharmacy.         Return in about 4 months (around 4/7/2020) for diabetes follow up, labs will be needed.    Kvng Richardson MD  [facility]

## 2019-12-04 NOTE — PATIENT INSTRUCTIONS
You are planning to cut back the dicyclomine to twice per day.              You are also planning to cut back the mirtazapine to every other evening for a few weeks, before stopping.                 I have sent an Rx to your pharmacy for alendronate, to reduce your future risk of more fractures.                   Follow the directions for proper usage.              Consider getting the shingles vaccine (Shingrix) at your pharmacy.

## 2019-12-06 DIAGNOSIS — R33.9 URINARY RETENTION: Primary | ICD-10-CM

## 2019-12-11 ENCOUNTER — OFFICE VISIT (OUTPATIENT)
Dept: UROLOGY | Facility: CLINIC | Age: 79
End: 2019-12-11
Payer: COMMERCIAL

## 2019-12-11 VITALS
HEIGHT: 71 IN | BODY MASS INDEX: 23.8 KG/M2 | WEIGHT: 170 LBS | SYSTOLIC BLOOD PRESSURE: 138 MMHG | DIASTOLIC BLOOD PRESSURE: 72 MMHG | OXYGEN SATURATION: 97 % | HEART RATE: 96 BPM

## 2019-12-11 DIAGNOSIS — R39.15 URINARY URGENCY: Primary | ICD-10-CM

## 2019-12-11 LAB
ALBUMIN UR-MCNC: NEGATIVE MG/DL
APPEARANCE UR: CLEAR
BILIRUB UR QL STRIP: NEGATIVE
COLOR UR AUTO: YELLOW
GLUCOSE UR STRIP-MCNC: NEGATIVE MG/DL
HGB UR QL STRIP: NEGATIVE
KETONES UR STRIP-MCNC: NEGATIVE MG/DL
LEUKOCYTE ESTERASE UR QL STRIP: ABNORMAL
NITRATE UR QL: NEGATIVE
PH UR STRIP: 6 PH (ref 5–7)
RESIDUAL VOLUME (RV) (EXTERNAL): 76
SOURCE: ABNORMAL
SP GR UR STRIP: 1.01 (ref 1–1.03)
UROBILINOGEN UR STRIP-ACNC: 0.2 EU/DL (ref 0.2–1)

## 2019-12-11 PROCEDURE — 99212 OFFICE O/P EST SF 10 MIN: CPT | Mod: 25 | Performed by: UROLOGY

## 2019-12-11 PROCEDURE — 51798 US URINE CAPACITY MEASURE: CPT | Performed by: UROLOGY

## 2019-12-11 PROCEDURE — 81003 URINALYSIS AUTO W/O SCOPE: CPT | Performed by: UROLOGY

## 2019-12-11 ASSESSMENT — MIFFLIN-ST. JEOR: SCORE: 1490.3

## 2019-12-11 ASSESSMENT — PAIN SCALES - GENERAL: PAINLEVEL: NO PAIN (0)

## 2019-12-11 NOTE — LETTER
12/11/2019       RE: Jose Francisco Gonzalez  [address]  [place] [address]     Dear Colleague,    Thank you for referring your patient, Jose Francisco Gonzalez, to the [facility] [place] at [facility]. Please see a copy of my visit note below.    Shashi Gonzalez is a 79-year-old gentleman who saw me 2 years ago because ofn urinary urgency and occasional urge incontinence.  This has improved over time as he is eating less chocolate and drinking only decaf coffee occasionally.  He denies any dysuria or hematuria.  He has a normal urinalysis today and a 76 cc postvoid residual  Other past medical history, arthritis, hyperlipidemia, rheumatoid, history of syncope, type 2 diabetes, former smoker, irritable bowel syndrome.  No family history of prostate cancer.  Has yearly digital rectal exam with Dr. Richardson-last 2 months ago  Medications: Fosamax, amlodipine, low-dose aspirin, Lipitor, Bentyl, glipizide, Plaquenil, insulin, lisinopril, metoprolol, Remeron, multivitamin, Metamucil, senna  Allergies: None  Review of systems: As above  Exam: Alert and oriented, normal appearance, normal vital signs, normal respirations, neuro grossly intact  Assessment: Voiding comfortably.  Patient needs digital rectal exam yearly with primary care physician.  See me as needed      Again, thank you for allowing me to participate in the care of your patient.      Sincerely,    Kit Landeros MD

## 2019-12-11 NOTE — PROGRESS NOTES
Shashi Gonzalez is a 79-year-old gentleman who saw me 2 years ago because ofn urinary urgency and occasional urge incontinence.  This has improved over time as he is eating less chocolate and drinking only decaf coffee occasionally.  He denies any dysuria or hematuria.  He has a normal urinalysis today and a 76 cc postvoid residual  Other past medical history, arthritis, hyperlipidemia, rheumatoid, history of syncope, type 2 diabetes, former smoker, irritable bowel syndrome.  No family history of prostate cancer.  Has yearly digital rectal exam with Dr. Richardson-last 2 months ago  Medications: Fosamax, amlodipine, low-dose aspirin, Lipitor, Bentyl, glipizide, Plaquenil, insulin, lisinopril, metoprolol, Remeron, multivitamin, Metamucil, senna  Allergies: None  Review of systems: As above  Exam: Alert and oriented, normal appearance, normal vital signs, normal respirations, neuro grossly intact  Assessment: Voiding comfortably.  Patient needs digital rectal exam yearly with primary care physician.  See me as needed

## 2019-12-11 NOTE — NURSING NOTE
"Chief Complaint   Patient presents with     Urgency     Patient here today for 1 Year Follow for UA and PVR       Blood pressure 138/72, pulse 96, height 1.791 m (5' 10.5\"), weight 77.1 kg (170 lb), SpO2 97 %. Body mass index is 24.05 kg/m .    Patient Active Problem List   Diagnosis     Hypercholesterolemia     Hyponatremia     Hypertensive heart disease without heart failure     Allergic rhinitis     Polyp of nasal cavity     Type 2 diabetes mellitus with diabetic nephropathy, with long-term current use of insulin (H)     Achilles bursitis or tendinitis     Hypertension goal BP (blood pressure) < 140/80     Impotence of organic origin     Non-toxic nodular goiter     Other psoriasis     Arthropathy     Nasal crusting     Preventive measure     Screening for prostate cancer     Personal history of tobacco use, presenting hazards to health     Microalbuminuria     Sialoadenitis     Syncope     Chest discomfort     Premature atrial contractions     Urinary urgency     Benign non-nodular prostatic hyperplasia with lower urinary tract symptoms     Poor balance     Change in bowel habits since mid May , 2019     Abdominal pain, generalized     Compression fracture of L1 lumbar vertebra (H)     Age-related osteoporosis with current pathological fracture with routine healing     Adjustment disorder with mixed anxiety and depressed mood     Weight loss     Rheumatoid arthritis involving both hands with positive rheumatoid factor (H)     Irritable bowel syndrome, unspecified type       No Known Allergies    Current Outpatient Medications   Medication Sig Dispense Refill     alendronate (FOSAMAX) 70 MG tablet Take 1 tablet (70 mg) by mouth every 7 days 12 tablet 3     amLODIPine (NORVASC) 2.5 MG tablet TAKE 1 TABLET BY MOUTH EVERY DAY 90 tablet 0     aspirin 81 MG tablet Take  by mouth daily.       atorvastatin (LIPITOR) 20 MG tablet TAKE 1 TABLET(20MG) BY MOUTH DAILY 90 tablet 4     blood glucose (NO BRAND SPECIFIED) " lancets standard Use to test blood sugar 2 times daily or as directed.                                        To use with his glucometer 1 Box 12     blood glucose (ONETOUCH VERIO IQ) test strip USE TO TEST BLOOD SUGARS THREE TIMES DAILY OR AS DIRECTED 300 strip 1     dicyclomine (BENTYL) 20 MG tablet Take 1 tablet (20 mg) by mouth 2 times daily 120 tablet 3     glipiZIDE (GLUCOTROL XL) 10 MG 24 hr tablet TAKE 1 TABLET(10 MG) BY MOUTH DAILY 90 tablet 4     hydroxychloroquine (PLAQUENIL) 200 MG tablet Take 1 tablet (200 mg) by mouth daily 30 tablet      insulin glargine (BASAGLAR KWIKPEN) 100 UNIT/ML pen INJECT 26 UNITS UNDER THE SKIN DAILY OR AS DIRECTED 30 mL 11     insulin pen needle (B-D U/F) 31G X 8 MM miscellaneous USE DAILY AS DIRECTED 50 each 0     lisinopril (PRINIVIL/ZESTRIL) 10 MG tablet TAKE 1 TABLET BY MOUTH EVERY DAY 90 tablet 3     metoprolol succinate ER (TOPROL-XL) 50 MG 24 hr tablet Take 1 tablet (50 mg) by mouth daily 90 tablet 3     mirtazapine (REMERON) 7.5 MG tablet Taper and then stop as we discussed 90 tablet 2     Multiple Vitamins-Minerals (PRESERVISION AREDS 2) CAPS 2 per day       order for DME Test strips for pt's glucometer, brand as covered by insurance Test QID and prn. He is on insulin. Patients preferred brand-Verio One Touch. 400 each 1     Psyllium-Calcium (METAMUCIL PLUS CALCIUM) CAPS Take 2 capsules by mouth At Bedtime       SENNA-docusate sodium (SENNA S) 8.6-50 MG tablet Take daily while using percocet to prevent constipation 15 tablet 0       Social History     Tobacco Use     Smoking status: Former Smoker     Packs/day: 1.00     Years: 20.00     Pack years: 20.00     Types: Cigarettes     Last attempt to quit: 1993     Years since quittin.8     Smokeless tobacco: Never Used   Substance Use Topics     Alcohol use: Yes     Comment: occ     Drug use: No       PVR 76ML    My Luis,  [facility]  2019

## 2019-12-12 DIAGNOSIS — I10 HYPERTENSION GOAL BP (BLOOD PRESSURE) < 140/80: Chronic | ICD-10-CM

## 2019-12-12 RX ORDER — AMLODIPINE BESYLATE 2.5 MG/1
TABLET ORAL
Qty: 90 TABLET | Refills: 3 | Status: SHIPPED | OUTPATIENT
Start: 2019-12-12 | End: 2020-12-09

## 2019-12-12 NOTE — TELEPHONE ENCOUNTER
"Requested Prescriptions   Pending Prescriptions Disp Refills     amLODIPine (NORVASC) 2.5 MG tablet [Pharmacy Med Name: AMLODIPINE BESYLATE 2.5MG TABLETS]    Last Written Prescription Date:  09/27/2019  Last Fill Quantity: 90 tablet,  # refills: 0   Last office visit: 12/4/2019 with prescribing provider:  Debra   Future Office Visit:     90 tablet 0     Sig: TAKE 1 TABLET BY MOUTH EVERY DAY       Calcium Channel Blockers Protocol  Passed - 12/12/2019 10:24 AM        Passed - Blood pressure under 140/90 in past 12 months     BP Readings from Last 3 Encounters:   12/11/19 138/72   12/04/19 136/78   10/21/19 108/68                 Passed - Recent (12 mo) or future (30 days) visit within the authorizing provider's specialty     Patient has had an office visit with the authorizing provider or a provider within the authorizing providers department within the previous 12 mos or has a future within next 30 days. See \"Patient Info\" tab in inbasket, or \"Choose Columns\" in Meds & Orders section of the refill encounter.              Passed - Medication is active on med list        Passed - Patient is age 18 or older        Passed - Normal serum creatinine on file in past 12 months     Recent Labs   Lab Test 10/02/19  1042  06/12/19  1854   CR 0.66   < >  --    CREAT  --   --  0.7    < > = values in this interval not displayed.                "

## 2019-12-26 DIAGNOSIS — E11.9 DIABETES MELLITUS (H): ICD-10-CM

## 2019-12-27 NOTE — TELEPHONE ENCOUNTER
"Requested Prescriptions   Pending Prescriptions Disp Refills     insulin pen needle (B-D U/F) 31G X 8 MM miscellaneous  Last Written Prescription Date:  4/16/2019  Last Fill Quantity: 50 each,  # refills: 0   Last office visit: 12/4/2019 with prescribing provider:  Debra   Future Office Visit:     50 each 0     Sig: USE DAILY AS DIRECTED       Diabetic Supplies Protocol Passed - 12/26/2019  5:07 PM        Passed - Medication is active on med list        Passed - Patient is 18 years of age or older        Passed - Recent (6 mo) or future (30 days) visit within the authorizing provider's specialty     Patient had office visit in the last 6 months or has a visit in the next 30 days with authorizing provider.  See \"Patient Info\" tab in inbasket, or \"Choose Columns\" in Meds & Orders section of the refill encounter.               "

## 2019-12-27 NOTE — TELEPHONE ENCOUNTER
Prescription approved per [facility]ill Protocol for 4 months of refills since last appointment, which was 12/04/19

## 2020-01-09 ENCOUNTER — TRANSFERRED RECORDS (OUTPATIENT)
Dept: HEALTH INFORMATION MANAGEMENT | Facility: CLINIC | Age: 80
End: 2020-01-09

## 2020-01-25 NOTE — PROGRESS NOTES
Discharge Note    Progress reporting period is from initial evaluation date (please see noted date below) to Oct 11, 2019.  Linked Episodes   Type: Episode: Status: Noted: Resolved: Last update: Updated by:   PHYSICAL THERAPY LBP 6/7/2019 Active 6/7/2019  10/11/2019  4:12 PM Gordon Romo, PT      Comments:       Jose Francisco failed to follow up and current status is unknown.  Please see information below for last relevant information on current status.  Patient seen for 6 visits.    SUBJECTIVE  Subjective changes noted by patient:  Pt. cont to note decreasing LBP. He feels he is getting stronger.   .  Current pain level is 1/10.     Previous pain level was  2/10.   Changes in function:  Yes (See Goal flowsheet attached for changes in current functional level)  Adverse reaction to treatment or activity: None    OBJECTIVE  Changes noted in objective findings: Strength - lower abdominals 4/5; B hip flex 4/5; abd 4-/5     ASSESSMENT/PLAN  Diagnosis: LBP without sciatica   Updated problem list and treatment plan:   Pain - HEP  Decreased ROM/flexibility - HEP  Decreased function - HEP  Decreased strength - HEP  Impaired muscle performance - HEP  STG/LTGs have been met or progress has been made towards goals:  Yes, please see goal flowsheet for most current information  Assessment of Progress: current status is unknown.    Last current status:     Self Management Plans:  HEP  I have re-evaluated this patient and find that the nature, scope, duration and intensity of the therapy is appropriate for the medical condition of the patient.  Jose Francisco continues to require the following intervention to meet STG and LTG's:  HEP.    Recommendations:  Discharge with current home program.  Patient to follow up with MD as needed.    Please refer to the daily flowsheet for treatment today, total treatment time and time spent performing 1:1 timed codes.

## 2020-05-04 DIAGNOSIS — I10 HYPERTENSION GOAL BP (BLOOD PRESSURE) < 140/80: Chronic | ICD-10-CM

## 2020-05-04 RX ORDER — METOPROLOL SUCCINATE 50 MG/1
TABLET, EXTENDED RELEASE ORAL
Qty: 90 TABLET | Refills: 1 | Status: SHIPPED | OUTPATIENT
Start: 2020-05-04 | End: 2020-05-25

## 2020-05-15 ENCOUNTER — TRANSFERRED RECORDS (OUTPATIENT)
Dept: MULTI SPECIALTY CLINIC | Facility: CLINIC | Age: 80
End: 2020-05-15

## 2020-05-15 LAB — RETINOPATHY: NORMAL

## 2020-05-24 DIAGNOSIS — I10 HYPERTENSION GOAL BP (BLOOD PRESSURE) < 140/80: Chronic | ICD-10-CM

## 2020-05-24 DIAGNOSIS — E78.5 HYPERLIPIDEMIA LDL GOAL <100: ICD-10-CM

## 2020-05-25 RX ORDER — METOPROLOL SUCCINATE 50 MG/1
TABLET, EXTENDED RELEASE ORAL
Qty: 90 TABLET | Refills: 4 | Status: SHIPPED | OUTPATIENT
Start: 2020-05-25 | End: 2021-05-19

## 2020-05-25 RX ORDER — ATORVASTATIN CALCIUM 20 MG/1
TABLET, FILM COATED ORAL
Qty: 90 TABLET | Refills: 4 | Status: SHIPPED | OUTPATIENT
Start: 2020-05-25 | End: 2021-08-16

## 2020-06-02 ENCOUNTER — VIRTUAL VISIT (OUTPATIENT)
Dept: FAMILY MEDICINE | Facility: CLINIC | Age: 80
End: 2020-06-02
Payer: COMMERCIAL

## 2020-06-02 DIAGNOSIS — Z79.4 TYPE 2 DIABETES MELLITUS WITH DIABETIC NEPHROPATHY, WITH LONG-TERM CURRENT USE OF INSULIN (H): ICD-10-CM

## 2020-06-02 DIAGNOSIS — I10 HYPERTENSION GOAL BP (BLOOD PRESSURE) < 140/80: ICD-10-CM

## 2020-06-02 DIAGNOSIS — E11.21 TYPE 2 DIABETES MELLITUS WITH DIABETIC NEPHROPATHY, WITH LONG-TERM CURRENT USE OF INSULIN (H): ICD-10-CM

## 2020-06-02 DIAGNOSIS — E78.5 HYPERLIPIDEMIA LDL GOAL <100: ICD-10-CM

## 2020-06-02 DIAGNOSIS — K58.0 IRRITABLE BOWEL SYNDROME WITH DIARRHEA: Primary | ICD-10-CM

## 2020-06-02 PROCEDURE — 99214 OFFICE O/P EST MOD 30 MIN: CPT | Mod: 95 | Performed by: INTERNAL MEDICINE

## 2020-06-02 NOTE — LETTER
Sylvia 10, 2020      Jose Francisco Gonzalez  [address]  [facility] [address]        Dear ,    We are writing to inform you of your test results.                Your sodium level is low again, and the potassium level is a bit high.    You should cut back your fluid intake, especially water, and increase your salt intake.                                                      Your diabetes control is good and stable.       The A1C of 7.0 correlates to an average blood sugar of approximately 150.                                                                                                              Your other lab results are normal or stable,including the liver,kidney,and LDL cholesterol level.                                                                                                                                                           We should recheck some of these labs again in 3-4 months.              See you then.        Recent Results (from the past 100 hour(s))   CBC with platelets and differential    Collection Time: 06/09/20  9:52 AM   Result Value Ref Range    WBC 6.9 4.0 - 11.0 10e9/L    RBC Count 5.13 4.4 - 5.9 10e12/L    Hemoglobin 15.6 13.3 - 17.7 g/dL    Hematocrit 46.1 40.0 - 53.0 %    MCV 90 78 - 100 fl    MCH 30.4 26.5 - 33.0 pg    MCHC 33.8 31.5 - 36.5 g/dL    RDW 13.0 10.0 - 15.0 %    Platelet Count 282 150 - 450 10e9/L    Diff Method Automated Method     % Neutrophils 73.2 %    % Lymphocytes 14.7 %    % Monocytes 9.8 %    % Eosinophils 2.0 %    % Basophils 0.3 %    Absolute Neutrophil 5.1 1.6 - 8.3 10e9/L    Absolute Lymphocytes 1.0 0.8 - 5.3 10e9/L    Absolute Monocytes 0.7 0.0 - 1.3 10e9/L    Absolute Eosinophils 0.1 0.0 - 0.7 10e9/L    Absolute Basophils 0.0 0.0 - 0.2 10e9/L   LDL cholesterol direct    Collection Time: 06/09/20  9:52 AM   Result Value Ref Range    LDL Cholesterol Direct 71 <100 mg/dL   Hemoglobin A1c    Collection Time: 06/09/20  9:52 AM   Result  Value Ref Range    Hemoglobin A1C 7.0 (H) 0 - 5.6 %   Comprehensive metabolic panel (BMP + Alb, Alk Phos, ALT, AST, Total. Bili, TP)    Collection Time: 06/09/20  9:52 AM   Result Value Ref Range    Sodium 126 (L) 133 - 144 mmol/L    Potassium 5.7 (H) 3.4 - 5.3 mmol/L    Chloride 93 (L) 94 - 109 mmol/L    Carbon Dioxide 27 20 - 32 mmol/L    Anion Gap 6 3 - 14 mmol/L    Glucose 200 (H) 70 - 99 mg/dL    Urea Nitrogen 12 7 - 30 mg/dL    Creatinine 0.72 0.66 - 1.25 mg/dL    GFR Estimate 87 >60 mL/min/[1.73_m2]    GFR Estimate If Black >90 >60 mL/min/[1.73_m2]    Calcium 8.9 8.5 - 10.1 mg/dL    Bilirubin Total 0.7 0.2 - 1.3 mg/dL    Albumin 3.7 3.4 - 5.0 g/dL    Protein Total 8.3 6.8 - 8.8 g/dL    Alkaline Phosphatase 75 40 - 150 U/L    ALT 27 0 - 70 U/L    AST 23 0 - 45 U/L         If you have any questions or concerns, please call the clinic at the number listed above.       Sincerely,        Kvng Richardson MD

## 2020-06-02 NOTE — PROGRESS NOTES
"Jose Francisco Gonzalez is a 79 year old male who is being evaluated via a billable telephone visit.      The patient has been notified of following:     \"This telephone visit will be conducted via a call between you and your physician/provider. We have found that certain health care needs can be provided without the need for a physical exam.  This service lets us provide the care you need with a short phone conversation.  If a prescription is necessary we can send it directly to your pharmacy.  If lab work is needed we can place an order for that and you can then stop by our lab to have the test done at a later time.    Telephone visits are billed at different rates depending on your insurance coverage. During this emergency period, for some insurers they may be billed the same as an in-person visit.  Please reach out to your insurance provider with any questions.    If during the course of the call the physician/provider feels a telephone visit is not appropriate, you will not be charged for this service.\"    Patient has given verbal consent for Telephone visit?  Yes    What phone number would you like to be contacted at? [phone]        Subjective     Jose Francisco Gonzalez is a 79 year old male who presents via phone visit today for the following health issues:    HPI  Diabetes Follow-up    How often are you checking your blood sugar? Two times daily    What time of day are you checking your blood sugars (select all that apply)?  morning and evening  Have you had any blood sugars above 200?  Yes 1-2 x week  Have you had any blood sugars below 70?  No    What symptoms do you notice when your blood sugar is low?  None and Not applicable    What concerns do you have today about your diabetes? None     Do you have any of these symptoms? (Select all that apply)  No numbness or tingling in feet.  No redness, sores or blisters on feet.  No complaints of excessive thirst.  No reports of blurry vision.  No significant changes to " weight.    Have you had a diabetic eye exam in the last 12 months? Yes-  Location: 2 weeks ago        BP Readings from Last 2 Encounters:   12/11/19 138/72   12/04/19 136/78     Hemoglobin A1C (%)   Date Value   09/04/2019 6.6 (H)   04/24/2019 7.0 (H)     LDL Cholesterol Calculated (mg/dL)   Date Value   06/07/2019 53   01/25/2016 51     LDL Cholesterol Direct (mg/dL)   Date Value   04/10/2018 72   05/02/2017 70                 How many servings of fruits and vegetables do you eat daily?  4 or more    On average, how many sweetened beverages do you drink each day (Examples: soda, juice, sweet tea, etc.  Do NOT count diet or artificially sweetened beverages)?   0    How many days per week do you exercise enough to make your heart beat faster? 4    How many minutes a day do you exercise enough to make your heart beat faster? 30 - 60    How many days per week do you miss taking your medication? 0         [place] 1[place]                             He stopped taking dicyclomine; abd pain has not returned.            Does have occasional diarrhea; but better than usual.     His Dx is IBS; he wants to work with a nutritionist.                      Has cut back caffeine.                    Taking alendronate and tolerating it.                   Tapered and stopped mirtazapine.               Emotionally ok, except upset by the recent chaos locally,and by coronavirus.                                                        Current Outpatient Medications   Medication Sig Dispense Refill     alendronate (FOSAMAX) 70 MG tablet Take 1 tablet (70 mg) by mouth every 7 days 12 tablet 3     amLODIPine (NORVASC) 2.5 MG tablet TAKE 1 TABLET BY MOUTH EVERY DAY 90 tablet 3     aspirin 81 MG tablet Take  by mouth daily.       atorvastatin (LIPITOR) 20 MG tablet TAKE 1 TABLET(20MG) BY MOUTH DAILY 90 tablet 4     blood glucose (NO BRAND SPECIFIED) lancets standard Use to test blood sugar 2 times daily  or as directed.                                        To use with his glucometer 1 Box 12     blood glucose (ONETOUCH VERIO IQ) test strip USE TO TEST BLOOD SUGARS THREE TIMES DAILY OR AS DIRECTED 300 strip 1     glipiZIDE (GLUCOTROL XL) 10 MG 24 hr tablet TAKE 1 TABLET(10 MG) BY MOUTH DAILY 90 tablet 4     hydroxychloroquine (PLAQUENIL) 200 MG tablet Take 1 tablet (200 mg) by mouth daily 30 tablet      insulin glargine (BASAGLAR KWIKPEN) 100 UNIT/ML pen INJECT 26 UNITS UNDER THE SKIN DAILY OR AS DIRECTED 30 mL 11     insulin pen needle (B-D U/F) 31G X 8 MM miscellaneous USE DAILY AS DIRECTED 100 each 3     lisinopril (PRINIVIL/ZESTRIL) 10 MG tablet TAKE 1 TABLET BY MOUTH EVERY DAY 90 tablet 3     metoprolol succinate ER (TOPROL-XL) 50 MG 24 hr tablet TAKE 1 TABLET(50 MG) BY MOUTH DAILY 90 tablet 4     Multiple Vitamins-Minerals (PRESERVISION AREDS 2) CAPS 2 per day       order for DME Test strips for pt's glucometer, brand as covered by insurance Test QID and prn. He is on insulin. Patients preferred brand-Verio One Touch. 400 each 1     Psyllium-Calcium (METAMUCIL PLUS CALCIUM) CAPS Take 2 capsules by mouth At Bedtime       SENNA-docusate sodium (SENNA S) 8.6-50 MG tablet Take daily while using percocet to prevent constipation 15 tablet 0     No Known Allergies  BP Readings from Last 3 Encounters:   12/11/19 138/72   12/04/19 136/78   10/21/19 108/68    Wt Readings from Last 3 Encounters:   12/11/19 77.1 kg (170 lb)   12/04/19 79.4 kg (175 lb)   10/21/19 73.9 kg (163 lb)                    Reviewed and updated as needed this visit by Provider         Review of Systems   CONSTITUTIONAL:NEGATIVE for fever, chills, change in weight  RESP:NEGATIVE for significant cough or SOB  CV: NEGATIVE for chest pain, palpitations or peripheral edema       Objective   Reported vitals:  There were no vitals taken for this visit.     PSYCH: Alert and oriented times 3; coherent speech, normal   rate and volume, able to articulate  logical thoughts, able   to abstract reason, no tangential thoughts, no hallucinations   or delusions  His affect is normal and pleasant  RESP: No cough, no audible wheezing, able to talk in full sentences  Remainder of exam unable to be completed due to telephone visits    Diagnostic Test Results:  none         Assessment/Plan:  1. Irritable bowel syndrome with diarrhea  Ok for referral.       check sprue HARRY.   - NUTRITION REFERRAL  - Comprehensive metabolic panel (BMP + Alb, Alk Phos, ALT, AST, Total. Bili, TP); Future  - CBC with platelets and differential; Future  - Tissue transglutaminase harry IgA and IgG; Future    2. Type 2 diabetes mellitus with diabetic nephropathy, with long-term current use of insulin (H)  Check labs and adjust medications as indicated.    - NUTRITION REFERRAL  - Comprehensive metabolic panel (BMP + Alb, Alk Phos, ALT, AST, Total. Bili, TP); Future  - Hemoglobin A1c; Future    3. Hypertension goal BP (blood pressure) < 140/80  Same meds    4. Hyperlipidemia LDL goal <100  Check labs and adjust medications as indicated.    - LDL cholesterol direct; Future    Return for lab only visit; non-fasting.      Phone call duration:  13 minutes    Kvng Richardson MD    Addendum:         Recent Results (from the past 100 hour(s))   CBC with platelets and differential    Collection Time: 06/09/20  9:52 AM   Result Value Ref Range    WBC 6.9 4.0 - 11.0 10e9/L    RBC Count 5.13 4.4 - 5.9 10e12/L    Hemoglobin 15.6 13.3 - 17.7 g/dL    Hematocrit 46.1 40.0 - 53.0 %    MCV 90 78 - 100 fl    MCH 30.4 26.5 - 33.0 pg    MCHC 33.8 31.5 - 36.5 g/dL    RDW 13.0 10.0 - 15.0 %    Platelet Count 282 150 - 450 10e9/L    Diff Method Automated Method     % Neutrophils 73.2 %    % Lymphocytes 14.7 %    % Monocytes 9.8 %    % Eosinophils 2.0 %    % Basophils 0.3 %    Absolute Neutrophil 5.1 1.6 - 8.3 10e9/L    Absolute Lymphocytes 1.0 0.8 - 5.3 10e9/L    Absolute Monocytes 0.7 0.0 - 1.3 10e9/L    Absolute Eosinophils  0.1 0.0 - 0.7 10e9/L    Absolute Basophils 0.0 0.0 - 0.2 10e9/L   LDL cholesterol direct    Collection Time: 06/09/20  9:52 AM   Result Value Ref Range    LDL Cholesterol Direct 71 <100 mg/dL   Hemoglobin A1c    Collection Time: 06/09/20  9:52 AM   Result Value Ref Range    Hemoglobin A1C 7.0 (H) 0 - 5.6 %   Comprehensive metabolic panel (BMP + Alb, Alk Phos, ALT, AST, Total. Bili, TP)    Collection Time: 06/09/20  9:52 AM   Result Value Ref Range    Sodium 126 (L) 133 - 144 mmol/L    Potassium 5.7 (H) 3.4 - 5.3 mmol/L    Chloride 93 (L) 94 - 109 mmol/L    Carbon Dioxide 27 20 - 32 mmol/L    Anion Gap 6 3 - 14 mmol/L    Glucose 200 (H) 70 - 99 mg/dL    Urea Nitrogen 12 7 - 30 mg/dL    Creatinine 0.72 0.66 - 1.25 mg/dL    GFR Estimate 87 >60 mL/min/[1.73_m2]    GFR Estimate If Black >90 >60 mL/min/[1.73_m2]    Calcium 8.9 8.5 - 10.1 mg/dL    Bilirubin Total 0.7 0.2 - 1.3 mg/dL    Albumin 3.7 3.4 - 5.0 g/dL    Protein Total 8.3 6.8 - 8.8 g/dL    Alkaline Phosphatase 75 40 - 150 U/L    ALT 27 0 - 70 U/L    AST 23 0 - 45 U/L     Letter sent.                     Your sodium level is low again, and the potassium level is a bit high.            You should cut back your fluid intake, especially water, and increase your salt intake.                        Your diabetes control is good and stable.       The A1C of 7.0 correlates to an average blood sugar of approximately 150.                 Your other lab results are normal or stable,including the liver,kidney,and LDL cholesterol level.                  We should recheck some of these labs again in 3-4 months.              See you then.

## 2020-06-09 DIAGNOSIS — E78.5 HYPERLIPIDEMIA LDL GOAL <100: ICD-10-CM

## 2020-06-09 DIAGNOSIS — K58.0 IRRITABLE BOWEL SYNDROME WITH DIARRHEA: ICD-10-CM

## 2020-06-09 DIAGNOSIS — E11.21 TYPE 2 DIABETES MELLITUS WITH DIABETIC NEPHROPATHY, WITH LONG-TERM CURRENT USE OF INSULIN (H): ICD-10-CM

## 2020-06-09 DIAGNOSIS — Z79.4 TYPE 2 DIABETES MELLITUS WITH DIABETIC NEPHROPATHY, WITH LONG-TERM CURRENT USE OF INSULIN (H): ICD-10-CM

## 2020-06-09 DIAGNOSIS — E11.21 TYPE 2 DIABETES MELLITUS WITH DIABETIC NEPHROPATHY (H): ICD-10-CM

## 2020-06-09 LAB
BASOPHILS # BLD AUTO: 0 10E9/L (ref 0–0.2)
BASOPHILS NFR BLD AUTO: 0.3 %
DIFFERENTIAL METHOD BLD: NORMAL
EOSINOPHIL # BLD AUTO: 0.1 10E9/L (ref 0–0.7)
EOSINOPHIL NFR BLD AUTO: 2 %
ERYTHROCYTE [DISTWIDTH] IN BLOOD BY AUTOMATED COUNT: 13 % (ref 10–15)
HBA1C MFR BLD: 7 % (ref 0–5.6)
HCT VFR BLD AUTO: 46.1 % (ref 40–53)
HGB BLD-MCNC: 15.6 G/DL (ref 13.3–17.7)
LYMPHOCYTES # BLD AUTO: 1 10E9/L (ref 0.8–5.3)
LYMPHOCYTES NFR BLD AUTO: 14.7 %
MCH RBC QN AUTO: 30.4 PG (ref 26.5–33)
MCHC RBC AUTO-ENTMCNC: 33.8 G/DL (ref 31.5–36.5)
MCV RBC AUTO: 90 FL (ref 78–100)
MONOCYTES # BLD AUTO: 0.7 10E9/L (ref 0–1.3)
MONOCYTES NFR BLD AUTO: 9.8 %
NEUTROPHILS # BLD AUTO: 5.1 10E9/L (ref 1.6–8.3)
NEUTROPHILS NFR BLD AUTO: 73.2 %
PLATELET # BLD AUTO: 282 10E9/L (ref 150–450)
RBC # BLD AUTO: 5.13 10E12/L (ref 4.4–5.9)
WBC # BLD AUTO: 6.9 10E9/L (ref 4–11)

## 2020-06-09 PROCEDURE — 83036 HEMOGLOBIN GLYCOSYLATED A1C: CPT | Performed by: INTERNAL MEDICINE

## 2020-06-09 PROCEDURE — 36415 COLL VENOUS BLD VENIPUNCTURE: CPT | Performed by: INTERNAL MEDICINE

## 2020-06-09 PROCEDURE — 80053 COMPREHEN METABOLIC PANEL: CPT | Performed by: INTERNAL MEDICINE

## 2020-06-09 PROCEDURE — 83516 IMMUNOASSAY NONANTIBODY: CPT | Performed by: INTERNAL MEDICINE

## 2020-06-09 PROCEDURE — 85025 COMPLETE CBC W/AUTO DIFF WBC: CPT | Performed by: INTERNAL MEDICINE

## 2020-06-09 PROCEDURE — 83721 ASSAY OF BLOOD LIPOPROTEIN: CPT | Performed by: INTERNAL MEDICINE

## 2020-06-09 NOTE — TELEPHONE ENCOUNTER
Central Prior Authorization Team   Phone: [phone]      PA Initiation    Medication: blood glucose (ONETOUCH VERIO IQ) test strip   Insurance Company: [employer] [place] - Phone [phone] Fax [phone]  Pharmacy Filling the Rx: [id number] [place], [place] - [address] AT [facility] [address]  Filling Pharmacy Phone: [phone]  Filling Pharmacy Fax:    Start Date: 6/9/2020

## 2020-06-09 NOTE — TELEPHONE ENCOUNTER
Prior Authorization Retail Medication Request    Medication/Dose: blood glucose (ONETOUCH VERIO IQ) test strip   ICD code (if different than what is on RX):     Previously Tried and Failed:     Rationale:       Insurance Name:     Insurance ID:         Pharmacy Information (if different than what is on RX)  Name:     Phone:

## 2020-06-10 LAB
ALBUMIN SERPL-MCNC: 3.7 G/DL (ref 3.4–5)
ALP SERPL-CCNC: 75 U/L (ref 40–150)
ALT SERPL W P-5'-P-CCNC: 27 U/L (ref 0–70)
ANION GAP SERPL CALCULATED.3IONS-SCNC: 6 MMOL/L (ref 3–14)
AST SERPL W P-5'-P-CCNC: 23 U/L (ref 0–45)
BILIRUB SERPL-MCNC: 0.7 MG/DL (ref 0.2–1.3)
BUN SERPL-MCNC: 12 MG/DL (ref 7–30)
CALCIUM SERPL-MCNC: 8.9 MG/DL (ref 8.5–10.1)
CHLORIDE SERPL-SCNC: 93 MMOL/L (ref 94–109)
CO2 SERPL-SCNC: 27 MMOL/L (ref 20–32)
CREAT SERPL-MCNC: 0.72 MG/DL (ref 0.66–1.25)
GFR SERPL CREATININE-BSD FRML MDRD: 87 ML/MIN/{1.73_M2}
GLUCOSE SERPL-MCNC: 200 MG/DL (ref 70–99)
LDLC SERPL DIRECT ASSAY-MCNC: 71 MG/DL
POTASSIUM SERPL-SCNC: 5.7 MMOL/L (ref 3.4–5.3)
PROT SERPL-MCNC: 8.3 G/DL (ref 6.8–8.8)
SODIUM SERPL-SCNC: 126 MMOL/L (ref 133–144)

## 2020-06-10 NOTE — TELEPHONE ENCOUNTER
Please get more information regarding this issue.           Which glucometer and which strips is he using now?              Why not switch to a monitor and strips that are covered by his insurance?

## 2020-06-10 NOTE — TELEPHONE ENCOUNTER
PRIOR AUTHORIZATION DENIED    Medication: blood glucose (ONETOUCH VERIO IQ) test strip     Denial Date: 6/10/2020    Denial Rational:  Patient must have a history of trial & failure to the formulary alternative(s) or have a contraindication or intolerance to the formulary alternatives: Contour test strips.             Appeal Information:    If you would like to appeal, please supply P/A team with a letter of medical necessity with clinical reason.

## 2020-06-10 NOTE — TELEPHONE ENCOUNTER
RN called back to pt.    Pt states he does not need these style of test strips specifically. Not sure why these went through for him.    New pharmacy: [facility] [address] [phone]    RN called to pharmacy.  There is a billing issue on pt regarding restrictions with insurance as well as Medicare part B and Medicare part D.    At this time pharmacist advises that pt get new scripts for meter, strips, and lancets.

## 2020-06-11 LAB
TTG IGA SER-ACNC: 3 U/ML
TTG IGG SER-ACNC: 1 U/ML

## 2020-06-30 ENCOUNTER — OFFICE VISIT (OUTPATIENT)
Dept: NUTRITION | Facility: CLINIC | Age: 80
End: 2020-06-30
Payer: COMMERCIAL

## 2020-06-30 DIAGNOSIS — K58.9 IRRITABLE BOWEL SYNDROME, UNSPECIFIED TYPE: Primary | ICD-10-CM

## 2020-06-30 DIAGNOSIS — K58.0 IRRITABLE BOWEL SYNDROME WITH DIARRHEA: ICD-10-CM

## 2020-06-30 DIAGNOSIS — Z79.4 TYPE 2 DIABETES MELLITUS WITH DIABETIC NEPHROPATHY, WITH LONG-TERM CURRENT USE OF INSULIN (H): ICD-10-CM

## 2020-06-30 DIAGNOSIS — E11.21 TYPE 2 DIABETES MELLITUS WITH DIABETIC NEPHROPATHY, WITH LONG-TERM CURRENT USE OF INSULIN (H): ICD-10-CM

## 2020-06-30 PROCEDURE — 97802 MEDICAL NUTRITION INDIV IN: CPT

## 2020-06-30 NOTE — PROGRESS NOTES
Medical Nutrition Therapy  Visit Type:Telephone Visit for Initial assessment and intervention  Patient verbally consented to the telephone visit service today: yes    Audio only visit done, as patient does not have access to audio-visual technology.    Individual visit provided, given no group classes are available for 2 months.     Jose Francisco Gonzalez presents today for MNT and education related to type 2 diabetes and IBS.   He is accompanied by self.     ASSESSMENT:   Patient comments/concerns relating to nutrition: Jose Francisco mentions that he has been having diarrhea and flatulence and is wondering if there is anything he can do nutritionally for this. He fell and had a fracture and had abdominal pain which he thought was from the fracture, but wondering if it is from the IBS. He started taking a medication, and his pain went away. However he was still having the diarrhea and flatulence and the medication was beginning to affect his cognition, which was when he stopped taking the medication.     NUTRITION HISTORY:    Breakfast: Banana with 1 cup of coffee with 1/2 and 1/2 cream OR 1 bowl of grape nuts with milk and raisins or blueberries sometimes with cherries and tangerines   Lunch: Ham sandwich with wheat bread and iced tea and fruit (apple)   5:00: [place] with water  Dinner: Meatloaf (small slice) with boiled potatoes (1/2 small potato with butter) with water (sometimes has small glass of milk)   Snacks: sometimes has fruit   Beverages: Alcohol 1/day, Milk 0-1/day, Coffee 1/day and Water all day, Iced Tea     Misses meals? Sometimes has a smaller meal for lunch  Eats out:  seldom     Previous diet education:  No     Food allergies/intolerances: none  When he first had diarrhea he was taking metamucil, he was    Diet is high in: fat (saturated)  Diet is low in: vegetables    EXERCISE: minimal exercise - walking occassionally    SOCIO/ECONOMIC:   Lives with: spouse    MEDICATIONS:  Current Outpatient Medications    Medication     alendronate (FOSAMAX) 70 MG tablet     amLODIPine (NORVASC) 2.5 MG tablet     aspirin 81 MG tablet     atorvastatin (LIPITOR) 20 MG tablet     blood glucose (NO BRAND SPECIFIED) lancets standard     blood glucose (NO BRAND SPECIFIED) test strip     blood glucose (ONETOUCH VERIO IQ) test strip     blood glucose monitoring (NO BRAND SPECIFIED) meter device kit     glipiZIDE (GLUCOTROL XL) 10 MG 24 hr tablet     hydroxychloroquine (PLAQUENIL) 200 MG tablet     insulin glargine (BASAGLAR KWIKPEN) 100 UNIT/ML pen     insulin pen needle (B-D U/F) 31G X 8 MM miscellaneous     lisinopril (PRINIVIL/ZESTRIL) 10 MG tablet     metoprolol succinate ER (TOPROL-XL) 50 MG 24 hr tablet     Multiple Vitamins-Minerals (PRESERVISION AREDS 2) CAPS     order for DME     Psyllium-Calcium (METAMUCIL PLUS CALCIUM) CAPS     SENNA-docusate sodium (SENNA S) 8.6-50 MG tablet     No current facility-administered medications for this visit.        LABS:  Last Basic Metabolic Panel:  Lab Results   Component Value Date     06/09/2020      Lab Results   Component Value Date    POTASSIUM 5.7 06/09/2020     Lab Results   Component Value Date    CHLORIDE 93 06/09/2020     Lab Results   Component Value Date    DENISE 8.9 06/09/2020     Lab Results   Component Value Date    CO2 27 06/09/2020     Lab Results   Component Value Date    BUN 12 06/09/2020     Lab Results   Component Value Date    CR 0.72 06/09/2020     Lab Results   Component Value Date     06/09/2020       ANTHROPOMETRICS:  Vitals: There were no vitals taken for this visit.  There is no height or weight on file to calculate BMI.      Wt Readings from Last 5 Encounters:   12/11/19 77.1 kg (170 lb)   12/04/19 79.4 kg (175 lb)   10/21/19 73.9 kg (163 lb)   10/02/19 72.1 kg (159 lb)   09/11/19 71.2 kg (157 lb)       Weight Change: 170    ESTIMATED KCAL REQUIREMENTS:  1800 kcal per day for weight maintenance    NUTRITION INTERVENTION:  Education given to support:  general nutrition guidelines, consistent meals, fiber and behavior modification  Education Materials Provided (mailed to patient): IBS and Low FODMAP Diet    Today we discussed IBS and diet recommendations for type 2 diabetes. We discussed very basics of Low Fodmap diet, discussed following a low-fat diet and limiting artificial sweeteners.  We discussed using a food/symptom journal to help determine what might be causing the diarrhea/flatulance.     PATIENT'S BEHAVIOR CHANGE GOALS:   See Patient Instructions for patient stated behavior change goals. AVS was printed and given to patient at today's appointment.    MONITOR / EVALUATE:  RD will monitor/evaluate: IBS symptoms and diabetes    FOLLOW-UP:  Follow-up appointment scheduled in September    Kimmie Castillo, ANTOLIN, LD, CDE  Time spent in minutes: 40  Encounter: Individual

## 2020-07-20 DIAGNOSIS — E11.21 TYPE 2 DIABETES MELLITUS WITH DIABETIC NEPHROPATHY, WITHOUT LONG-TERM CURRENT USE OF INSULIN (H): ICD-10-CM

## 2020-07-20 RX ORDER — GLIPIZIDE 10 MG/1
TABLET, FILM COATED, EXTENDED RELEASE ORAL
Qty: 90 TABLET | Refills: 0 | Status: SHIPPED | OUTPATIENT
Start: 2020-07-20 | End: 2020-11-11

## 2020-08-13 ENCOUNTER — TELEPHONE (OUTPATIENT)
Dept: FAMILY MEDICINE | Facility: CLINIC | Age: 80
End: 2020-08-13

## 2020-08-13 NOTE — TELEPHONE ENCOUNTER
Patient Quality Outreach      Summary:    Patient is due/failing the following:   Annual wellness, date due: 5/28/2020    Type of outreach:    Sent letter.    Questions for provider review:    None                                                                                   **Start Working phrase here:**       Patient has the following on his problem list/HM: None                                                KENDRICK [facility]       Chart routed to .

## 2020-08-13 NOTE — LETTER
August 13, 2020    Jose Francisco Gonzalez  [address]  [facility] [address]    Dear Pratibha Felton cares about your health and your health plan.  I have reviewed your medical conditions, medication list and lab results, and am making recommendations based on this review to better manage your health.    You are in particular need of attention regarding:  -Wellness (Physical) Visit     I am recommending that you:     -schedule a WELLNESS (Physical) APPOINTMENT with me.   I will check fasting labs the same day - nothing to eat except water and meds for 8-10 hours prior.  We can also do this as a video visit.      Please call us at the [facility] location:  [phone] or use [employer] to address the above recommendations.     Thank you for trusting [facility].  We appreciate the opportunity to serve you and look forward to supporting your healthcare in the future.    If you have (or plan to have) any of these tests done at a facility other than a [facility] or a [facility], please have the results sent to the [facility] location noted above.      Best Regards,    Kvng Richardson MD

## 2020-09-21 ENCOUNTER — VIRTUAL VISIT (OUTPATIENT)
Dept: NUTRITION | Facility: CLINIC | Age: 80
End: 2020-09-21
Payer: COMMERCIAL

## 2020-09-21 DIAGNOSIS — Z79.4 TYPE 2 DIABETES MELLITUS WITH DIABETIC NEPHROPATHY, WITH LONG-TERM CURRENT USE OF INSULIN (H): Primary | ICD-10-CM

## 2020-09-21 DIAGNOSIS — K58.0 IRRITABLE BOWEL SYNDROME WITH DIARRHEA: ICD-10-CM

## 2020-09-21 DIAGNOSIS — E11.21 TYPE 2 DIABETES MELLITUS WITH DIABETIC NEPHROPATHY, WITH LONG-TERM CURRENT USE OF INSULIN (H): Primary | ICD-10-CM

## 2020-09-21 PROCEDURE — 98966 PH1 ASSMT&MGMT NQHP 5-10: CPT | Mod: 95

## 2020-09-21 NOTE — PROGRESS NOTES
Medical Nutrition Therapy  Visit Type:Reassessment and intervention    Jose Francisco Gonzalez presents today for MNT and education related to type 2 diabetes and IBS.   He is accompanied by self.     ASSESSMENT:   Patient comments/concerns relating to nutrition: Jose Francisco states that everything is going well with hid diet. He mentions that his symptoms have improved on the low FODMAP diet and he doesn't have any new questions today.     NUTRITION HISTORY:  Eating 5-6 small meals/day  Cutting back on milk and butter- this seems to help   Sticking with low fodmap diet  Was addicted to sugar, cut out soda pops  Having some looser stools    Previous diet education:  Yes       EXERCISE: minimal, some walking    SOCIO/ECONOMIC:   Lives with: spouse    MEDICATIONS:  Current Outpatient Medications   Medication     alendronate (FOSAMAX) 70 MG tablet     amLODIPine (NORVASC) 2.5 MG tablet     aspirin 81 MG tablet     atorvastatin (LIPITOR) 20 MG tablet     blood glucose (NO BRAND SPECIFIED) lancets standard     blood glucose (NO BRAND SPECIFIED) test strip     blood glucose (ONETOUCH VERIO IQ) test strip     blood glucose monitoring (NO BRAND SPECIFIED) meter device kit     glipiZIDE (GLUCOTROL XL) 10 MG 24 hr tablet     hydroxychloroquine (PLAQUENIL) 200 MG tablet     insulin glargine (BASAGLAR KWIKPEN) 100 UNIT/ML pen     insulin pen needle (B-D U/F) 31G X 8 MM miscellaneous     lisinopril (PRINIVIL/ZESTRIL) 10 MG tablet     metoprolol succinate ER (TOPROL-XL) 50 MG 24 hr tablet     Multiple Vitamins-Minerals (PRESERVISION AREDS 2) CAPS     order for DME     Psyllium-Calcium (METAMUCIL PLUS CALCIUM) CAPS     SENNA-docusate sodium (SENNA S) 8.6-50 MG tablet     No current facility-administered medications for this visit.        LABS:  Last Basic Metabolic Panel:  Lab Results   Component Value Date     06/09/2020      Lab Results   Component Value Date    POTASSIUM 5.7 06/09/2020     Lab Results   Component Value Date     CHLORIDE 93 06/09/2020     Lab Results   Component Value Date    DENISE 8.9 06/09/2020     Lab Results   Component Value Date    CO2 27 06/09/2020     Lab Results   Component Value Date    BUN 12 06/09/2020     Lab Results   Component Value Date    CR 0.72 06/09/2020     Lab Results   Component Value Date     06/09/2020       ANTHROPOMETRICS:  Vitals: There were no vitals taken for this visit.  There is no height or weight on file to calculate BMI.      Wt Readings from Last 5 Encounters:   12/11/19 77.1 kg (170 lb)   12/04/19 79.4 kg (175 lb)   10/21/19 73.9 kg (163 lb)   10/02/19 72.1 kg (159 lb)   09/11/19 71.2 kg (157 lb)       Weight Change: Stable    ESTIMATED KCAL REQUIREMENTS:  1800 kcal per day for weight maintenance    NUTRITION INTERVENTION:  Education given to support: diet changes for IBS  Motivational Interviewing    PATIENT'S BEHAVIOR CHANGE GOALS:   See Patient Instructions for patient stated behavior change goals. AVS was printed and given to patient at today's appointment.    MONITOR / EVALUATE:  RD will monitor/evaluate:  Progress toward meeting stated nutrition-related goals  Readiness to change nutrition-related behaviors  Weight change    FOLLOW-UP:  Follow up with RD as needed.    Kimmie Castillo, ANTOLIN, LD, CDE  Time spent in minutes: 7   Encounter: Individual

## 2020-09-22 DIAGNOSIS — Z79.4 TYPE 2 DIABETES MELLITUS WITH DIABETIC NEPHROPATHY, WITH LONG-TERM CURRENT USE OF INSULIN (H): ICD-10-CM

## 2020-09-22 DIAGNOSIS — E11.21 TYPE 2 DIABETES MELLITUS WITH DIABETIC NEPHROPATHY, WITH LONG-TERM CURRENT USE OF INSULIN (H): ICD-10-CM

## 2020-09-23 ENCOUNTER — TELEPHONE (OUTPATIENT)
Dept: FAMILY MEDICINE | Facility: CLINIC | Age: 80
End: 2020-09-23

## 2020-09-23 RX ORDER — INSULIN GLARGINE 100 [IU]/ML
INJECTION, SOLUTION SUBCUTANEOUS
Qty: 30 ML | Refills: 0 | Status: SHIPPED | OUTPATIENT
Start: 2020-09-23 | End: 2020-12-24

## 2020-09-23 NOTE — TELEPHONE ENCOUNTER
Reason for Call:  Other prescription    Detailed comments: Jose Francisco states that he is no longer needing the rx for insulin pen because his wife told him he had enough of a supply.     Phone Number Patient can be reached at: Home number on file [phone] (home),   Telephone Information:   Mobile [phone]       Best Time: asap    Can we leave a detailed message on this number? YES    Call taken on 9/23/2020 at 11:27 AM by Shell Long

## 2020-09-23 NOTE — TELEPHONE ENCOUNTER
Pt was informed rx for Basglar was approved today. He can contact [facility]'s pharmacy and let them know he will contact them when he needs refill. Pt verbalized agreement with plan.

## 2020-11-10 DIAGNOSIS — E11.21 TYPE 2 DIABETES MELLITUS WITH DIABETIC NEPHROPATHY, WITHOUT LONG-TERM CURRENT USE OF INSULIN (H): ICD-10-CM

## 2020-11-11 RX ORDER — GLIPIZIDE 10 MG/1
10 TABLET, FILM COATED, EXTENDED RELEASE ORAL DAILY
Qty: 90 TABLET | Refills: 4 | Status: SHIPPED | OUTPATIENT
Start: 2020-11-11 | End: 2021-03-08

## 2020-11-24 DIAGNOSIS — M80.00XD AGE-RELATED OSTEOPOROSIS WITH CURRENT PATHOLOGICAL FRACTURE WITH ROUTINE HEALING: ICD-10-CM

## 2020-11-24 RX ORDER — ALENDRONATE SODIUM 70 MG/1
70 TABLET ORAL
Qty: 12 TABLET | Refills: 3 | Status: SHIPPED | OUTPATIENT
Start: 2020-11-24 | End: 2021-11-10

## 2020-12-09 DIAGNOSIS — I10 HYPERTENSION GOAL BP (BLOOD PRESSURE) < 140/80: Chronic | ICD-10-CM

## 2020-12-09 RX ORDER — AMLODIPINE BESYLATE 2.5 MG/1
2.5 TABLET ORAL DAILY
Qty: 90 TABLET | Refills: 1 | Status: SHIPPED | OUTPATIENT
Start: 2020-12-09 | End: 2021-05-20

## 2020-12-25 ENCOUNTER — NURSE TRIAGE (OUTPATIENT)
Dept: NURSING | Facility: CLINIC | Age: 80
End: 2020-12-25

## 2020-12-25 NOTE — TELEPHONE ENCOUNTER
"Wife of patient calls very panicked,  unable to give permission to speak to me because she is unable to speak, she states that he woke up a few minutes ago and was unable to verbaly communicate with her, he is a diabetic, she gave him some orange juice but there was no change, she does not know how to check his blood sugar.      RN reviewed protocols for sudden onset of aphasia, advised wife to call 911 now, could be a stroke and not hypoglycemic.     Kimmie Berman RN - [place] Nurse Advisor  12/25/2020   2\"22Am    Reason for Disposition    [1] Loss of speech or garbled speech AND [2] sudden onset AND [3] present now    Protocols used: NEUROLOGIC DEFICIT-A-AH      "

## 2021-01-01 ENCOUNTER — TRANSFERRED RECORDS (OUTPATIENT)
Dept: MULTI SPECIALTY CLINIC | Facility: CLINIC | Age: 81
End: 2021-01-01

## 2021-01-01 LAB — RETINOPATHY: NORMAL

## 2021-02-05 ENCOUNTER — IMMUNIZATION (OUTPATIENT)
Dept: NURSING | Facility: CLINIC | Age: 81
End: 2021-02-05
Payer: COMMERCIAL

## 2021-02-05 PROCEDURE — 91300 PR COVID VAC PFIZER DIL RECON 30 MCG/0.3 ML IM: CPT

## 2021-02-05 PROCEDURE — 0001A PR COVID VAC PFIZER DIL RECON 30 MCG/0.3 ML IM: CPT

## 2021-02-16 PROBLEM — M80.00XD AGE-RELATED OSTEOPOROSIS WITH CURRENT PATHOLOGICAL FRACTURE WITH ROUTINE HEALING: Status: ACTIVE | Noted: 2019-08-30

## 2021-02-17 ENCOUNTER — VIRTUAL VISIT (OUTPATIENT)
Dept: INTERNAL MEDICINE | Facility: CLINIC | Age: 81
End: 2021-02-17
Payer: COMMERCIAL

## 2021-02-17 DIAGNOSIS — E87.1 HYPONATREMIA: ICD-10-CM

## 2021-02-17 DIAGNOSIS — Z79.4 TYPE 2 DIABETES MELLITUS WITH DIABETIC NEPHROPATHY, WITH LONG-TERM CURRENT USE OF INSULIN (H): Primary | ICD-10-CM

## 2021-02-17 DIAGNOSIS — Z86.39 HISTORY OF HYPOGLYCEMIA: ICD-10-CM

## 2021-02-17 DIAGNOSIS — E11.21 TYPE 2 DIABETES MELLITUS WITH DIABETIC NEPHROPATHY, WITH LONG-TERM CURRENT USE OF INSULIN (H): Primary | ICD-10-CM

## 2021-02-17 PROCEDURE — 99213 OFFICE O/P EST LOW 20 MIN: CPT | Mod: 95 | Performed by: INTERNAL MEDICINE

## 2021-02-17 NOTE — PROGRESS NOTES
"Jose Francisco is a 80 year old who is being evaluated via a billable telephone visit.      What phone number would you like to be contacted at? [phone]  How would you like to obtain your AVS? Alycia    Assessment & Plan     Type 2 diabetes mellitus with diabetic nephropathy, with long-term current use of insulin (H)  The episode that he describes which occurred on December 25 sounds like classic hypoglycemia, especially with the profuse diaphoresis and the neurologic symptoms.           Since then, he is being more careful, and he has cut back his insulin a bit, and he is having more snacks in the evening.  - Comprehensive metabolic panel (BMP + Alb, Alk Phos, ALT, AST, Total. Bili, TP)  - Hemoglobin A1c  - Albumin Random Urine Quantitative with Creat Ratio    History of hypoglycemia  As above.    Hyponatremia    - Hemoglobin A1c     CMP             Patient Instructions   I will let you know your lab results.                      Return in about 4 months (around 6/17/2021) for follow up of several issues.    Kvng Richardson MD  [facility]    Subjective   Jose Francisco is a 80 year old who presents for the following health issues     HPI        Had low blood sugar episode on Christmas - thinks he might need to adjust insulin      We reviewed the apparent episode of hypoglycemia mentioned in the triage note of 12/25/2020, with neuro sx of confusion, expressive aphasia,with profuse diaphoresis.                        He was able to drink some orange juice; then he fell asleep.       He awoke and felt \"fine\".           2 days later he flew to [place]; and since then he has been checking his glucoses more regularly, and has been eating more.     He has avoided further hypoglycemia.                       He flew back here to [place] to get a COVID-19 vaccine; on 2/5/21.                    Will be flying back in 10 days.                                                                                  "                                                   On 12/24, he may have had one alcoholic beverage.   Nothing unusual in terms of food intake, or illness, etc.               He adjusts his insulin dose a bit; 18-26 units.           AM glucoses are still sometimes under 100; variable.           He wants to schedule some lab work.      No back pain.           Bowels are better; taking metamucil.                         He has macular degeneration.              He has cut back hydroxychloroquine to 200 mg per day.                     Review of Systems   NEURO: NEGATIVE for involuntary movements, memory problems, numbness or tingling  and weakness       Current Outpatient Medications   Medication Sig Dispense Refill     alendronate (FOSAMAX) 70 MG tablet Take 1 tablet (70 mg) by mouth every 7 days 12 tablet 3     amLODIPine (NORVASC) 2.5 MG tablet Take 1 tablet (2.5 mg) by mouth daily 90 tablet 1     aspirin 81 MG tablet Take  by mouth daily.       atorvastatin (LIPITOR) 20 MG tablet TAKE 1 TABLET(20MG) BY MOUTH DAILY 90 tablet 4     blood glucose (NO BRAND SPECIFIED) lancets standard Use to test blood sugar 3 times daily or as directed. 300 each 1     blood glucose (NO BRAND SPECIFIED) test strip Use to test blood sugar 3 times daily or as directed. 300 each 1     blood glucose (ONETOUCH VERIO IQ) test strip USE TO TEST BLOOD SUGARS THREE TIMES DAILY OR AS DIRECTED 300 strip 1     blood glucose monitoring (NO BRAND SPECIFIED) meter device kit Use to test blood sugar 3 times daily or as directed. 1 kit 0     glipiZIDE (GLUCOTROL XL) 10 MG 24 hr tablet Take 1 tablet (10 mg) by mouth daily 90 tablet 4     hydroxychloroquine (PLAQUENIL) 200 MG tablet Take 1 tablet (200 mg) by mouth daily 30 tablet      insulin glargine (BASAGLAR KWIKPEN) 100 UNIT/ML pen INJECT 26 UNITS SUBCUTANEOUS EVERY DAY AS DIRECTED 30 mL 4     insulin pen needle (B-D U/F) 31G X 8 MM miscellaneous USE DAILY AS DIRECTED 100 each 3     lisinopril  (PRINIVIL/ZESTRIL) 10 MG tablet TAKE 1 TABLET BY MOUTH EVERY DAY 90 tablet 3     metoprolol succinate ER (TOPROL-XL) 50 MG 24 hr tablet TAKE 1 TABLET(50 MG) BY MOUTH DAILY 90 tablet 4     Multiple Vitamins-Minerals (PRESERVISION AREDS 2) CAPS 2 per day       order for DME Test strips for pt's glucometer, brand as covered by insurance Test QID and prn. He is on insulin. Patients preferred brand-Verio One Touch. 400 each 1     Psyllium-Calcium (METAMUCIL PLUS CALCIUM) CAPS Take 2 capsules by mouth At Bedtime       SENNA-docusate sodium (SENNA S) 8.6-50 MG tablet Take daily while using percocet to prevent constipation 15 tablet 0     Wt Readings from Last 4 Encounters:   12/11/19 77.1 kg (170 lb)   12/04/19 79.4 kg (175 lb)   10/21/19 73.9 kg (163 lb)   10/02/19 72.1 kg (159 lb)       Objective           Vitals:  No vitals were obtained today due to virtual visit.    Physical Exam     PSYCH: Alert and oriented times 3; coherent speech, normal   rate and volume, able to articulate logical thoughts, able   to abstract reason, no tangential thoughts, no hallucinations   or delusions  His affect is normal  RESP: No cough, no audible wheezing, able to talk in full sentences  Remainder of exam unable to be completed due to telephone visits                Phone call duration: 15 minutes                             ADDENDUM:           Recent Results (from the past 100 hour(s))   Comprehensive metabolic panel (BMP + Alb, Alk Phos, ALT, AST, Total. Bili, TP)    Collection Time: 02/22/21 11:29 AM   Result Value Ref Range    Sodium 129 (L) 133 - 144 mmol/L    Potassium 4.2 3.4 - 5.3 mmol/L    Chloride 96 94 - 109 mmol/L    Carbon Dioxide 28 20 - 32 mmol/L    Anion Gap 5 3 - 14 mmol/L    Glucose 164 (H) 70 - 99 mg/dL    Urea Nitrogen 15 7 - 30 mg/dL    Creatinine 0.70 0.66 - 1.25 mg/dL    GFR Estimate 88 >60 mL/min/[1.73_m2]    GFR Estimate If Black >90 >60 mL/min/[1.73_m2]    Calcium 9.0 8.5 - 10.1 mg/dL    Bilirubin Total 0.7 0.2  - 1.3 mg/dL    Albumin 3.5 3.4 - 5.0 g/dL    Protein Total 8.0 6.8 - 8.8 g/dL    Alkaline Phosphatase 73 40 - 150 U/L    ALT 27 0 - 70 U/L    AST 23 0 - 45 U/L   Hemoglobin A1c    Collection Time: 02/22/21 11:29 AM   Result Value Ref Range    Hemoglobin A1C 7.6 (H) 0 - 5.6 %   Albumin Random Urine Quantitative with Creat Ratio    Collection Time: 02/22/21 11:45 AM   Result Value Ref Range    Creatinine Urine 55 mg/dL    Albumin Urine mg/L 49 mg/L    Albumin Urine mg/g Cr 88.09 (H) 0 - 17 mg/g Cr     My ch satisfactory, art message and letter sent.                            Your diabetes control is satisfactory, for age 80.       The A1C of 7.6 correlates to an average blood sugar of approximately 168.           Your other lab results are normal or stable,including the liver and kidney function, and the electrolytes.           Keep taking the same medications.

## 2021-02-17 NOTE — LETTER
February 22, 2021      Jose Francisco Gonzalez  [address]  [facility] [address]        Dear ,    We are writing to inform you of your test results.                 Your diabetes control is satisfactory, for age 80.       The A1C of 7.6 correlates to an average blood sugar of approximately 168.           Your other lab results are normal or stable,including the liver and kidney function, and the electrolytes.           Keep taking the same medications.    Recent Results (from the past 100 hour(s))   Comprehensive metabolic panel (BMP + Alb, Alk Phos, ALT, AST, Total. Bili, TP)    Collection Time: 02/22/21 11:29 AM   Result Value Ref Range    Sodium 129 (L) 133 - 144 mmol/L    Potassium 4.2 3.4 - 5.3 mmol/L    Chloride 96 94 - 109 mmol/L    Carbon Dioxide 28 20 - 32 mmol/L    Anion Gap 5 3 - 14 mmol/L    Glucose 164 (H) 70 - 99 mg/dL    Urea Nitrogen 15 7 - 30 mg/dL    Creatinine 0.70 0.66 - 1.25 mg/dL    GFR Estimate 88 >60 mL/min/[1.73_m2]    GFR Estimate If Black >90 >60 mL/min/[1.73_m2]    Calcium 9.0 8.5 - 10.1 mg/dL    Bilirubin Total 0.7 0.2 - 1.3 mg/dL    Albumin 3.5 3.4 - 5.0 g/dL    Protein Total 8.0 6.8 - 8.8 g/dL    Alkaline Phosphatase 73 40 - 150 U/L    ALT 27 0 - 70 U/L    AST 23 0 - 45 U/L   Hemoglobin A1c    Collection Time: 02/22/21 11:29 AM   Result Value Ref Range    Hemoglobin A1C 7.6 (H) 0 - 5.6 %   Albumin Random Urine Quantitative with Creat Ratio    Collection Time: 02/22/21 11:45 AM   Result Value Ref Range    Creatinine Urine 55 mg/dL    Albumin Urine mg/L 49 mg/L    Albumin Urine mg/g Cr 88.09 (H) 0 - 17 mg/g Cr         If you have any questions or concerns, please call the clinic at the number listed above.       Sincerely,        Kvng Richardson MD

## 2021-02-22 DIAGNOSIS — Z79.4 TYPE 2 DIABETES MELLITUS WITH DIABETIC NEPHROPATHY, WITH LONG-TERM CURRENT USE OF INSULIN (H): ICD-10-CM

## 2021-02-22 DIAGNOSIS — E87.1 HYPONATREMIA: ICD-10-CM

## 2021-02-22 DIAGNOSIS — E11.21 TYPE 2 DIABETES MELLITUS WITH DIABETIC NEPHROPATHY, WITH LONG-TERM CURRENT USE OF INSULIN (H): ICD-10-CM

## 2021-02-22 LAB
ALBUMIN SERPL-MCNC: 3.5 G/DL (ref 3.4–5)
ALP SERPL-CCNC: 73 U/L (ref 40–150)
ALT SERPL W P-5'-P-CCNC: 27 U/L (ref 0–70)
ANION GAP SERPL CALCULATED.3IONS-SCNC: 5 MMOL/L (ref 3–14)
AST SERPL W P-5'-P-CCNC: 23 U/L (ref 0–45)
BILIRUB SERPL-MCNC: 0.7 MG/DL (ref 0.2–1.3)
BUN SERPL-MCNC: 15 MG/DL (ref 7–30)
CALCIUM SERPL-MCNC: 9 MG/DL (ref 8.5–10.1)
CHLORIDE SERPL-SCNC: 96 MMOL/L (ref 94–109)
CO2 SERPL-SCNC: 28 MMOL/L (ref 20–32)
CREAT SERPL-MCNC: 0.7 MG/DL (ref 0.66–1.25)
CREAT UR-MCNC: 55 MG/DL
GFR SERPL CREATININE-BSD FRML MDRD: 88 ML/MIN/{1.73_M2}
GLUCOSE SERPL-MCNC: 164 MG/DL (ref 70–99)
HBA1C MFR BLD: 7.6 % (ref 0–5.6)
MICROALBUMIN UR-MCNC: 49 MG/L
MICROALBUMIN/CREAT UR: 88.09 MG/G CR (ref 0–17)
POTASSIUM SERPL-SCNC: 4.2 MMOL/L (ref 3.4–5.3)
PROT SERPL-MCNC: 8 G/DL (ref 6.8–8.8)
SODIUM SERPL-SCNC: 129 MMOL/L (ref 133–144)

## 2021-02-22 PROCEDURE — 36415 COLL VENOUS BLD VENIPUNCTURE: CPT | Performed by: INTERNAL MEDICINE

## 2021-02-22 PROCEDURE — 83036 HEMOGLOBIN GLYCOSYLATED A1C: CPT | Performed by: INTERNAL MEDICINE

## 2021-02-22 PROCEDURE — 80053 COMPREHEN METABOLIC PANEL: CPT | Performed by: INTERNAL MEDICINE

## 2021-02-22 PROCEDURE — 82043 UR ALBUMIN QUANTITATIVE: CPT | Performed by: INTERNAL MEDICINE

## 2021-02-26 ENCOUNTER — IMMUNIZATION (OUTPATIENT)
Dept: NURSING | Facility: CLINIC | Age: 81
End: 2021-02-26
Attending: FAMILY MEDICINE
Payer: COMMERCIAL

## 2021-02-26 PROCEDURE — 0002A PR COVID VAC PFIZER DIL RECON 30 MCG/0.3 ML IM: CPT

## 2021-02-26 PROCEDURE — 91300 PR COVID VAC PFIZER DIL RECON 30 MCG/0.3 ML IM: CPT

## 2021-02-28 ENCOUNTER — HOSPITAL ENCOUNTER (EMERGENCY)
Facility: CLINIC | Age: 81
Discharge: HOME | End: 2021-02-28
Attending: EMERGENCY MEDICINE | Admitting: EMERGENCY MEDICINE
Payer: COMMERCIAL

## 2021-02-28 VITALS
TEMPERATURE: 98.4 F | SYSTOLIC BLOOD PRESSURE: 147 MMHG | DIASTOLIC BLOOD PRESSURE: 78 MMHG | BODY MASS INDEX: 23.8 KG/M2 | RESPIRATION RATE: 18 BRPM | OXYGEN SATURATION: 99 % | HEART RATE: 89 BPM | WEIGHT: 170 LBS | HEIGHT: 71 IN

## 2021-02-28 DIAGNOSIS — R55 NEAR SYNCOPE: ICD-10-CM

## 2021-02-28 DIAGNOSIS — E16.2 HYPOGLYCEMIA: ICD-10-CM

## 2021-02-28 LAB
ALBUMIN UR-MCNC: 30 MG/DL
ANION GAP SERPL CALCULATED.3IONS-SCNC: 6 MMOL/L (ref 3–14)
APPEARANCE UR: CLEAR
BASOPHILS # BLD AUTO: 0.1 10E9/L (ref 0–0.2)
BASOPHILS NFR BLD AUTO: 0.5 %
BILIRUB UR QL STRIP: NEGATIVE
BUN SERPL-MCNC: 25 MG/DL (ref 7–30)
CALCIUM SERPL-MCNC: 8.9 MG/DL (ref 8.5–10.1)
CHLORIDE SERPL-SCNC: 94 MMOL/L (ref 94–109)
CO2 SERPL-SCNC: 30 MMOL/L (ref 20–32)
COLOR UR AUTO: YELLOW
CREAT SERPL-MCNC: 0.92 MG/DL (ref 0.66–1.25)
DIFFERENTIAL METHOD BLD: ABNORMAL
EOSINOPHIL # BLD AUTO: 0.2 10E9/L (ref 0–0.7)
EOSINOPHIL NFR BLD AUTO: 1.8 %
ERYTHROCYTE [DISTWIDTH] IN BLOOD BY AUTOMATED COUNT: 12.7 % (ref 10–15)
GFR SERPL CREATININE-BSD FRML MDRD: 77 ML/MIN/{1.73_M2}
GLUCOSE BLDC GLUCOMTR-MCNC: 111 MG/DL (ref 70–99)
GLUCOSE BLDC GLUCOMTR-MCNC: 224 MG/DL (ref 70–99)
GLUCOSE BLDC GLUCOMTR-MCNC: 281 MG/DL (ref 70–99)
GLUCOSE BLDC GLUCOMTR-MCNC: 298 MG/DL (ref 70–99)
GLUCOSE SERPL-MCNC: 108 MG/DL (ref 70–99)
GLUCOSE UR STRIP-MCNC: 300 MG/DL
HCT VFR BLD AUTO: 44.9 % (ref 40–53)
HGB BLD-MCNC: 15.1 G/DL (ref 13.3–17.7)
HGB UR QL STRIP: NEGATIVE
IMM GRANULOCYTES # BLD: 0.1 10E9/L (ref 0–0.4)
IMM GRANULOCYTES NFR BLD: 0.6 %
INTERPRETATION ECG - MUSE: NORMAL
KETONES UR STRIP-MCNC: NEGATIVE MG/DL
LEUKOCYTE ESTERASE UR QL STRIP: NEGATIVE
LYMPHOCYTES # BLD AUTO: 1.3 10E9/L (ref 0.8–5.3)
LYMPHOCYTES NFR BLD AUTO: 11.1 %
MCH RBC QN AUTO: 30.2 PG (ref 26.5–33)
MCHC RBC AUTO-ENTMCNC: 33.6 G/DL (ref 31.5–36.5)
MCV RBC AUTO: 90 FL (ref 78–100)
MONOCYTES # BLD AUTO: 1.1 10E9/L (ref 0–1.3)
MONOCYTES NFR BLD AUTO: 9 %
MUCOUS THREADS #/AREA URNS LPF: PRESENT /LPF
NEUTROPHILS # BLD AUTO: 9.1 10E9/L (ref 1.6–8.3)
NEUTROPHILS NFR BLD AUTO: 77 %
NITRATE UR QL: NEGATIVE
NRBC # BLD AUTO: 0 10*3/UL
NRBC BLD AUTO-RTO: 0 /100
PH UR STRIP: 6 PH (ref 5–7)
PLATELET # BLD AUTO: 312 10E9/L (ref 150–450)
POTASSIUM SERPL-SCNC: 3.6 MMOL/L (ref 3.4–5.3)
RBC # BLD AUTO: 5 10E12/L (ref 4.4–5.9)
RBC #/AREA URNS AUTO: 1 /HPF (ref 0–2)
SODIUM SERPL-SCNC: 130 MMOL/L (ref 133–144)
SOURCE: ABNORMAL
SP GR UR STRIP: 1.02 (ref 1–1.03)
TROPONIN I SERPL-MCNC: <0.015 UG/L (ref 0–0.04)
UROBILINOGEN UR STRIP-MCNC: NORMAL MG/DL (ref 0–2)
WBC # BLD AUTO: 11.8 10E9/L (ref 4–11)
WBC #/AREA URNS AUTO: 1 /HPF (ref 0–5)

## 2021-02-28 PROCEDURE — 93005 ELECTROCARDIOGRAM TRACING: CPT

## 2021-02-28 PROCEDURE — 96360 HYDRATION IV INFUSION INIT: CPT

## 2021-02-28 PROCEDURE — 258N000003 HC RX IP 258 OP 636: Performed by: EMERGENCY MEDICINE

## 2021-02-28 PROCEDURE — 81001 URINALYSIS AUTO W/SCOPE: CPT | Performed by: EMERGENCY MEDICINE

## 2021-02-28 PROCEDURE — 999N001017 HC STATISTIC GLUCOSE BY METER IP

## 2021-02-28 PROCEDURE — 85025 COMPLETE CBC W/AUTO DIFF WBC: CPT | Performed by: EMERGENCY MEDICINE

## 2021-02-28 PROCEDURE — 84484 ASSAY OF TROPONIN QUANT: CPT | Performed by: EMERGENCY MEDICINE

## 2021-02-28 PROCEDURE — 80048 BASIC METABOLIC PNL TOTAL CA: CPT | Performed by: EMERGENCY MEDICINE

## 2021-02-28 PROCEDURE — 99284 EMERGENCY DEPT VISIT MOD MDM: CPT | Mod: 25

## 2021-02-28 RX ADMIN — SODIUM CHLORIDE 1000 ML: 9 INJECTION, SOLUTION INTRAVENOUS at 03:27

## 2021-02-28 ASSESSMENT — ENCOUNTER SYMPTOMS
WEAKNESS: 0
FREQUENCY: 0
DYSURIA: 0
VOMITING: 0
COUGH: 0
HEMATURIA: 0
NAUSEA: 1
CHILLS: 0
ABDOMINAL PAIN: 0
SHORTNESS OF BREATH: 0
DIAPHORESIS: 1
FEVER: 0
PALPITATIONS: 0

## 2021-02-28 ASSESSMENT — MIFFLIN-ST. JEOR: SCORE: 1493.24

## 2021-02-28 NOTE — ED NOTES
Bed: ED02  Expected date: 2/28/21  Expected time: 12:25 AM  Means of arrival: Ambulance  Comments:  HEMS 423 80M glucose issues

## 2021-02-28 NOTE — ED TRIAGE NOTES
Pt got his 2nd covid shot on Friday. Pt is a diabetic. Pt has been checking his blood sugars and reports they have been fluctuating. At 2300 pt blood sugar was 266 he took 13units of his insulin reported after that he felt shaky and checked his blood sugar and it was low. Pt ate some peanut butter and drank orange juice. Pt concerned because he is supposed to fly back to [place] at 0600 this AM

## 2021-02-28 NOTE — DISCHARGE INSTRUCTIONS
Check your sugar frequently over the next 24 hours, and report results to your doctor.     Call tomorrow to discuss symptoms and any needed changes in insulin dose.

## 2021-02-28 NOTE — ED PROVIDER NOTES
History   Chief Complaint:  Diaphoresis and Nausea    HPI   Jose Francisco Gonzalez is a 80 year old male, with a history of DM II (insulin-dependent), hyperlipidemia, and RA, who presents to the ED for evaluation of diaphoresis and nausea . The patient reports he had a normal day yesterday without any abnormalities. He had consumed a normal breakfast, lunch, and for dinner ate at [employer] as well as had a Dr. Pepper. The patient did not eat out of the ordinary and checked his blood glucose afterwards and found it to be around 250, which is normal. When he went to bed, he checked his sugar again and found it to be 268, which is again normal for him. He takes his long acting, Basaglar, insulin at night which is normally 26 units, but decided to take 13 tonight as he wanted to have some energy in the morning when he woke up since he is flying to [place]. He took the insulin at 2240 when he measured his sugar for the first time. However, when the patient went to go to bed, he was diaphoretic, nauseous, and was not feeling right so he checked his blood glucose gain at 2351 and found his sugar to be 48.  There was no associated chest pain, dyspnea or palpitations.  The patient then drank some orange juice, ate some apples, and consumed peanut butter. He was feeling better, checked his sugars, and found them to be 163 and improving. However, shortly after he checked them again, he began to feel worse and rechecked them one more time and found the sugar level to be at 86, which then prompted his EMS call to be evaluated in the emergency department. The patient states he has normally ran slightly higher as he knows it is dangerous to be low. He denies any recent illness including fever, cough, chills, shortness of breath, chest pain, abdominal pain, or vomiting. He has not had any dysuria, hematuria, or increased urinary frequency. The patient has not felt more weak or unsteady as he ambulates. The patient says he does not  "believe he took more than he normally does as he took less than what his normal prescription is for the night. Of note, the patient did obtain his second COVID-19 shot two days ago, but has not had any symptoms from the vaccination and was feeling fine before and after the shot until today.     Review of Systems   Constitutional: Positive for diaphoresis. Negative for chills and fever.   Respiratory: Negative for cough and shortness of breath.    Cardiovascular: Negative for chest pain and palpitations.   Gastrointestinal: Positive for nausea. Negative for abdominal pain and vomiting.   Genitourinary: Negative for dysuria, frequency and hematuria.   Neurological: Negative for weakness.   All other systems reviewed and are negative.    Allergies:  No known drug allergies    Medications:    Alendronate  Amlodipine  Aspirin  Lipitor  Glipizide  Plaquenil  Basaglar  Lisinopril  Metoprolol  Senna-docusate    Past Medical History:    Thoracic vertebra compression fracture  Hyperlipidemia  Rheumatoid arthritis  Sialoadenitis  DM II  Arthropathy  IBS  Adjustment disorder  BPH  Psoriasis    Past Surgical History:    Nasal surgery  Cataract extraction  Tonsillectomy    Family History:    Heart disease  CAD  Alzheimer disease  Alcohol abuse    Social History:  PCP: [facility] Richardson  Presents to the ED via EMS    Physical Exam     Patient Vitals for the past 24 hrs:   BP Temp Temp src Pulse Resp SpO2 Height Weight   02/28/21 0457 (!) 147/78 -- -- 89 18 99 % -- --   02/28/21 0400 (!) 175/95 -- -- 102 -- 94 % -- --   02/28/21 0300 133/73 -- -- 95 -- 92 % -- --   02/28/21 0243 (!) 160/88 -- -- 95 -- 97 % -- --   02/28/21 0237 -- -- -- -- -- 95 % -- --   02/28/21 0236 (!) 183/95 -- -- 98 -- -- -- --   02/28/21 0139 (!) 141/76 98.4  F (36.9  C) Oral 96 16 98 % 1.803 m (5' 11\") 77.1 kg (170 lb)       Physical Exam  Constitutional:  Cooperative.   HENT:   Head:    Atraumatic.   Mouth/Throat:   Oropharynx is without erythema or exudate " and mucous membranes are moist.   Eyes:    Conjunctivae normal and EOM are normal.      Pupils are equal, round, and reactive to light.   Neck:    Normal range of motion. Neck supple.   Cardiovascular:  Normal rate, regular rhythm, normal heart sounds and radial and dorsalis pedis pulses are 2+ and symmetric.    Pulmonary/Chest:  Effort normal and breath sounds normal.   Abdominal:   Soft. Bowel sounds are normal.      No splenomegaly or hepatomegaly. No tenderness. No rebound.   Musculoskeletal:  Normal range of motion. No edema and no tenderness.   Neurological:  Alert. Normal strength. No cranial nerve deficit. GCS 15  Skin:    Skin is warm and dry.   Psychiatric:   Normal mood and affect.       Emergency Department Course   ECG (02:42:15):  Rate 95 bpm. DC interval 194. QRS duration 78. QT/QTc 364/457. P-R-T axes 51 -6 62. Sinus rhythm with occasional premature ventricular complexes. Nonspecific ST abnormality. Abnormal ECG. Rate increased. Now with PVC. Now with nonspecific T wave changes.  Interpreted at 0247 by Arvind Armando MD.    Laboratory:  CBC: WBC 11.8 (H), o/w WNL (HGB 15.1, )    BMP (Collected 0146): Na 130 (L),  (H), o/w WNL (Creatinine 0.92)    Glucose by Meter (Collected 0135): 111 (H)  Glucose by Meter (Collected 0226): 224 (H)  Glucose by Meter (Collected 0326): 281 (H)  Glucose by Meter (Collected 0432): 298 (H)    Troponin (Collected 0146): <0.015    UA: , Mucous Present, o/w Negative    Emergency Department Course:    Reviewed:  I reviewed the patient's nursing notes, vitals, past available medical records.     Assessments:  0157 I obtained history and examined the patient as noted above.     0440 I rechecked the patient and explained findings. He is feeling much improved and happy that his sugar is maintaining a normal level. He would like to leave and catch his flight.     Interventions:  0434: NS 1L IV Bolus     Disposition:  The patient was discharged to  home.    Impression & Plan    Medical Decision Making:  Jose Francisco Gonzalez is a 80 year old male who presents for evaluation of diaphoresis, lightheadedness, and nausea, and a blood sugar of 48 at home.  This is consistent with hypoglycemia.  The most likely etiology of the hypoglycemia is decreased oral intake, or perhaps rebound hypoglycemia after sugary soda.  Also possible is that the patient measured his insulin dose incorrectly, but nonetheless a broad differential was considered including infection, medication noncompliance, overdose of medication, other metabolic derangement, lack of adequate PO intake, etc.  The patient is on long-acting insulin.  They are on oral diabetes medications.  Workup and detailed history done and appears outpatient management is indicated as sugars have been stable and they ate a large meal here without evidence of recurrent hypoglycemia.  He does not have a history of similar recent episodes, and has not had recent medication changes.  I do not detect any signs of illness on tonight's testing.  Given his cardiac risk factors, EKG and troponin are checked.  I doubt ACS, dysrhythmia or PE as a source of his near syncopal symptoms.  He was able to ambulate around the ED without any concerning symptoms.  Blood sugars remain mildly elevated at the time of the patient's discharge.  I discussed this with the patient, and that I am comfortable with these levels.  He will monitor and record his sugars frequently over the next couple of days.  He will call his doctor to discuss symptoms, and any needed dosing changes.  He is leaving on an airplane in 3 hours to go back to [place] where he will be staying with his wife for the next month or so.  Discussed in detail with patient about this problem and my recommendations for management in the future.      Diagnosis:    ICD-10-CM    1. Hypoglycemia  E16.2 Glucose by meter     Glucose by meter   2. Near syncope  R55      Scribe Disclosure:  I,  Munir Roth, am serving as a scribe at 1:57 AM on 2/28/2021 to document services personally performed by Arvind Armando MD at [facility] EMERGENCY DEPT based on my observations and the provider's statements to me.      Arvind Armando MD  02/28/21 0801

## 2021-03-01 ENCOUNTER — TELEPHONE (OUTPATIENT)
Dept: INTERNAL MEDICINE | Facility: CLINIC | Age: 81
End: 2021-03-01

## 2021-03-01 NOTE — TELEPHONE ENCOUNTER
"Patient calling to discuss low sugars and when he should take his insulin?    Saturday night had hypoglycemia at 11:00 am- sweating- BS 66  Ate apple/OJ then bs was 170     Sat/sun 12:00 am  BS was 44 called 911    Ambulance took patient to [facility] for low blood sugar- stayed the night and then got on a plane to [place] where he is now    Patient does take his Basaglar between 11-12 pm at night- states he has done this for 10-12 years.  Advised that we can check with his provider to suggest the best time to take the insulin.  patient decreased the dose from 26 units to 13 units  Hospital just monitored and released    This am BS was 214 2 am-, 270 4 am, 270 at 8 am    ED for acute condition Discharge Protocol    \"Hi, my name is Erin Engel RN, a registered nurse, and I am calling from [facility].  I am calling to follow up and see how things are going for you after your recent emergency visit.\"    Tell me how you are doing now that you are home?\"       Discharge Instructions    \"Let's review your discharge instructions.  What is/are the follow-up recommendations?  Pt. Response: none    \"Has an appointment with your primary care provider been scheduled?\"  No (not needed)- went back to [place]    Medications    \"Tell me what changed about your medicines when you discharged?\"    none    \"What questions do you have about your medications?\"   None        Call Summary    \"What questions or concerns do you have about your recent visit and your follow-up care?\"     yes. Advice given: wants to jeanne bustamante to do about his medications- states he took 13 units instead of 26. takes this every noght between 11-12 pm     \"If you have questions or things don't continue to improve, we encourage you contact us through the main clinic number (give number).  Even if the clinic is not open, triage nurses are available 24/7 to help you.     We would like you to know that our clinic has extended hours (provide " "information).  We also have urgent care (provide details on closest location and hours/contact info)\"    \"Thank you for your time and take care!\"              "

## 2021-03-02 NOTE — TELEPHONE ENCOUNTER
Patient called to make sure message was sent to provider. Reassured patient that message was sent and that provider was not in clinic yesterday.

## 2021-03-05 ENCOUNTER — NURSE TRIAGE (OUTPATIENT)
Dept: INTERNAL MEDICINE | Facility: CLINIC | Age: 81
End: 2021-03-05

## 2021-03-05 DIAGNOSIS — E11.21 TYPE 2 DIABETES MELLITUS WITH DIABETIC NEPHROPATHY, WITH LONG-TERM CURRENT USE OF INSULIN (H): ICD-10-CM

## 2021-03-05 DIAGNOSIS — Z79.4 TYPE 2 DIABETES MELLITUS WITH DIABETIC NEPHROPATHY, WITH LONG-TERM CURRENT USE OF INSULIN (H): ICD-10-CM

## 2021-03-05 NOTE — TELEPHONE ENCOUNTER
Patient is requesting a call from PCP to discuss low BS and insulin dose. Please see previous telephone message dated 3/01. Pt was seen at ED 02/28/21. He is currently in [place] and may not be back until April. He was advised to monitor blood sugars. Education provided re:fisrt aid for hypoglycemia and when to seek emergent care at ED. Please advice can phone visit be schedule since pt is in [place]? If yes can this be overbooked 03/08/21 ?       Additional Information    Negative: Unconscious or difficult to awaken    Negative: Seizure occurs    Negative: Acting confused (e.g., disoriented, slurred speech)    Negative: Very weak (can't stand)    Negative: Sounds like a life-threatening emergency to the triager    Negative: Vomiting and signs of dehydration (e.g., very dry mouth, lightheaded, dark urine, etc.)    Negative: Low blood sugar symptoms persist > 30 minutes AND using low blood sugar Care Advice    Negative: Low blood glucose (< 70 mg/dL or 3.9 mmol/L) persists > 30 minutes AND using low blood sugar Care Advice    Negative: Patient sounds very sick or weak to the triager    Negative: Diabetes medication overdose (e.g., insulin error) and triager unable to answer question    Negative: Caller has URGENT medication or insulin pump question and triager unable to answer question    Negative: Low blood sugar symptoms with no other adult present AND hasn't tried Care Advice    Negative: Low blood glucose (< 70 mg/dL or 3.9 mmol/L) with no other adult present AND hasn't tried Care Advice    Negative: Low blood glucose (< 70 mg/dL or 3.9 mmol/L) or symptomatic, now improved with Care Advice AND cause unknown    Patient wants to be seen    Protocols used: DIABETES - LOW BLOOD SUGAR-A-OH

## 2021-03-08 RX ORDER — INSULIN GLARGINE 100 [IU]/ML
INJECTION, SOLUTION SUBCUTANEOUS
Qty: 30 ML | Refills: 4 | COMMUNITY
Start: 2021-03-08 | End: 2021-10-22

## 2021-03-08 NOTE — TELEPHONE ENCOUNTER
D/w pt.                    More recently, AM glucoses have been 120 range.            I suggested that he stop taking glipizide.                  Check glucose before, and 2 hrs after one meal per day and report results to me in 2 wks.                      He is now taking 15-20 units of Basaglar.

## 2021-03-23 ENCOUNTER — TELEPHONE (OUTPATIENT)
Dept: INTERNAL MEDICINE | Facility: CLINIC | Age: 81
End: 2021-03-23

## 2021-03-23 DIAGNOSIS — E11.21 TYPE 2 DIABETES MELLITUS WITH DIABETIC NEPHROPATHY, WITH LONG-TERM CURRENT USE OF INSULIN (H): Primary | ICD-10-CM

## 2021-03-23 DIAGNOSIS — E11.9 DIABETES MELLITUS (H): ICD-10-CM

## 2021-03-23 DIAGNOSIS — Z79.4 TYPE 2 DIABETES MELLITUS WITH DIABETIC NEPHROPATHY, WITH LONG-TERM CURRENT USE OF INSULIN (H): Primary | ICD-10-CM

## 2021-03-23 RX ORDER — PEN NEEDLE, DIABETIC 31 GX5/16"
NEEDLE, DISPOSABLE MISCELLANEOUS
Qty: 100 EACH | Refills: 11 | Status: SHIPPED | OUTPATIENT
Start: 2021-03-23 | End: 2022-04-21

## 2021-03-23 NOTE — TELEPHONE ENCOUNTER
Patient states that his blood sugars have been running much better. States that he has had no episodes of symptomatic low blood sugar. Patient states that he once had 70 or 80, but did not have symptoms. Low occurs when he wakes in the morning. Patient is currently checking blood sugars 3-5 times a day. Did have some blood sugar as high at 290 when family was visiting. Typically is using 15-20 units of Basaglar.     Patient will schedule visit in about 1 month. States that he would like to discuss continuous glucose monitor at that time.   Brigid Irby RN

## 2021-05-18 DIAGNOSIS — I10 HYPERTENSION GOAL BP (BLOOD PRESSURE) < 140/80: Chronic | ICD-10-CM

## 2021-05-19 RX ORDER — METOPROLOL SUCCINATE 50 MG/1
TABLET, EXTENDED RELEASE ORAL
Qty: 90 TABLET | Refills: 4 | Status: SHIPPED | OUTPATIENT
Start: 2021-05-19 | End: 2022-06-03

## 2021-05-19 NOTE — TELEPHONE ENCOUNTER
Routing refill request to provider for review/approval because:  BP out of range:   BP Readings from Last 3 Encounters:   02/28/21 (!) 147/78   12/11/19 138/72   12/04/19 136/78        Linda JOHNSON RN, BSN, PHN

## 2021-05-20 DIAGNOSIS — I10 HYPERTENSION GOAL BP (BLOOD PRESSURE) < 140/80: Chronic | ICD-10-CM

## 2021-05-20 RX ORDER — AMLODIPINE BESYLATE 2.5 MG/1
2.5 TABLET ORAL DAILY
Qty: 90 TABLET | Refills: 1 | Status: SHIPPED | OUTPATIENT
Start: 2021-05-20 | End: 2021-11-16

## 2021-05-20 NOTE — CONFIDENTIAL NOTE
Routing refill request to provider for review/approval because:  Last BP out of range 2.28.21 = 147/78

## 2021-06-07 DIAGNOSIS — E11.21 TYPE 2 DIABETES MELLITUS WITH DIABETIC NEPHROPATHY, WITH LONG-TERM CURRENT USE OF INSULIN (H): Primary | ICD-10-CM

## 2021-06-07 DIAGNOSIS — Z79.4 TYPE 2 DIABETES MELLITUS WITH DIABETIC NEPHROPATHY, WITH LONG-TERM CURRENT USE OF INSULIN (H): Primary | ICD-10-CM

## 2021-06-10 ENCOUNTER — OFFICE VISIT (OUTPATIENT)
Dept: INTERNAL MEDICINE | Facility: CLINIC | Age: 81
End: 2021-06-10
Payer: COMMERCIAL

## 2021-06-10 VITALS
BODY MASS INDEX: 24.27 KG/M2 | HEART RATE: 72 BPM | WEIGHT: 174 LBS | OXYGEN SATURATION: 97 % | SYSTOLIC BLOOD PRESSURE: 134 MMHG | DIASTOLIC BLOOD PRESSURE: 74 MMHG | TEMPERATURE: 98.2 F

## 2021-06-10 DIAGNOSIS — M05.742 RHEUMATOID ARTHRITIS INVOLVING BOTH HANDS WITH POSITIVE RHEUMATOID FACTOR (H): ICD-10-CM

## 2021-06-10 DIAGNOSIS — E78.5 HYPERLIPIDEMIA LDL GOAL <100: ICD-10-CM

## 2021-06-10 DIAGNOSIS — E11.21 TYPE 2 DIABETES MELLITUS WITH DIABETIC NEPHROPATHY, WITH LONG-TERM CURRENT USE OF INSULIN (H): Primary | ICD-10-CM

## 2021-06-10 DIAGNOSIS — E87.1 HYPONATREMIA: ICD-10-CM

## 2021-06-10 DIAGNOSIS — R39.15 URINARY URGENCY: ICD-10-CM

## 2021-06-10 DIAGNOSIS — Z81.8 FAMILY HISTORY OF DEMENTIA: ICD-10-CM

## 2021-06-10 DIAGNOSIS — R26.89 POOR BALANCE: ICD-10-CM

## 2021-06-10 DIAGNOSIS — M05.741 RHEUMATOID ARTHRITIS INVOLVING BOTH HANDS WITH POSITIVE RHEUMATOID FACTOR (H): ICD-10-CM

## 2021-06-10 DIAGNOSIS — I10 HYPERTENSION GOAL BP (BLOOD PRESSURE) < 140/80: Chronic | ICD-10-CM

## 2021-06-10 DIAGNOSIS — Z79.4 TYPE 2 DIABETES MELLITUS WITH DIABETIC NEPHROPATHY, WITH LONG-TERM CURRENT USE OF INSULIN (H): Primary | ICD-10-CM

## 2021-06-10 DIAGNOSIS — M80.00XD AGE-RELATED OSTEOPOROSIS WITH CURRENT PATHOLOGICAL FRACTURE WITH ROUTINE HEALING: ICD-10-CM

## 2021-06-10 LAB
ALBUMIN SERPL-MCNC: 3.6 G/DL (ref 3.4–5)
ALP SERPL-CCNC: 85 U/L (ref 40–150)
ALT SERPL W P-5'-P-CCNC: 21 U/L (ref 0–70)
ANION GAP SERPL CALCULATED.3IONS-SCNC: 3 MMOL/L (ref 3–14)
AST SERPL W P-5'-P-CCNC: 20 U/L (ref 0–45)
BASOPHILS # BLD AUTO: 0 10E9/L (ref 0–0.2)
BASOPHILS NFR BLD AUTO: 0.3 %
BILIRUB SERPL-MCNC: 0.5 MG/DL (ref 0.2–1.3)
BUN SERPL-MCNC: 12 MG/DL (ref 7–30)
CALCIUM SERPL-MCNC: 8.9 MG/DL (ref 8.5–10.1)
CHLORIDE SERPL-SCNC: 92 MMOL/L (ref 94–109)
CHOLEST SERPL-MCNC: 142 MG/DL
CO2 SERPL-SCNC: 30 MMOL/L (ref 20–32)
CREAT SERPL-MCNC: 0.79 MG/DL (ref 0.66–1.25)
CREAT UR-MCNC: 42 MG/DL
DIFFERENTIAL METHOD BLD: NORMAL
EOSINOPHIL # BLD AUTO: 0.2 10E9/L (ref 0–0.7)
EOSINOPHIL NFR BLD AUTO: 2.6 %
ERYTHROCYTE [DISTWIDTH] IN BLOOD BY AUTOMATED COUNT: 13.4 % (ref 10–15)
GFR SERPL CREATININE-BSD FRML MDRD: 83 ML/MIN/{1.73_M2}
GLUCOSE SERPL-MCNC: 289 MG/DL (ref 70–99)
HBA1C MFR BLD: 8.6 % (ref 0–5.6)
HCT VFR BLD AUTO: 43.3 % (ref 40–53)
HDLC SERPL-MCNC: 53 MG/DL
HGB BLD-MCNC: 14.7 G/DL (ref 13.3–17.7)
LDLC SERPL CALC-MCNC: 76 MG/DL
LYMPHOCYTES # BLD AUTO: 1.5 10E9/L (ref 0.8–5.3)
LYMPHOCYTES NFR BLD AUTO: 20.7 %
MCH RBC QN AUTO: 30.8 PG (ref 26.5–33)
MCHC RBC AUTO-ENTMCNC: 33.9 G/DL (ref 31.5–36.5)
MCV RBC AUTO: 91 FL (ref 78–100)
MICROALBUMIN UR-MCNC: 15 MG/L
MICROALBUMIN/CREAT UR: 36.67 MG/G CR (ref 0–17)
MONOCYTES # BLD AUTO: 0.7 10E9/L (ref 0–1.3)
MONOCYTES NFR BLD AUTO: 10.1 %
NEUTROPHILS # BLD AUTO: 4.8 10E9/L (ref 1.6–8.3)
NEUTROPHILS NFR BLD AUTO: 66.3 %
NONHDLC SERPL-MCNC: 89 MG/DL
PLATELET # BLD AUTO: 262 10E9/L (ref 150–450)
POTASSIUM SERPL-SCNC: 4.1 MMOL/L (ref 3.4–5.3)
PROT SERPL-MCNC: 7.9 G/DL (ref 6.8–8.8)
RBC # BLD AUTO: 4.78 10E12/L (ref 4.4–5.9)
SODIUM SERPL-SCNC: 125 MMOL/L (ref 133–144)
TRIGL SERPL-MCNC: 67 MG/DL
WBC # BLD AUTO: 7.2 10E9/L (ref 4–11)

## 2021-06-10 PROCEDURE — 99214 OFFICE O/P EST MOD 30 MIN: CPT | Performed by: INTERNAL MEDICINE

## 2021-06-10 PROCEDURE — 83036 HEMOGLOBIN GLYCOSYLATED A1C: CPT | Performed by: INTERNAL MEDICINE

## 2021-06-10 PROCEDURE — 36415 COLL VENOUS BLD VENIPUNCTURE: CPT | Performed by: INTERNAL MEDICINE

## 2021-06-10 PROCEDURE — 80061 LIPID PANEL: CPT | Performed by: INTERNAL MEDICINE

## 2021-06-10 PROCEDURE — 80053 COMPREHEN METABOLIC PANEL: CPT | Performed by: INTERNAL MEDICINE

## 2021-06-10 PROCEDURE — 85025 COMPLETE CBC W/AUTO DIFF WBC: CPT | Performed by: INTERNAL MEDICINE

## 2021-06-10 PROCEDURE — 82043 UR ALBUMIN QUANTITATIVE: CPT | Performed by: INTERNAL MEDICINE

## 2021-06-10 RX ORDER — LISINOPRIL 10 MG/1
10 TABLET ORAL DAILY
Qty: 90 TABLET | Refills: 3 | Status: SHIPPED | OUTPATIENT
Start: 2021-06-10 | End: 2022-05-25

## 2021-06-10 NOTE — PROGRESS NOTES
Assessment & Plan     Type 2 diabetes mellitus with diabetic nephropathy, with long-term current use of insulin (H)  We discussed that we do not need tight diabetes control at his age, and we need to avoid hypoglycemia.        Labs are pending.      We discussed a one-time continuous glucose monitor   set up with the diabetes educators which can be helpful in terms of planning his meals and insulin scheduling.  - Comprehensive metabolic panel (BMP + Alb, Alk Phos, ALT, AST, Total. Bili, TP)  - Albumin Random Urine Quantitative with Creat Ratio  - Hemoglobin A1c  - [facility]BULATORY ADULT DIABETES EDUCATOR REFERRAL    Hypertension goal BP (blood pressure) < 140/80  .    Resume lisinopril  - Comprehensive metabolic panel (BMP + Alb, Alk Phos, ALT, AST, Total. Bili, TP)  - lisinopril (ZESTRIL) 10 MG tablet  Dispense: 90 tablet; Refill: 3    Hyponatremia  Chronic.  Check for worsening.    Age-related osteoporosis with current pathological fracture with routine healing  Continue alendronate    Poor balance  Resume physical therapy  - [facility]    Hyperlipidemia LDL goal <100  Check LDL  - Lipid Profile (Chol, Trig, HDL, LDL calc)    Urinary urgency  Fairly mild symptoms at this point.  Since he does have some post void residual, pharmacologic therapy not indicated    Family history of dementia  Noted    Rheumatoid arthritis involving both hands with positive rheumatoid factor (H)  He plans to follow-up with rheumatology  - CBC with platelets and differential               Patient Instructions               I will let you know your lab results.                          Consider an appointment with the diabetes educators for a one time continuous glucose monitor.                  Start taking the lisinopril again.                             FYI: new FDC date of 12/31/21.       This date is definite assuming no unusual circumstance.         Return in about 4 months (around 10/10/2021) for follow up of  several issues.    Kvng Richardson MD  [facility]    Chan Felton is a 81 year old who presents for the following health issues     HPI     Diabetes Follow-up    How often are you checking your blood sugar? Two times daily  Blood sugar testing frequency justification:  Frequent hypoglycemia  What time of day are you checking your blood sugars (select all that apply)?  Before and after meals  Have you had any blood sugars above 200?  Yes   Have you had any blood sugars below 70?  No    What symptoms do you notice when your blood sugar is low?  Confusion and fever    What concerns do you have today about your diabetes? None     Do you have any of these symptoms? (Select all that apply)  No numbness or tingling in feet.  No redness, sores or blisters on feet.  No complaints of excessive thirst.  No reports of blurry vision.  No significant changes to weight.    Have you had a diabetic eye exam in the last 12 months? Yes- Date of last eye exam: january 2021,  Location: [facility]                Hyperlipidemia Follow-Up      Are you regularly taking any medication or supplement to lower your cholesterol?   Yes- atorvastatin    Are you having muscle aches or other side effects that you think could be caused by your cholesterol lowering medication?  No    Hypertension Follow-up      Do you check your blood pressure regularly outside of the clinic? Yes     Are you following a low salt diet? No    Are your blood pressures ever more than 140 on the top number (systolic) OR more   than 90 on the bottom number (diastolic), for example 140/90? No    BP Readings from Last 2 Encounters:   06/10/21 134/74   02/28/21 (!) 147/78     Hemoglobin A1C (%)   Date Value   06/10/2021 8.6 (H)   02/22/2021 7.6 (H)     LDL Cholesterol Calculated (mg/dL)   Date Value   06/10/2021 76   06/07/2019 53     LDL Cholesterol Direct (mg/dL)   Date Value   06/09/2020 71         How many servings of fruits and  vegetables do you eat daily?  2-3    On average, how many sweetened beverages do you drink each day (Examples: soda, juice, sweet tea, etc.  Do NOT count diet or artificially sweetened beverages)?   0    How many days per week do you exercise enough to make your heart beat faster? 4    How many minutes a day do you exercise enough to make your heart beat faster? 20 - 29    How many days per week do you miss taking your medication? 0            Had another episode of hypoglycemia on Feb 28.                  Lately, he has been aiming for higher glucoses.      He has cut back his long-acting insulin dosage.                        Currently,using a sliding scale; 6-26 units ; mainly checks AM and HS.              he does not use any short acting insulin, or glipizide.  When he made this appointment, he was planning to ask for a continuous glucose monitor system, however the more he has read about this, the more complicated it seems to him.            At any rate, it does not seem that it would be covered by Medicare, given his single insulin injection per day.                    In terms of other issues,                 somehow he stopped taking lisinopril.                                             He cut back the hydroxychloroquine, and the he stopped taking it entirely, on his own.            Bowels ok with metamucil.               Occasional urgency.                  We reviewed the urology note of 12/19; PVR then 76 ml.                He also reports his balance is poor.  He would like to resume physical therapy.      He is concerned about his risk for Alzheimer's dementia, given his family history.    Review of Systems         Current Outpatient Medications   Medication Sig Dispense Refill     alendronate (FOSAMAX) 70 MG tablet Take 1 tablet (70 mg) by mouth every 7 days 12 tablet 3     amLODIPine (NORVASC) 2.5 MG tablet Take 1 tablet (2.5 mg) by mouth daily 90 tablet 1     aspirin 81 MG tablet Take  by mouth  daily.       atorvastatin (LIPITOR) 20 MG tablet TAKE 1 TABLET(20MG) BY MOUTH DAILY 90 tablet 4     blood glucose (NO BRAND SPECIFIED) lancets standard Use to test blood sugar 3 times daily or as directed. 300 each 1     blood glucose (NO BRAND SPECIFIED) test strip Use to test blood sugar 3 times daily or as directed. 300 each 1     blood glucose (ONETOUCH VERIO IQ) test strip USE TO TEST BLOOD SUGARS THREE TIMES DAILY OR AS DIRECTED 300 strip 1     blood glucose monitoring (NO BRAND SPECIFIED) meter device kit Use to test blood sugar 3 times daily or as directed. 1 kit 0     CONTOUR NEXT TEST test strip USE TO TEST BLOOD SUGAR 3 TIMES DAILY OR AS DIRECTED 300 strip 0     hydroxychloroquine (PLAQUENIL) 200 MG tablet Take 1 tablet (200 mg) by mouth daily 30 tablet      insulin glargine (BASAGLAR KWIKPEN) 100 UNIT/ML pen INJECT 15-20 UNITS SUBCUTANEOUS EVERY DAY AS DIRECTED 30 mL 4     insulin pen needle (B-D U/F) 31G X 8 MM miscellaneous USE DAILY AS DIRECTED 100 each 11     lisinopril (PRINIVIL/ZESTRIL) 10 MG tablet TAKE 1 TABLET BY MOUTH EVERY DAY 90 tablet 3     metoprolol succinate ER (TOPROL-XL) 50 MG 24 hr tablet TAKE 1 TABLET(50 MG) BY MOUTH DAILY 90 tablet 4     Multiple Vitamins-Minerals (PRESERVISION AREDS 2) CAPS 2 per day       order for DME Test strips for pt's glucometer, brand as covered by insurance Test QID and prn. He is on insulin. Patients preferred brand-Verio One Touch. 400 each 1     Psyllium-Calcium (METAMUCIL PLUS CALCIUM) CAPS Take 2 capsules by mouth At Bedtime       SENNA-docusate sodium (SENNA S) 8.6-50 MG tablet Take daily while using percocet to prevent constipation 15 tablet 0     Wt Readings from Last 4 Encounters:   06/10/21 78.9 kg (174 lb)   02/28/21 77.1 kg (170 lb)   12/11/19 77.1 kg (170 lb)   12/04/19 79.4 kg (175 lb)       Objective    /74   Pulse 72   Temp 98.2  F (36.8  C) (Oral)   Wt 78.9 kg (174 lb)   SpO2 97%   BMI 24.27 kg/m    Body mass index is 24.27  kg/m .  Physical Exam   GENERAL APPEARANCE: alert, no distress, fatigued and frail  CV: regular rates and rhythm  NEURO: gait abnormal ; a bit unsteady        Results for orders placed or performed in visit on 06/10/21   Comprehensive metabolic panel (BMP + Alb, Alk Phos, ALT, AST, Total. Bili, TP)     Status: Abnormal   Result Value Ref Range    Sodium 125 (L) 133 - 144 mmol/L    Potassium 4.1 3.4 - 5.3 mmol/L    Chloride 92 (L) 94 - 109 mmol/L    Carbon Dioxide 30 20 - 32 mmol/L    Anion Gap 3 3 - 14 mmol/L    Glucose 289 (H) 70 - 99 mg/dL    Urea Nitrogen 12 7 - 30 mg/dL    Creatinine 0.79 0.66 - 1.25 mg/dL    GFR Estimate 83 >60 mL/min/[1.73_m2]    GFR Estimate If Black >90 >60 mL/min/[1.73_m2]    Calcium 8.9 8.5 - 10.1 mg/dL    Bilirubin Total 0.5 0.2 - 1.3 mg/dL    Albumin 3.6 3.4 - 5.0 g/dL    Protein Total 7.9 6.8 - 8.8 g/dL    Alkaline Phosphatase 85 40 - 150 U/L    ALT 21 0 - 70 U/L    AST 20 0 - 45 U/L   Lipid Profile (Chol, Trig, HDL, LDL calc)     Status: None   Result Value Ref Range    Cholesterol 142 <200 mg/dL    Triglycerides 67 <150 mg/dL    HDL Cholesterol 53 >39 mg/dL    LDL Cholesterol Calculated 76 <100 mg/dL    Non HDL Cholesterol 89 <130 mg/dL   Albumin Random Urine Quantitative with Creat Ratio     Status: Abnormal   Result Value Ref Range    Creatinine Urine 42 mg/dL    Albumin Urine mg/L 15 mg/L    Albumin Urine mg/g Cr 36.67 (H) 0 - 17 mg/g Cr   CBC with platelets and differential     Status: None   Result Value Ref Range    WBC 7.2 4.0 - 11.0 10e9/L    RBC Count 4.78 4.4 - 5.9 10e12/L    Hemoglobin 14.7 13.3 - 17.7 g/dL    Hematocrit 43.3 40.0 - 53.0 %    MCV 91 78 - 100 fl    MCH 30.8 26.5 - 33.0 pg    MCHC 33.9 31.5 - 36.5 g/dL    RDW 13.4 10.0 - 15.0 %    Platelet Count 262 150 - 450 10e9/L    % Neutrophils 66.3 %    % Lymphocytes 20.7 %    % Monocytes 10.1 %    % Eosinophils 2.6 %    % Basophils 0.3 %    Absolute Neutrophil 4.8 1.6 - 8.3 10e9/L    Absolute Lymphocytes 1.5 0.8 - 5.3  10e9/L    Absolute Monocytes 0.7 0.0 - 1.3 10e9/L    Absolute Eosinophils 0.2 0.0 - 0.7 10e9/L    Absolute Basophils 0.0 0.0 - 0.2 10e9/L    Diff Method Automated Method    Hemoglobin A1c     Status: Abnormal   Result Value Ref Range    Hemoglobin A1C 8.6 (H) 0 - 5.6 %     Letter sent.                   Your diabetes control is not as good, as predicted..       The A1C of 8.6 correlates to an average blood sugar of approximately 198.         We do not need tight control, but a little bit more insulin would be helpful.                   Your sodium level is a little lower than usual.  Please cut back your water intake.            Your other lab results are normal or stable,including the liver,kidney, cholesterol, and the bone marrow function.

## 2021-06-10 NOTE — LETTER
Sylvia 10, 2021      Jose Francisco Gonzalez  [address]  [facility] [address]        Dear ,    We are writing to inform you of your test results.              Your diabetes control is not as good, as predicted..       The A1C of 8.6 correlates to an average blood sugar of approximately 198.         We do not need tight control, but a little bit more insulin would be helpful.                                                                                                                   Your sodium level is a little lower than usual.  Please cut back your water intake.            Your other lab results are normal or stable,including the liver,kidney, cholesterol, and the bone marrow function.      Results for orders placed or performed in visit on 06/10/21   Comprehensive metabolic panel (BMP + Alb, Alk Phos, ALT, AST, Total. Bili, TP)     Status: Abnormal   Result Value Ref Range    Sodium 125 (L) 133 - 144 mmol/L    Potassium 4.1 3.4 - 5.3 mmol/L    Chloride 92 (L) 94 - 109 mmol/L    Carbon Dioxide 30 20 - 32 mmol/L    Anion Gap 3 3 - 14 mmol/L    Glucose 289 (H) 70 - 99 mg/dL    Urea Nitrogen 12 7 - 30 mg/dL    Creatinine 0.79 0.66 - 1.25 mg/dL    GFR Estimate 83 >60 mL/min/[1.73_m2]    GFR Estimate If Black >90 >60 mL/min/[1.73_m2]    Calcium 8.9 8.5 - 10.1 mg/dL    Bilirubin Total 0.5 0.2 - 1.3 mg/dL    Albumin 3.6 3.4 - 5.0 g/dL    Protein Total 7.9 6.8 - 8.8 g/dL    Alkaline Phosphatase 85 40 - 150 U/L    ALT 21 0 - 70 U/L    AST 20 0 - 45 U/L   Lipid Profile (Chol, Trig, HDL, LDL calc)     Status: None   Result Value Ref Range    Cholesterol 142 <200 mg/dL    Triglycerides 67 <150 mg/dL    HDL Cholesterol 53 >39 mg/dL    LDL Cholesterol Calculated 76 <100 mg/dL    Non HDL Cholesterol 89 <130 mg/dL   Albumin Random Urine Quantitative with Creat Ratio     Status: Abnormal   Result Value Ref Range    Creatinine Urine 42 mg/dL    Albumin Urine mg/L 15 mg/L    Albumin Urine mg/g Cr 36.67 (H) 0 -  17 mg/g Cr   CBC with platelets and differential     Status: None   Result Value Ref Range    WBC 7.2 4.0 - 11.0 10e9/L    RBC Count 4.78 4.4 - 5.9 10e12/L    Hemoglobin 14.7 13.3 - 17.7 g/dL    Hematocrit 43.3 40.0 - 53.0 %    MCV 91 78 - 100 fl    MCH 30.8 26.5 - 33.0 pg    MCHC 33.9 31.5 - 36.5 g/dL    RDW 13.4 10.0 - 15.0 %    Platelet Count 262 150 - 450 10e9/L    % Neutrophils 66.3 %    % Lymphocytes 20.7 %    % Monocytes 10.1 %    % Eosinophils 2.6 %    % Basophils 0.3 %    Absolute Neutrophil 4.8 1.6 - 8.3 10e9/L    Absolute Lymphocytes 1.5 0.8 - 5.3 10e9/L    Absolute Monocytes 0.7 0.0 - 1.3 10e9/L    Absolute Eosinophils 0.2 0.0 - 0.7 10e9/L    Absolute Basophils 0.0 0.0 - 0.2 10e9/L    Diff Method Automated Method    Hemoglobin A1c     Status: Abnormal   Result Value Ref Range    Hemoglobin A1C 8.6 (H) 0 - 5.6 %         If you have any questions or concerns, please call the clinic at the number listed above.       Sincerely,        Kvng Richardson MD

## 2021-06-10 NOTE — PATIENT INSTRUCTIONS
I will let you know your lab results.                          Consider an appointment with the diabetes educators for a one time continuous glucose monitor.                  Start taking the lisinopril again.                             FYI: new half-way date of 12/31/21.       This date is definite assuming no unusual circumstance.

## 2021-07-20 ENCOUNTER — TELEPHONE (OUTPATIENT)
Dept: INTERNAL MEDICINE | Facility: CLINIC | Age: 81
End: 2021-07-20

## 2021-07-20 NOTE — TELEPHONE ENCOUNTER
Patient is requesting who Dr. Richardson would recommend that he see after Dr. Richardson retires.   Can we leave a detailed message on this number? YES  Phone number patient can be reached at: Home number on file [phone] (home)    Brigid Irby RN  [facility] Triage

## 2021-07-22 NOTE — TELEPHONE ENCOUNTER
He can see any of the [facility] providers who are not close to skilled nursing.

## 2021-08-16 DIAGNOSIS — E78.5 HYPERLIPIDEMIA LDL GOAL <100: ICD-10-CM

## 2021-08-17 RX ORDER — ATORVASTATIN CALCIUM 20 MG/1
TABLET, FILM COATED ORAL
Qty: 90 TABLET | Refills: 3 | Status: SHIPPED | OUTPATIENT
Start: 2021-08-17 | End: 2022-01-01

## 2021-09-17 ENCOUNTER — TRANSFERRED RECORDS (OUTPATIENT)
Dept: HEALTH INFORMATION MANAGEMENT | Facility: CLINIC | Age: 81
End: 2021-09-17

## 2021-09-17 LAB — RETINOPATHY: NEGATIVE

## 2021-10-04 ENCOUNTER — TRANSFERRED RECORDS (OUTPATIENT)
Dept: HEALTH INFORMATION MANAGEMENT | Facility: CLINIC | Age: 81
End: 2021-10-04

## 2021-10-04 LAB — RETINOPATHY: NEGATIVE

## 2021-10-22 ENCOUNTER — OFFICE VISIT (OUTPATIENT)
Dept: INTERNAL MEDICINE | Facility: CLINIC | Age: 81
End: 2021-10-22
Payer: COMMERCIAL

## 2021-10-22 VITALS
BODY MASS INDEX: 23.01 KG/M2 | OXYGEN SATURATION: 98 % | HEART RATE: 90 BPM | TEMPERATURE: 98.3 F | RESPIRATION RATE: 16 BRPM | SYSTOLIC BLOOD PRESSURE: 120 MMHG | DIASTOLIC BLOOD PRESSURE: 70 MMHG | WEIGHT: 165 LBS

## 2021-10-22 DIAGNOSIS — M81.0 OSTEOPOROSIS WITHOUT CURRENT PATHOLOGICAL FRACTURE, UNSPECIFIED OSTEOPOROSIS TYPE: ICD-10-CM

## 2021-10-22 DIAGNOSIS — E87.1 HYPONATREMIA: ICD-10-CM

## 2021-10-22 DIAGNOSIS — Z79.4 TYPE 2 DIABETES MELLITUS WITH DIABETIC NEPHROPATHY, WITH LONG-TERM CURRENT USE OF INSULIN (H): ICD-10-CM

## 2021-10-22 DIAGNOSIS — R80.9 PROTEINURIA, UNSPECIFIED TYPE: ICD-10-CM

## 2021-10-22 DIAGNOSIS — I10 HYPERTENSION, UNSPECIFIED TYPE: ICD-10-CM

## 2021-10-22 DIAGNOSIS — E11.21 TYPE 2 DIABETES MELLITUS WITH DIABETIC NEPHROPATHY, WITH LONG-TERM CURRENT USE OF INSULIN (H): ICD-10-CM

## 2021-10-22 LAB
HBA1C MFR BLD: 9.3 % (ref 0–5.6)
OSMOLALITY UR: 378 MMOL/KG (ref 100–1200)
SODIUM UR-SCNC: 55 MMOL/L

## 2021-10-22 PROCEDURE — 83935 ASSAY OF URINE OSMOLALITY: CPT | Performed by: INTERNAL MEDICINE

## 2021-10-22 PROCEDURE — 84300 ASSAY OF URINE SODIUM: CPT | Performed by: INTERNAL MEDICINE

## 2021-10-22 PROCEDURE — 99214 OFFICE O/P EST MOD 30 MIN: CPT | Performed by: INTERNAL MEDICINE

## 2021-10-22 PROCEDURE — 84443 ASSAY THYROID STIM HORMONE: CPT | Performed by: INTERNAL MEDICINE

## 2021-10-22 PROCEDURE — 36415 COLL VENOUS BLD VENIPUNCTURE: CPT | Performed by: INTERNAL MEDICINE

## 2021-10-22 PROCEDURE — 82043 UR ALBUMIN QUANTITATIVE: CPT | Performed by: INTERNAL MEDICINE

## 2021-10-22 PROCEDURE — 82306 VITAMIN D 25 HYDROXY: CPT | Performed by: INTERNAL MEDICINE

## 2021-10-22 PROCEDURE — 83036 HEMOGLOBIN GLYCOSYLATED A1C: CPT | Performed by: INTERNAL MEDICINE

## 2021-10-22 PROCEDURE — 80048 BASIC METABOLIC PNL TOTAL CA: CPT | Performed by: INTERNAL MEDICINE

## 2021-10-22 RX ORDER — INSULIN GLARGINE 100 [IU]/ML
INJECTION, SOLUTION SUBCUTANEOUS
Qty: 30 ML | Refills: 4
Start: 2021-10-22 | End: 2021-12-28

## 2021-10-22 RX ORDER — CYCLOSPORINE 0.5 MG/ML
1 EMULSION OPHTHALMIC 2 TIMES DAILY PRN
COMMUNITY
Start: 2021-09-21 | End: 2022-01-01

## 2021-10-22 ASSESSMENT — ANXIETY QUESTIONNAIRES
IF YOU CHECKED OFF ANY PROBLEMS ON THIS QUESTIONNAIRE, HOW DIFFICULT HAVE THESE PROBLEMS MADE IT FOR YOU TO DO YOUR WORK, TAKE CARE OF THINGS AT HOME, OR GET ALONG WITH OTHER PEOPLE: NOT DIFFICULT AT ALL
GAD7 TOTAL SCORE: 0
6. BECOMING EASILY ANNOYED OR IRRITABLE: NOT AT ALL
2. NOT BEING ABLE TO STOP OR CONTROL WORRYING: NOT AT ALL
7. FEELING AFRAID AS IF SOMETHING AWFUL MIGHT HAPPEN: NOT AT ALL
3. WORRYING TOO MUCH ABOUT DIFFERENT THINGS: NOT AT ALL
5. BEING SO RESTLESS THAT IT IS HARD TO SIT STILL: NOT AT ALL
1. FEELING NERVOUS, ANXIOUS, OR ON EDGE: NOT AT ALL

## 2021-10-22 ASSESSMENT — PATIENT HEALTH QUESTIONNAIRE - PHQ9
5. POOR APPETITE OR OVEREATING: NOT AT ALL
SUM OF ALL RESPONSES TO PHQ QUESTIONS 1-9: 1

## 2021-10-22 NOTE — PATIENT INSTRUCTIONS
DEXA/Bone density test to see response to Alendronate  Labs as ordered   WIll make further recommendations based on lab results  Tetanus (TDAP) vaccine booster at pharmacy

## 2021-10-22 NOTE — PROGRESS NOTES
ASSESSMENT:   1. Type 2 diabetes mellitus with diabetic nephropathy, with long-term current use of insulin (H)  Poor control.  Will check labs to get a sense of patient's overall control over the last 3 months.  Given a moderate Basaglar insulin being used, likely has some insulin production still that could try daily sulfonylurea in addition.  With history of IBS attends are on the loose side, not a good candidate for Metformin.  Patient does not wish to start fast acting insulin with meals at this time  - insulin glargine (BASAGLAR KWIKPEN) 100 UNIT/ML pen; INJECT 25-30 UNITS SUBCUTANEOUS EVERY DAY AS DIRECTED  Dispense: 30 mL; Refill: 4  - Basic metabolic panel; Future  - Hemoglobin A1c; Future  - TSH with free T4 reflex; Future  - Basic metabolic panel  - Hemoglobin A1c  - TSH with free T4 reflex    2. Osteoporosis without current pathological fracture, unspecified osteoporosis type  On alendronate since 2019.  Needs DEXA to reassess bone density status.  Initially planned for 5 years of treatment  - DX Hip/Pelvis/Spine; Future  - Vitamin D Deficiency; Future  - Vitamin D Deficiency    3. Hyponatremia  Chronic.  Currently asymptomatic.  No previous lab work-up to assess if related to SIADH versus other.  Not on diuretic at this time.  Labs as ordered  - Basic metabolic panel; Future  - Osmolality urine; Future  - Sodium random urine; Future  - Basic metabolic panel  - Osmolality urine  - Sodium random urine    4. Proteinuria, unspecified type  Only on 10 mg lisinopril currently.  Lab as ordered.  If proteinuria present, will increase lisinopril dosage  - Albumin Random Urine Quantitative with Creat Ratio; Future  - Albumin Random Urine Quantitative with Creat Ratio    5. Hypertension, unspecified type  Controlled.  Await urine protein results to see if higher dose lisinopril needed as tolerated      PLAN:   DEXA/Bone density test to see response to Alendronate  Labs as ordered   WIll make further  recommendations based on lab results  Tetanus (TDAP) vaccine booster at pharmacy      (Chart documentation was completed, in part, with [employer] voice-recognition software. Even though reviewed, some grammatical, spelling, and word errors may remain.)    Kin Alejo MD  Internal Medicine Department  [facility] Felton is a 81 year old who presents for the following health issues     HPI     Chief Complaint   Patient presents with     Establish Care     was under the care of Dr Kvng Richardson      Most recent lab results reviewed with pt.      Component      Latest Ref Rng & Units 6/9/2020 2/22/2021 6/10/2021   WBC      4.0 - 11.0 10e9/L 6.9  7.2   RBC Count      4.4 - 5.9 10e12/L 5.13  4.78   Hemoglobin      13.3 - 17.7 g/dL 15.6  14.7   Hematocrit      40.0 - 53.0 % 46.1  43.3   MCV      78 - 100 fl 90  91   MCH      26.5 - 33.0 pg 30.4  30.8   MCHC      31.5 - 36.5 g/dL 33.8  33.9   RDW      10.0 - 15.0 % 13.0  13.4   Platelet Count      150 - 450 10e9/L 282  262   Diff Method       Automated Method  Automated Method   % Neutrophils      % 73.2  66.3   % Lymphocytes      % 14.7  20.7   % Monocytes      % 9.8  10.1   % Eosinophils      % 2.0  2.6   % Basophils      % 0.3  0.3   Absolute Neutrophil      1.6 - 8.3 10e9/L 5.1  4.8   Absolute Lymphocytes      0.8 - 5.3 10e9/L 1.0  1.5   Absolute Monocytes      0.0 - 1.3 10e9/L 0.7  0.7   Absolute Eosinophils      0.0 - 0.7 10e9/L 0.1  0.2   Absolute Basophils      0.0 - 0.2 10e9/L 0.0  0.0   Sodium      133 - 144 mmol/L 126 (L) 129 (L) 125 (L)   Potassium      3.4 - 5.3 mmol/L 5.7 (H) 4.2 4.1   Chloride      94 - 109 mmol/L 93 (L) 96 92 (L)   Carbon Dioxide      20 - 32 mmol/L 27 28 30   Anion Gap      3 - 14 mmol/L 6 5 3   Glucose      70 - 99 mg/dL 200 (H) 164 (H) 289 (H)   Urea Nitrogen      7 - 30 mg/dL 12 15 12   Creatinine      0.66 - 1.25 mg/dL 0.72 0.70 0.79   GFR Estimate      >60 mL/min/1.73:m2 87 88 83   GFR  Estimate If Black      >60 mL/min/1.73:m2 >90 >90 >90   Calcium      8.5 - 10.1 mg/dL 8.9 9.0 8.9   Bilirubin Total      0.2 - 1.3 mg/dL 0.7 0.7 0.5   Albumin      3.4 - 5.0 g/dL 3.7 3.5 3.6   Protein Total      6.8 - 8.8 g/dL 8.3 8.0 7.9   Alkaline Phosphatase      40 - 150 U/L 75 73 85   ALT      0 - 70 U/L 27 27 21   AST      0 - 45 U/L 23 23 20   Cholesterol      <200 mg/dL   142   Triglycerides      <150 mg/dL   67   HDL Cholesterol      >39 mg/dL   53   LDL Cholesterol Calculated      <100 mg/dL   76   Non HDL Cholesterol      <130 mg/dL   89   Creatinine Urine      mg/dL  55 42   Albumin Urine mg/L      mg/L  49 15   Albumin Urine mg/g Cr      0 - 17 mg/g Cr  88.09 (H) 36.67 (H)   LDL Cholesterol Direct      <100 mg/dL 71     Hemoglobin A1C      0 - 5.6 % 7.0 (H) 7.6 (H) 8.6 (H)     Meeting patient for the first time today.  Transferring care from Dr Richardson.   Denies CP, SOB, abdominal pain, polyuria, polydipsia,  extremity numbness/parasthesias or skin problems. Has macular degeneration with reduced central vision. Driving a little in daytime only.  History of diabetes.  Blood sugar in the morning range usually 120-160 though was 80 this morning.  Bedtime blood sugars usually range 220-300.  Blood sugars have been worsening over the last year with last A1c 8.6.  Patient states Dr. Richardson it was fine for patient to run high blood sugars at his age  Using Basaglar 25 units at bedtime.  Not on any fast acting insulin or other diabetic medications to control blood sugar during the day.  Has hearing loss and using hearing aids.   History of osteoporosis.  Patient on Fosamax which was started December 2019 patient will be leaving to [place] on 10/29/2021.  He will be back in [place] briefly for [place] and then leaving again to [place] until April 2022    Additional ROS:   Constitutional, HEENT, Cardiovascular, Pulmonary, GI and , Neuro, MSK and Psych review of systems/symptoms are otherwise negative or  "unchanged from previous, except as noted above.      OBJECTIVE:  /70   Pulse 90   Temp 98.3  F (36.8  C) (Temporal)   Resp 16   Wt 74.8 kg (165 lb)   SpO2 98%   BMI 23.01 kg/m     Estimated body mass index is 23.01 kg/m  as calculated from the following:    Height as of 2/28/21: 1.803 m (5' 11\").    Weight as of this encounter: 74.8 kg (165 lb).     Neck: no adenopathy. Thyroid normal to palpation. No bruits  Pulm: Lungs clear to auscultation   CV: Regular rates and rhythm  GI: Soft, nontender, Normal active bowel sounds, No hepatosplenomegaly or masses palpable  Ext: Peripheral pulses intact. No edema. Foot exam skin normal  Neuro: Normal strength and tone, sensory exam grossly normal. LTS intact BLE distally.               "

## 2021-10-23 LAB
ANION GAP SERPL CALCULATED.3IONS-SCNC: 7 MMOL/L (ref 3–14)
BUN SERPL-MCNC: 11 MG/DL (ref 7–30)
CALCIUM SERPL-MCNC: 8.9 MG/DL (ref 8.5–10.1)
CHLORIDE BLD-SCNC: 90 MMOL/L (ref 94–109)
CO2 SERPL-SCNC: 26 MMOL/L (ref 20–32)
CREAT SERPL-MCNC: 0.78 MG/DL (ref 0.66–1.25)
DEPRECATED CALCIDIOL+CALCIFEROL SERPL-MC: 22 UG/L (ref 20–75)
GFR SERPL CREATININE-BSD FRML MDRD: 83 ML/MIN/1.73M2
GLUCOSE BLD-MCNC: 277 MG/DL (ref 70–99)
POTASSIUM BLD-SCNC: 4.8 MMOL/L (ref 3.4–5.3)
SODIUM SERPL-SCNC: 123 MMOL/L (ref 133–144)
TSH SERPL DL<=0.005 MIU/L-ACNC: 0.98 MU/L (ref 0.4–4)

## 2021-10-23 ASSESSMENT — ANXIETY QUESTIONNAIRES: GAD7 TOTAL SCORE: 0

## 2021-10-24 LAB
CREAT UR-MCNC: 79 MG/DL
MICROALBUMIN UR-MCNC: 35 MG/L
MICROALBUMIN/CREAT UR: 44.3 MG/G CR (ref 0–17)

## 2021-10-31 PROBLEM — M05.742 RHEUMATOID ARTHRITIS INVOLVING BOTH HANDS WITH POSITIVE RHEUMATOID FACTOR (H): Status: RESOLVED | Noted: 2019-09-05 | Resolved: 2021-10-31

## 2021-10-31 PROBLEM — R63.4 WEIGHT LOSS: Status: RESOLVED | Noted: 2019-09-04 | Resolved: 2021-10-31

## 2021-10-31 PROBLEM — R26.89 POOR BALANCE: Status: RESOLVED | Noted: 2018-04-11 | Resolved: 2021-10-31

## 2021-10-31 PROBLEM — R80.9 PROTEINURIA, UNSPECIFIED TYPE: Status: ACTIVE | Noted: 2021-10-31

## 2021-10-31 PROBLEM — M05.741 RHEUMATOID ARTHRITIS INVOLVING BOTH HANDS WITH POSITIVE RHEUMATOID FACTOR (H): Status: RESOLVED | Noted: 2019-09-05 | Resolved: 2021-10-31

## 2021-10-31 PROBLEM — I10 PRIMARY HYPERTENSION: Status: ACTIVE | Noted: 2021-10-31

## 2021-10-31 PROBLEM — I10 HYPERTENSION, UNSPECIFIED TYPE: Status: ACTIVE | Noted: 2021-10-31

## 2021-10-31 PROBLEM — R10.84 ABDOMINAL PAIN, GENERALIZED: Status: RESOLVED | Noted: 2019-06-11 | Resolved: 2021-10-31

## 2021-10-31 PROBLEM — R19.4 CHANGE IN BOWEL HABITS: Status: RESOLVED | Noted: 2019-05-29 | Resolved: 2021-10-31

## 2021-11-10 DIAGNOSIS — M80.00XD AGE-RELATED OSTEOPOROSIS WITH CURRENT PATHOLOGICAL FRACTURE WITH ROUTINE HEALING: ICD-10-CM

## 2021-11-10 RX ORDER — ALENDRONATE SODIUM 70 MG/1
TABLET ORAL
Qty: 12 TABLET | Refills: 3 | Status: SHIPPED | OUTPATIENT
Start: 2021-11-10 | End: 2022-01-01

## 2021-11-10 NOTE — TELEPHONE ENCOUNTER
Routing refill request to provider for review/approval because:     Dexa on file within past 2 years    Jhoana Armenta RN

## 2021-11-16 DIAGNOSIS — I10 HYPERTENSION GOAL BP (BLOOD PRESSURE) < 140/80: Chronic | ICD-10-CM

## 2021-11-16 RX ORDER — AMLODIPINE BESYLATE 2.5 MG/1
2.5 TABLET ORAL DAILY
Qty: 90 TABLET | Refills: 2 | Status: SHIPPED | OUTPATIENT
Start: 2021-11-16 | End: 2022-01-01

## 2021-12-03 ENCOUNTER — TRANSFERRED RECORDS (OUTPATIENT)
Dept: HEALTH INFORMATION MANAGEMENT | Facility: CLINIC | Age: 81
End: 2021-12-03

## 2021-12-06 ENCOUNTER — ANCILLARY PROCEDURE (OUTPATIENT)
Dept: BONE DENSITY | Facility: CLINIC | Age: 81
End: 2021-12-06
Attending: INTERNAL MEDICINE
Payer: COMMERCIAL

## 2021-12-06 DIAGNOSIS — M81.0 OSTEOPOROSIS WITHOUT CURRENT PATHOLOGICAL FRACTURE, UNSPECIFIED OSTEOPOROSIS TYPE: ICD-10-CM

## 2021-12-06 PROCEDURE — 77085 DXA BONE DENSITY AXL VRT FX: CPT | Performed by: INTERNAL MEDICINE

## 2021-12-08 ENCOUNTER — TRANSFERRED RECORDS (OUTPATIENT)
Dept: HEALTH INFORMATION MANAGEMENT | Facility: CLINIC | Age: 81
End: 2021-12-08
Payer: COMMERCIAL

## 2021-12-08 LAB
ALT SERPL-CCNC: 14 U/L (ref 5–40)
AST SERPL-CCNC: 24 U/L (ref 5–34)
CREATININE (EXTERNAL): 0.72 MG/DL (ref 0.5–1.3)
GFR ESTIMATED (EXTERNAL): 110.08 ML/MIN/1.73M2

## 2021-12-26 ENCOUNTER — TELEPHONE (OUTPATIENT)
Dept: INTERNAL MEDICINE | Facility: CLINIC | Age: 81
End: 2021-12-26
Payer: COMMERCIAL

## 2021-12-26 DIAGNOSIS — M80.00XD AGE-RELATED OSTEOPOROSIS WITH CURRENT PATHOLOGICAL FRACTURE WITH ROUTINE HEALING: ICD-10-CM

## 2021-12-26 DIAGNOSIS — I10 HYPERTENSION GOAL BP (BLOOD PRESSURE) < 140/80: Chronic | ICD-10-CM

## 2021-12-26 DIAGNOSIS — E11.21 TYPE 2 DIABETES MELLITUS WITH DIABETIC NEPHROPATHY, WITH LONG-TERM CURRENT USE OF INSULIN (H): ICD-10-CM

## 2021-12-26 DIAGNOSIS — Z79.4 TYPE 2 DIABETES MELLITUS WITH DIABETIC NEPHROPATHY, WITH LONG-TERM CURRENT USE OF INSULIN (H): ICD-10-CM

## 2021-12-26 NOTE — TELEPHONE ENCOUNTER
Reason for Call:  Medication or medication refill:    Do you use a [facility]?  Name of the pharmacy and phone number for the current request:  [facility] on [address]    Name of the medication requested: Basaglar    Other request: Patient would also like the results of his lab work done early December. He is leaving for [place] on Friday, can this all be done ASAP?    Can we leave a detailed message on this number? NO    Phone number patient can be reached at: Home number on file [phone] (home)    Best Time:     Call taken on 12/26/2021 at 10:28 AM by Jasmin Harrell

## 2021-12-27 RX ORDER — PEN NEEDLE, DIABETIC 31 GX5/16"
NEEDLE, DISPOSABLE MISCELLANEOUS
Qty: 100 EACH | Refills: 11 | Status: CANCELLED | OUTPATIENT
Start: 2021-12-27

## 2021-12-27 RX ORDER — METOPROLOL SUCCINATE 50 MG/1
50 TABLET, EXTENDED RELEASE ORAL DAILY
Qty: 90 TABLET | Refills: 4 | Status: CANCELLED | OUTPATIENT
Start: 2021-12-27

## 2021-12-27 RX ORDER — LISINOPRIL 10 MG/1
10 TABLET ORAL DAILY
Qty: 90 TABLET | Refills: 3 | Status: CANCELLED | OUTPATIENT
Start: 2021-12-27

## 2021-12-27 RX ORDER — ALENDRONATE SODIUM 70 MG/1
TABLET ORAL
Qty: 12 TABLET | Refills: 3 | Status: CANCELLED | OUTPATIENT
Start: 2021-12-27

## 2021-12-27 RX ORDER — AMLODIPINE BESYLATE 2.5 MG/1
2.5 TABLET ORAL DAILY
Qty: 90 TABLET | Refills: 2 | Status: CANCELLED | OUTPATIENT
Start: 2021-12-27

## 2021-12-28 RX ORDER — INSULIN GLARGINE 100 [IU]/ML
INJECTION, SOLUTION SUBCUTANEOUS
Qty: 30 ML | Refills: 1 | Status: SHIPPED | OUTPATIENT
Start: 2021-12-28 | End: 2022-01-26

## 2022-01-01 ENCOUNTER — PATIENT OUTREACH (OUTPATIENT)
Dept: NURSING | Facility: CLINIC | Age: 82
End: 2022-01-01
Payer: COMMERCIAL

## 2022-01-01 ENCOUNTER — MYC MEDICAL ADVICE (OUTPATIENT)
Dept: PHARMACY | Facility: PHYSICIAN GROUP | Age: 82
End: 2022-01-01

## 2022-01-01 ENCOUNTER — PRE VISIT (OUTPATIENT)
Dept: NEPHROLOGY | Facility: CLINIC | Age: 82
End: 2022-01-01

## 2022-01-01 ENCOUNTER — NURSE TRIAGE (OUTPATIENT)
Dept: INTERNAL MEDICINE | Facility: CLINIC | Age: 82
End: 2022-01-01

## 2022-01-01 ENCOUNTER — VIRTUAL VISIT (OUTPATIENT)
Dept: BEHAVIORAL HEALTH | Facility: CLINIC | Age: 82
End: 2022-01-01
Attending: SOCIAL WORKER
Payer: COMMERCIAL

## 2022-01-01 ENCOUNTER — TELEPHONE (OUTPATIENT)
Dept: NEPHROLOGY | Facility: CLINIC | Age: 82
End: 2022-01-01

## 2022-01-01 ENCOUNTER — HOSPITAL ENCOUNTER (OUTPATIENT)
Dept: BEHAVIORAL HEALTH | Facility: CLINIC | Age: 82
Discharge: HOME | End: 2022-12-05
Attending: FAMILY MEDICINE
Payer: COMMERCIAL

## 2022-01-01 ENCOUNTER — TELEPHONE (OUTPATIENT)
Dept: BEHAVIORAL HEALTH | Facility: CLINIC | Age: 82
End: 2022-01-01

## 2022-01-01 ENCOUNTER — TRANSFERRED RECORDS (OUTPATIENT)
Dept: HEALTH INFORMATION MANAGEMENT | Facility: CLINIC | Age: 82
End: 2022-01-01

## 2022-01-01 ENCOUNTER — TELEPHONE (OUTPATIENT)
Dept: PHARMACY | Facility: PHYSICIAN GROUP | Age: 82
End: 2022-01-01

## 2022-01-01 ENCOUNTER — TRANSFERRED RECORDS (OUTPATIENT)
Dept: MULTI SPECIALTY CLINIC | Facility: CLINIC | Age: 82
End: 2022-01-01

## 2022-01-01 ENCOUNTER — OFFICE VISIT (OUTPATIENT)
Dept: INTERNAL MEDICINE | Facility: CLINIC | Age: 82
End: 2022-01-01
Payer: COMMERCIAL

## 2022-01-01 ENCOUNTER — VIRTUAL VISIT (OUTPATIENT)
Dept: PHARMACY | Facility: PHYSICIAN GROUP | Age: 82
End: 2022-01-01
Payer: COMMERCIAL

## 2022-01-01 ENCOUNTER — TELEPHONE (OUTPATIENT)
Dept: INTERNAL MEDICINE | Facility: CLINIC | Age: 82
End: 2022-01-01

## 2022-01-01 ENCOUNTER — TELEPHONE (OUTPATIENT)
Dept: FAMILY MEDICINE | Facility: CLINIC | Age: 82
End: 2022-01-01

## 2022-01-01 ENCOUNTER — TELEPHONE (OUTPATIENT)
Dept: PHARMACY | Facility: CLINIC | Age: 82
End: 2022-01-01

## 2022-01-01 ENCOUNTER — NURSE TRIAGE (OUTPATIENT)
Dept: NURSING | Facility: CLINIC | Age: 82
End: 2022-01-01

## 2022-01-01 ENCOUNTER — TRANSCRIBE ORDERS (OUTPATIENT)
Dept: OPHTHALMOLOGY | Facility: CLINIC | Age: 82
End: 2022-01-01

## 2022-01-01 ENCOUNTER — MEDICAL CORRESPONDENCE (OUTPATIENT)
Dept: HEALTH INFORMATION MANAGEMENT | Facility: CLINIC | Age: 82
End: 2022-01-01

## 2022-01-01 ENCOUNTER — DOCUMENTATION ONLY (OUTPATIENT)
Dept: PHARMACY | Facility: PHYSICIAN GROUP | Age: 82
End: 2022-01-01

## 2022-01-01 ENCOUNTER — MYC MEDICAL ADVICE (OUTPATIENT)
Dept: INTERNAL MEDICINE | Facility: CLINIC | Age: 82
End: 2022-01-01

## 2022-01-01 ENCOUNTER — OFFICE VISIT (OUTPATIENT)
Dept: PHARMACY | Facility: PHYSICIAN GROUP | Age: 82
End: 2022-01-01
Payer: COMMERCIAL

## 2022-01-01 ENCOUNTER — LAB (OUTPATIENT)
Dept: URGENT CARE | Facility: URGENT CARE | Age: 82
End: 2022-01-01
Payer: COMMERCIAL

## 2022-01-01 ENCOUNTER — HOSPITAL ENCOUNTER (EMERGENCY)
Facility: CLINIC | Age: 82
Discharge: HOME | End: 2022-11-26
Attending: EMERGENCY MEDICINE | Admitting: EMERGENCY MEDICINE
Payer: COMMERCIAL

## 2022-01-01 ENCOUNTER — OFFICE VISIT (OUTPATIENT)
Dept: FAMILY MEDICINE | Facility: CLINIC | Age: 82
End: 2022-01-01
Payer: COMMERCIAL

## 2022-01-01 ENCOUNTER — CLINICAL UPDATE (OUTPATIENT)
Dept: PHARMACY | Facility: PHYSICIAN GROUP | Age: 82
End: 2022-01-01
Payer: COMMERCIAL

## 2022-01-01 ENCOUNTER — VIRTUAL VISIT (OUTPATIENT)
Dept: BEHAVIORAL HEALTH | Facility: CLINIC | Age: 82
End: 2022-01-01
Payer: COMMERCIAL

## 2022-01-01 ENCOUNTER — LAB (OUTPATIENT)
Dept: LAB | Facility: CLINIC | Age: 82
End: 2022-01-01
Payer: COMMERCIAL

## 2022-01-01 ENCOUNTER — DOCUMENTATION ONLY (OUTPATIENT)
Dept: OTHER | Facility: CLINIC | Age: 82
End: 2022-01-01

## 2022-01-01 ENCOUNTER — TELEPHONE (OUTPATIENT)
Dept: NURSING | Facility: CLINIC | Age: 82
End: 2022-01-01

## 2022-01-01 ENCOUNTER — PATIENT OUTREACH (OUTPATIENT)
Dept: CARE COORDINATION | Facility: CLINIC | Age: 82
End: 2022-01-01

## 2022-01-01 ENCOUNTER — LAB REQUISITION (OUTPATIENT)
Dept: LAB | Facility: CLINIC | Age: 82
End: 2022-01-01
Payer: COMMERCIAL

## 2022-01-01 ENCOUNTER — HEALTH MAINTENANCE LETTER (OUTPATIENT)
Age: 82
End: 2022-01-01

## 2022-01-01 ENCOUNTER — OFFICE VISIT (OUTPATIENT)
Dept: NEPHROLOGY | Facility: CLINIC | Age: 82
End: 2022-01-01
Payer: COMMERCIAL

## 2022-01-01 ENCOUNTER — VIRTUAL VISIT (OUTPATIENT)
Dept: EDUCATION SERVICES | Facility: CLINIC | Age: 82
End: 2022-01-01
Payer: COMMERCIAL

## 2022-01-01 ENCOUNTER — VIRTUAL VISIT (OUTPATIENT)
Dept: INTERNAL MEDICINE | Facility: CLINIC | Age: 82
End: 2022-01-01
Payer: COMMERCIAL

## 2022-01-01 ENCOUNTER — HOSPITAL ENCOUNTER (EMERGENCY)
Facility: CLINIC | Age: 82
Discharge: LEFT BEFORE DISCHARGE | End: 2022-10-03
Admitting: EMERGENCY MEDICINE
Payer: COMMERCIAL

## 2022-01-01 VITALS
RESPIRATION RATE: 15 BRPM | SYSTOLIC BLOOD PRESSURE: 144 MMHG | OXYGEN SATURATION: 97 % | HEART RATE: 86 BPM | WEIGHT: 150.6 LBS | DIASTOLIC BLOOD PRESSURE: 74 MMHG | TEMPERATURE: 97 F | BODY MASS INDEX: 21 KG/M2

## 2022-01-01 VITALS
HEART RATE: 60 BPM | OXYGEN SATURATION: 96 % | DIASTOLIC BLOOD PRESSURE: 84 MMHG | TEMPERATURE: 97.2 F | BODY MASS INDEX: 20.67 KG/M2 | WEIGHT: 148.2 LBS | RESPIRATION RATE: 15 BRPM | SYSTOLIC BLOOD PRESSURE: 146 MMHG

## 2022-01-01 VITALS
OXYGEN SATURATION: 98 % | BODY MASS INDEX: 21.36 KG/M2 | TEMPERATURE: 97.7 F | HEART RATE: 84 BPM | RESPIRATION RATE: 18 BRPM | SYSTOLIC BLOOD PRESSURE: 135 MMHG | WEIGHT: 152.6 LBS | DIASTOLIC BLOOD PRESSURE: 72 MMHG | HEIGHT: 71 IN

## 2022-01-01 VITALS
DIASTOLIC BLOOD PRESSURE: 81 MMHG | SYSTOLIC BLOOD PRESSURE: 144 MMHG | HEART RATE: 102 BPM | TEMPERATURE: 98.4 F | OXYGEN SATURATION: 96 % | BODY MASS INDEX: 20.32 KG/M2 | WEIGHT: 145.7 LBS

## 2022-01-01 VITALS
SYSTOLIC BLOOD PRESSURE: 184 MMHG | OXYGEN SATURATION: 98 % | RESPIRATION RATE: 14 BRPM | DIASTOLIC BLOOD PRESSURE: 158 MMHG | HEART RATE: 95 BPM | TEMPERATURE: 98 F

## 2022-01-01 VITALS
SYSTOLIC BLOOD PRESSURE: 165 MMHG | WEIGHT: 143 LBS | TEMPERATURE: 97.5 F | OXYGEN SATURATION: 97 % | HEIGHT: 71 IN | BODY MASS INDEX: 20.02 KG/M2 | RESPIRATION RATE: 16 BRPM | HEART RATE: 78 BPM | DIASTOLIC BLOOD PRESSURE: 90 MMHG

## 2022-01-01 DIAGNOSIS — Z79.4 TYPE 2 DIABETES MELLITUS WITH DIABETIC NEPHROPATHY, WITH LONG-TERM CURRENT USE OF INSULIN (H): Primary | ICD-10-CM

## 2022-01-01 DIAGNOSIS — F99 INSOMNIA DUE TO OTHER MENTAL DISORDER: ICD-10-CM

## 2022-01-01 DIAGNOSIS — H35.30 MACULAR DEGENERATION (SENILE) OF RETINA: ICD-10-CM

## 2022-01-01 DIAGNOSIS — F41.9 ANXIETY DISORDER, UNSPECIFIED TYPE: ICD-10-CM

## 2022-01-01 DIAGNOSIS — F33.1 MODERATE EPISODE OF RECURRENT MAJOR DEPRESSIVE DISORDER (H): Primary | ICD-10-CM

## 2022-01-01 DIAGNOSIS — R26.9 ABNORMAL GAIT: ICD-10-CM

## 2022-01-01 DIAGNOSIS — E87.1 HYPONATREMIA: ICD-10-CM

## 2022-01-01 DIAGNOSIS — R53.1 WEAKNESS: ICD-10-CM

## 2022-01-01 DIAGNOSIS — E22.2 SYNDROME OF INAPPROPRIATE ANTIDIURETIC HORMONE SECRETION (H): ICD-10-CM

## 2022-01-01 DIAGNOSIS — R53.83 OTHER FATIGUE: ICD-10-CM

## 2022-01-01 DIAGNOSIS — E11.21 TYPE 2 DIABETES MELLITUS WITH DIABETIC NEPHROPATHY, WITH LONG-TERM CURRENT USE OF INSULIN (H): ICD-10-CM

## 2022-01-01 DIAGNOSIS — K59.00 CONSTIPATION, UNSPECIFIED CONSTIPATION TYPE: ICD-10-CM

## 2022-01-01 DIAGNOSIS — Z79.4 TYPE 2 DIABETES MELLITUS WITH DIABETIC NEPHROPATHY, WITH LONG-TERM CURRENT USE OF INSULIN (H): ICD-10-CM

## 2022-01-01 DIAGNOSIS — F51.05 INSOMNIA DUE TO OTHER MENTAL DISORDER: ICD-10-CM

## 2022-01-01 DIAGNOSIS — Z20.822 CLOSE EXPOSURE TO 2019 NOVEL CORONAVIRUS: ICD-10-CM

## 2022-01-01 DIAGNOSIS — I10 HYPERTENSION, UNSPECIFIED TYPE: ICD-10-CM

## 2022-01-01 DIAGNOSIS — E22.2 SIADH (SYNDROME OF INAPPROPRIATE ADH PRODUCTION) (H): ICD-10-CM

## 2022-01-01 DIAGNOSIS — I10 PRIMARY HYPERTENSION: ICD-10-CM

## 2022-01-01 DIAGNOSIS — E87.1 HYPONATREMIA: Primary | ICD-10-CM

## 2022-01-01 DIAGNOSIS — F33.2 SEVERE EPISODE OF RECURRENT MAJOR DEPRESSIVE DISORDER, WITHOUT PSYCHOTIC FEATURES (H): Primary | ICD-10-CM

## 2022-01-01 DIAGNOSIS — F32.2 CURRENT SEVERE EPISODE OF MAJOR DEPRESSIVE DISORDER WITHOUT PSYCHOTIC FEATURES WITHOUT PRIOR EPISODE (H): ICD-10-CM

## 2022-01-01 DIAGNOSIS — F41.1 GENERALIZED ANXIETY DISORDER: ICD-10-CM

## 2022-01-01 DIAGNOSIS — E11.21 TYPE 2 DIABETES MELLITUS WITH DIABETIC NEPHROPATHY, WITH LONG-TERM CURRENT USE OF INSULIN (H): Primary | ICD-10-CM

## 2022-01-01 DIAGNOSIS — F43.23 ADJUSTMENT DISORDER WITH MIXED ANXIETY AND DEPRESSED MOOD: ICD-10-CM

## 2022-01-01 DIAGNOSIS — F32.2 CURRENT SEVERE EPISODE OF MAJOR DEPRESSIVE DISORDER WITHOUT PSYCHOTIC FEATURES WITHOUT PRIOR EPISODE (H): Primary | ICD-10-CM

## 2022-01-01 DIAGNOSIS — E87.1 HYPO-OSMOLALITY AND HYPONATREMIA: ICD-10-CM

## 2022-01-01 DIAGNOSIS — R19.5 CHANGE IN CONSISTENCY OF STOOL: ICD-10-CM

## 2022-01-01 DIAGNOSIS — E87.1 CHRONIC HYPONATREMIA: ICD-10-CM

## 2022-01-01 DIAGNOSIS — I10 HYPERTENSION GOAL BP (BLOOD PRESSURE) < 140/80: Chronic | ICD-10-CM

## 2022-01-01 DIAGNOSIS — F41.9 ANXIETY: ICD-10-CM

## 2022-01-01 DIAGNOSIS — R45.851 SUICIDAL IDEATION: ICD-10-CM

## 2022-01-01 DIAGNOSIS — M80.00XD AGE-RELATED OSTEOPOROSIS WITH CURRENT PATHOLOGICAL FRACTURE WITH ROUTINE HEALING: ICD-10-CM

## 2022-01-01 DIAGNOSIS — H35.3190 MACULAR DEGENERATION, DRY: Primary | ICD-10-CM
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
## 2022-01-01 DIAGNOSIS — E78.5 HYPERLIPIDEMIA LDL GOAL <100: ICD-10-CM

## 2022-01-01 LAB
ALBUMIN SERPL BCG-MCNC: 4 G/DL (ref 3.5–5.2)
ALBUMIN SERPL-MCNC: 3.4 G/DL (ref 3.4–5)
ALBUMIN SERPL-MCNC: 3.5 G/DL (ref 3.4–5)
ALBUMIN SERPL-MCNC: 3.5 G/DL (ref 3.4–5)
ALBUMIN UR-MCNC: NEGATIVE MG/DL
ALBUMIN UR-MCNC: NEGATIVE MG/DL
ALP SERPL-CCNC: 65 U/L (ref 40–150)
ALP SERPL-CCNC: 71 U/L (ref 40–150)
ALP SERPL-CCNC: 72 U/L (ref 40–150)
ALP SERPL-CCNC: 73 U/L (ref 40–129)
ALT SERPL W P-5'-P-CCNC: 10 U/L (ref 10–50)
ALT SERPL W P-5'-P-CCNC: 22 U/L (ref 0–70)
ALT SERPL W P-5'-P-CCNC: 25 U/L (ref 0–70)
ALT SERPL W P-5'-P-CCNC: 26 U/L (ref 0–70)
AMPHETAMINES UR QL SCN: NORMAL
ANION GAP SERPL CALCULATED.3IONS-SCNC: 10 MMOL/L (ref 7–15)
ANION GAP SERPL CALCULATED.3IONS-SCNC: 4 MMOL/L (ref 3–14)
ANION GAP SERPL CALCULATED.3IONS-SCNC: 8 MMOL/L (ref 3–14)
ANION GAP SERPL CALCULATED.3IONS-SCNC: 8 MMOL/L (ref 7–15)
APPEARANCE UR: CLEAR
APPEARANCE UR: CLEAR
AST SERPL W P-5'-P-CCNC: 21 U/L (ref 0–45)
AST SERPL W P-5'-P-CCNC: 24 U/L (ref 0–45)
AST SERPL W P-5'-P-CCNC: 24 U/L (ref 0–45)
AST SERPL W P-5'-P-CCNC: 29 U/L (ref 10–50)
ATRIAL RATE - MUSE: 90 BPM
BARBITURATES UR QL: NORMAL
BASOPHILS # BLD AUTO: 0 10E3/UL (ref 0–0.2)
BASOPHILS # BLD AUTO: 0.1 10E3/UL (ref 0–0.2)
BASOPHILS NFR BLD AUTO: 1 %
BASOPHILS NFR BLD AUTO: 1 %
BENZODIAZ UR QL: NORMAL
BILIRUB SERPL-MCNC: 0.5 MG/DL
BILIRUB SERPL-MCNC: 0.7 MG/DL (ref 0.2–1.3)
BILIRUB SERPL-MCNC: 0.8 MG/DL (ref 0.2–1.3)
BILIRUB SERPL-MCNC: 1.1 MG/DL (ref 0.2–1.3)
BILIRUB UR QL STRIP: NEGATIVE
BILIRUB UR QL STRIP: NEGATIVE
BUN SERPL-MCNC: 12 MG/DL (ref 7–30)
BUN SERPL-MCNC: 15 MG/DL (ref 7–30)
BUN SERPL-MCNC: 17.4 MG/DL (ref 8–23)
BUN SERPL-MCNC: 19 MG/DL (ref 7–30)
BUN SERPL-MCNC: 19.9 MG/DL (ref 8–23)
BUN SERPL-MCNC: 21 MG/DL (ref 7–30)
CALCIUM SERPL-MCNC: 8.7 MG/DL (ref 8.5–10.1)
CALCIUM SERPL-MCNC: 8.9 MG/DL (ref 8.5–10.1)
CALCIUM SERPL-MCNC: 9.3 MG/DL (ref 8.8–10.2)
CALCIUM SERPL-MCNC: 9.7 MG/DL (ref 8.8–10.2)
CANNABINOIDS UR QL SCN: NORMAL
CHLORIDE BLD-SCNC: 88 MMOL/L (ref 94–109)
CHLORIDE BLD-SCNC: 90 MMOL/L (ref 94–109)
CHLORIDE BLD-SCNC: 90 MMOL/L (ref 94–109)
CHLORIDE BLD-SCNC: 93 MMOL/L (ref 94–109)
CHLORIDE SERPL-SCNC: 87 MMOL/L (ref 98–107)
CHLORIDE SERPL-SCNC: 93 MMOL/L (ref 98–107)
CO2 SERPL-SCNC: 27 MMOL/L (ref 20–32)
CO2 SERPL-SCNC: 28 MMOL/L (ref 20–32)
CO2 SERPL-SCNC: 28 MMOL/L (ref 20–32)
CO2 SERPL-SCNC: 31 MMOL/L (ref 20–32)
COCAINE UR QL: NORMAL
COLOR UR AUTO: NORMAL
COLOR UR AUTO: YELLOW
CORTIS SERPL-MCNC: 18.6 UG/DL
CREAT SERPL-MCNC: 0.6 MG/DL (ref 0.66–1.25)
CREAT SERPL-MCNC: 0.62 MG/DL (ref 0.66–1.25)
CREAT SERPL-MCNC: 0.64 MG/DL (ref 0.66–1.25)
CREAT SERPL-MCNC: 0.66 MG/DL (ref 0.66–1.25)
CREAT SERPL-MCNC: 0.67 MG/DL (ref 0.67–1.17)
CREAT SERPL-MCNC: 0.76 MG/DL (ref 0.67–1.17)
DEPRECATED HCO3 PLAS-SCNC: 27 MMOL/L (ref 22–29)
DEPRECATED HCO3 PLAS-SCNC: 30 MMOL/L (ref 22–29)
DIASTOLIC BLOOD PRESSURE - MUSE: NORMAL MMHG
EOSINOPHIL # BLD AUTO: 0.1 10E3/UL (ref 0–0.7)
EOSINOPHIL # BLD AUTO: 0.2 10E3/UL (ref 0–0.7)
EOSINOPHIL NFR BLD AUTO: 1 %
EOSINOPHIL NFR BLD AUTO: 3 %
ERYTHROCYTE [DISTWIDTH] IN BLOOD BY AUTOMATED COUNT: 12.5 % (ref 10–15)
ERYTHROCYTE [DISTWIDTH] IN BLOOD BY AUTOMATED COUNT: 13.2 % (ref 10–15)
ERYTHROCYTE [DISTWIDTH] IN BLOOD BY AUTOMATED COUNT: 13.2 % (ref 10–15)
GFR SERPL CREATININE-BSD FRML MDRD: 89 ML/MIN/1.73M2
GFR SERPL CREATININE-BSD FRML MDRD: >90 ML/MIN/1.73M2
GLUCOSE BLD-MCNC: 164 MG/DL (ref 70–99)
GLUCOSE BLD-MCNC: 246 MG/DL (ref 70–99)
GLUCOSE BLD-MCNC: 255 MG/DL (ref 70–99)
GLUCOSE BLD-MCNC: 302 MG/DL (ref 70–99)
GLUCOSE BLDC GLUCOMTR-MCNC: 201 MG/DL (ref 70–99)
GLUCOSE SERPL-MCNC: 199 MG/DL (ref 70–99)
GLUCOSE SERPL-MCNC: 208 MG/DL (ref 70–99)
GLUCOSE UR STRIP-MCNC: NEGATIVE MG/DL
GLUCOSE UR STRIP-MCNC: NEGATIVE MG/DL
HBA1C MFR BLD: 7.4 % (ref 0–5.6)
HCT VFR BLD AUTO: 38.6 % (ref 40–53)
HCT VFR BLD AUTO: 39 % (ref 40–53)
HCT VFR BLD AUTO: 41.6 % (ref 40–53)
HGB BLD-MCNC: 13.1 G/DL (ref 13.3–17.7)
HGB BLD-MCNC: 13.1 G/DL (ref 13.3–17.7)
HGB BLD-MCNC: 13.8 G/DL (ref 13.3–17.7)
HGB UR QL STRIP: NEGATIVE
HGB UR QL STRIP: NEGATIVE
HYALINE CASTS: 1 /LPF
IMM GRANULOCYTES # BLD: 0 10E3/UL
IMM GRANULOCYTES # BLD: 0 10E3/UL
IMM GRANULOCYTES NFR BLD: 0 %
IMM GRANULOCYTES NFR BLD: 1 %
INTERPRETATION ECG - MUSE: NORMAL
KETONES UR STRIP-MCNC: NEGATIVE MG/DL
KETONES UR STRIP-MCNC: NEGATIVE MG/DL
LEUKOCYTE ESTERASE UR QL STRIP: NEGATIVE
LEUKOCYTE ESTERASE UR QL STRIP: NEGATIVE
LYMPHOCYTES # BLD AUTO: 0.9 10E3/UL (ref 0.8–5.3)
LYMPHOCYTES # BLD AUTO: 1.1 10E3/UL (ref 0.8–5.3)
LYMPHOCYTES NFR BLD AUTO: 14 %
LYMPHOCYTES NFR BLD AUTO: 17 %
MCH RBC QN AUTO: 30.2 PG (ref 26.5–33)
MCH RBC QN AUTO: 30.4 PG (ref 26.5–33)
MCH RBC QN AUTO: 30.5 PG (ref 26.5–33)
MCHC RBC AUTO-ENTMCNC: 33.2 G/DL (ref 31.5–36.5)
MCHC RBC AUTO-ENTMCNC: 33.6 G/DL (ref 31.5–36.5)
MCHC RBC AUTO-ENTMCNC: 33.9 G/DL (ref 31.5–36.5)
MCV RBC AUTO: 90 FL (ref 78–100)
MCV RBC AUTO: 91 FL (ref 78–100)
MCV RBC AUTO: 91 FL (ref 78–100)
MONOCYTES # BLD AUTO: 0.7 10E3/UL (ref 0–1.3)
MONOCYTES # BLD AUTO: 0.8 10E3/UL (ref 0–1.3)
MONOCYTES NFR BLD AUTO: 11 %
MONOCYTES NFR BLD AUTO: 13 %
MUCOUS THREADS #/AREA URNS LPF: PRESENT /LPF
NEUTROPHILS # BLD AUTO: 4.1 10E3/UL (ref 1.6–8.3)
NEUTROPHILS # BLD AUTO: 4.8 10E3/UL (ref 1.6–8.3)
NEUTROPHILS NFR BLD AUTO: 68 %
NEUTROPHILS NFR BLD AUTO: 70 %
NITRATE UR QL: NEGATIVE
NITRATE UR QL: NEGATIVE
NRBC # BLD AUTO: 0 10E3/UL
NRBC # BLD AUTO: 0 10E3/UL
NRBC BLD AUTO-RTO: 0 /100
NRBC BLD AUTO-RTO: 0 /100
OPIATES UR QL SCN: NORMAL
OSMOLALITY SERPL: 291 MMOL/KG (ref 280–301)
OSMOLALITY UR: 465 MMOL/KG (ref 100–1200)
OSMOLALITY UR: 465 MMOL/KG (ref 100–1200)
P AXIS - MUSE: 79 DEGREES
PCP UR QL SCN: NORMAL
PH UR STRIP: 6.5 [PH] (ref 5–7)
PH UR STRIP: 7 [PH] (ref 5–7)
PLATELET # BLD AUTO: 261 10E3/UL (ref 150–450)
PLATELET # BLD AUTO: 286 10E3/UL (ref 150–450)
PLATELET # BLD AUTO: 357 10E3/UL (ref 150–450)
POTASSIUM BLD-SCNC: 3.9 MMOL/L (ref 3.4–5.3)
POTASSIUM BLD-SCNC: 4 MMOL/L (ref 3.4–5.3)
POTASSIUM BLD-SCNC: 4.2 MMOL/L (ref 3.4–5.3)
POTASSIUM BLD-SCNC: 4.2 MMOL/L (ref 3.4–5.3)
POTASSIUM SERPL-SCNC: 4.4 MMOL/L (ref 3.4–5.3)
POTASSIUM SERPL-SCNC: 5.1 MMOL/L (ref 3.4–5.3)
PR INTERVAL - MUSE: 172 MS
PROT SERPL-MCNC: 7.4 G/DL (ref 6.8–8.8)
PROT SERPL-MCNC: 7.7 G/DL (ref 6.4–8.3)
PROT SERPL-MCNC: 7.9 G/DL (ref 6.8–8.8)
PROT SERPL-MCNC: 8.1 G/DL (ref 6.8–8.8)
QRS DURATION - MUSE: 80 MS
QT - MUSE: 348 MS
QTC - MUSE: 425 MS
R AXIS - MUSE: 30 DEGREES
RBC # BLD AUTO: 4.3 10E6/UL (ref 4.4–5.9)
RBC # BLD AUTO: 4.31 10E6/UL (ref 4.4–5.9)
RBC # BLD AUTO: 4.57 10E6/UL (ref 4.4–5.9)
RBC URINE: 1 /HPF
RETINOPATHY: NORMAL
SARS-COV-2 RNA RESP QL NAA+PROBE: NEGATIVE
SARS-COV-2 RNA RESP QL NAA+PROBE: NEGATIVE
SODIUM SERPL-SCNC: 123 MMOL/L (ref 133–144)
SODIUM SERPL-SCNC: 124 MMOL/L (ref 136–145)
SODIUM SERPL-SCNC: 125 MMOL/L (ref 133–144)
SODIUM SERPL-SCNC: 126 MMOL/L (ref 133–144)
SODIUM SERPL-SCNC: 129 MMOL/L (ref 133–144)
SODIUM SERPL-SCNC: 131 MMOL/L (ref 136–145)
SODIUM UR-SCNC: 80 MMOL/L
SODIUM UR-SCNC: 97 MMOL/L
SP GR UR STRIP: 1.01 (ref 1–1.03)
SP GR UR STRIP: 1.01 (ref 1–1.03)
SQUAMOUS EPITHELIAL: <1 /HPF
SYSTOLIC BLOOD PRESSURE - MUSE: NORMAL MMHG
T AXIS - MUSE: 78 DEGREES
TSH SERPL DL<=0.005 MIU/L-ACNC: 1.66 MU/L (ref 0.4–4)
TSH SERPL DL<=0.005 MIU/L-ACNC: 1.8 UIU/ML (ref 0.3–4.2)
UROBILINOGEN UR STRIP-MCNC: NORMAL MG/DL
UROBILINOGEN UR STRIP-MCNC: NORMAL MG/DL
VENTRICULAR RATE- MUSE: 90 BPM
WBC # BLD AUTO: 6 10E3/UL (ref 4–11)
WBC # BLD AUTO: 6.5 10E3/UL (ref 4–11)
WBC # BLD AUTO: 6.8 10E3/UL (ref 4–11)
WBC URINE: 1 /HPF

## 2022-01-01 PROCEDURE — 999N000216 HC STATISTIC ADULT CD FACE TO FACE-NO CHRG: Mod: GT,95 | Performed by: COUNSELOR

## 2022-01-01 PROCEDURE — C9803 HOPD COVID-19 SPEC COLLECT: HCPCS

## 2022-01-01 PROCEDURE — 93005 ELECTROCARDIOGRAM TRACING: CPT

## 2022-01-01 PROCEDURE — 99214 OFFICE O/P EST MOD 30 MIN: CPT | Performed by: INTERNAL MEDICINE

## 2022-01-01 PROCEDURE — 85025 COMPLETE CBC W/AUTO DIFF WBC: CPT | Performed by: EMERGENCY MEDICINE

## 2022-01-01 PROCEDURE — U0005 INFEC AGEN DETEC AMPLI PROBE: HCPCS

## 2022-01-01 PROCEDURE — G0463 HOSPITAL OUTPT CLINIC VISIT: HCPCS

## 2022-01-01 PROCEDURE — 99207 PR NO CHARGE LOS: CPT | Performed by: PHARMACIST

## 2022-01-01 PROCEDURE — 80048 BASIC METABOLIC PNL TOTAL CA: CPT | Performed by: PATHOLOGY

## 2022-01-01 PROCEDURE — 80307 DRUG TEST PRSMV CHEM ANLYZR: CPT | Performed by: NURSE PRACTITIONER

## 2022-01-01 PROCEDURE — 82040 ASSAY OF SERUM ALBUMIN: CPT | Performed by: EMERGENCY MEDICINE

## 2022-01-01 PROCEDURE — 84300 ASSAY OF URINE SODIUM: CPT | Performed by: INTERNAL MEDICINE

## 2022-01-01 PROCEDURE — U0003 INFECTIOUS AGENT DETECTION BY NUCLEIC ACID (DNA OR RNA); SEVERE ACUTE RESPIRATORY SYNDROME CORONAVIRUS 2 (SARS-COV-2) (CORONAVIRUS DISEASE [COVID-19]), AMPLIFIED PROBE TECHNIQUE, MAKING USE OF HIGH THROUGHPUT TECHNOLOGIES AS DESCRIBED BY CMS-2020-01-R: HCPCS

## 2022-01-01 PROCEDURE — 83935 ASSAY OF URINE OSMOLALITY: CPT | Performed by: INTERNAL MEDICINE

## 2022-01-01 PROCEDURE — 83935 ASSAY OF URINE OSMOLALITY: CPT | Performed by: PATHOLOGY

## 2022-01-01 PROCEDURE — 84300 ASSAY OF URINE SODIUM: CPT | Performed by: PATHOLOGY

## 2022-01-01 PROCEDURE — 36415 COLL VENOUS BLD VENIPUNCTURE: CPT | Performed by: INTERNAL MEDICINE

## 2022-01-01 PROCEDURE — 81001 URINALYSIS AUTO W/SCOPE: CPT | Performed by: PATHOLOGY

## 2022-01-01 PROCEDURE — 99213 OFFICE O/P EST LOW 20 MIN: CPT | Performed by: INTERNAL MEDICINE

## 2022-01-01 PROCEDURE — U0003 INFECTIOUS AGENT DETECTION BY NUCLEIC ACID (DNA OR RNA); SEVERE ACUTE RESPIRATORY SYNDROME CORONAVIRUS 2 (SARS-COV-2) (CORONAVIRUS DISEASE [COVID-19]), AMPLIFIED PROBE TECHNIQUE, MAKING USE OF HIGH THROUGHPUT TECHNOLOGIES AS DESCRIBED BY CMS-2020-01-R: HCPCS | Performed by: EMERGENCY MEDICINE

## 2022-01-01 PROCEDURE — 99224 PR SUBSEQUENT OBSERVATION CARE,LEVEL I: CPT | Mod: 25 | Performed by: NURSE PRACTITIONER

## 2022-01-01 PROCEDURE — 83930 ASSAY OF BLOOD OSMOLALITY: CPT | Performed by: PATHOLOGY

## 2022-01-01 PROCEDURE — 99214 OFFICE O/P EST MOD 30 MIN: CPT | Performed by: PHYSICIAN ASSISTANT

## 2022-01-01 PROCEDURE — 84443 ASSAY THYROID STIM HORMONE: CPT | Performed by: EMERGENCY MEDICINE

## 2022-01-01 PROCEDURE — 84443 ASSAY THYROID STIM HORMONE: CPT | Performed by: INTERNAL MEDICINE

## 2022-01-01 PROCEDURE — 85014 HEMATOCRIT: CPT | Performed by: EMERGENCY MEDICINE

## 2022-01-01 PROCEDURE — 99204 OFFICE O/P NEW MOD 45 MIN: CPT | Mod: GC | Performed by: INTERNAL MEDICINE

## 2022-01-01 PROCEDURE — 80053 COMPREHEN METABOLIC PANEL: CPT

## 2022-01-01 PROCEDURE — 80053 COMPREHEN METABOLIC PANEL: CPT | Performed by: EMERGENCY MEDICINE

## 2022-01-01 PROCEDURE — 99204 OFFICE O/P NEW MOD 45 MIN: CPT | Mod: 95 | Performed by: NURSE PRACTITIONER

## 2022-01-01 PROCEDURE — 83036 HEMOGLOBIN GLYCOSYLATED A1C: CPT | Performed by: INTERNAL MEDICINE

## 2022-01-01 PROCEDURE — 80053 COMPREHEN METABOLIC PANEL: CPT | Performed by: INTERNAL MEDICINE

## 2022-01-01 PROCEDURE — 97803 MED NUTRITION INDIV SUBSEQ: CPT | Mod: 95 | Performed by: DIETITIAN, REGISTERED

## 2022-01-01 PROCEDURE — 36415 COLL VENOUS BLD VENIPUNCTURE: CPT | Performed by: EMERGENCY MEDICINE

## 2022-01-01 PROCEDURE — 999N000104 HC STATISTIC NO CHARGE

## 2022-01-01 PROCEDURE — 80048 BASIC METABOLIC PNL TOTAL CA: CPT | Performed by: INTERNAL MEDICINE

## 2022-01-01 PROCEDURE — 36415 COLL VENOUS BLD VENIPUNCTURE: CPT | Performed by: PATHOLOGY

## 2022-01-01 PROCEDURE — 82533 TOTAL CORTISOL: CPT | Performed by: INTERNAL MEDICINE

## 2022-01-01 PROCEDURE — 81003 URINALYSIS AUTO W/O SCOPE: CPT | Mod: XU | Performed by: NURSE PRACTITIONER

## 2022-01-01 PROCEDURE — 36415 COLL VENOUS BLD VENIPUNCTURE: CPT

## 2022-01-01 PROCEDURE — 99285 EMERGENCY DEPT VISIT HI MDM: CPT | Mod: 25

## 2022-01-01 PROCEDURE — 90791 PSYCH DIAGNOSTIC EVALUATION: CPT

## 2022-01-01 PROCEDURE — 85027 COMPLETE CBC AUTOMATED: CPT | Performed by: INTERNAL MEDICINE

## 2022-01-01 PROCEDURE — 99213 OFFICE O/P EST LOW 20 MIN: CPT | Mod: 95 | Performed by: INTERNAL MEDICINE

## 2022-01-01 RX ORDER — GLIMEPIRIDE 4 MG/1
TABLET ORAL
Qty: 180 TABLET | OUTPATIENT
Start: 2022-01-01

## 2022-01-01 RX ORDER — MIRTAZAPINE 15 MG/1
15 TABLET, FILM COATED ORAL AT BEDTIME
Qty: 30 TABLET | Refills: 11 | Status: SHIPPED | OUTPATIENT
Start: 2022-01-01 | End: 2022-01-01

## 2022-01-01 RX ORDER — MIRTAZAPINE 7.5 MG/1
7.5 TABLET, FILM COATED ORAL AT BEDTIME
Qty: 30 TABLET | Refills: 0 | Status: SHIPPED | OUTPATIENT
Start: 2022-01-01 | End: 2022-01-01 | Stop reason: ALTCHOICE

## 2022-01-01 RX ORDER — INSULIN GLARGINE 100 [IU]/ML
INJECTION, SOLUTION SUBCUTANEOUS
Qty: 15 ML | Refills: 3 | OUTPATIENT
Start: 2022-01-01

## 2022-01-01 RX ORDER — INSULIN GLARGINE 100 [IU]/ML
INJECTION, SOLUTION SUBCUTANEOUS
Qty: 15 ML | Refills: 1
Start: 2022-01-01 | End: 2022-01-01

## 2022-01-01 RX ORDER — NICOTINE POLACRILEX 4 MG
15 LOZENGE BUCCAL
Qty: 37.5 ML | Refills: 4 | Status: SHIPPED | OUTPATIENT
Start: 2022-01-01 | End: 2022-01-01

## 2022-01-01 RX ORDER — MIRTAZAPINE 15 MG/1
15 TABLET, FILM COATED ORAL AT BEDTIME
Qty: 30 TABLET | Refills: 1 | Status: SHIPPED | OUTPATIENT
Start: 2022-01-01 | End: 2023-01-01 | Stop reason: DRUGHIGH

## 2022-01-01 RX ORDER — INSULIN GLARGINE 100 [IU]/ML
INJECTION, SOLUTION SUBCUTANEOUS
Qty: 15 ML | Refills: 3 | Status: SHIPPED | OUTPATIENT
Start: 2022-01-01 | End: 2022-01-01

## 2022-01-01 RX ORDER — GLIMEPIRIDE 4 MG/1
TABLET ORAL
Qty: 135 TABLET | Refills: 3 | Status: SHIPPED | OUTPATIENT
Start: 2022-01-01 | End: 2022-01-01 | Stop reason: ALTCHOICE

## 2022-01-01 RX ORDER — GLIMEPIRIDE 4 MG/1
TABLET ORAL
Qty: 60 TABLET | Refills: 11 | Status: SHIPPED | OUTPATIENT
Start: 2022-01-01 | End: 2022-01-01

## 2022-01-01 RX ORDER — INSULIN LISPRO 100 [IU]/ML
3 INJECTION, SOLUTION INTRAVENOUS; SUBCUTANEOUS
Qty: 15 ML | Refills: 11 | Status: SHIPPED | OUTPATIENT
Start: 2022-01-01 | End: 2023-01-01

## 2022-01-01 RX ORDER — SULFASALAZINE 500 MG/1
500 TABLET, DELAYED RELEASE ORAL 2 TIMES DAILY
COMMUNITY
Start: 2022-01-01 | End: 2023-01-01

## 2022-01-01 RX ORDER — HYDROXYZINE HYDROCHLORIDE 10 MG/1
10 TABLET, FILM COATED ORAL 3 TIMES DAILY PRN
Qty: 90 TABLET | Refills: 1 | Status: SHIPPED | OUTPATIENT
Start: 2022-01-01 | End: 2023-01-01

## 2022-01-01 RX ORDER — BUPROPION HYDROCHLORIDE 150 MG/1
150 TABLET ORAL EVERY MORNING
Qty: 30 TABLET | Refills: 11 | Status: SHIPPED | OUTPATIENT
Start: 2022-01-01 | End: 2022-01-01 | Stop reason: ALTCHOICE

## 2022-01-01 RX ORDER — MIRTAZAPINE 7.5 MG/1
15 TABLET, FILM COATED ORAL AT BEDTIME
Qty: 60 TABLET | Refills: 11
Start: 2022-01-01 | End: 2022-01-01

## 2022-01-01 RX ORDER — INSULIN GLARGINE 100 [IU]/ML
INJECTION, SOLUTION SUBCUTANEOUS
Qty: 15 ML | Refills: 1 | Status: SHIPPED | OUTPATIENT
Start: 2022-01-01 | End: 2022-01-01

## 2022-01-01 RX ORDER — MIRTAZAPINE 7.5 MG/1
15 TABLET, FILM COATED ORAL AT BEDTIME
Qty: 60 TABLET | Refills: 11 | Status: SHIPPED | OUTPATIENT
Start: 2022-01-01 | End: 2022-01-01 | Stop reason: DRUGHIGH

## 2022-01-01 RX ORDER — AMLODIPINE BESYLATE 2.5 MG/1
TABLET ORAL
Qty: 90 TABLET | Refills: 3 | Status: SHIPPED | OUTPATIENT
Start: 2022-01-01 | End: 2023-01-01

## 2022-01-01 RX ORDER — LISINOPRIL 10 MG/1
TABLET ORAL
Qty: 90 TABLET | Refills: 3 | Status: SHIPPED | OUTPATIENT
Start: 2022-01-01 | End: 2023-01-01

## 2022-01-01 RX ORDER — ALENDRONATE SODIUM 70 MG/1
TABLET ORAL
Qty: 12 TABLET | Refills: 3 | Status: SHIPPED | OUTPATIENT
Start: 2022-01-01 | End: 2023-01-01 | Stop reason: ALTCHOICE

## 2022-01-01 RX ORDER — INSULIN GLARGINE 100 [IU]/ML
INJECTION, SOLUTION SUBCUTANEOUS
Qty: 15 ML | Refills: 1
Start: 2022-01-01 | End: 2023-01-01

## 2022-01-01 RX ORDER — SODIUM CHLORIDE 1 G/1
TABLET ORAL
COMMUNITY
Start: 2022-01-01 | End: 2022-01-01

## 2022-01-01 RX ORDER — GLIMEPIRIDE 2 MG/1
TABLET ORAL
Qty: 180 TABLET | Refills: 1 | Status: SHIPPED | OUTPATIENT
Start: 2022-01-01 | End: 2022-01-01 | Stop reason: DRUGHIGH

## 2022-01-01 RX ORDER — ATORVASTATIN CALCIUM 20 MG/1
TABLET, FILM COATED ORAL
Qty: 90 TABLET | Refills: 3
Start: 2022-01-01

## 2022-01-01 RX ORDER — GLIMEPIRIDE 4 MG/1
TABLET ORAL
Qty: 60 TABLET | Refills: 11
Start: 2022-01-01 | End: 2022-01-01

## 2022-01-01 RX ORDER — PEN NEEDLE, DIABETIC 31 GX5/16"
NEEDLE, DISPOSABLE MISCELLANEOUS
Qty: 400 EACH | Refills: 3 | Status: SHIPPED | OUTPATIENT
Start: 2022-01-01 | End: 2023-01-01

## 2022-01-01 ASSESSMENT — COLUMBIA-SUICIDE SEVERITY RATING SCALE - C-SSRS
3. HAVE YOU BEEN THINKING ABOUT HOW YOU MIGHT KILL YOURSELF?: YES
1. IN THE PAST MONTH, HAVE YOU WISHED YOU WERE DEAD OR WISHED YOU COULD GO TO SLEEP AND NOT WAKE UP?: YES
2. HAVE YOU ACTUALLY HAD ANY THOUGHTS OF KILLING YOURSELF?: YES
LETHALITY/MEDICAL DAMAGE CODE MOST LETHAL POTENTIAL ATTEMPT: BEHAVIOR LIKELY TO RESULT IN INJURY BUT NOT LIKELY TO CAUSE DEATH
LETHALITY/MEDICAL DAMAGE CODE MOST LETHAL ACTUAL ATTEMPT: MINOR PHYSICAL DAMAGE
4. HAVE YOU HAD THESE THOUGHTS AND HAD SOME INTENTION OF ACTING ON THEM?: NO
4. HAVE YOU HAD THESE THOUGHTS AND HAD SOME INTENTION OF ACTING ON THEM?: YES
ATTEMPT LIFETIME: YES
ATTEMPT PAST THREE MONTHS: NO
TOTAL  NUMBER OF INTERRUPTED ATTEMPTS LIFETIME: YES
6. HAVE YOU EVER DONE ANYTHING, STARTED TO DO ANYTHING, OR PREPARED TO DO ANYTHING TO END YOUR LIFE?: YES
1. IN THE PAST MONTH, HAVE YOU WISHED YOU WERE DEAD OR WISHED YOU COULD GO TO SLEEP AND NOT WAKE UP?: YES
REASONS FOR IDEATION PAST MONTH: COMPLETELY TO END OR STOP THE PAIN (YOU COULDN'T GO ON LIVING WITH THE PAIN OR HOW YOU WERE FEELING)
TOTAL  NUMBER OF ABORTED OR SELF INTERRUPTED ATTEMPTS LIFETIME: NO
5. HAVE YOU STARTED TO WORK OUT OR WORKED OUT THE DETAILS OF HOW TO KILL YOURSELF? DO YOU INTEND TO CARRY OUT THIS PLAN?: YES
LETHALITY/MEDICAL DAMAGE CODE MOST RECENT ACTUAL ATTEMPT: MINOR PHYSICAL DAMAGE
1. HAVE YOU WISHED YOU WERE DEAD OR WISHED YOU COULD GO TO SLEEP AND NOT WAKE UP?: YES
5. HAVE YOU STARTED TO WORK OUT OR WORKED OUT THE DETAILS OF HOW TO KILL YOURSELF? DO YOU INTEND TO CARRY OUT THIS PLAN?: NO
TOTAL  NUMBER OF ACTUAL ATTEMPTS LIFETIME: 1
TOTAL  NUMBER OF INTERRUPTED ATTEMPTS PAST 3 MONTHS: NO
LETHALITY/MEDICAL DAMAGE CODE MOST RECENT POTENTIAL ATTEMPT: BEHAVIOR LIKELY TO RESULT IN INJURY BUT NOT LIKELY TO CAUSE DEATH
LETHALITY/MEDICAL DAMAGE CODE FIRST ACTUAL ATTEMPT: MINOR PHYSICAL DAMAGE
REASONS FOR IDEATION LIFETIME: COMPLETELY TO END OR STOP THE PAIN (YOU COULDN'T GO ON LIVING WITH THE PAIN OR HOW YOU WERE FEELING)
LETHALITY/MEDICAL DAMAGE CODE FIRST POTENTIAL ATTEMPT: BEHAVIOR LIKELY TO RESULT IN INJURY BUT NOT LIKELY TO CAUSE DEATH
2. HAVE YOU ACTUALLY HAD ANY THOUGHTS OF KILLING YOURSELF?: YES
3. HAVE YOU BEEN THINKING ABOUT HOW YOU MIGHT KILL YOURSELF?: YES
5. HAVE YOU STARTED TO WORK OUT OR WORKED OUT THE DETAILS OF HOW TO KILL YOURSELF? DO YOU INTEND TO CARRY OUT THIS PLAN?: NO
2. HAVE YOU ACTUALLY HAD ANY THOUGHTS OF KILLING YOURSELF IN THE PAST MONTH?: YES
TOTAL  NUMBER OF INTERRUPTED ATTEMPTS LIFETIME: 1
4. HAVE YOU HAD THESE THOUGHTS AND HAD SOME INTENTION OF ACTING ON THEM?: NO
6. HAVE YOU EVER DONE ANYTHING, STARTED TO DO ANYTHING, OR PREPARED TO DO ANYTHING TO END YOUR LIFE?: NO

## 2022-01-01 ASSESSMENT — ACTIVITIES OF DAILY LIVING (ADL)
ADLS_ACUITY_SCORE: 35
DEPENDENT_IADLS:: CLEANING;COOKING;LAUNDRY;SHOPPING;MEAL PREPARATION;MEDICATION MANAGEMENT;TRANSPORTATION
DEPENDENT_IADLS:: CLEANING;COOKING;LAUNDRY;SHOPPING;MEAL PREPARATION;MEDICATION MANAGEMENT;TRANSPORTATION
ADLS_ACUITY_SCORE: 35
DEPENDENT_IADLS:: CLEANING;COOKING;LAUNDRY;SHOPPING;MEAL PREPARATION;MEDICATION MANAGEMENT;TRANSPORTATION
ADLS_ACUITY_SCORE: 35
DEPENDENT_IADLS:: CLEANING;COOKING;LAUNDRY;SHOPPING;MEAL PREPARATION;MEDICATION MANAGEMENT;TRANSPORTATION

## 2022-01-01 ASSESSMENT — ANXIETY QUESTIONNAIRES
8. IF YOU CHECKED OFF ANY PROBLEMS, HOW DIFFICULT HAVE THESE MADE IT FOR YOU TO DO YOUR WORK, TAKE CARE OF THINGS AT HOME, OR GET ALONG WITH OTHER PEOPLE?: NOT DIFFICULT AT ALL
7. FEELING AFRAID AS IF SOMETHING AWFUL MIGHT HAPPEN: SEVERAL DAYS
5. BEING SO RESTLESS THAT IT IS HARD TO SIT STILL: NOT AT ALL
GAD7 TOTAL SCORE: 6
7. FEELING AFRAID AS IF SOMETHING AWFUL MIGHT HAPPEN: SEVERAL DAYS
4. TROUBLE RELAXING: SEVERAL DAYS
IF YOU CHECKED OFF ANY PROBLEMS ON THIS QUESTIONNAIRE, HOW DIFFICULT HAVE THESE PROBLEMS MADE IT FOR YOU TO DO YOUR WORK, TAKE CARE OF THINGS AT HOME, OR GET ALONG WITH OTHER PEOPLE: NOT DIFFICULT AT ALL
2. NOT BEING ABLE TO STOP OR CONTROL WORRYING: NOT AT ALL
GAD7 TOTAL SCORE: 6
3. WORRYING TOO MUCH ABOUT DIFFERENT THINGS: SEVERAL DAYS
IF YOU CHECKED OFF ANY PROBLEMS ON THIS QUESTIONNAIRE, HOW DIFFICULT HAVE THESE PROBLEMS MADE IT FOR YOU TO DO YOUR WORK, TAKE CARE OF THINGS AT HOME, OR GET ALONG WITH OTHER PEOPLE: NOT DIFFICULT AT ALL
6. BECOMING EASILY ANNOYED OR IRRITABLE: SEVERAL DAYS
7. FEELING AFRAID AS IF SOMETHING AWFUL MIGHT HAPPEN: NOT AT ALL
1. FEELING NERVOUS, ANXIOUS, OR ON EDGE: SEVERAL DAYS
7. FEELING AFRAID AS IF SOMETHING AWFUL MIGHT HAPPEN: NOT AT ALL
4. TROUBLE RELAXING: NOT AT ALL
6. BECOMING EASILY ANNOYED OR IRRITABLE: NOT AT ALL
7. FEELING AFRAID AS IF SOMETHING AWFUL MIGHT HAPPEN: SEVERAL DAYS
3. WORRYING TOO MUCH ABOUT DIFFERENT THINGS: SEVERAL DAYS
5. BEING SO RESTLESS THAT IT IS HARD TO SIT STILL: NOT AT ALL
2. NOT BEING ABLE TO STOP OR CONTROL WORRYING: SEVERAL DAYS
GAD7 TOTAL SCORE: 2
5. BEING SO RESTLESS THAT IT IS HARD TO SIT STILL: NOT AT ALL
GAD7 TOTAL SCORE: 2
IF YOU CHECKED OFF ANY PROBLEMS ON THIS QUESTIONNAIRE, HOW DIFFICULT HAVE THESE PROBLEMS MADE IT FOR YOU TO DO YOUR WORK, TAKE CARE OF THINGS AT HOME, OR GET ALONG WITH OTHER PEOPLE: NOT DIFFICULT AT ALL
6. BECOMING EASILY ANNOYED OR IRRITABLE: SEVERAL DAYS
GAD7 TOTAL SCORE: 6
8. IF YOU CHECKED OFF ANY PROBLEMS, HOW DIFFICULT HAVE THESE MADE IT FOR YOU TO DO YOUR WORK, TAKE CARE OF THINGS AT HOME, OR GET ALONG WITH OTHER PEOPLE?: NOT DIFFICULT AT ALL
1. FEELING NERVOUS, ANXIOUS, OR ON EDGE: SEVERAL DAYS
1. FEELING NERVOUS, ANXIOUS, OR ON EDGE: SEVERAL DAYS
3. WORRYING TOO MUCH ABOUT DIFFERENT THINGS: SEVERAL DAYS
8. IF YOU CHECKED OFF ANY PROBLEMS, HOW DIFFICULT HAVE THESE MADE IT FOR YOU TO DO YOUR WORK, TAKE CARE OF THINGS AT HOME, OR GET ALONG WITH OTHER PEOPLE?: NOT DIFFICULT AT ALL
GAD7 TOTAL SCORE: 6
7. FEELING AFRAID AS IF SOMETHING AWFUL MIGHT HAPPEN: SEVERAL DAYS
4. TROUBLE RELAXING: SEVERAL DAYS
GAD7 TOTAL SCORE: 6
2. NOT BEING ABLE TO STOP OR CONTROL WORRYING: SEVERAL DAYS
GAD7 TOTAL SCORE: 6

## 2022-01-01 ASSESSMENT — PATIENT HEALTH QUESTIONNAIRE - PHQ9
SUM OF ALL RESPONSES TO PHQ QUESTIONS 1-9: 13
SUM OF ALL RESPONSES TO PHQ QUESTIONS 1-9: 13
10. IF YOU CHECKED OFF ANY PROBLEMS, HOW DIFFICULT HAVE THESE PROBLEMS MADE IT FOR YOU TO DO YOUR WORK, TAKE CARE OF THINGS AT HOME, OR GET ALONG WITH OTHER PEOPLE: SOMEWHAT DIFFICULT
SUM OF ALL RESPONSES TO PHQ QUESTIONS 1-9: 9
SUM OF ALL RESPONSES TO PHQ QUESTIONS 1-9: 9
10. IF YOU CHECKED OFF ANY PROBLEMS, HOW DIFFICULT HAVE THESE PROBLEMS MADE IT FOR YOU TO DO YOUR WORK, TAKE CARE OF THINGS AT HOME, OR GET ALONG WITH OTHER PEOPLE: SOMEWHAT DIFFICULT

## 2022-01-01 ASSESSMENT — PAIN SCALES - GENERAL
PAINLEVEL: NO PAIN (0)
PAINLEVEL: NO PAIN (0)

## 2022-01-04 NOTE — TELEPHONE ENCOUNTER
Patient called to follow up on below message he would also like a call about this results on his bone scan and blood work from 12/8 and if any follow up is needed      Please advise    Okay to leave patient detailed message    Juan Carlos Booth RN

## 2022-01-13 ENCOUNTER — TELEPHONE (OUTPATIENT)
Dept: INTERNAL MEDICINE | Facility: CLINIC | Age: 82
End: 2022-01-13
Payer: COMMERCIAL

## 2022-01-13 NOTE — TELEPHONE ENCOUNTER
Pt calling in again as he hasn't heard anything on his labs from October and December 2021. He also wants the results from his Dexa scan.    Jhoana Armenta RN

## 2022-01-21 ENCOUNTER — VIRTUAL VISIT (OUTPATIENT)
Dept: INTERNAL MEDICINE | Facility: CLINIC | Age: 82
End: 2022-01-21
Payer: COMMERCIAL

## 2022-01-21 DIAGNOSIS — M05.741 RHEUMATOID ARTHRITIS INVOLVING BOTH HANDS WITH POSITIVE RHEUMATOID FACTOR (H): ICD-10-CM

## 2022-01-21 DIAGNOSIS — M80.00XD AGE-RELATED OSTEOPOROSIS WITH CURRENT PATHOLOGICAL FRACTURE WITH ROUTINE HEALING: ICD-10-CM

## 2022-01-21 DIAGNOSIS — E11.21 TYPE 2 DIABETES MELLITUS WITH DIABETIC NEPHROPATHY, WITH LONG-TERM CURRENT USE OF INSULIN (H): Primary | ICD-10-CM

## 2022-01-21 DIAGNOSIS — M05.742 RHEUMATOID ARTHRITIS INVOLVING BOTH HANDS WITH POSITIVE RHEUMATOID FACTOR (H): ICD-10-CM

## 2022-01-21 DIAGNOSIS — E87.1 HYPONATREMIA: ICD-10-CM

## 2022-01-21 DIAGNOSIS — Z79.4 TYPE 2 DIABETES MELLITUS WITH DIABETIC NEPHROPATHY, WITH LONG-TERM CURRENT USE OF INSULIN (H): Primary | ICD-10-CM

## 2022-01-21 PROCEDURE — 99214 OFFICE O/P EST MOD 30 MIN: CPT | Mod: 95 | Performed by: INTERNAL MEDICINE

## 2022-01-21 RX ORDER — HYDROXYCHLOROQUINE SULFATE 200 MG/1
TABLET, FILM COATED ORAL
COMMUNITY
Start: 2020-06-09 | End: 2022-01-01 | Stop reason: ALTCHOICE

## 2022-01-21 NOTE — PROGRESS NOTES
Jose Francisco is a 81 year old who is being evaluated via a billable telephone visit.      What phone number would you like to be contacted at? [phone]  How would you like to obtain your AVS? Mail a copy    ASSESSMENT:   1. Type 2 diabetes mellitus with diabetic nephropathy, with long-term current use of insulin (H)  Needs improved control.  Continuing Basaglar at 12 units daily and glimepiride will be started in the morning in addition per addendum plan below.  We will get A1c recheck in addition  - glimepiride (AMARYL) 2 MG tablet; Take 1 tablet (2 mg) by mouth daily (with breakfast)  Dispense: 90 tablet; Refill: 1  - insulin glargine (BASAGLAR KWIKPEN) 100 UNIT/ML pen; [id number] UNITS SUBCUTANEOUS EVERY DAY  Dispense: 15 mL; Refill: 3    2. Hyponatremia  Chronic.  Possible component of SIADH.  Needs lab recheck as ordered an addendum below.  If sodium still remaining quite low, will put patient on fluid restriction with recheck 10 days later    3. Age-related osteoporosis with current pathological fracture with routine healing  Overall stable.  Patient continue alendronate and repeat DEXA in 2-3 years.  Fosamax started in December 2019    4. Rheumatoid arthritis involving both hands with positive rheumatoid factor (H)  Patient states symptoms stable.  On Plaquenil.  Not using prednisone currently.  Continue management per rheumatology.  Patient states he has had an eye exam in the last 1 year      PLAN:  Consistent with Basaglar 12 units insulin at bedtime  Check blood sugars before breakfast and bedtime between now and next Wednesday and record results so MD can review them further and make  Further recommendations for diabetes management. Possible addition Glimepiride  Continue alendronate/Fosamax  Repeat bone density study again in 3 years  Patient to contact clinic on [address] or [facility] in [facility] to see where a blood draw could be done if order faxed to that lab.  If either are available to do, and  patient to get that facility lab name, telephone number and fax number  Will contact patient next Wednesday to get updated information and fax orders for future labs    Phone call contact time  Call Started at 8:25am  Call Ended at 8:45am  Total minutes: 20 min    (Chart documentation was completed, in part, with [employer] ([employer]) voice-recognition software. Even though reviewed, some grammatical, spelling, and word errors may remain.)      Addendum:  Spoke with patient 1/26/2022. Morning blood sugars with Basaglar 12 units were 138, 190, 195, 164, 149, 218. Bedtime blood sugars were 230, 271, 327, 186, 228, 265.  Patient was instructed to continue Basaglar 12 units daily in the evening and to start glimepiride 2 mg tablet, 1 tablet daily in the morning for diabetes. If any blood sugars below 70, patient was to reduce glimepiride to 1/2 tablet (1mg) daily in the morning.  Patient states that labs can be drawn at [facility] on [address]. Telephone number [phone] and clinic fax number [phone].  Lab orders faxed to that clinic for BMP and A1c to follow-up on chronic hyponatremia and diabetes. If sodium remains low, will start patient on fluid restriction for probable SIADH based on previous urine sodium and osmolality results      Kin Alejo MD  Internal Medicine Department  [facility]               Chan Felton is a 81 year old who presents for the following health issues     HPI     Chief Complaint   Patient presents with     Results     DEXA completed on 12/06/2021 and review Lab completed on     Discuss     Diabetes         Most recent lab results reviewed with pt.      Patient currently in [place] until April.  Most recent DEXA reviewed with patient.  [place] physician did not directly compare DEXA is but fairly stable  Patient states blood sugars generally ranging  in the morning but average about 150.  Bedtime blood sugars often about 200.   Following diabetic diet.  Patient has been varying his Basaglar dose.  Took 12 units last night and blood sugar dropped from 230 last night down to 138 this morning.  Denies chest pain, shortness of breath, abdominal pain or vision changes.  Chronic hyponatremia and needs follow-up.  Not on diuretic therapy     Component      Latest Ref Rng & Units 6/10/2021 10/22/2021   Sodium      133 - 144 mmol/L 125 (L) 123 (L)   Potassium      3.4 - 5.3 mmol/L 4.1 4.8   Chloride      94 - 109 mmol/L 92 (L) 90 (L)   Carbon Dioxide      20 - 32 mmol/L 30 26   Anion Gap      3 - 14 mmol/L 3 7   Glucose      70 - 99 mg/dL 289 (H) 277 (H)   Urea Nitrogen      7 - 30 mg/dL 12 11   Creatinine      0.66 - 1.25 mg/dL 0.79 0.78   GFR Estimate      >60 mL/min/1.73m2 83 83   GFR Estimate If Black      >60 mL/min/1.73:m2 >90    Calcium      8.5 - 10.1 mg/dL 8.9 8.9   Bilirubin Total      0.2 - 1.3 mg/dL 0.5    Albumin      3.4 - 5.0 g/dL 3.6    Protein Total      6.8 - 8.8 g/dL 7.9    Alkaline Phosphatase      40 - 150 U/L 85    ALT      0 - 70 U/L 21    AST      0 - 45 U/L 20    Cholesterol      <200 mg/dL 142    Triglycerides      <150 mg/dL 67    HDL Cholesterol      >39 mg/dL 53    LDL Cholesterol Calculated      <100 mg/dL 76    Non HDL Cholesterol      <130 mg/dL 89    Creatinine Urine      mg/dL  79   Albumin Urine mg/L      mg/L  35   Albumin Urine mg/g Cr      0.00 - 17.00 mg/g Cr  44.30 (H)   Hemoglobin A1C POCT      0 - 5.6 % 8.6 (H)    Hemoglobin A1C      0.0 - 5.6 %  9.3 (H)   Urine Osmolality      100-1,200 mmol/kg  378   Vitamin D Deficiency screening      20 - 75 ug/L  22   TSH      0.40 - 4.00 mU/L  0.98   Sodium Urine mmol/L      mmol/L  55       Additional ROS:   Constitutional, HEENT, Cardiovascular, Pulmonary, GI and , Neuro, MSK and Psych review of systems/symptoms are otherwise negative or unchanged from previous, except as noted above.           Objective:  Any reported vitals from today were reviewed in chart. See  nursing documentation for details    RESP: No cough, no audible wheezing, able to talk in full sentences  GEN: Normal affect. Normal though content/speech  Remainder of exam unable to be completed due to telephone visits

## 2022-01-21 NOTE — LETTER
[facility]  [address] [address]  [phone]      2022       To:   [facility] [place] [address]  Telephone [phone]  Fax [phone]    Re:   Jose Francisco MCKNIGHT Carlos  [address]  [facility] [address]    [birth year]       To whom it may concern,   Please draw a  Basic Metabolic Panel (BMP) for dx hyponatremia (E87.1) and and A1C for dx diabetes (E11.21) and fax the results to me at [phone] attention Dr Alejo. If you have further questions regarding this matter, please feel free to contact me at [phone].    Sincerely,            Kin Alejo MD  Internal Medicine

## 2022-01-26 RX ORDER — GLIMEPIRIDE 2 MG/1
2 TABLET ORAL
Qty: 90 TABLET | Refills: 1 | Status: SHIPPED | OUTPATIENT
Start: 2022-01-26 | End: 2022-02-07

## 2022-01-26 RX ORDER — INSULIN GLARGINE 100 [IU]/ML
INJECTION, SOLUTION SUBCUTANEOUS
Qty: 15 ML | Refills: 3 | Status: SHIPPED | OUTPATIENT
Start: 2022-01-26 | End: 2022-02-07

## 2022-01-31 ENCOUNTER — TELEPHONE (OUTPATIENT)
Dept: INTERNAL MEDICINE | Facility: CLINIC | Age: 82
End: 2022-01-31
Payer: COMMERCIAL

## 2022-01-31 NOTE — PATIENT INSTRUCTIONS
Consistently take Basaglar 12 units insulin at bedtime  Start glimepiride 2 mg tablet, 1 tablet daily in the morning for diabetes. If any blood sugars below 70, patient was to reduce glimepiride to 1/2 tablet (1mg) daily in the morning.  Continue alendronate/Fosamax  Repeat bone density study again in 3 years  Get lab appt with [facility] on [address] nonfasting in the next 1 week for BMP and A1C ad ask them to fax results to me at [phone] attention Dr Alejo

## 2022-01-31 NOTE — TELEPHONE ENCOUNTER
Please [facility] to [facility] work     Dr. Alejo, please order future blood work for     [facility]     Fax : [phone]    PLAN:  Consistent with Basaglar 12 units insulin at bedtime  Check blood sugars before breakfast and bedtime between now and next Wednesday and record results so MD can review them further and make  Further recommendations for diabetes management. Possible addition Glimepiride  Continue alendronate/Fosamax  Repeat bone density study again in 3 years  Patient to contact clinic on [address] or hospital in [facility] to see where a blood draw could be done if order faxed to that lab.  If either are available to do, and patient to get that facility lab name, telephone number and fax number  Will contact patient next Wednesday to get updated information and fax orders for future labs    Leticia Dash RN  [facility]

## 2022-01-31 NOTE — TELEPHONE ENCOUNTER
Pt calling with update for Dr. Alejo.  He had a VV with Dr. Alejo on 1/21.    1) Had attack of Rheumatoid Arthritis, on his right hand. Having pain, and stiffness.  He went to Urgent Care Clinic in [place], [place].   [facility] [phone]  Fax [phone]    2)  And also reporting his blood sugars. 2 nights ago, have been higher than normal, he is thinking r/t the arthritis.     BS readings below--from the end of January.    230, 138  271, 190  186, 164  228, 149  226, 218

## 2022-02-02 ENCOUNTER — TRANSFERRED RECORDS (OUTPATIENT)
Dept: HEALTH INFORMATION MANAGEMENT | Facility: CLINIC | Age: 82
End: 2022-02-02
Payer: COMMERCIAL

## 2022-02-02 LAB
CREATININE (EXTERNAL): 0.88 MG/DL (ref 0.7–1.3)
GFR ESTIMATED (EXTERNAL): >60 ML/MIN/1.73M2
GLUCOSE (EXTERNAL): 280 MG/DL (ref 70–100)
POTASSIUM (EXTERNAL): 4.4 MMOL/L (ref 3.5–5.1)

## 2022-02-07 ENCOUNTER — TELEPHONE (OUTPATIENT)
Dept: INTERNAL MEDICINE | Facility: CLINIC | Age: 82
End: 2022-02-07
Payer: COMMERCIAL

## 2022-02-07 DIAGNOSIS — Z79.4 TYPE 2 DIABETES MELLITUS WITH DIABETIC NEPHROPATHY, WITH LONG-TERM CURRENT USE OF INSULIN (H): ICD-10-CM

## 2022-02-07 DIAGNOSIS — E11.21 TYPE 2 DIABETES MELLITUS WITH DIABETIC NEPHROPATHY, WITH LONG-TERM CURRENT USE OF INSULIN (H): ICD-10-CM

## 2022-02-07 RX ORDER — INSULIN GLARGINE 100 [IU]/ML
INJECTION, SOLUTION SUBCUTANEOUS
Qty: 15 ML | Refills: 3
Start: 2022-02-07 | End: 2022-02-27

## 2022-02-07 RX ORDER — GLIMEPIRIDE 2 MG/1
4 TABLET ORAL
Qty: 180 TABLET | Refills: 1
Start: 2022-02-07 | End: 2022-03-14

## 2022-02-07 NOTE — TELEPHONE ENCOUNTER
Patient called to report blood sugars are running higher than normal since he started taking glimepiride 2 mg in the morning.    BS  1/31    1/31  PM   212  2/1     2/2     2/2     2/3     2/3     2/4     2/4       Sherrie Benítez RN

## 2022-02-07 NOTE — TELEPHONE ENCOUNTER
Patient increase glimepiride to 2 mg tablet, 2 tablets (total 4 mg) daily in the morning.  In addition, patient to increase Basaglar insulin to 15 units once a day.  If morning blood sugars remain >130 in AM after 4 days days, then patient to increase Basaglar further by 1 unit every other day until has 2 consecutive readings of AM sugars < 130 and then stay at that dose of Basaglar.    Patient to update us again in 3 weeks regarding blood sugar status and dose of Basaglar used. Check blood sugars twice a day (before breakfast and bedtime) for 4 days prior to the update phone call to the clinic    Inform patient

## 2022-02-08 NOTE — TELEPHONE ENCOUNTER
Called patient and relayed below message. Had patient read back instructions. Pt verbalized understanding. Patient advised to call back if any questions about basaglar dosing. Discussed following up in 3 weeks with BG and current dose of basasglar. Pt verbalized understanding.    Mary Jean RN

## 2022-02-15 NOTE — TELEPHONE ENCOUNTER
Pt calling with BLOOD SUGAR UPDATE for Dr. Alejo.  blood sugars taken twice/day, morning & night, starting last Thurs Feb 10th, up to Tuesday Feb 15th.  FYI--he has been alternating the Basaglar dosing 15 units one night & 16 units the next night.    BS readings    Thurs night . Basaglar dose 15 units.  Friday morning BS 87.   Night BS was 182. Basaglar dose 16 units.  Sat morning .  Sat night, . Basaglar 15 units.  Sunday morning BS was 154  Sun night, . Basaglar 16 units.  Monday morning BS was 146  Monday night, BS was 192. Basaglar 15 units.  Tues morning BS was 122.    FYI---He had labs done in [facility], dated 2/2.  Pt will be back in back to [place] in April.

## 2022-02-24 ENCOUNTER — VIRTUAL VISIT (OUTPATIENT)
Dept: INTERNAL MEDICINE | Facility: CLINIC | Age: 82
End: 2022-02-24
Payer: COMMERCIAL

## 2022-02-24 DIAGNOSIS — Z79.4 TYPE 2 DIABETES MELLITUS WITH DIABETIC NEPHROPATHY, WITH LONG-TERM CURRENT USE OF INSULIN (H): ICD-10-CM

## 2022-02-24 DIAGNOSIS — E87.1 HYPONATREMIA: ICD-10-CM

## 2022-02-24 DIAGNOSIS — E11.21 TYPE 2 DIABETES MELLITUS WITH DIABETIC NEPHROPATHY, WITH LONG-TERM CURRENT USE OF INSULIN (H): ICD-10-CM

## 2022-02-24 DIAGNOSIS — J06.9 UPPER RESPIRATORY TRACT INFECTION, UNSPECIFIED TYPE: ICD-10-CM

## 2022-02-24 PROCEDURE — 99214 OFFICE O/P EST MOD 30 MIN: CPT | Mod: 95 | Performed by: INTERNAL MEDICINE

## 2022-02-24 NOTE — PROGRESS NOTES
Jose Francisco is a 81 year old who is being evaluated via a billable telephone visit.      What phone number would you like to be contacted at? [phone]  How would you like to obtain your AVS? Mail a copy    ASSESSMENT:   1. Upper respiratory tract infection, unspecified type  Resolving.  Sounds viral in nature.  Symptomatic cares with Mucinex as needed    2. Type 2 diabetes mellitus with diabetic nephropathy, with long-term current use of insulin (H)  Previously uncontrolled with A1c 9.3.  Sugars are improved with current medications but need further control.  Will increase Basaglar while continuing glimepiride and recheck A1c in mid April.  Counseled regarding carbohydrate reduction and walking exercise as able  - Hemoglobin A1c; Future  - Comprehensive metabolic panel; Future  - Lipid panel reflex to direct LDL Fasting; Future  - Albumin Random Urine Quantitative with Creat Ratio; Future  - insulin glargine (BASAGLAR KWIKPEN) 100 UNIT/ML pen; INJECT 16 UNITS SUBCUTANEOUS EVERY DAY  Dispense: 15 mL; Refill: 3    3. Hyponatremia  Secondary to SIADH.  Stable low sodium at 125 with recent lab draw earlier this month.  See see results in chart.  Counseled to reduce fluid intake by half and will recheck lab in mid April  - Comprehensive metabolic panel; Future      PLAN:  Increase Basaglar to 16 units daily  Continue other medications  Reduce fluid intake by half  Call  [phone] or use [facility] to schedule a future lab appointment  fasting in mid April   For fasting labs, please refrain from eating for 8 hours or more.   Drink 2 glasses of water before your lab appointment. It is fine to take your  oral medications on the morning of the lab test as usual  Schedule a follow up appointment with me in clinic a few days after these future labs are drawn to review results and other medical issues as necessary. Check blood sugars twice a day (before breakfast and bedtime) for 4 days prior to the appointment with me, write  them down and have them available to review with the appointment        Phone call contact time  Call Started at 11:45am  Call Ended at 12:02pm  Total minutes: 15 min    (Chart documentation was completed, in part, with [employer] voice-recognition software. Even though reviewed, some grammatical, spelling, and word errors may remain.)    Kin Alejo MD  Internal Medicine Department  [facility]CTORA[facility] CampusSHAHLA Felton is a 81 year old who presents for the following health issues     HPI     Concern - Sinus   Onset: Pt has been having problems with his sinus for about a week now. He is having a little congestion in his chest as well. Pt has a little cough, no fever and hoarse voice   Intensity: mild  Progression of Symptoms:  improving  Accompanying Signs & Symptoms: None  Previous history of similar problem: No  Precipitating factors:        Worsened by: Nothing  Alleviating factors:        Improved by: mucinex  Therapies tried and outcome: mucinex      Most recent lab results reviewed with pt.       No shortness of breath. No F/C.  Slight hoarseness. Getting better.   Clear phlegm. Very rarely slight yellow.  Denies sinus pain  On Plaquenil.  Previous COVID vaccinations completed  Lab results from 2 weeks ago reviewed.  Sodium chronically low at 125 and unchanged from 4 and 8 months ago.  Previous urine studies consistent with SIADH.  Morning blood sugars recently 134, 126, 138.  Patient taking 15 units of Basaglar once a day.  Evening blood sugars 177, 177, 182.  Patient has been exercising more and has reduced sugar and juice intake.  Weight now 165 pounds which is down 10 pounds from a year ago.         Additional ROS:   Constitutional, HEENT, Cardiovascular, Pulmonary, GI and , Neuro, MSK and Psych review of systems/symptoms are otherwise negative or unchanged from previous, except as noted above.           Objective:  Any reported vitals from today were reviewed in  chart. See nursing documentation for details    RESP: No cough, no audible wheezing, able to talk in full sentences.  No coughing heard during the appointment today  GEN: Normal affect. Normal though content/speech  Remainder of exam unable to be completed due to telephone visits

## 2022-02-27 RX ORDER — INSULIN GLARGINE 100 [IU]/ML
INJECTION, SOLUTION SUBCUTANEOUS
Qty: 15 ML | Refills: 3
Start: 2022-02-27 | End: 2022-01-01

## 2022-02-28 NOTE — PATIENT INSTRUCTIONS
Increase Basaglar to 16 units daily  Continue other medications  Reduce fluid intake by half  Call  [phone] or use [facility] to schedule a future lab appointment  fasting in mid April   For fasting labs, please refrain from eating for 8 hours or more.   Drink 2 glasses of water before your lab appointment. It is fine to take your  oral medications on the morning of the lab test as usual  Schedule a follow up appointment with me in clinic a few days after these future labs are drawn to review results and other medical issues as necessary. Check blood sugars twice a day (before breakfast and bedtime) for 4 days prior to the appointment with me, write them down and have them available to review with the appointment

## 2022-03-11 DIAGNOSIS — E11.21 TYPE 2 DIABETES MELLITUS WITH DIABETIC NEPHROPATHY, WITH LONG-TERM CURRENT USE OF INSULIN (H): ICD-10-CM

## 2022-03-11 DIAGNOSIS — Z79.4 TYPE 2 DIABETES MELLITUS WITH DIABETIC NEPHROPATHY, WITH LONG-TERM CURRENT USE OF INSULIN (H): ICD-10-CM

## 2022-03-14 RX ORDER — GLIMEPIRIDE 2 MG/1
4 TABLET ORAL
Qty: 180 TABLET | Refills: 1 | Status: SHIPPED | OUTPATIENT
Start: 2022-03-14 | End: 2022-01-01

## 2022-03-15 NOTE — TELEPHONE ENCOUNTER
Has lab appt  April 2022  
Patient states script is cancelled in their system and they are needing it resent   
Routing refill request to provider for review/approval because:  Lab Results   Component Value Date    A1C 9.3 10/22/2021    A1C 8.6 06/10/2021    A1C 7.6 02/22/2021    A1C 7.0 06/09/2020    A1C 6.6 09/04/2019    A1C 7.0 04/24/2019     Medication is needing to be resent     Juan Carlos Booth RN  [facility] Triage Nurse   
No

## 2022-04-01 LAB — RETINOPATHY: NORMAL

## 2022-04-14 ENCOUNTER — LAB (OUTPATIENT)
Dept: LAB | Facility: CLINIC | Age: 82
End: 2022-04-14
Payer: COMMERCIAL

## 2022-04-14 DIAGNOSIS — E11.21 TYPE 2 DIABETES MELLITUS WITH DIABETIC NEPHROPATHY, WITH LONG-TERM CURRENT USE OF INSULIN (H): ICD-10-CM

## 2022-04-14 DIAGNOSIS — E87.1 HYPONATREMIA: ICD-10-CM

## 2022-04-14 DIAGNOSIS — Z79.4 TYPE 2 DIABETES MELLITUS WITH DIABETIC NEPHROPATHY, WITH LONG-TERM CURRENT USE OF INSULIN (H): ICD-10-CM

## 2022-04-14 LAB
ALBUMIN SERPL-MCNC: 3.7 G/DL (ref 3.4–5)
ALP SERPL-CCNC: 71 U/L (ref 40–150)
ALT SERPL W P-5'-P-CCNC: 27 U/L (ref 0–70)
ANION GAP SERPL CALCULATED.3IONS-SCNC: 3 MMOL/L (ref 3–14)
AST SERPL W P-5'-P-CCNC: 24 U/L (ref 0–45)
BILIRUB SERPL-MCNC: 0.7 MG/DL (ref 0.2–1.3)
BUN SERPL-MCNC: 11 MG/DL (ref 7–30)
CALCIUM SERPL-MCNC: 9.2 MG/DL (ref 8.5–10.1)
CHLORIDE BLD-SCNC: 93 MMOL/L (ref 94–109)
CHOLEST SERPL-MCNC: 120 MG/DL
CO2 SERPL-SCNC: 30 MMOL/L (ref 20–32)
CREAT SERPL-MCNC: 0.69 MG/DL (ref 0.66–1.25)
CREAT UR-MCNC: 53 MG/DL
FASTING STATUS PATIENT QL REPORTED: NO
GFR SERPL CREATININE-BSD FRML MDRD: >90 ML/MIN/1.73M2
GLUCOSE BLD-MCNC: 186 MG/DL (ref 70–99)
HBA1C MFR BLD: 7.7 % (ref 0–5.6)
HDLC SERPL-MCNC: 65 MG/DL
LDLC SERPL CALC-MCNC: 43 MG/DL
MICROALBUMIN UR-MCNC: 9 MG/L
MICROALBUMIN/CREAT UR: 16.98 MG/G CR (ref 0–17)
NONHDLC SERPL-MCNC: 55 MG/DL
POTASSIUM BLD-SCNC: 4.5 MMOL/L (ref 3.4–5.3)
PROT SERPL-MCNC: 7.9 G/DL (ref 6.8–8.8)
SODIUM SERPL-SCNC: 126 MMOL/L (ref 133–144)
TRIGL SERPL-MCNC: 61 MG/DL

## 2022-04-14 PROCEDURE — 36415 COLL VENOUS BLD VENIPUNCTURE: CPT

## 2022-04-14 PROCEDURE — 83036 HEMOGLOBIN GLYCOSYLATED A1C: CPT

## 2022-04-14 PROCEDURE — 80053 COMPREHEN METABOLIC PANEL: CPT

## 2022-04-14 PROCEDURE — 82043 UR ALBUMIN QUANTITATIVE: CPT

## 2022-04-14 PROCEDURE — 80061 LIPID PANEL: CPT

## 2022-04-20 DIAGNOSIS — Z79.4 TYPE 2 DIABETES MELLITUS WITH DIABETIC NEPHROPATHY, WITH LONG-TERM CURRENT USE OF INSULIN (H): ICD-10-CM

## 2022-04-20 DIAGNOSIS — E11.21 TYPE 2 DIABETES MELLITUS WITH DIABETIC NEPHROPATHY, WITH LONG-TERM CURRENT USE OF INSULIN (H): ICD-10-CM

## 2022-04-21 RX ORDER — PEN NEEDLE, DIABETIC 31 GX5/16"
NEEDLE, DISPOSABLE MISCELLANEOUS
Qty: 100 EACH | Refills: 11 | Status: SHIPPED | OUTPATIENT
Start: 2022-04-21 | End: 2022-01-01

## 2022-04-21 NOTE — TELEPHONE ENCOUNTER
Routing refill request to provider for review/approval because:  Last filled by Dr. Richardson  routing to new PCP     Juan Carlos Booth, RN  [facility] Triage Nurse

## 2022-04-22 ENCOUNTER — TELEPHONE (OUTPATIENT)
Dept: INTERNAL MEDICINE | Facility: CLINIC | Age: 82
End: 2022-04-22
Payer: COMMERCIAL

## 2022-04-22 DIAGNOSIS — E87.1 HYPONATREMIA: Primary | ICD-10-CM

## 2022-04-22 NOTE — TELEPHONE ENCOUNTER
Received a call from the patient asking about his lab results from 4/14/22.     Patient would like any recommendations from Dr. Alejo and he would like a copy of his labs sent to his house (confrimed address listed in the patient's chart).    Routing to PCP and DIANA Funes RN

## 2022-04-22 NOTE — LETTER
[facility]  [address] [address]  [phone]      4/26/2022       Jose Francisco Gonzalez  [address]  [facility] [address]        Dear Catherine Felton is a copy of your lab results discuss with Dr Alejo. Dr Alejo vis requesting that you have a lab appointment prior to your visit in June 6, 2022.   Please contact us with any further questions or concerns.  Sincerely,      Kin Alejo MD/Cathryn Whitley, [facility]  Internal Medicine

## 2022-04-25 NOTE — TELEPHONE ENCOUNTER
Please advise on lab results and than we can print them out and mail them to patient and give patient any recommendations.

## 2022-04-25 NOTE — TELEPHONE ENCOUNTER
Spoke with patient and reviewed lab results.  History of SIADH.  Sodium still low but improved.  Patient has been drinking more fluids recently and instructed him to try and limit fluid intake during the day to 36-40 ounces of fluid in total or less.  Blood sugar control improved.  Patient will continue other medications.  Patient needs to  have a nonfasting BMP lab drawn again  in early June a few days before his scheduledappt to see me on 6/6/2022.  Please call patient and assist in scheduling a lab appointment and then mail a copy of the labs from 4/14/2022 to patient as requested

## 2022-04-26 NOTE — TELEPHONE ENCOUNTER
LMTCB to scheduled lab appointment BEFORE appointment on 06/06/2022 with Sapna. Results from 04/14/2022 mailed to Patient as requested

## 2022-05-23 DIAGNOSIS — I10 HYPERTENSION GOAL BP (BLOOD PRESSURE) < 140/80: Chronic | ICD-10-CM

## 2022-05-25 RX ORDER — LISINOPRIL 10 MG/1
10 TABLET ORAL DAILY
Qty: 90 TABLET | Refills: 1 | Status: SHIPPED | OUTPATIENT
Start: 2022-05-25 | End: 2022-01-01

## 2022-05-25 NOTE — TELEPHONE ENCOUNTER
Routing refill request to provider for review/approval because:  Last prescripton filled by Dr. Richardson. PCP to review and advise.

## 2022-06-03 ENCOUNTER — LAB (OUTPATIENT)
Dept: LAB | Facility: CLINIC | Age: 82
End: 2022-06-03
Payer: COMMERCIAL

## 2022-06-03 DIAGNOSIS — E87.1 HYPONATREMIA: ICD-10-CM

## 2022-06-03 DIAGNOSIS — I10 HYPERTENSION GOAL BP (BLOOD PRESSURE) < 140/80: Chronic | ICD-10-CM

## 2022-06-03 LAB
ANION GAP SERPL CALCULATED.3IONS-SCNC: 7 MMOL/L (ref 3–14)
BUN SERPL-MCNC: 13 MG/DL (ref 7–30)
CALCIUM SERPL-MCNC: 9.3 MG/DL (ref 8.5–10.1)
CHLORIDE BLD-SCNC: 94 MMOL/L (ref 94–109)
CO2 SERPL-SCNC: 28 MMOL/L (ref 20–32)
CREAT SERPL-MCNC: 0.73 MG/DL (ref 0.66–1.25)
GFR SERPL CREATININE-BSD FRML MDRD: 90 ML/MIN/1.73M2
GLUCOSE BLD-MCNC: 193 MG/DL (ref 70–99)
POTASSIUM BLD-SCNC: 4.3 MMOL/L (ref 3.4–5.3)
SODIUM SERPL-SCNC: 129 MMOL/L (ref 133–144)

## 2022-06-03 PROCEDURE — 80048 BASIC METABOLIC PNL TOTAL CA: CPT

## 2022-06-03 PROCEDURE — 36415 COLL VENOUS BLD VENIPUNCTURE: CPT

## 2022-06-03 RX ORDER — METOPROLOL SUCCINATE 50 MG/1
50 TABLET, EXTENDED RELEASE ORAL DAILY
Qty: 90 TABLET | Refills: 4 | Status: SHIPPED | OUTPATIENT
Start: 2022-06-03 | End: 2023-01-01 | Stop reason: DRUGHIGH

## 2022-06-03 NOTE — TELEPHONE ENCOUNTER
Patient called, went to refill metoprolol but per pharmacy medication was discontinued, patient is out of med. Per chart review medication last ordered 5/19/21 by Dr. Richardson. pcp please advise if patient should still bet taking metopolol 50 mg daily, if so, please re-order and route back to triage to follow up with patient. Pended medication-please review.     Routing refill request to provider for review/approval because:  Ordered by previous PCP.      Patient taking metoprolol 50 mg once daily- patient is completely out of medication. Patient is also taking lisinopril 10 mg once daily.   No recent notes on patient's HTN management.     Preferred pharmacy: [id number] [place] [id number] AT [facility] [address]    Callback: [phone]-ok to leave detailed VM.     Nidhi Hastings RN  [facility]

## 2022-06-06 ENCOUNTER — VIRTUAL VISIT (OUTPATIENT)
Dept: INTERNAL MEDICINE | Facility: CLINIC | Age: 82
End: 2022-06-06
Payer: COMMERCIAL

## 2022-06-06 DIAGNOSIS — Z79.4 TYPE 2 DIABETES MELLITUS WITH DIABETIC NEPHROPATHY, WITH LONG-TERM CURRENT USE OF INSULIN (H): ICD-10-CM

## 2022-06-06 DIAGNOSIS — E22.2 SYNDROME OF INAPPROPRIATE ANTIDIURETIC HORMONE SECRETION (H): ICD-10-CM

## 2022-06-06 DIAGNOSIS — J30.0 VASOMOTOR RHINITIS: ICD-10-CM

## 2022-06-06 DIAGNOSIS — E11.21 TYPE 2 DIABETES MELLITUS WITH DIABETIC NEPHROPATHY, WITH LONG-TERM CURRENT USE OF INSULIN (H): ICD-10-CM

## 2022-06-06 DIAGNOSIS — N39.41 URGE INCONTINENCE OF URINE: ICD-10-CM

## 2022-06-06 PROCEDURE — 99214 OFFICE O/P EST MOD 30 MIN: CPT | Mod: 95 | Performed by: INTERNAL MEDICINE

## 2022-06-06 NOTE — PROGRESS NOTES
"Jose Francisco is a 82 year old  who is being evaluated via a billable telephone visit.  Originally planned as video visit but done instead through telephone visit as MD was out of the office during the week with medical illness.  Patient was actually contacted on 6/8/2022 by MD     The patient has been notified of following:     \"This telephone visit will be conducted via a call between you and your physician/provider. We have found that certain health care needs can be provided without the need for a physical exam.  This service lets us provide the care you need with a short phone conversation.  If a prescription is necessary we can send it directly to your pharmacy.  If lab work is needed we can place an order for that and you can then stop by our lab to have the test done at a later time.    Telephone visits are billed at different rates depending on your insurance coverage. During this emergency period, for some insurers they may be billed the same as an in-person visit.  Please reach out to your insurance provider with any questions.    If during the course of the call the physician/provider feels a telephone visit is not appropriate, you will not be charged for this service.\"    Patient has given verbal consent for Telephone visit?  Yes    What phone number would you like to be contacted at? [phone]    How would you like to obtain your AVS? Mailed to pt    ASSESSMENT:   1. Type 2 diabetes mellitus with diabetic nephropathy, with long-term current use of insulin (H)  Controlled for age (goal A1C < 8).  Continue current medication including glimepiride 4 mg daily in AM.  If A1c increasing the future, will then increase a.m. glimepiride to 6 mg daily with breakfast as patient does have some room for bedtime blood sugars to come down.  Recheck A1c 3 months  - Basic metabolic panel; Future  - Hemoglobin A1c; Future    2. 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  Counseled " regarding additional reduction in fluid intake and recheck lab 3 months  - Basic metabolic panel; Future    3. Vasomotor rhinitis  Symptoms consistent with vasomotor etiology, especially with occurring after meals and failure to respond to antihistamine therapy.  Start Atrovent nasal spray  - ipratropium (ATROVENT) 0.06 % nasal spray; Spray 1-2 sprays into both nostrils 2 times daily  Dispense: 15 mL; Refill: 11    4. Urge incontinence of urine  Symptoms not very bothersome to patient.  Wearing a pad now for backup.  Counseled to take more frequent bathroom breaks.  We will also decrease caffeine intake and counseled patient regarding discontinuing alcohol use but prefers to still continue half shot of bourbon at night for now.  Defer medications like Myrbetriq for now as patient does have some baseline dry mouth symptoms already      PLAN:  Continue current medications  Start Atrovent nasal spray, 2 sprays each nostril twice a day for postnasal drainage and secondary cough, especially after meals  Call  [phone] or use [employer] to schedule a future lab appointment  non-fasting in 3 months to follow-up on diabetes and sodium level  Reduce coffee intake to 1 cup/day and if nighttime urination remains, would also  discontinue alcohol intake  We will make further recommendations regarding fluid intake limitations based on future sodium lab result    Phone call contact time  Call Started at 3:39pm  Call Ended at 4:07pm  Total minutes: 28 min    (Chart documentation was completed, in part, with [employer] voice-recognition software. Even though reviewed, some grammatical, spelling, and word errors may remain.)    Kin Alejo MD  Internal Medicine Department  [facility]         Chan Felton is a 82 year old who presents for the following health issues     HPI   Chief Complaint   Patient presents with     Throat Problem     Pt always feeling a little tickle in the back of his  throat and some post nasal drip x several weeks      Results     Diabetes        Most recent lab results reviewed with pt.      Component      Latest Ref Rng & Units 6/10/2021 10/22/2021 12/8/2021 4/14/2022   Sodium      133 - 144 mmol/L 125 (L) 123 (L)  126 (L)   Potassium      3.4 - 5.3 mmol/L 4.1 4.8  4.5   Chloride      94 - 109 mmol/L 92 (L) 90 (L)  93 (L)   Carbon Dioxide      20 - 32 mmol/L 30 26  30   Anion Gap      3 - 14 mmol/L 3 7  3   Glucose      70 - 99 mg/dL 289 (H) 277 (H)  186 (H)   Urea Nitrogen      7 - 30 mg/dL 12 11  11   Creatinine      0.66 - 1.25 mg/dL 0.79 0.78  0.69   GFR Estimate      >60 mL/min/1.73m2 83 83  >90   GFR Estimate If Black      >60 mL/min/1.73:m2 >90      Calcium      8.5 - 10.1 mg/dL 8.9 8.9  9.2   Bilirubin Total      0.2 - 1.3 mg/dL 0.5   0.7   Albumin      3.4 - 5.0 g/dL 3.6   3.7   Protein Total      6.8 - 8.8 g/dL 7.9   7.9   Alkaline Phosphatase      40 - 150 U/L 85      ALT      0 - 70 U/L 21   27   AST      0 - 45 U/L 20   24   Alkaline Phosphatase      40 - 150 U/L    71   Cholesterol      <200 mg/dL 142   120   Triglycerides      <150 mg/dL 67   61   HDL Cholesterol      >=40 mg/dL 53   65   LDL Cholesterol Calculated      <=100 mg/dL 76   43   Non HDL Cholesterol      <130 mg/dL 89   55   Patient Fasting > 8hrs?          No   Creatinine Urine      mg/dL 42 79  53   Albumin Urine mg/L      mg/L 15 35  9   Albumin Urine mg/g Cr      0.00 - 17.00 mg/g Cr 36.67 (H) 44.30 (H)  16.98   Creatinine (External)      0.500 - 1.300 mg/dL   0.720    GFR Estimated (External)      mL/min/1.73m2   110.08    Hemoglobin A1C POCT      0 - 5.6 % 8.6 (H)      Hemoglobin A1C      0.0 - 5.6 %  9.3 (H)  7.7 (H)   Urine Osmolality      100-1,200 mmol/kg  378     Vitamin D Deficiency screening      20 - 75 ug/L  22     TSH      0.40 - 4.00 mU/L  0.98     Sodium Urine mmol/L      mmol/L  55       Component      Latest Ref Rng & Units 6/3/2022   Sodium      133 - 144 mmol/L 129 (L)    Potassium      3.4 - 5.3 mmol/L 4.3   Chloride      94 - 109 mmol/L 94   Carbon Dioxide      20 - 32 mmol/L 28   Anion Gap      3 - 14 mmol/L 7   Glucose      70 - 99 mg/dL 193 (H)   Urea Nitrogen      7 - 30 mg/dL 13   Creatinine      0.66 - 1.25 mg/dL 0.73   GFR Estimate      >60 mL/min/1.73m2 90   GFR Estimate If Black      >60 mL/min/1.73:m2    Calcium      8.5 - 10.1 mg/dL 9.3   Bilirubin Total      0.2 - 1.3 mg/dL    Albumin      3.4 - 5.0 g/dL    Protein Total      6.8 - 8.8 g/dL    Alkaline Phosphatase      40 - 150 U/L    ALT      0 - 70 U/L    AST      0 - 45 U/L    Alkaline Phosphatase      40 - 150 U/L    Cholesterol      <200 mg/dL    Triglycerides      <150 mg/dL    HDL Cholesterol      >=40 mg/dL    LDL Cholesterol Calculated      <=100 mg/dL    Non HDL Cholesterol      <130 mg/dL    Patient Fasting > 8hrs?          Creatinine Urine      mg/dL    Albumin Urine mg/L      mg/L    Albumin Urine mg/g Cr      0.00 - 17.00 mg/g Cr    Creatinine (External)      0.500 - 1.300 mg/dL    GFR Estimated (External)      mL/min/1.73m2    Hemoglobin A1C POCT      0 - 5.6 %    Hemoglobin A1C      0.0 - 5.6 %    Urine Osmolality      100-1,200 mmol/kg    Vitamin D Deficiency screening      20 - 75 ug/L    TSH      0.40 - 4.00 mU/L    Sodium Urine mmol/L      mmol/L        Denies CP, SOB, abdominal pain, polyuria, polydipsia, vision changes, extremity numbness/parasthesias or skin problems.  Patient gets some clearing of his throat with clear sputum intermittently and clear rhinorrhea for many months.  Denies dysphagia.  Generally occurs after meals.  Will occur after dinner when in the kitchen also.  Patient jokes that his nose will run after dinner and this gets him out of having to do the dishes at times.  No change with Allegra.  Patient has been on lisinopril long-term and symptoms occurred years after being on lisinopril.  Has some intermittent urinary urge incontinence.  Started to wear a pad now.  Drinks 3  cups of decaffeinated coffee per day.  No strain with urination.  Nocturia once or twice a night.  Patient drinks about a half shot of bourbon along with water in the drink every night.  Patient has baseline mild dry mouth so not a good candidate for anticholinergic therapy  Blood sugars consistently less than 130 in the morning.  Blood sugar was 95 this morning.  Patient states blood sugars in the evening generally range between 180-200 at the highest.  Patient denies any low blood sugar issues.  Besides the fluid intake above, patient has some milk in the morning with breakfast and occasional ice tea.  Will be traveling to [place] and [facility] in the next month to visit family members there and looking forward to the trip         Additional ROS:   Constitutional, HEENT, Cardiovascular, Pulmonary, GI and , Neuro, MSK and Psych review of systems/symptoms are otherwise negative or unchanged from previous, except as noted above.           Objective:  Any reported vitals from today were reviewed in chart. See nursing documentation for details    RESP: No cough, no audible wheezing, able to talk in full sentences  GEN: Normal affect. Normal though content/speech  Remainder of exam unable to be completed due to telephone visits

## 2022-06-08 RX ORDER — IPRATROPIUM BROMIDE 42 UG/1
1-2 SPRAY, METERED NASAL 2 TIMES DAILY
Qty: 15 ML | Refills: 11 | Status: SHIPPED | OUTPATIENT
Start: 2022-06-08 | End: 2022-01-01

## 2022-06-11 PROBLEM — E22.2: Status: ACTIVE | Noted: 2022-01-01

## 2022-06-11 PROBLEM — N39.41 URGE INCONTINENCE OF URINE: Status: ACTIVE | Noted: 2022-01-01

## 2022-06-11 PROBLEM — J30.0 VASOMOTOR RHINITIS: Status: ACTIVE | Noted: 2022-01-01

## 2022-06-11 NOTE — PATIENT INSTRUCTIONS
Continue current medications  Start Atrovent nasal spray, 2 sprays each nostril twice a day for postnasal drainage and secondary cough, especially after meals  Call  [phone] or use [employer] to schedule a future lab appointment  non-fasting in 3 months to follow-up on diabetes and sodium level  Reduce coffee intake to 1 cup/day and if nighttime urination remains, would also  discontinue alcohol intake  We will make further recommendations regarding fluid intake limitations based on future sodium lab result

## 2022-06-23 NOTE — TELEPHONE ENCOUNTER
This patient was referred by his [facility] insurance plan. I called the patient to schedule an appointment and he stated he felt pretty good about his medications at this time.  He was open to having a letter sent with information for him to consider so will go ahead and do that.    Gabo Rm, MichelD, [facility]  Medication Therapy Management Pharmacist  Pager: [phone]

## 2022-06-23 NOTE — LETTER
[address] [address] [phone]      June 23, 2022    Jose Francisco Gonzalez                                                                                                               [address]  [facility] [address]      Dear Jose Francisco,    Your [employer] insurance plan has recommended you schedule a Medication Therapy Management (MTM) appointment. MTM is designed to help you get the most of out of your medicines.     During an MTM appointment a specially trained pharmacist will review all of your medicines, both prescription and over-the-counter. They will make sure your medicines are the best choice for you and are safe and convenient for you.  MTM pharmacists work together with you and your doctor to help you understand your medicines, solve any problems related to your medicines and help you get the best results from taking your medicines.     At [facility], we strongly believe in a team approach to health care. We want to help you understand your medicines and health conditions. To learn more about how you might benefit from MTM services, watch the patient video at [email or web address].     To make an appointment, please call the MTM scheduling line at [phone] (toll-free at [phone]).    We look forward to hearing from you!        Gabo Rm, PharmD, [facility]  Medication Therapy Management Pharmacist  Pager: [phone]

## 2022-07-11 NOTE — TELEPHONE ENCOUNTER
Cheko from [facility] called to clarify if pt is still on Basaglar. This is still on active med list. Pt needs refill.     Zenaida FINN RN  [facility]

## 2022-07-12 NOTE — TELEPHONE ENCOUNTER
Pt calling in regards to:    COVID exposure    States feeling fever-jayson, runny nose    Requesting COVID PCR test, states not trusting home tests.   Declined triage.     Pt warm transferred to scheduling.     Mariah House RN  [place] Nurse Advisor  07/12/22  6:44 PM            Reason for Disposition    COVID-19 Testing, questions about    Protocols used: CORONAVIRUS (COVID-19) EXPOSURE-A- 1.18.2022

## 2022-07-14 NOTE — TELEPHONE ENCOUNTER
Pending Prescriptions:                       Disp   Refills    insulin glargine (BASAGLAR KWIKPEN) 100 U*15 mL  3            Sig: INJECT 16 UNITS SUBCUTANEOUS EVERY DAY    Prescription approved per [facility] Refill Protocol.

## 2022-08-18 NOTE — TELEPHONE ENCOUNTER
"Patient calling stating he tested positive for COVID today. Patient wants to know how to look into antiviral medication.   This RN provided patient with the below information regarding COVID treatment/scheduling, signs/symptoms, red flag/emergency symptoms and when to seek care, isolation precautions. Patient verbalized understanding and denies any further questions or concerns at this time. Patient also advised that should he develop any questions/concerns, there is a triage nurse available 24/7.     Shereen Rutherford RN BSN MSN  [facility]      Instructions for Patients  Your COVID-19 test was positive. This means you have the virus. Please follow the \"How can I take care of myself\" and \"How do I self-isolate?\" guidelines in these instructions.    What treatments are available?  Over-the-counter medicines may help with your symptoms such as runny or stuffy nose, cough, chills, and fever. Talk to your care team about your options.     Some people are at high risk for severe illness (for example if you have a weak immune system, you're 65 or older, or you have certain medical problems). If your risk it high and your symptoms started in the last 5 to 7 days, we strongly recommend for you to get COVID treatment as soon as possible before you get really sick. Paxlovid, Molnupiravir and the monoclonal antibody treatments are proven safe and effective, make you feel better faster, and prevent hospitalization and death.       You can schedule an appointment to discuss COVID treatment by requesting an appointment on [facility] by selecting \"schedule COVID-19 Treatment\" or by calling [address] [place] [address] ([phone]) and pressing 7.    What are the symptoms of COVID-19?  Symptoms can include fever, cough, shortness of breath, chills, headache, muscle pain sore throat, fatigue, runny or stuffy nose, and loss of taste and smell. Other less common symptoms include nausea, vomiting, or diarrhea (watery stools).    Know " when to call 911. Emergency warning signs include:    Trouble breathing or shortness of breath    Pain or pressure in the chest that doesn't go away    Feeling confused like you haven't felt before, or not being able to wake up    Bluish-colored lips or face    How can I take care of myself?  1. Get lots of rest. Drink extra fluids (unless a doctor has told you not to).  2. Take Tylenol (acetaminophen) for fever or pain. If you have liver or kidney problems, ask your family doctor if it's okay to take Tylenol   Adults:   650 mg (two 325 mg pills or tablets) every 4 to 6 hours, or...   1,000 mg (two 500 mg pills or tablets) every 8 hours as needed.  Note: Don't take more than 3,000 mg in one day. Acetaminophen is found in many medicines (both prescribed and over the counter). Read all labels to be sure you don't take too much.  For children, check the Tylenol bottle for the right dose. The dose is based on the child's age or weight.  3. Take over the counter medicines for your symptoms as needed. Talk to your pharmacist.  4. If you have other health problems (like cancer, heart failure, an organ transplant, or severe kidney disease): Call your specialty clinic if you don't feel better in the next 2 days.    These guidelines are for isolating and quarantining before returning to work, school or .     For employers, schools and day cares: This is an official notice for this person's medical guidelines for returning in-person.     For health care sites: The CDC gives different isolation and quarantine guidelines for healthcare sites, please check with these sites before arriving.     How do I self-isolate?  You isolate when you have symptoms of COVID or a test shows you have COVID, even if you don't have symptoms.     If you DO have symptoms:  o Stay home and away from others  - For at least 5 days after your symptoms started, AND   - You are fever free for 24 hours (with no medicine that reduces fever),  AND  - Your other symptoms are better.  o Wear a mask for 10 full days any time you are around others.    If you DON'T have symptoms:  o Stay at home and away from others for at least 5 days after your positive test.  o Wear a mask for 10 full days any time you are around others.    How and when do I quarantine?  You quarantine when you may have been exposed to the virus and DON'T have symptoms.     Stay home and away from others.     You must quarantine for 5 days after your last contact with a person who has COVID.  o Note: If you are fully vaccinated, you don't need to quarantine. You should still follow the steps below.     Wear a mask for 10 full days any time you're around others.    Get tested at least 5 days after you were exposed, even if you don't have symptoms.     If you start to have symptoms, isolate right away and get tested.    Where can I get more information?    [facility] COVID-19 Resource Hub: [email or web address]     CDC Quarantine & Isolation: https://www.cdc.gov/coronavirus/2019-ncov/your-health/quarantine-isolation.html     CDC - What to Do If You're Sick: https://www.cdc.gov/coronavirus/2019-ncov/if-you-are-sick/index.html    [facility] [place] [place] clinical trials (COVID-19 research studies): [email or web address]    [place] Department of Health COVID-19 Public Hotline: 1-413.862.3577

## 2022-08-26 NOTE — TELEPHONE ENCOUNTER
Pt is calling in about testing positive for Covid about a week ago after symptoms developed on 8/17/2022. Pt reports he had just came back from a trip to [place]. Pt no longer has most of the Covid symptoms, but he is very fatigued still. Pt has just slight congestion, but has no energy. Pt also tested himself for Covid again, and was positive.   Care advice given:    REPEATING A COVID-19 VIRAL TEST:  * NEGATIVE VIRAL TEST: A repeat test is sometimes needed after a negative viral test. Reason: A test may be incorrectly negative. For example, if a person gets the test too soon after exposure or does not swab the nose the right way. Further, if a person is exposed again or develops symptoms suggestive of COVID-19, then repeat viral testing should be performed. Home self-tests may recommend repeat testing after 1 to 2 days if the first test is negative.  * POSITIVE VIRAL TEST: After a positive test, repeat tests are generally not recommended for 90 days (3 months). Reason: Even after it is safe to stop isolation (usually 5 days), tests may stay positive. Further, re-infection appears to be rare during the initial 90 days after symptom onset of the preceding infection. However, if you have new symptoms of COVID-19 within 14 days of exposure to someone with COVID-19, you should stay home (isolate).    COVID-19 - HOW TO PROTECT OTHERS - WHEN YOU ARE SICK WITH COVID-19:  * STAY HOME A MINIMUM OF 5 DAYS: Home isolation is needed for at least 5 days after the symptoms started. Stay home from school or work if you are sick. Do NOT go to Buddhism services,  centers, shopping, or other public places. Do NOT use public transportation (e.g., bus, taxis, ride-sharing). Do NOT allow any visitors to your home. Leave the house only if you need to seek urgent medical care.  * WEAR A MASK FOR 10 DAYS: Wear a well-fitted mask for 10 full days any time you are around others inside your home or in public. Do not go to places  where you are unable to wear a mask.  CARE ADVICE FOR FATIGUE:  * Fatigue and feeling tired is the most common persisting symptom in people who have had COVID-19.  * Eat healthy: Eat healthy meals. Avoid overeating.  * Rest: Rest more. Get enough sleep.  * Stay active: Recognize that you may not be able to accomplish as much as before. It is normal to feel tired after exercising. But try to stay physically active. Walk or lightly exercise every day.  * Stay hydrated: Drink plenty of liquids. Not drinking enough fluids and being a little dehydrated is a common cause of fatigue and weakness. If you think you are dehydrated, drink several glasses of fruit juice, other clear fluids, or water. This should help.    GENERAL CARE ADVICE FOR COVID-19 SYMPTOMS:  * The symptoms are generally treated the same whether you have COVID-19, influenza or some other respiratory virus.  * Cough: Use cough drops.  * Feeling dehydrated: Drink extra liquids. If the air in your home is dry, use a humidifier.  * Fever: For fever over 101 F (38.3 C), take acetaminophen every 4 to 6 hours (Adults 650 mg) OR ibuprofen every 6 to 8 hours (Adults 400 mg). Before taking any medicine, read all the instructions on the package. Do not take aspirin unless your doctor has prescribed it for you.  * Muscle aches, headache, and other pains: Often this comes and goes with the fever. Take acetaminophen every 4 to 6 hours (Adults 650 mg) OR ibuprofen every 6 to 8 hours (Adults 400 mg). Before taking any medicine, read all the instructions on the package.  * Sore throat: Try throat lozenges, hard candy or warm chicken broth.    Per protocol pt should be evaluated in the clinic within 3 days. [place]. Pt was advised to:    CALL BACK IF:   * Fever over 103 F (39.4 C)  * Fever lasts over 3 days  * Fever returns after being gone for 24 hours  * Chest pain or difficulty breathing occurs  * You become worse      Pt  verbalized understanding.     Elder Werner RN on 8/26/2022 at 11:57 AM      Caller Name (Patient Jose Francisco Gonzalez)    Gather patient reported symptoms   Assessment   Current Symptoms at time of phone call, reported by patient: (if no symptoms, document No symptoms] fatigue   Date of Symptom(s) onset (if applicable) 8/17/2022     If at time of call, Patients symptoms hare worsened, the Patient should contact 911 or have someone drive them to Emergency Dept promptly:      If Patient calling 911, inform 911 personal that you have tested positive for the Coronavirus (COVID-19).  Place mask on and await 911 to arrive.    If Emergency Dept, If possible, please have another adult drive you to the Emergency Dept but you need to wear mask when in contact with other people.      Monoclonal Antibody Administration    You may be eligible to receive a new treatment with a monoclonal antibody for preventing hospitalization in patients at high risk for complications from COVID-19. This medication is still experimental and available on a limited basis; it is given through an IV and must be given at an infusion center. Please note that not all people who are eligible will receive the medication since it is in limited supply.  Is the patient symptomatic and onset of symptoms within the last 7 days?  Yes  Is the patient interested in a visit with a provider to discuss treatment options?: No.  Reason patient declined:  Symptom onset date is past 10 days for Monoclonal Antibody      How can I protect others?    These guidelines are for isolating before returning to work, school or .       If you DO have symptoms:  o Stay home and away from others  - For at least 5 days after your symptoms started, AND   - You are fever free for 24 hours (with no medicine that reduces fever), AND  - Your other symptoms are better.  o Wear a mask for 10 full days any time you are around others.    If you DON'T have symptoms:  o Stay at home  and away from others for at least 5 days after your positive test.  o Wear a mask for 10 full days any time you are around others.    There may be different guidelines for healthcare facilities. Please check with the specific sites before arriving.     If you've been told by a doctor that you were severely ill with COVID-19 or are immunocompromised, you should isolate for at least 10 days.    You should not go back to work until you meet the guidelines above for ending your home isolation. You don't need to be retested for COVID-19 before going back to work--studies show that you won't spread the virus if it's been at least 10 days since your symptoms started (or 20 days, if you have a weak immune system).    Employers, schools, and daycares: This is an official notice for this person's medical guidelines for returning in-person. They must meet the above guidelines before going back to work, school, or  in person.    YReason for Disposition    [1] PERSISTING SYMPTOMS OF COVID-19 AND [2] NO medical visit for COVID-19 in past 2 weeks    COVID-19 Testing, questions about    Additional Information    Negative: SEVERE difficulty breathing (e.g., struggling for each breath, speaks in single words)    Negative: [1] SEVERE weakness (e.g., can't stand or can barely walk) AND [2] new-onset or WORSE    Negative: Difficult to awaken or acting confused (e.g., disoriented, slurred speech)    Negative: Bluish (or gray) lips or face now    Negative: Sounds like a life-threatening emergency to the triager    Negative: [1] Typical COVID-19 symptoms AND [2] lasting less than 3 weeks    Negative: [1] Chest pain, pressure, or tightness AND [2] new-onset or worsening    Negative: [1] Fever AND [2] new-onset or worsening    Negative: [1] MODERATE difficulty breathing (e.g., speaks in phrases, SOB even at rest, pulse 100-120) AND [2] new-onset or WORSE    Negative: [1] MODERATE difficulty breathing AND [2] oxygen level (e.g., pulse  oximetry) 91 to 94 percent    Negative: Oxygen level (e.g., pulse oximetry) 90 percent or lower    Negative: MODERATE difficulty breathing (e.g., speaks in phrases, SOB even at rest, pulse 100-120)    Negative: [1] Drinking very little AND [2] dehydration suspected (e.g., no urine > 12 hours, very dry mouth, very lightheaded)    Negative: Patient sounds very sick or weak to the triager    Negative: [1] MILD difficulty breathing (e.g., minimal/no SOB at rest, SOB with walking, pulse <100) AND [2] new-onset    Negative: Oxygen level (e.g., pulse oximetry) 91 to 94 percent    Negative: [1] PERSISTING SYMPTOMS OF COVID-19 AND [2] NEW symptom AND [3] could be serious    Negative: [1] Caller has URGENT question AND [2] triager unable to answer question    Negative: [1] PERSISTING SYMPTOMS OF COVID-19 AND [2] symptoms WORSE    Negative: [1] Caller has NON-URGENT question AND [2] triager unable to answer    Protocols used: CORONAVIRUS (COVID-19) PERSISTING SYMPTOMS FOLLOW-UP CALL-A-OH, CORONAVIRUS (COVID-19) DIAGNOSED OR [id number] 1.18.2022

## 2022-09-09 NOTE — TELEPHONE ENCOUNTER
Nurse Triage SBAR    Is this a 2nd Level Triage? YES, LICENSED PRACTITIONER REVIEW IS REQUIRED    Situation: Pt tested positive with home test for Covid on August 14th. He did not get treatment or have a visit for this. He is having persisting symptoms, cough, fatigue, loss of taste (affecting appetite). He is also having difficulty sleeping and experienced a dizzy spell in the middle of the night a few nights ago while he was on the toilet and weakness. No fever, breathing difficulty, or chest pain. He is concerned he is worsening     Background: Has had 4 shots total, all Pfizer. Pt endorses history of type 2 diabetes.     Assessment: see below    Protocol Recommended Disposition:   See in Office Today    Recommendation: protocol states to be seen today. Offered virtual visit and pt declined requesting an in-person visit next week. [place] Can same day or other slot be used to schedule him next week? Please review/advise.     Routed to provider    Does the patient meet one of the following criteria for ADS visit consideration? 16+ years old, with an [facility] PCP     TIP  Providers, please consider if this condition is appropriate for management at one of our Acute and Diagnostic Services sites.     If patient is a good candidate, please use dotphrase <dot>triageresponse and select Refer to ADS to document.    Ok to leave detailed vm.     Reason for Disposition    [1] PERSISTING SYMPTOMS OF COVID-19 AND [2] symptoms WORSE    Additional Information    Negative: SEVERE difficulty breathing (e.g., struggling for each breath, speaks in single words)    Negative: [1] SEVERE weakness (e.g., can't stand or can barely walk) AND [2] new-onset or WORSE    Negative: Difficult to awaken or acting confused (e.g., disoriented, slurred speech)    Negative: Bluish (or gray) lips or face now    Negative: Sounds like a life-threatening emergency to the triager    Negative: [1] Typical COVID-19 symptoms AND [2]  "lasting less than 3 weeks    Negative: [1] Chest pain, pressure, or tightness AND [2] new-onset or worsening    Negative: [1] Fever AND [2] new-onset or worsening    Negative: [1] MODERATE difficulty breathing (e.g., speaks in phrases, SOB even at rest, pulse 100-120) AND [2] new-onset or WORSE    Negative: [1] MODERATE difficulty breathing AND [2] oxygen level (e.g., pulse oximetry) 91 to 94 percent    Negative: Oxygen level (e.g., pulse oximetry) 90 percent or lower    Negative: MODERATE difficulty breathing (e.g., speaks in phrases, SOB even at rest, pulse 100-120)    Negative: [1] Drinking very little AND [2] dehydration suspected (e.g., no urine > 12 hours, very dry mouth, very lightheaded)    Negative: Patient sounds very sick or weak to the triager    Negative: [1] MILD difficulty breathing (e.g., minimal/no SOB at rest, SOB with walking, pulse <100) AND [2] new-onset    Negative: Oxygen level (e.g., pulse oximetry) 91 to 94 percent    Negative: [1] PERSISTING SYMPTOMS OF COVID-19 AND [2] NEW symptom AND [3] could be serious    Negative: [1] Caller has URGENT question AND [2] triager unable to answer question    Answer Assessment - Initial Assessment Questions  1. COVID-19 ONSET: \"When did the symptoms of COVID-19 first start?\"        First started August 14th.     2. DIAGNOSIS CONFIRMATION: \"How were you diagnosed?\" (e.g., COVID-19 oral or nasal viral test; COVID-19 antibody test; doctor visit)        Home test was positive on the 14th of August.     3. MAIN SYMPTOM:  \"What is your main concern or symptom right now?\" (e.g., breathing difficulty, cough, fatigue. loss of smell)        Fatigue, loss sense of taste (affects apetite), cough    4. SYMPTOM ONSET: \"When did this  start?\"        All of these symptoms started on the 14th but have continued to persist.   Pt did not do any treatment once getting the positive test.     5. BETTER-SAME-WORSE: \"Are you getting better, staying the same, or getting worse over " "the last 1 to 2 weeks?\"        Getting worse.     6. RECENT MEDICAL VISIT: \"Have you been seen by a healthcare provider (doctor, NP, PA) for these persisting COVID-19 symptoms?\" If Yes, ask: \"When were you seen?\" (e.g., date)        No    7. COUGH: \"Do you have a cough?\" If Yes, ask: \"How bad is the cough?\"          Yes, little bit of phlegm, yellowish color.     8. FEVER: \"Do you have a fever?\" If Yes, ask: \"What is your temperature, how was it measured, and when did it start?\"        No    9. BREATHING DIFFICULTY: \"Are you having any trouble breathing?\" If Yes, ask: \"How bad is your breathing?\" (e.g., mild, moderate, severe)     - MILD: No SOB at rest, mild SOB with walking, speaks normally in sentences, can lie down, no retractions, pulse < 100.     - MODERATE: SOB at rest, SOB with minimal exertion and prefers to sit, cannot lie down flat, speaks in phrases, mild retractions, audible wheezing, pulse 100-120.     - SEVERE: Very SOB at rest, speaks in single words, struggling to breathe, sitting hunched forward, retractions, pulse > 120          No    10. HIGH RISK DISEASE: \"Do you have any chronic medical problems?\" (e.g., asthma, heart or lung disease, weak immune system, obesity, etc.)          Type 2 Diabetes.     11. VACCINE: \"Have you gotten the COVID-19 vaccine?\" If Yes, ask: \"Which one, how many shots, when did you get it?\"          Yes, 4 shots total. All Pfizer.     12. BOOSTER: \"Have you received your COVID-19 booster?\" If Yes, ask: \"Which one and when did you get it?\"          Gotten 2 boosters both Pfizer.     13. PREGNANCY: \"Is there any chance you are pregnant?\" \"When was your last menstrual period?\"          NA    14. OTHER SYMPTOMS: \"Do you have any other symptoms?\"  (e.g., fatigue, headache, muscle pain, weakness)          Dizzy spell in the middle of the night. He was sitting on the toilet and experienced this a few nights ago. His wife states this happened as well once before getting Covid " "about a month ago. Wife states he is also not very stable on his feet. Not sure if this is from not getting good sleep or something else.     15. O2 SATURATION MONITOR:  \"Do you use an oxygen saturation monitor (pulse oximeter) at home?\" If Yes, ask \"What is your reading (oxygen level) today?\" \"What is your usual oxygen saturation reading?\" (e.g., 95%)          No    Protocols used: CORONAVIRUS (COVID-19) PERSISTING SYMPTOMS FOLLOW-UP CALL-A-OH    SEE IN OFFICE TODAY:   * You need to be examined today. Let me give you an appointment.  * IF NO AVAILABLE APPOINTMENTS: You need to be seen in the Urgent Care Center. Go to the one at ____________. Go there today. A nearby Urgent Care Center is often a good source of care. Another choice is to go to the Emergency Department.      CALL BACK IF:  * You have more questions or concerns  * You become worse        Patient/Caregiver understands and will follow care advice? Other, see cornelia Nogueira [facility]   "

## 2022-09-09 NOTE — TELEPHONE ENCOUNTER
"Patient Contact    Attempt # 1    Was call answered?  Yes.  \"May I please speak with <patient name>\"  Is patient available?   Yes    Pt scheduled for appt.       "

## 2022-09-09 NOTE — TELEPHONE ENCOUNTER
OK to either use [facility] same-day on 9/19, or I can see him this Monday (9/12) at 10AM (ok to double book).

## 2022-09-12 NOTE — PROGRESS NOTES
Assessment & Plan     Fall, subsequent encounter  Seems to have recovered just fine with no residual pain    Hyponatremia  Recheck chemistries today, hyponatremia appears to be chronic and has been stable  - Basic metabolic panel  (Ca, Cl, CO2, Creat, Gluc, K, Na, BUN); Future  - Basic metabolic panel  (Ca, Cl, CO2, Creat, Gluc, K, Na, BUN)    Type 2 diabetes mellitus with diabetic nephropathy, with long-term current use of insulin (H)  Due for recheck with PCP             Blood sugar testing frequency justification:        Return in about 4 weeks (around 10/10/2022) for Diabetes Check.    Piero Wilks MD  [facility]    Chan Felton is a 82 year old, presenting for the following health issues:  ER F/U      HPI     ED/UC Followup:    Facility:  [facility]   Date of visit: 9/10/22  Reason for visit: Fall, hyponatremia, contusion of right shoulder, bruised ribs   Current Status: Stable       Answers for HPI/ROS submitted by the patient on 9/12/2022  CARIDAD 7 TOTAL SCORE: 2      Suffered a recent fall, was seen in outside ER, x-rays of his shoulder and ribs were negative for fracture, CT of his head was normal.  He was hyponatremic and instructed to take sodium tablets.  Some degree of hyponatremia has been present chronically in this patient.      Review of Systems   Constitutional, HEENT, cardiovascular, pulmonary, GI, , musculoskeletal, neuro, skin, endocrine and psych systems are negative, except as otherwise noted.      Objective    BP (!) 144/74   Pulse 86   Temp 97  F (36.1  C) (Temporal)   Resp 15   Wt 68.3 kg (150 lb 9.6 oz)   SpO2 97%   BMI 21.00 kg/m    Body mass index is 21 kg/m .  Physical Exam   GENERAL: healthy, alert and no distress  NECK: no adenopathy, no asymmetry, masses, or scars and thyroid normal to palpation  RESP: lungs clear to auscultation - no rales, rhonchi or wheezes  CV: regular rate and rhythm, normal S1 S2, no S3  or S4, no murmur, click or rub, no peripheral edema and peripheral pulses strong  ABDOMEN: soft, nontender, no hepatosplenomegaly, no masses and bowel sounds normal  MS: no gross musculoskeletal defects noted, no edema

## 2022-09-13 NOTE — TELEPHONE ENCOUNTER
"Received a call from the patient stating he is experiencing an urge to urinate along with increased frequency with urination. No fever, no abdominal pain, no flank pain, no blood in urine, and no pain with urination.     1. SYMPTOM: \"What's the main symptom you're concerned about?\" (e.g., frequency, incontinence)      Urgency, no burning with urination, no fever, increased frequency with urination   2. ONSET: \"When did the symptoms start?\"      This afternoon  3. PAIN: \"Is there any pain?\" If Yes, ask: \"How bad is it?\" (Scale: 1-10; mild, moderate, severe)      No abdominal or back pain  4. CAUSE: \"What do you think is causing the symptoms?\"      Unknown  5. OTHER SYMPTOMS: \"Do you have any other symptoms?\" (e.g., fever, flank pain, blood in urine, pain with urination)      No fever, no pain with urination, no blood in urine  6. PREGNANCY: \"Is there any chance you are pregnant?\" \"When was your last menstrual period?\"      N/A    Triage protocol recommending patient be seen in the office today. End of the day today. Triage nurse recommended urgent care. Patient plans to head to the urgent care in [place] for further evaluation and assessment.     Reason for Disposition    Urinating more frequently than usual (i.e., frequency)    Additional Information    Negative: Shock suspected (e.g., cold/pale/clammy skin, too weak to stand, low BP, rapid pulse)    Negative: Sounds like a life-threatening emergency to the triager    Negative: Followed a female genital area injury (e.g., vagina, vulva)    Negative: Followed a male genital area injury (penis, scrotum)    Negative: Vaginal discharge    Negative: Pus (white, yellow) or bloody discharge from end of penis    Negative: Pain or burning with passing urine (urination) and pregnant    Negative: Pain or burning with passing urine (urination) and female    Negative: Pain or burning with passing urine (urination) and male    Negative: Pain or itching in the vulvar area    " Negative: Pain in scrotum is main symptom    Negative: Blood in the urine is main symptom    Negative: Symptoms arising from use of a urinary catheter (e.g., coude, Escobedo)    Negative: Unable to urinate (or only a few drops) > 4 hours and bladder feels very full (e.g., palpable bladder or strong urge to urinate)    Negative: Decreased urination and drinking very little and dehydration suspected (e.g., dark urine, no urine > 12 hours, very dry mouth, very lightheaded)    Negative: Patient sounds very sick or weak to the triager    Negative: Fever > 100.4 F  (38.0 C)    Negative: Side (flank) or lower back pain present    Negative: Can't control passage of urine (i.e., urinary incontinence) and new-onset (< 2 weeks) or worsening    Protocols used: URINARY SYMPTOMS-A-OH    Olesya Funes RN

## 2022-10-03 NOTE — TELEPHONE ENCOUNTER
"  S-(situation): Mild diarrhea with lightheadedness and dry mouth    B-(background): 82 year old male   Last VV with Dr. Alejo was on 6/6/2022    A-(assessment):   Mild diarrhea, 1 loose stool today.   Reports watery stools \"Off and on for almost a week now.\"   Denies abdominal pain, blood in stool, nausea, vomiting, or fever.   Denies recent travel or antibiotic use.  Last void was 1 hour ago.   Reports lightheadedness today and a dry mouth  Reports no change to oral intake.    R-(recommendations): Routing to provider for 2nd level triage.   Should patient be seen in ED/UCC for possible need for IV fluids?   Would ADS be appropriate?     Shereen Rutherford RN BSN [facility]      Reason for Disposition    Drinking very little and has signs of dehydration (e.g., no urine > 12 hours, very dry mouth, very lightheaded)    Additional Information    Negative: Shock suspected (e.g., cold/pale/clammy skin, too weak to stand, low BP, rapid pulse)    Negative: Difficult to awaken or acting confused (e.g., disoriented, slurred speech)    Negative: Sounds like a life-threatening emergency to the triager    Negative: Vomiting also present and worse than the diarrhea    Negative: Blood in stool and without diarrhea    Negative: SEVERE abdominal pain (e.g., excruciating) and present > 1 hour    Negative: SEVERE abdominal pain and age > 60 years    Negative: Bloody, black, or tarry bowel movements (Exception: chronic-unchanged black-grey bowel movements and is taking iron pills or Pepto-Bismol)    Negative: SEVERE diarrhea (e.g., 7 or more times / day more than normal) and age > 60 years    Negative: Constant abdominal pain lasting > 2 hours    Answer Assessment - Initial Assessment Questions  1. DIARRHEA SEVERITY: \"How bad is the diarrhea?\" \"How many more stools have you had in the past 24 hours than normal?\"     - NO DIARRHEA (SCALE 0)    - MILD (SCALE 1-3): Few loose or mushy BMs; increase of 1-3 stools over " "normal daily number of stools; mild increase in ostomy output.    -  MODERATE (SCALE 4-7): Increase of 4-6 stools daily over normal; moderate increase in ostomy output.  * SEVERE (SCALE 8-10; OR 'WORST POSSIBLE'): Increase of 7 or more stools daily over normal; moderate increase in ostomy output; incontinence.      1 loose stool today  2. ONSET: \"When did the diarrhea begin?\"       \"Off and on for almost a week now.\"   3. BM CONSISTENCY: \"How loose or watery is the diarrhea?\"       \"Pretty watery\" per patient   4. VOMITING: \"Are you also vomiting?\" If Yes, ask: \"How many times in the past 24 hours?\"       No per patient   5. ABDOMINAL PAIN: \"Are you having any abdominal pain?\" If Yes, ask: \"What does it feel like?\" (e.g., crampy, dull, intermittent, constant)       No per patient   6. ABDOMINAL PAIN SEVERITY: If present, ask: \"How bad is the pain?\"  (e.g., Scale 1-10; mild, moderate, or severe)    - MILD (1-3): doesn't interfere with normal activities, abdomen soft and not tender to touch     - MODERATE (4-7): interferes with normal activities or awakens from sleep, abdomen tender to touch     - SEVERE (8-10): excruciating pain, doubled over, unable to do any normal activities        Denies pain   7. ORAL INTAKE: If vomiting, \"Have you been able to drink liquids?\" \"How much liquids have you had in the past 24 hours?\"      No changes in oral intake per patient   8. HYDRATION: \"Any signs of dehydration?\" (e.g., dry mouth [not just dry lips], too weak to stand, dizziness, new weight loss) \"When did you last urinate?\"      No per patient Last void was 1 hour good.   9. EXPOSURE: \"Have you traveled to a foreign country recently?\" \"Have you been exposed to anyone with diarrhea?\" \"Could you have eaten any food that was spoiled?\"      No per patient   10. ANTIBIOTIC USE: \"Are you taking antibiotics now or have you taken antibiotics in the past 2 months?\"        No per patient   11. OTHER SYMPTOMS: \"Do you have any other " "symptoms?\" (e.g., fever, blood in stool)        Patient reports a dry mouth. Denies blood in stool and fever  12. PREGNANCY: \"Is there any chance you are pregnant?\" \"When was your last menstrual period?\"        N/A    Protocols used: DIARRHEA-A-OH      "

## 2022-10-03 NOTE — ED NOTES
Rapid Assessment Note    History:   Jose Francisco Gonzalez is a 82 year old male who presents with episode of dizziness/wooziness. He came down from [place] today with wife for a dental appointment. He got out of his car and he was feeling dizzy, light-headed, but went to his dentist appointment any way. After his appointment he called his primary care physician because about a month ago he was in the hospital with low sodium levels. Tomorrow morning he has oral surgery so he hasn't been taking his baby aspirin. No chest pain, numbness, speech difficulty, shortness of breath.  Symptoms have since resolved.    Exam:   General:  Alert, interactive  Cardiovascular:  Well perfused  Lungs:  No respiratory distress, no accessory muscle use  Neuro:  Moving all 4 extremities  Skin:  Warm, dry  Psych:  Normal affect      Plan of Care:   I evaluated the patient and developed an initial plan of care. I discussed this plan and explained that I, or one of my partners, would be returning to complete the evaluation.     Labs and EKG ordered.    I, Jaspreet Basurto, am serving as a scribe to document services personally performed by Drake Murray Warren, DO, based on my observations and the provider's statements to me.    10/3/2022  [employer]    Portions of this medical record were completed by a scribe. UPON MY REVIEW AND AUTHENTICATION BY ELECTRONIC SIGNATURE, this confirms (a) I performed the applicable clinical services, and (b) the record is accurate.        Drake Murray DO  10/03/22 [id number]

## 2022-10-03 NOTE — TELEPHONE ENCOUNTER
Pt with dry mouth and lightheadedness  indicating dehydration from the diarrhea and fluid loss. Last sodium drawn by Dr Garner 2 weeks ago was quite low at 123 but pt has had similar in past. Will likely need IVF but if sodium lower, may need one day admission to slowly bring them up vs just getting IVF through ADS clinic. Therefore if pt drinking very little and lightheaded, would have seen at ER today. They can then also do stool cultures there for c diff, etc

## 2022-10-03 NOTE — ED TRIAGE NOTES
Pt called the nurse line and said he was feeling dizzy so they told him to go to the ER for fluids, hx low NA

## 2022-10-03 NOTE — TELEPHONE ENCOUNTER
Provider message relayed to pt; Go to ER for eval.    He has a dental procedure for tooth extraction tomorrow and hesitant to be see in ER.     Pt agrees to plan of care. Wife will drive pt to [facility] ER now.

## 2022-10-04 NOTE — TELEPHONE ENCOUNTER
Nurse Triage    Is this a 2nd Level Triage? NO    Situation: Patient calling with concerns for diarrhea.     Background: Initially patient was just calling to report that he was directed to go to the ED earlier by Dr Alejo's nurse due to having diarrhea. Pt states he went to [facility] ED, had labs drawn and waited about 5 hours. Pt decided to go home because there were still 10 patients ahead of him waiting to be seen and he needed to come home and get some rest. He states he is having oral surgery tomorrow. Pt states he has had 4 episodes of diarrhea today. He sees that his sodium was low from the lab draw today, but that he is chronically low so he wasn't too concerned.     Assessment: Pt denies feeling too weak to stand, disorientation, abdominal pain or fever. Pt states he is drinking adequate fluids and voiding good amounts.     Protocol Recommended Disposition:   See PCP Within 24 Hours    Recommendation: Advised patient to see HCP within 24 hours. Care advice given. Reviewed concerning symptoms and when to call back.     Routed to provider per patient's request. Pt had 4 episodes of diarrhea today and is having oral surgery tomorrow. He went to the ED and had labs drawn per Dr. Alejo's recommendation. After 5 hours of waiting, he decided to go home. Pt states he feels ok and has a doctor's appointment 10/6 with Dr. Burrell.     Navya Stern RN [facility] 10/3/2022 10:30 PM    Reason for Disposition    [1] MODERATE diarrhea (e.g., 4-6 times / day more than normal) AND [2] age > 70 years    Additional Information    Negative: Shock suspected (e.g., cold/pale/clammy skin, too weak to stand, low BP, rapid pulse)    Negative: Difficult to awaken or acting confused (e.g., disoriented, slurred speech)    Negative: Sounds like a life-threatening emergency to the triager    Negative: Vomiting also present and worse than the diarrhea    Negative: [1] Blood in stool AND [2] without diarrhea     Negative: Diarrhea in a cancer patient who is currently (or recently) receiving chemotherapy or radiation therapy, or cancer patient who has metastatic or end-stage cancer and is receiving palliative care    Negative: [1] SEVERE abdominal pain (e.g., excruciating) AND [2] present > 1 hour    Negative: [1] SEVERE abdominal pain AND [2] age > 60 years    Negative: [1] Blood in the stool AND [2] moderate or large amount of blood    Negative: Black or tarry bowel movements (Exception: chronic-unchanged black-grey bowel movements AND is taking iron pills or Pepto-bismol)    Negative: [1] Drinking very little AND [2] dehydration suspected (e.g., no urine > 12 hours, very dry mouth, very lightheaded)    Negative: Patient sounds very sick or weak to the triager    Negative: [1] SEVERE diarrhea (e.g., 7 or more times / day more than normal) AND [2] age > 60 years    Negative: [1] Constant abdominal pain AND [2] present > 2 hours    Negative: [1] Fever > 103 F (39.4 C) AND [2] not able to get the fever down using Fever Care Advice    Negative: [1] SEVERE diarrhea (e.g., 7 or more times / day more than normal) AND [2] present > 24 hours (1 day)    Negative: [1] MODERATE diarrhea (e.g., 4-6 times / day more than normal) AND [2] present > 48 hours (2 days)    Protocols used: DIARRHEA-A-

## 2022-10-05 NOTE — TELEPHONE ENCOUNTER
Spoke with patient last evening.  Had oral surgery yesterday morning.  Patient stated that he had a normal formed large stool yesterday afternoon.  After that, he had another bowel movement that was formed but slightly softer.  No further doni diarrhea.  Patient was drinking protein supplements along with soft foods after oral surgery but maintaining nutrition and hydration.  Denied lightheadedness or dizziness issue.  Urinating normally.  Labs from ER that were drawn during triage time showed stable chronic hyponatremia.  Recommended patient have repeat BMP and A1C lab in 1 month and patient will schedule lab appointment.  Given improved stools, unlikely to have C. difficile and if viral enteritis, that is resolving.  Inform patient he could take a single Imodium tablet if he wished or use some Metamucil/Citrucel for thickener and otherwise monitor stools.  If recurrent diarrhea, patient to let me know

## 2022-10-06 NOTE — PROGRESS NOTES
Assessment & Plan     Hyponatremia  Repeat Na+   - Comprehensive metabolic panel (BMP + Alb, Alk Phos, ALT, AST, Total. Bili, TP)    Change in consistency of stool  Continue BRAT diet, may start to push more regular diet as tolerated. Not infectious sounding ok to trial imodium to help reset. F/u with any new or worsening symptoms.     - Comprehensive metabolic panel (BMP + Alb, Alk Phos, ALT, AST, Total. Bili, TP)    Hypertension, unspecified type  Repeat BP ok. Continue home monitoring.   - Comprehensive metabolic panel (BMP + Alb, Alk Phos, ALT, AST, Total. Bili, TP)    Type 2 diabetes mellitus with diabetic nephropathy, with long-term current use of insulin (H)  Refill. F/u PCP.   - insulin glargine (BASAGLAR KWIKPEN) 100 UNIT/ML pen  Dispense: 15 mL; Refill: 1    The likelihood of other entities in the differential is insufficient to justify any further testing for them at this time. This was explained to the patient. The patient was advised that persistent or worsening symptoms would require further evaluation. Patient advised to call the office and if unable to reach to go to the emergency room if they develop any new or worsening symptoms. Expressed understanding and agreement with above stated plan.     Ok to get booster. Wishes to get at pharmacy after loose stool clear up.     Ordering of each unique test  Prescription drug management  30 minutes spent on the date of the encounter doing chart review, review of test results, interpretation of tests, patient visit and documentation          No follow-ups on file.    The likelihood of other entities in the differential is insufficient to justify any further testing for them at this time. This was explained to the patient. The patient was advised that persistent or worsening symptoms would require further evaluation. Patient advised to call the office and if unable to reach to go to the emergency room if they develop any new or worsening symptoms. Expressed  "understanding and agreement with above stated plan.       PATRICE Ricardo [facility] DEBORAH Felton is a 82 year old, presenting for the following health issues:  Follow Up (Lab results taken at  on 10/3/22, would like to recheck Sodium - hx of low sodium. Treatment for low sodium) and Bowel Problems (Soft stools x 1 week - would like to know if he should be taking imodium, has not noticed any blood in stools, no pain )      HPI     Recent ER visit and oral surgery operation. Hx of chronic low sodium. Checked in ER recently.   Feels well overall. Notes 1 week hx of loose stools. No blood/mucous. Getting gradually better every day. Has 2-4 loose bowel movements daily. Practicing the BRAT diet. Drinking Ensure.     Did have COVID 2 months ago. Asking about booster shot.   Daughter is a PT and keeps him active. Walking. Feels stable. No longer dizzy, lightheaded. Urinating normally. No fever, chills, SOB, CP, ABD pain, N/V.     Wants refill on Basaglar.     Review of Systems   Constitutional, HEENT, cardiovascular, pulmonary, gi and gu systems are negative, except as otherwise noted. LOOSE STOOLS.      Objective    /72   Pulse 84   Temp 97.7  F (36.5  C) (Temporal)   Resp 18   Ht 1.803 m (5' 11\")   Wt 69.2 kg (152 lb 9.6 oz)   SpO2 98%   BMI 21.28 kg/m    Body mass index is 21.28 kg/m .  Physical Exam   GENERAL: healthy, alert and no distress  EYES: Eyes grossly normal to inspection, PERRL and conjunctivae and sclerae normal  NECK: no adenopathy, no asymmetry, masses, or scars and thyroid normal to palpation  RESP: lungs clear to auscultation - no rales, rhonchi or wheezes  CV: regular rate and rhythm, normal S1 S2, no S3 or S4, no murmur, click or rub, no peripheral edema and peripheral pulses strong  ABDOMEN: soft, nontender, no hepatosplenomegaly, no masses and bowel sounds normal  MS: no gross musculoskeletal defects noted, no edema  SKIN: no suspicious lesions " or rashes  NEURO: Normal strength and tone, mentation intact and speech normal  PSYCH: mentation appears normal, affect normal/bright

## 2022-11-08 NOTE — TELEPHONE ENCOUNTER
Routing refill request to provider for review/approval because:  Labs out of range:    Creatinine   Date Value Ref Range Status   10/06/2022 0.62 (L) 0.66 - 1.25 mg/dL Final   06/10/2021 0.79 0.66 - 1.25 mg/dL Final     Justice L. Phoenix, RN

## 2022-11-14 NOTE — TELEPHONE ENCOUNTER
Patient calling reporting dizziness, weakness, and worsening vision. Patient is currently in [place] and was seen in the ED for these same symptoms. Informed patient that I can not triage for patients outside of [place] or [place]. Informed patient that if his symptoms are worse than when he was seen in the ED he should go back to ED for re-evaluation. If he is too weak or dizzy to stand he should call 911 for an ambulance.  Patient verbalized understanding.   Jaclyn Sharif RN   11/14/22 9:05 AM  [facility] Nurse Advisor

## 2022-11-21 NOTE — PROGRESS NOTES
ASSESSMENT:    1. 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 Labs as ordered. If confirmed, will reduce fluid intake to approx 36 oz/day  - Comprehensive metabolic panel; Future  - Osmolality urine; Future  - Adult Nephrology  Referral; Future  - Sodium random urine; Future  - Comprehensive metabolic panel  - Osmolality urine  - Sodium random urine    2. Type 2 diabetes mellitus with diabetic nephropathy, with long-term current use of insulin (H)   LAST A1C 7.7. Goal A1C < 8 with age but will try to get PM sugars a little better and increase Glimepiride to 6mg in AM. Labs today as ordered  - insulin glargine (BASAGLAR KWIKPEN) 100 UNIT/ML pen; INJECT 12 UNITS SUBCUTANEOUS EVERY DAY  Dispense: 15 mL; Refill: 1  - Hemoglobin A1c; Future  - Comprehensive metabolic panel; Future  - Hemoglobin A1c  - Comprehensive metabolic panel  - glimepiride (AMARYL) 4 MG tablet; Take  1.5  tabs daily in AM for diabetes  Dispense: 60 tablet; Refill: 11    3. Weakness  Hx covid plus possible sodium level contribution vs other. Pt to see physical therapy  - Physical Therapy Referral; Future    4. Other fatigue  Previous covid . Depression contributing some. Also assess for metabolic cause  - Comprehensive metabolic panel; Future  - TSH with free T4 reflex; Future  - Cortisol; Future  - CBC with platelets; Future  - Comprehensive metabolic panel  - TSH with free T4 reflex  - Cortisol  - CBC with platelets    5. Adjustment disorder with mixed anxiety and depressed mood   PHQ9 = 9. No suicidal ideation. Await sodium result. If better, then can try selective serotonin reuptake inhibitor. If same or worse, given possible worsening of sodium level with selective serotonin reuptake inhibitor, will use Buproprion instead    6. Constipation, unspecified constipation type   See plan discussion below and check thyroid status  - TSH with free T4 reflex; Future  - TSH with free T4 reflex        PLAN:   Senokot-S 1 tab  every other day to  help stimulate bowel function better. Et over the counter   Change Basaglar to 12 units once a day. Do not vary the dose unless having any blood sugars  < 75. If so, then contact clinic   Increase Glimepiride to 4mg tab, 1.5 tabs (6mg) daily in AM for diabetes.  Check sugars in AM and bedtime for the next 5 days and then call in the results [facility] he clinic next Monday [phone]  Referral to kidney specialist re: low sodium. They will call to schedule. Possible fluid restriction depending on urine results to also help sodium   Referral to  Physical therapy. May do in [place] by [facility] or at [facility]. They will call to schedule  Labs as ordered for fatigue   Get adjustments for upper bridge as scheduled  Future treatment for depression. Will depend on sodium status re: options and will update you after labs back       (Chart documentation was completed, in part, with [employer] voice-recognition software. Even though reviewed, some grammatical, spelling, and word errors may remain.)    Kin Alejo MD  Internal Medicine Department  [facility]      Chan Felton is a 82 year old, presenting for the following health issues:  Recheck Medication and MOOD CHANGES      History of Present Illness       Mental Health Follow-up:  Patient presents to follow-up on Depression & Anxiety.Patient's depression since last visit has been:  Worse  The patient is having other symptoms associated with depression.  Patient's anxiety since last visit has been:  Bad  The patient is having other symptoms associated with anxiety.  Any significant life events: health concerns  Patient is not feeling anxious or having panic attacks.  Patient has no concerns about alcohol or drug use.    Diabetes:   He presents for follow up of diabetes.  He is checking home blood glucose two times daily. He checks blood glucose before meals and at bedtime.  Blood glucose is sometimes over 200 and sometimes under 70. He is aware of  hypoglycemia symptoms including weakness. He is concerned about low blood sugar, several less than 70 in the past few weeks.  He is having weight loss.         Reason for visit:  Weakness  dizziness  digesion    He eats 2-3 servings of fruits and vegetables daily.He consumes 4 sweetened beverage(s) daily.He exercises with enough effort to increase his heart rate 10 to 19 minutes per day.  He exercises with enough effort to increase his heart rate 3 or less days per week.   He is taking medications regularly.    Today's PHQ-9         PHQ-9 Total Score: 9    PHQ-9 Q9 Thoughts of better off dead/self-harm past 2 weeks :   Not at all    How difficult have these problems made it for you to do your work, take care of things at home, or get along with other people: Somewhat difficult  Today's CARIDAD-7 Score: 6       Other concerns:  1. Concerns about long hauler covid symptoms, positive 8/2022       Most recent lab results reviewed with pt.      1 coffee/day. One 16oz gatorade/day. Some milk and OJ.. Takes pills with water so has 2 small glasses    Sugars:  130,224  152,210  149,342  94,186  108     Has had teeth taken out upper and has bridge put in now since October.  Has appt tomorrow and adjustments x3 so far.  Causing some pain with chewing  On softer foods such as soups. Having canned vegetables  that he doesn't like   Had covid infection in Aug 2022   More fatigue since covid infection. Has reduced taste and normal smell  Regarding fluid intake, will drink 16 ounces of Gatorade and also also have one Ensure and generally has milk with each meal.  Will also drink some water during the day   Patient has been averaging about 2000 steps per day. Use to be much more active and walking further distances but not able now due to fatigue.  No recent falls. Difficulty  performing chores around the house. His wife notes that he is sleeping much more than usual and is much less active.  Depression as below. Chronic  Seen in ER  "10/3/22 when sodium down to 121 and given IVF  Occ constipation with stools harder. NO blood seen in stools recently    PHQ-9 ([employer]) 11/21/2022   1.  Little interest or pleasure in doing things 1   2.  Feeling down, depressed, or hopeless 1   3.  Trouble falling or staying asleep, or sleeping too much 1   4.  Feeling tired or having little energy 3   5.  Poor appetite or overeating 1   6.  Feeling bad about yourself 0   7.  Trouble concentrating 2   8.  Moving slowly or restless 0   9.  Suicidal or self-harm thoughts 0   PHQ-9 Total Score 9   Difficulty at work, home, or with people    1.  Little interest or pleasure in doing things Several days   2.  Feeling down, depressed, or hopeless Several days   3.  Trouble falling or staying asleep, or sleeping too much Several days   4.  Feeling tired or having little energy Nearly every day   5.  Poor appetite or overeating Several days   6.  Feeling bad about yourself Not at all   7.  Trouble concentrating More than half the days   8.  Moving slowly or restless Not at all   9.  Suicidal or self-harm thoughts Not at all   PHQ-9 via [employer] TOTAL SCORE-----> 9 (Mild depression)   Difficulty at work, home, or with people Somewhat difficult            Additional ROS:   Constitutional, HEENT, Cardiovascular, Pulmonary, GI and , Neuro, MSK and Psych review of systems/symptoms are otherwise negative or unchanged from previous, except as noted above.      OBJECTIVE:  BP (!) 146/84   Pulse 60   Temp 97.2  F (36.2  C) (Temporal)   Resp 15   Wt 67.2 kg (148 lb 3.2 oz)   SpO2 96%   BMI 20.67 kg/m     Estimated body mass index is 20.67 kg/m  as calculated from the following:    Height as of 10/6/22: 1.803 m (5' 11\").    Weight as of this encounter: 67.2 kg (148 lb 3.2 oz).     Neck: no adenopathy. Thyroid normal to palpation. No bruits  Pulm: Lungs clear to auscultation   CV: Regular rates and rhythm  GI: Soft, nontender, Normal active bowel sounds, No hepatosplenomegaly or " masses palpable  Ext: Peripheral pulses intact. No edema.  Neuro: Normal  tone, sensory exam grossly normal. Strength globally 5-/5. ABle to get out of chair without trouble. Stable gait

## 2022-11-21 NOTE — PATIENT INSTRUCTIONS
Senokot-S 1 tab  every other day to help stimulate bowel function better. Et over the counter   Change Basaglar to 12 units once a day. Do not vary the dose unless having any blood sugars  < 75. If so, then contact clinic   Increase Glimepiride to 4mg tab, 1.5 tabs (6mg) daily in AM for diabetes.  Check sugars in AM and bedtime for the next 5 days and then call in the results tot he clinic next Monday [phone]  Referral to kidney specialist re: low sodium. They will call to schedule. Possible fluid restriction depending on urine results to also help sodium   Referral to  Physical therapy. May do in [place] by [facility] or at [facility]. They will call to schedule  Labs as ordered for fatigue   Get adjustments for upper bridge as scheduled  Future treatment for depression. Will depend on sodium status re: options and will update you after labs back

## 2022-11-22 NOTE — TELEPHONE ENCOUNTER
[facility] Call Center    Phone Message    May a detailed message be left on voicemail: yes     Reason for Call: Order(s): Other:   Reason for requested: Labs   Date needed: 12/9/22  Provider name: Xavi      Action Taken: Message routed to:  [facility] nephro    Travel Screening: Not Applicable

## 2022-11-23 NOTE — TELEPHONE ENCOUNTER
Routing refill request to provider for review/approval because:  Pharmacy requesting 90 day supply     Juan Carlos Booth RN  [facility] Triage Nurse

## 2022-11-23 NOTE — TELEPHONE ENCOUNTER
DIAGNOSIS: Hyponatremia   DATE RECEIVED: 12.09.2022   NOTES STATUS DETAILS   OFFICE NOTE from referring provider Internal 11.21.2022 Kin Alejo MD   OFFICE NOTE from other specialist  Internal 10.06.2022 Hema Burrell PA-C   *Only VASCULITIS or LUPUS gather office notes for the following     *PULMONARY       *ENT     *DERMATOLOGY     *RHEUMATOLOGY     DISCHARGE SUMMARY from hospital     DISCHARGE REPORT from the ER Internal 10.03.2022  [facility]   MEDICATION LIST Internal    IMAGING  (NEED IMAGES AND REPORTS)     KIDNEY CT SCAN     KIDNEY ULTRASOUND     MR ABDOMEN     NUCLEAR MEDICINE RENAL     LABS     CBC Internal 11.21.2022   CMP Internal 11.21.2022   BMP Internal 09.12.2022   UA Internal 02.28.2022   URINE PROTEIN Internal 02.28.2022   RENAL PANEL     BIOPSY     KIDNEY BIOPSY

## 2022-11-23 NOTE — TELEPHONE ENCOUNTER
[employer] request for 90 day supply rejected. Rx was specifically written for smaller quantity and would have been written for larger amount at time of prescription if medically appropriate

## 2022-11-26 NOTE — ED NOTES
Pt. seen by psychiatry and [facility]. Medication prescribed for anxiety /depression. Referral to [facility] for [facility] set up. Discharge instructions reviewed with patient including follow-up care plan. Educated on medication regime and advised not to stop prescribed medication without consulting their physician. Reviewed safety plan and outpatient resources.Denies SI. All belongings which where brought into the hospital have been returned to patient. Escorted off the unit at 1415  accompanied writer.  Discharged to  home-transported by wife.

## 2022-11-26 NOTE — ED PROVIDER NOTES
History   Chief Complaint:  Suicidal       The history is provided by the patient.      Jose Francisco Gonzalez is a 82 year old male with history of anxiety and depression who presents feeling suicidal with a plan. He states that as he is getting older, he is becoming more of a burden on his wife. He has thoughts of getting into an intentional car accident to commit suicide.  He denies prior attempt.    Review of Systems   Psychiatric/Behavioral: Positive for suicidal ideas.   All other systems reviewed and are negative.    Allergies:  The patient has no known allergies.     Medications:  Fosamax  Norvasc  Aspirin  Lipitor  Amaryl  Insulin  Zestril  Metoprolol  Azulfidine    Past Medical History:     Hypertension  DM II  RA  Hyperlipidemia  IBS  Hyponatremia  Osteoporosis   Anxiety   Depression    Past Surgical History:    Pyogenic granuloma removal  Cataract surgery  Tonsillectomy  Tooth extraction     Family History:    Heart disease x2  CAD x2  Alzheimer disease    Social History:  The patient presents to the ED alone.  PCP: Kin Alejo     Physical Exam     Patient Vitals for the past 24 hrs:   BP Temp Temp src Pulse Resp SpO2   11/26/22 0643 (!) 156/97 98.1  F (36.7  C) Temporal 81 16 97 %       Physical Exam  Head:  The scalp, face, and head appear normal  Eyes:  Conjunctivae are normal  ENT:    The nose is normal    Pinnae are normal  Neck:  Trachea midline  CV:  Normal rate, regular rhythm.  Resp:  No respiratory distress   Musc:  Normal muscular tone    No major joint effusions    No asymmetric leg swelling    Good capillary refill noted  Skin:  No rash or lesions noted  Neuro: Speech is normal and fluent. Face is symmetric. Moving all extremities well.   Psych:  Depressed mood. Congruent affect.           Emergency Department Course     Laboratory:  Labs Ordered and Resulted from Time of ED Arrival to Time of ED Departure   COMPREHENSIVE METABOLIC PANEL - Abnormal       Result Value    Sodium 129 (*)      Potassium 4.0      Chloride 93 (*)     Carbon Dioxide (CO2) 28      Anion Gap 8      Urea Nitrogen 19      Creatinine 0.66      Calcium 8.7      Glucose 164 (*)     Alkaline Phosphatase 65      AST 21      ALT 22      Protein Total 7.4      Albumin 3.4      Bilirubin Total 0.7      GFR Estimate >90     CBC WITH PLATELETS AND DIFFERENTIAL - Abnormal    WBC Count 6.0      RBC Count 4.31 (*)     Hemoglobin 13.1 (*)     Hematocrit 38.6 (*)     MCV 90      MCH 30.4      MCHC 33.9      RDW 12.5      Platelet Count 286      % Neutrophils 68      % Lymphocytes 14      % Monocytes 13      % Eosinophils 3      % Basophils 1      % Immature Granulocytes 1      NRBCs per 100 WBC 0      Absolute Neutrophils 4.1      Absolute Lymphocytes 0.9      Absolute Monocytes 0.8      Absolute Eosinophils 0.2      Absolute Basophils 0.1      Absolute Immature Granulocytes 0.0      Absolute NRBCs 0.0     COVID-19 VIRUS (CORONAVIRUS) BY PCR - Normal    SARS CoV2 PCR Negative     TSH WITH FREE T4 REFLEX - Normal    TSH 1.66          Emergency Department Course:    Reviewed:  I reviewed nursing notes, vitals, past medical history and Care Everywhere    Assessments:  0456 I obtained history and examined the patient as noted above.   0638 I rechecked the patient and explained findings.     Disposition:  The patient was transferred to [facility].     Impression & Plan     Medical Decision Making:  Patient presents with chief complaint suicidal ideation.  He has history of depression and anxiety, but he reports this is the worst he has felt.  Denies recent suicide attempt, but has plan if discharged.  He raises concern for his sodium level, but today it is higher than it has been on multiple previous evaluations.  At this point, patient medically cleared for either [place] admission or to be seen in [facility].  [facility] therefore [facility]. He's in agreement with plan thus far. All quesitons answered.      Diagnosis:    ICD-10-CM    1. Suicidal ideation  R45.851       2. Anxiety  F41.9       3. Depression, unspecified depression type  F32.A       4. Chronic hyponatremia  E87.1         Scribe Disclosure:  I, Frank Chatman, am serving as a scribe at 4:20 AM on 11/26/2022 to document services personally performed by Ken Wren MD based on my observations and the provider's statements to me.          Ken Wren MD  11/26/22 1145

## 2022-11-26 NOTE — ED TRIAGE NOTES
Pt states he is getting older and feels like a burden to his wife. Has thought about driving into traffic and getting into a car accident to end his life.      Triage Assessment     Row Name 11/26/22 0419       Triage Assessment (Adult)    Airway WDL WDL       Respiratory WDL    Respiratory WDL WDL       Skin Circulation/Temperature WDL    Skin Circulation/Temperature WDL WDL       Cardiac WDL    Cardiac WDL WDL

## 2022-11-26 NOTE — DISCHARGE INSTRUCTIONS
Senior (55+) Intensive Outpatient Program through [facility]; virtual intake appointment on Monday, Dec 5, 2022 at 12:00pm to complete an intake for their programs.    Finding the right mental health and addiction services can be a challenge. We are here to help.  Mental Health & Addiction Access: [phone]    55+ Mental Health Intensive Outpatient Program provides a group-based approach to help stabilize your symptoms and  improve your overall ability to cope with life stressors.  Schedule: Monday through Friday; 3 hours/day; 3 days/week for approximately 12 weeks    They can help you schedule with an individual therapist for when you complete their programming.       Aftercare Plan  If I am feeling unsafe or I am in a crisis, I will:   Contact my established care providers   Call the National Suicide Prevention Lifeline: 988  Go to the nearest emergency room   Call 911     Warning signs that I or other people might notice when a crisis is developing for me: increased thoughts of wanting to die, depression, anxiety, sleep difficulties    Things I am able to do on my own to cope or help me feel better: watch TV, listen to music, listen to audio books, spend time sitting outside when weather is nice     Things that I am able to do with others to cope or help me better: talk with wife, hang out with family/kids/grandkids, play games     Things I can use or do for distraction: music, movies, TV, books     Changes I can make to support my mental health and wellness: engage in therapy, take medications for sleep and anxiety     People in my life that I can ask for help: wife, children     Your [facility] has a mental health crisis team you can call 24/7: [facility]  [phone]     Other things that are important when I'm in crisis: validate my feelings, assure me that I will be better     Additional resources and information: follow with appointment for intake at [facility] 55+ Adult  "Intense outpatient programs; connect with individual therapist in March 2023.       Crisis Lines  Crisis Text Line  Text 459601  You will be connected with a trained live crisis counselor to provide support.  Por mariana, texto  LAST a 753779 o texto a 442-AYUDAME en WhatsApp  [employer] (LGBTQ Youth Crisis Line)  4.064.621.0046  text START to 852-630    [employer]  Fast Tracker  Linking people to mental health and substance use disorder resources  [employer]   [place] Mental Health Warm Line  Peer to peer support  Monday thru Saturday, 12 pm to 10 pm  417.815.8382 or 8.411.043.9310  Text \"Support\" to 65596  National Lily on Mental Illness (LEOBARDO)  144.094.0688 or 1.888.LEOBARDO.HELPS    Mental Health Apps  My3  https://[employer] (formerly [employer]  VirtualHopeBox  https://NI/apps/virtual-hope-box/    Additional Information  Today you were seen by a licensed mental health professional through Triage and Transition services, [facility] ([facility])  for a crisis assessment in the Emergency Department at [facility].  It is recommended that you follow up with your established providers (psychiatrist, mental health therapist, and/or primary care doctor - as relevant) as soon as possible. Coordinators from [facility] will be calling you in the next 24-48 hours to ensure that you have the resources you need.  You can also contact [facility] coordinators directly at [phone]. You may have been scheduled for or offered an appointment with a mental health provider. [facility] maintains an extensive network of licensed behavioral health providers to connect patients with the services they need.  We do not charge providers a fee to participate in our referral network.  We match patients with providers based on a patient's specific needs, insurance coverage, and location.  Our first effort will be to refer you to a provider within your care system, and will utilize providers outside your " care system as needed.

## 2022-11-26 NOTE — CONSULTS
Diagnostic Evaluation Consultation  Crisis Assessment    Patient was assessed: In Person  Patient location: [facility] - consult room A  Was a release of information signed: Yes. Providers included on the release: wife: Divya      Referral Data and Chief Complaint  Jose Francisco is a 82 year old, who uses he/him pronouns, and presents to the ED via EMS. Patient is referred to the ED by self. Patient is presenting to the ED for the following concerns: depression, anxiety, passive suicidl ideation.      Informed Consent and Assessment Methods     Patient is his own guardian. Writer met with patient and explained the crisis assessment process, including applicable information disclosures and limits to confidentiality, assessed understanding of the process, and obtained consent to proceed with the assessment. Patient was observed to be able to participate in the assessment as evidenced by acknowledged role of writer and purpose of assessment, engaged in answering all questions, and explored disposition options. Assessment methods included conducting a formal interview with patient, review of medical records, collaboration with medical staff, and obtaining relevant collateral information from family and community providers when available..     Over the course of this crisis assessment provided reassurance, offered validation, engaged patient in problem solving and disposition planning, worked with patient on brief, therapeutic activity: rapport development, empathetic listening and assisted in processing patient's thoughts and feeling relating to depression, aging, feeling like a burden. Patient's response to interventions was engaged, cooperative, pleasant.     Summary of Patient Situation    Patient is an 82 year old male who called EMS independently with passive suicidal ideation, depression, and anxiety.  He was accompanied to the ED by his wife, Divya.  Patient reports that his will to live is pretty low due to the long list  "of physical alignment/complications he has been experiencing: arthritis, macular degeneration, weight loss, diabetes, unsteady gait, chronic low sodium, and hard of hearing.  He identifies feeling ashamed of all of this requiring his wife to take on more of his care and responsibilities.  Patient notes that he does not feel connected in social situations as he cannot always hear what is going on or following conversations.     Patient endorses passive suicidal ideation with \"fantasized\" attempts and clearly no intent to act on these thoughts.  He does not want to hurt others and following through with these thoughts to end his life would bring his family undue pain.  Patient has one previous suicide attempt when he was battling a gambling addiction and notes that his family has forgiven him for that attempt.     Patient reports struggling with depression and anxiety, not being able to get much sleep at night as he awakes after a couple hours of sleep with racing thoughts of worry.  He reports having a \"not good\" appetite due to depression and dental concerns.  He currently wears a top plate of dentures which negatively impacts his sense of taste and smell.  Patient denies hallucinations (unless he is experiencing low blood sugar), paranoia, delusions, HI, and NSSI.         Brief Psychosocial History    Patient currently lives with his wife, Divya, of 52 years in the same [facility] home they have occupied for the duration of their wedded life.  He [place] and the rhoades are spent in [place].   This is patient's 2nd marriage as his first wife was killed in a car accident on their 10th anniversary leaving him to care for their two sons.  Patient remarried and had one daughter with current wife.  Patient is proud of his four grandchildren as well.  He does note that one of his sons had struggled with alcohol use and is now in recovery after several " "treatment programs.     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  He notes through half-way that he is financially stable.     Patient served a short time with the [place] [employer] and does not receive any benefits due to the limited duration of service.   His hobbies include: golf, tennis, reading, theatre, acting, movies, being outdoors, fishing.  However he is not able to engage in many of these hobbies any more due to his declining eye sight.     Patient is supported by his wife, children, other living family, and close friends.   No legal concerns identified and patient shares that \"I am not a Taoist person any more.\"     Significant Clinical History    Patient does not have a diagnosed mental health history as he endorses current struggles with grief, depression, and anxiety.   He has never been psychiatrically hospitalized either.  Patient has seen a therapist previously to help with his gambling addiction and some grief resolution after first wife passed away.     Patient any abuse history.      Collateral Information    The following information was received from Divya Gonzalez whose relationship to the patient is wife. Information was obtained in person. Their phone number is [phone] and they last had contact with patient today.    What happened today: feeling anxious and depression about his current health situation    What is different about patient's functioning: patient is dealing with a long list of physical health concerns as noted above in the presenting problem section.  This is causing him to having increased thoughts of not wanting to be alive anymore    Concern about alcohol/drug use: No    What do you think the patient needs: medications to address anxiety, depression, and sleep    Has patient made comments about wanting to kill themselves/others:  Not necessarily about killing himself but " wishes he would not be alive anymore;  No plans to act on thought    If d/c is recommended, can they take part in safety/aftercare planning: Yes wife will continue to support as she can and hopes patient will spend the night with us       Risk Assessment  [place] Suicide Severity Rating Scale Full Clinical Version: 11/26/2022  Suicidal Ideation  1. Wish to be Dead (Lifetime): Yes  1. Wish to be Dead (Past 1 Month): Yes  2. Non-Specific Active Suicidal Thoughts (Lifetime): Yes  2. Non-Specific Active Suicidal Thoughts (Past 1 Month): Yes  3. Active Suicidal Ideation with any Methods (Not Plan) Without Intent to Act (Lifetime): Yes  3. Active Suicidal Ideation with any Methods (Not Plan) Without Intent to Act (Past 1 Month): No  4. Active Suicidal Ideation with Some Intent to Act, Without Specific Plan (Lifetime): Yes  4. Active Suicidal Ideation with Some Intent to Act, Without Specific Plan (Past 1 Month): No  5. Active Suicidal Ideation with Specific Plan and Intent (Lifetime): Yes  5. Active Suicidal Ideation with Specific Plan and Intent (Past 1 Month): No  Intensity of Ideation  Most Severe Ideation Rating (Lifetime): 5  Most Severe Ideation Rating (Past 1 Month): 1  Frequency (Lifetime): Less than once a week  Frequency (Past 1 Month): Once a week  Duration (Lifetime): 1-4 hours/a lot of time  Duration (Past 1 Month): Less than 1 hour/some of the time  Controllability (Lifetime): Can control thoughts with a lot of difficulty  Controllability (Past 1 Month): Can control thoughts with some difficulty  Deterrents (Lifetime): Deterrents definitely did not stop you  Deterrents (Past 1 Month): Deterrents definitely stopped you from attempting suicide  Reasons for Ideation (Lifetime): Completely to end or stop the pain (You couldn't go on living with the pain or how you were feeling)  Reasons for Ideation (Past 1 Month): Completely to end or stop the pain (You couldn't go on living with the pain or how you were  feeling)  Suicidal Behavior  Actual Attempt (Lifetime): Yes  Total Number of Actual Attempts (Lifetime): 1  Actual Attempt (Past 3 Months): No  Has subject engaged in non-suicidal self-injurious behavior? (Lifetime): No  Interrupted Attempts (Lifetime): Yes  Total Number of Interrupted Attempts (Lifetime): 1  Interrupted Attempts (Past 3 Months): No  Aborted or Self-Interrupted Attempt (Lifetime): No  Preparatory Acts or Behavior (Lifetime): No  C-SSRS Risk (Lifetime/Recent)  Calculated C-SSRS Risk Score (Lifetime/Recent): Moderate Risk    Actual/Potential Lethality (Most Lethal Attempt)  Most Lethal Attempt Date:  (1990s)  Actual Lethality/Medical Damage Code (Most Lethal Attempt): Minor physical damage  Potential Lethality Code (Most Lethal Attempt): Behavior likely to result in injury but not likely to cause death       Validity of evaluation is not impacted by presenting factors during interview.   Environmental or Psychosocial Events: loss of status/respect/rank, helplessness/hopelessness, worsening chronic illness, social isolation and anniversary of traumatizing events  Chronic Risk Factors: history of suicide attempts (late 90s-early 2000s), chronic and ongoing sleep difficulties and chronic health problems   Warning Signs: seeking access to means to hurt or kill self, talking or writing about death, dying, or suicide, hopelessness, pain (new or worsening), feeling trapped, like there is no way out, anxiety, agitation, unable to sleep, sleeping all the time, no reason for living, no sense of purpose in life and recent losses (physical, financial, personal)  Protective Factors: strong bond to family unit, community support, or employment, intact marriage or domestic partnership, responsibilities and duties to others, including pets and children, lives in a responsibly safe and stable environment, good treatment engagement, sense of importance of health and wellness, supportive ongoing medical and mental  "health care relationships, help seeking and good impulse control  Interpretation of Risk Scoring, Risk Mitigation Interventions and Safety Plan:  Patient's Lifetime CSSRS is moderate risk due to his history of suicide attempt and current passive ideations.  He reports \"fantasizing\" about a plan of taking the car, driving down the freeway, and crashing.  Patient is clear about not going to act upon this thought as he does not want to bring hurt to his family.     Does the patient have access to lethal means? No     Does the patient have thoughts of harming others? No     Is the patient engaging in sexually inappropriate behavior?  no        Current Substance Abuse     Is there recent substance abuse? no     Was a urine drug screen or blood alcohol level obtained: No       Mental Status Exam     Affect: Flat   Appearance: Appropriate    Attention Span/Concentration: Attentive  Eye Contact: Engaged   Fund of Knowledge: Appropriate    Language /Speech Content: Fluent   Language /Speech Volume: Normal    Language /Speech Rate/Productions: Normal    Recent Memory: Intact   Remote Memory: Intact   Mood: Anxious and Depressed    Orientation to Person: Yes    Orientation to Place: Yes   Orientation to Time of Day: Yes    Orientation to Date: Yes    Situation (Do they understand why they are here?): Yes    Psychomotor Behavior: Normal    Thought Content: Clear   Thought Form: Goal Directed and Intact      History of commitment: No    Mini-Cog Assessment  Instructions:  1. Ask the patient to listen carefully and remember three unrelated words (ex. Table, apple, ya).  2. Ask the patient to draw a clock: first the [place], then the numbers, then the hands at a specific time (ex. 11:10am).  3. Ask the patient to repeat the three words.    Number of Words Recalled: 3  Clock-Drawing Test: 2 (Normal)   Mini-Cog Total Score: 5  Details: no concerns for mental cognition; actual Mini-Cog forms will be scanned into medical records     " "  Medication    Psychotropic medications:   Current Facility-Administered Medications   Medication     insulin glargine (LANTUS PEN) injection 12 Units     Current Outpatient Medications   Medication     alendronate (FOSAMAX) 70 MG tablet     amLODIPine (NORVASC) 2.5 MG tablet     aspirin 81 MG tablet     atorvastatin (LIPITOR) 20 MG tablet     insulin glargine (BASAGLAR KWIKPEN) 100 UNIT/ML pen     ipratropium (ATROVENT) 0.06 % nasal spray     lisinopril (ZESTRIL) 10 MG tablet     metoprolol succinate ER (TOPROL XL) 50 MG 24 hr tablet     Multiple Vitamins-Minerals (PRESERVISION AREDS 2) CAPS     Psyllium-Calcium (METAMUCIL PLUS CALCIUM) CAPS     RESTASIS 0.05 % ophthalmic emulsion     sulfaSALAzine ER (AZULFIDINE EN) 500 MG EC tablet     blood glucose (NO BRAND SPECIFIED) lancets standard     Contour Next EZ (CONTOUR NEXT EZ W/DEVICE KIT) w/Device KIT     CONTOUR NEXT TEST test strip     glimepiride (AMARYL) 4 MG tablet     insulin pen needle (B-D U/F) 31G X 8 MM miscellaneous     order for DME     Medication changes made in the last two weeks: No       Current Care Team    Primary Care Provider: Yes. Name: Dr. Kin Alejo. Location: [facility]. Date of last visit: 11/21/2022. Frequency: as needed. Perceived helpfulness: very helpful, kind, understanding.  Psychiatrist: No  Therapist: No  : No     CTSS or ARMHS: No  ACT Team: No  Other: No      Diagnosis    311 (F32.9) Unspecified Depressive Disorder  - by history  300.00 (F41.9) Unspecified Anxiety Disorder - by history   Adjustment Disorders  309.28 (F43.23) With mixed anxiety and depressed mood - primary and - by history     Clinical Summary and Substantiation of Recommendations    Patient independently called EMS on self early this morning for concerns of depression, anxiety, and physical health concerns causing him to wish he were dead.  Patient notes that he does not have a solid plan and no intention of following through with \"fantasized\" plan " to end his life.  Patient recognizes that his aging process is not an easy one as he is dealing with: diabetes, arthritis, macular degeneration, weight loss, unsteady gait, sleep difficulty, low sodium. Patient is hoping to get medications to manage anxiety, depression, and sleep.  He is not sure yet about scheduling with therapy.  Patient denies any HI, NSSI, AH, VH, paranoia, and delusions.     Disposition    Recommended disposition: Individual Therapy and Programmatic Care: 55+ Adult IOP through [facility]       Reviewed case and recommendations with attending provider. Attending Name: Gabrielle Monroe CNP      Attending concurs with disposition: Yes       Patient concurs with disposition: Yes       Guardian concurs with disposition: NA      Final disposition: Individual therapy  and Programmatic care: 55+ Adult IOP through [facility].     Outpatient Details (if applicable):   Aftercare plan and appointments placed in the AVS and provided to patient: Yes. Given to patient by [facility] RN    Was lethal means counseling provided as a part of aftercare planning? No;       Assessment Details    Patient interview started at: 0900 and completed at: 0950.     Total duration spent on the patient case in minutes: 1.50 hrs      CPT code(s) utilized: 38838 - Psychotherapy for Crisis - 60 (30-74*) min and 14242 - Psychotherapy for Crisis (Each additional 30 minutes) - 30 min        GARY Sparrow, MSW  DEC - Triage & Transition Services  Callback: [phone]      Aftercare Plan  If I am feeling unsafe or I am in a crisis, I will:   Contact my established care providers   Call the National Suicide Prevention Lifeline: 988  Go to the nearest emergency room   Call 911     Warning signs that I or other people might notice when a crisis is developing for me: increased thoughts of wanting to die, depression, anxiety, sleep difficulties    Things I am able to do on my own to cope or help me feel better: watch TV, listen to music, listen  "to audio books, spend time sitting outside when weather is nice     Things that I am able to do with others to cope or help me better: talk with wife, hang out with family/kids/grandkids, play games     Things I can use or do for distraction: music, movies, TV, books     Changes I can make to support my mental health and wellness: engage in therapy, take medications for sleep and anxiety     People in my life that I can ask for help: wife, children     Your [facility] has a mental health crisis team you can call 24/7: [facility]  [phone]     Other things that are important when I'm in crisis: validate my feelings, assure me that I will be better     Additional resources and information: follow with appointment for intake at [facility] 55+ Adult [facility] outpatient programs; connect with individual therapist in March 2023.       Crisis Lines  Crisis Text Line  Text 112034  You will be connected with a trained live crisis counselor to provide support.  Por espanol, texto  LAST a 366164 o texto a 442-AYUDAME en WhatsApp  [employer] (LGBTQ Youth Crisis Line)  3.171.243.9788  text START to 888-904    Community Resources  Fast Tracker  Linking people to mental health and substance use disorder resources  fastAble [employer].org   [place] Mental Health Warm Line  Peer to peer support  Monday thru Saturday, 12 pm to 10 pm  204.602.6497 or 2.078.919.3611  Text \"Support\" to 06328  National [place] [employer]ness ([place])  600.473.7732 or 1.888.LEOBARDO.HELPS    Mental Health Apps  My3  https://my3app.org  VirtualHopeBox  https://[employer].org/apps/virtual-hope-box/    Additional Information  Today you were seen by a licensed mental health professional through Triage and Transition services, [facility] ([facility])  for a crisis assessment in the Emergency Department at [facility].  It is recommended that you follow up with your established providers " (psychiatrist, mental health therapist, and/or primary care doctor - as relevant) as soon as possible. Coordinators from [facility] will be calling you in the next 24-48 hours to ensure that you have the resources you need.  You can also contact [facility] coordinators directly at [phone]. You may have been scheduled for or offered an appointment with a mental health provider. [facility] maintains an extensive network of licensed behavioral health providers to connect patients with the services they need.  We do not charge providers a fee to participate in our referral network.  We match patients with providers based on a patient's specific needs, insurance coverage, and location.  Our first effort will be to refer you to a provider within your care system, and will utilize providers outside your care system as needed.

## 2022-11-26 NOTE — ED NOTES
Bed: [facility]  Expected date:   Expected time:   Means of arrival:   Comments:  448 82M SI with a plan, self harm

## 2022-11-26 NOTE — ED NOTES
"82 year old male received to the [facility] unit due to depression and anxiety. Recent onset of suicidal ideation with thoughts of death by MVA. Talks of hopelessness and worthlessness due to deteriorating medical state ( poor eyesight-can't read, poor hearing-needs hearing aids fixed, tooth issues causing trouble chewing) Reports feeling \"ashamed\" that he can't be a proper \"\". Feels like a \"burden\" on his wife. Recent weight loss causing weakness and difficult ambulating. Given a walker for support while on the unit. Very sad and slightly irritable in presentation. Frustrated and overwhelmed. Assisted to order breakfast. Took am medication in ER.     Nursing assessment complete including patient and medication profiles. Risk assessments completed addressing suicide,fall,skin,nutrition and safety issues. Care plan initiated. Assessments reviewed with physician and admit orders received. Video monitoring in progress, Patient Informed.    "

## 2022-11-26 NOTE — ED PROVIDER NOTES
[facility] - Psychiatric Consultation  [facility] Emergency Department    Jose Francisco Gonzalez MRN: [id number]   Age: 82 year old YOB: 1940     History     Chief Complaint   Patient presents with     Suicidal     HPI  Jose Francisco Gonzalez is a 82 year old male with history significant mental health history.  Complex medical history with multiple specialists involve in his care.  Worsening symptoms of decreasing eyesight, hearing, ability to be as mobile as he had, and incontinence.  He was experiencing acute insomnia and panic last night with suicidal ideation (passive) and presented to the emergency department.  Patient was evaluated by the ED provider, who medically cleared patient to transfer to [facility] for psychiatric assessment, this is reviewed along with all pertinent labs and tests performed.      OUTPATIENT TEAM:  Therapist: none  Psychiatry: none  PCP:  SHAAN Barakat      NOTES ABOUT CURRENT PSYCHOTROPIC MEDICATIONS:   none      Current Facility-Administered Medications:      insulin glargine (LANTUS PEN) injection 12 Units, 12 Units, Subcutaneous, Daily, Ken Wren MD    Current Outpatient Medications:      alendronate (FOSAMAX) 70 MG tablet, TAKE 1 TABLET(70 MG) BY MOUTH EVERY 7 DAYS, Disp: 12 tablet, Rfl: 3     amLODIPine (NORVASC) 2.5 MG tablet, TAKE 1 TABLET(2.5 MG) BY MOUTH DAILY, Disp: 90 tablet, Rfl: 3     aspirin 81 MG tablet, Take 81 mg by mouth daily, Disp: , Rfl:      atorvastatin (LIPITOR) 20 MG tablet, TAKE 1 TABLET(20MG) BY MOUTH DAILY, Disp: 90 tablet, Rfl: 3     insulin glargine (BASAGLAR KWIKPEN) 100 UNIT/ML pen, INJECT 12 UNITS SUBCUTANEOUS EVERY DAY, Disp: 15 mL, Rfl: 1     ipratropium (ATROVENT) 0.06 % nasal spray, Spray 1-2 sprays into both nostrils 2 times daily (Patient taking differently: Spray 1-2 sprays into both nostrils 2 times daily as needed for rhinitis), Disp: 15 mL, Rfl: 11     lisinopril (ZESTRIL) 10 MG tablet, Take 1 tablet (10 mg) by mouth daily, Disp:  90 tablet, Rfl: 1     metoprolol succinate ER (TOPROL XL) 50 MG 24 hr tablet, Take 1 tablet (50 mg) by mouth daily, Disp: 90 tablet, Rfl: 4     mirtazapine (REMERON) 7.5 MG tablet, Take 1 tablet (7.5 mg) by mouth At Bedtime, Disp: 30 tablet, Rfl: 0     Multiple Vitamins-Minerals (PRESERVISION AREDS 2) CAPS, Take 2 capsules by mouth daily, Disp: , Rfl:      Psyllium-Calcium (METAMUCIL PLUS CALCIUM) CAPS, Take 2 capsules by mouth nightly as needed, Disp: , Rfl:      RESTASIS 0.05 % ophthalmic emulsion, Place 1 drop into both eyes 2 times daily as needed for dry eyes, Disp: , Rfl:      sulfaSALAzine ER (AZULFIDINE EN) 500 MG EC tablet, TAKE 1 TABLET BY MOUTH TWICE DAILY AFTER MEALS, Disp: , Rfl:      blood glucose (NO BRAND SPECIFIED) lancets standard, Use to test blood sugar 3 times daily or as directed., Disp: 300 each, Rfl: 1     Contour Next EZ (CONTOUR NEXT EZ W/DEVICE KIT) w/Device KIT, USE TO TEST BLOOD SUGAR THREE TIMES DAILY OR AS DIRECTED, Disp: 1 kit, Rfl: 1     CONTOUR NEXT TEST test strip, USE TO TEST BLOOD SUGAR 3 TIMES DAILY OR AS DIRECTED, Disp: 300 strip, Rfl: 11     glimepiride (AMARYL) 4 MG tablet, Take  1.5  tabs daily in AM for diabetes, Disp: 60 tablet, Rfl: 11     insulin pen needle (B-D U/F) 31G X 8 MM miscellaneous, USE DAILY AS DIRECTED, Disp: 100 each, Rfl: 11     order for DME, Test strips for pt's glucometer, brand as covered by insurance Test QID and prn. He is on insulin. Patients preferred brand-Verio One Touch., Disp: 400 each, Rfl: 1      PAST PSYCHOTROPIC MEDICATIONS:  none    Past Medical History  Past Medical History:   Diagnosis Date     Arthropathy, unspecified, site unspecified     palindromic rheumatism; takes aleve bid      Compression fracture of body of thoracic vertebra (H)      Compression fracture of L1 lumbar vertebra (H) 06/2019    see MRI     Hyperlipidemia LDL goal <100 11/2/2012    Was on fenofibrate plus simva, in [facility] study; LDL 84 in Lipitor 20 in 05/2012; 97 in  3/13     Hyponatremia      Rheumatoid arthritis (H)      Sialoadenitis 2015     Syncope      Type 2 diabetes, HbA1C goal < 8% (H) 2012     Past Surgical History:   Procedure Laterality Date     ENT SURGERY  2009    nasal; removal of pyogenic granuloma L     EYE SURGERY  murray 15 and2009    cataract     HC TOOTH EXTRACTION W/FORCEP       TONSILLECTOMY       alendronate (FOSAMAX) 70 MG tablet  amLODIPine (NORVASC) 2.5 MG tablet  aspirin 81 MG tablet  atorvastatin (LIPITOR) 20 MG tablet  insulin glargine (BASAGLAR KWIKPEN) 100 UNIT/ML pen  ipratropium (ATROVENT) 0.06 % nasal spray  lisinopril (ZESTRIL) 10 MG tablet  metoprolol succinate ER (TOPROL XL) 50 MG 24 hr tablet  mirtazapine (REMERON) 7.5 MG tablet  Multiple Vitamins-Minerals (PRESERVISION AREDS 2) CAPS  Psyllium-Calcium (METAMUCIL PLUS CALCIUM) CAPS  RESTASIS 0.05 % ophthalmic emulsion  sulfaSALAzine ER (AZULFIDINE EN) 500 MG EC tablet  blood glucose (NO BRAND SPECIFIED) lancets standard  Contour Next EZ (CONTOUR NEXT EZ W/DEVICE KIT) w/Device KIT  CONTOUR NEXT TEST test strip  glimepiride (AMARYL) 4 MG tablet  insulin pen needle (B-D U/F) 31G X 8 MM miscellaneous  order for DME      No Known Allergies  Past medical history, past surgical history, medications, and allergies were reviewed with the patient. Additional pertinent items: needing to use a walker to get around, feeling more weak lately    Family History  Family History   Problem Relation Age of Onset     Heart Disease Father      Coronary Artery Disease Father      Heart Disease Brother      Coronary Artery Disease Brother      Alzheimer Disease Sister      No Known Problems Mother      Substance Abuse Son         alcohol     Family history was reviewed with the patient.    Social History  Social History     Tobacco Use     Smoking status: Former     Packs/day: 1.00     Years: 20.00     Pack years: 20.00     Types: Cigarettes     Quit date: 1993     Years since quittin.8      "Smokeless tobacco: Never   Vaping Use     Vaping Use: Never used   Substance Use Topics     Alcohol use: Yes     Comment: socially     Drug use: No      Social history was reviewed with the patient. Additional pertinent items: decreased social interactions due to medical condition and symptoms specifically with gastrointestinal issues and decrease in sight and hearing      Review of Systems  A complete review of systems was performed with pertinent positives and negatives noted in the HPI, and all other systems negative.    Physical Examination   BP: (!) 156/97  Pulse: 81  Temp: 98.1  F (36.7  C)  Resp: 16  Height: 180.3 cm (5' 11\")  Weight: 64.9 kg (143 lb)  SpO2: 97 %    Physical Exam  General: Appears stated age.   Neuro: Alert and fully oriented. Extremities appear to demonstrate normal strength on visual inspection.   Integumentary/Skin: no rash visualized, normal color    Psychiatric Examination   Appearance: awake, alert  Attitude:  cooperative and tearful  Eye Contact:  good  Mood:  anxious and sad   Affect:  mood congruent  Speech:  clear, coherent  Psychomotor Behavior:  no evidence of tardive dyskinesia, dystonia, or tics  Thought Process:  logical and goal oriented  Associations:  no loose associations  Thought Content:  passive suicidal ideation present  Insight:  good  Judgement:  intact  Oriented to:  time, person, and place  Attention Span and Concentration:  intact  Recent and Remote Memory:  intact  Language: able to name/identify objects without impairment  Fund of Knowledge: intact with awareness of current and past events    ED Course      Chronic hyponatremia  Labs Ordered and Resulted from Time of ED Arrival to Time of ED Departure   COMPREHENSIVE METABOLIC PANEL - Abnormal       Result Value    Sodium 129 (*)     Potassium 4.0      Chloride 93 (*)     Carbon Dioxide (CO2) 28      Anion Gap 8      Urea Nitrogen 19      Creatinine 0.66      Calcium 8.7      Glucose 164 (*)     Alkaline " Phosphatase 65      AST 21      ALT 22      Protein Total 7.4      Albumin 3.4      Bilirubin Total 0.7      GFR Estimate >90     CBC WITH PLATELETS AND DIFFERENTIAL - Abnormal    WBC Count 6.0      RBC Count 4.31 (*)     Hemoglobin 13.1 (*)     Hematocrit 38.6 (*)     MCV 90      MCH 30.4      MCHC 33.9      RDW 12.5      Platelet Count 286      % Neutrophils 68      % Lymphocytes 14      % Monocytes 13      % Eosinophils 3      % Basophils 1      % Immature Granulocytes 1      NRBCs per 100 WBC 0      Absolute Neutrophils 4.1      Absolute Lymphocytes 0.9      Absolute Monocytes 0.8      Absolute Eosinophils 0.2      Absolute Basophils 0.1      Absolute Immature Granulocytes 0.0      Absolute NRBCs 0.0     GLUCOSE BY METER - Abnormal    GLUCOSE BY METER POCT 201 (*)    COVID-19 VIRUS (CORONAVIRUS) BY PCR - Normal    SARS CoV2 PCR Negative     TSH WITH FREE T4 REFLEX - Normal    TSH 1.66     UA MACROSCOPIC WITH REFLEX TO MICRO AND CULTURE - Normal    Color Urine Light Yellow      Appearance Urine Clear      Glucose Urine Negative      Bilirubin Urine Negative      Ketones Urine Negative      Specific Gravity Urine 1.013      Blood Urine Negative      pH Urine 7.0      Protein Albumin Urine Negative      Urobilinogen Urine Normal      Nitrite Urine Negative      Leukocyte Esterase Urine Negative     DRUG ABUSE SCREEN 77 URINE (FL, RH, SH) - Normal    Amphetamines Urine Screen Negative      Barbiturates Urine Screen Negative      Benzodiazepines Urine Screen Negative      Cannabinoids Urine Screen Negative      Cocaine Urine Screen Negative      Opiates Urine Screen Negative      PCP Urine Screen Negative         Assessments & Plan (with Medical Decision Making)     Patient presenting with anxiety, depression and suicidal ideation in the context of multiple worsening medical comorbidities. Nursing notes reviewed.  I have reviewed the DEC assessment complete by [facility] dated today.  No active suicidal ideation, reports  more feeling a burden to his wife and family.  He is open to a referral for the Senior program and for Care Coordination to help navigate the number of referrals and procedures he has.  He would also benefit from advanced care planning.    Serial assessments of the patient's psychiatric condition were performed. Nursing notes were reviewed. During the observation period, the patient did not require medications for agitation, and did not require restraints/seclusion for patient and/or provider safety.     After a period of working with the treatment team on the [facility] unit, the patient's mental state improved to allow a safe transition to outpatient care. After counseling on the diagnosis, work-up, and treatment plan, the patient was discharged. Close follow-up with a psychiatrist and/or therapist was recommended and community psychiatric resources were provided. Patient is to return to the ED if any urgent or potentially life-threatening concerns.       Discharge Diagnoses:   Final diagnoses:   Suicidal ideation   Anxiety   Chronic hyponatremia   Adjustment disorder with mixed anxiety and depressed mood       Treatment Plan:   -At this time, patient does not meet criteria to be placed under an involuntary hold and will be discharged home per patient request.  -start mirtazapine 7.5 mg and will talk with PCP for ongoing medication management   -Anticipating the patient will require more time to gain benefit from their treatment plan as what we are   -They report gaining benefit from the therapeutic milieu of the unit.  -Patient would be a good candidate for the Senior Treatment Program at the [facility].  Referral made  -Problem focused, supportive therapy provided around patients stressors and symptoms related to their diagnosis.  Validated patients emotions and problems solved with patient around stress management and self care strategies. Patient was receptive.   -Medication education  provided this visit includes, rationale for medication, importance of compliance, medication interactions, and common side effects. Patient agreeable.  -The patient was educated regarding their differential diagnoses and the importance of adhering to their treatment plan and outpatient follow up appointments to aid with diagnostic clarity.    At the time of discharge, the patient's acute suicide risk was determined to be low due to the following factors: Reduction in the intensity of mood/anxiety symptoms that preceded the admission, denial of suicidal thoughts, denies feeling helpless or helpless, not currently under the influence of alcohol or illicit substances, denies experiencing command hallucinations, no immediate access to firearms. The patient's acute risk could be higher if noncompliant with their treatment plan, medications, follow-up appointments or using illicit substances or alcohol.    DEE DEE Terrazas CNP   [facility] EMERGENCY DEPT  [facility] Unit  11/26/2022      Gabrielle Monroe APRN CNP  11/26/22 [id number]

## 2022-11-26 NOTE — ED NOTES
Pt's wife gave him his home morning medications. This was permitted by Dr. Wren.    Fosamax 70 mg  Amlodipine 25 mg  Aspirin 81 mg  Atorvastatin 20 mg  Glimepiride 6 mg

## 2022-11-26 NOTE — PHARMACY-ADMISSION MEDICATION HISTORY
Pharmacy Medication History  Admission medication history interview status for the 11/26/2022  admission is complete. See EPIC admission navigator for prior to admission medications     Location of Interview: Patient room  Medication history sources: Patient and Patient's family/friend (spouse), CareEverywhere, Surescripts    Significant changes made to the medication list:  Removed: salt tablets    In the past week, patient estimated taking medication this percent of the time: greater than 90%    Additional medication history information:   --Patient reports he sometimes takes insulin glargine 16 units if his BG is above 200.     Medication reconciliation completed by provider prior to medication history? No    Time spent in this activity: 10 min    Prior to Admission medications    Medication Sig Last Dose Taking? Auth Provider Long Term End Date   alendronate (FOSAMAX) 70 MG tablet TAKE 1 TABLET(70 MG) BY MOUTH EVERY 7 DAYS 11/20/2022 Yes Kin Alejo MD Yes    amLODIPine (NORVASC) 2.5 MG tablet TAKE 1 TABLET(2.5 MG) BY MOUTH DAILY 11/26/2022 at AM Yes Kin Alejo MD Yes    aspirin 81 MG tablet Take 81 mg by mouth daily 11/26/2022 Yes Reported, Patient     atorvastatin (LIPITOR) 20 MG tablet TAKE 1 TABLET(20MG) BY MOUTH DAILY 11/26/2022 Yes Ching Bennett MD Yes    glimepiride (AMARYL) 4 MG tablet Take 1.5 - 2 tabs daily in AM for diabetes  Patient taking differently: Take 8 mg by mouth every morning (before breakfast) Take 1.5 - 2 tabs daily in AM for diabetes 11/26/2022 at AM Yes Kin Alejo MD Yes    insulin glargine (BASAGLAR KWIKPEN) 100 UNIT/ML pen INJECT 12 UNITS SUBCUTANEOUS EVERY DAY 11/25/2022 at PM Yes iKn Alejo MD Yes    lisinopril (ZESTRIL) 10 MG tablet Take 1 tablet (10 mg) by mouth daily 11/26/2022 at AM Yes Kin Alejo MD Yes    metoprolol succinate ER (TOPROL XL) 50 MG 24 hr tablet Take 1 tablet (50 mg) by mouth daily 11/26/2022 at AM Yes Kin Alejo MD Yes    Multiple  Vitamins-Minerals (PRESERVISION AREDS 2) CAPS Take 2 capsules by mouth daily 11/26/2022 Yes Kvng Richardson MD     Psyllium-Calcium (METAMUCIL PLUS CALCIUM) CAPS Take 2 capsules by mouth nightly as needed PRN Yes Reported, Patient     RESTASIS 0.05 % ophthalmic emulsion Place 1 drop into both eyes 2 times daily as needed for dry eyes PRN Yes Reported, Patient     sulfaSALAzine ER (AZULFIDINE EN) 500 MG EC tablet TAKE 1 TABLET BY MOUTH TWICE DAILY AFTER MEALS 11/25/2022 at PM Yes Reported, Patient     blood glucose (NO BRAND SPECIFIED) lancets standard Use to test blood sugar 3 times daily or as directed.   Kvng Richardson MD     Contour Next EZ (CONTOUR NEXT EZ W/DEVICE KIT) w/Device KIT USE TO TEST BLOOD SUGAR THREE TIMES DAILY OR AS DIRECTED   Kvng Richardson MD     CONTOUR NEXT TEST test strip USE TO TEST BLOOD SUGAR 3 TIMES DAILY OR AS DIRECTED   Kvng Richardson MD     insulin pen needle (B-D U/F) 31G X 8 MM miscellaneous USE DAILY AS DIRECTED   Kin Alejo MD     ipratropium (ATROVENT) 0.06 % nasal spray Spray 1-2 sprays into both nostrils 2 times daily  Patient taking differently: Spray 1-2 sprays into both nostrils 2 times daily as needed for rhinitis   Kin Alejo MD     order for DME Test strips for pt's glucometer, brand as covered by insurance Test QID and prn. He is on insulin. Patients preferred brand-Verio One Touch.   Kvng Richardson MD         The information provided in this note is only as accurate as the sources available at the time of update(s)

## 2022-11-27 NOTE — TELEPHONE ENCOUNTER
Taken to [facility] ED last night by ambulance for anxiety and suicidal ideation. Went to [facility]. Discharged this afternoon w/ mirtazapine Rx. Pt and wife concerned about the possible side effects they read on the side effect profile. Disc'd that these are all possible side effects that may occur w/ med. Does not mean he will get all or any of them. He was concerned that he was unable to sleep last night. Advised pt he may sleep better w/ this med. If he has s/e he should call. Advised call PCP when clinic opens Mon. 11/28. Pt voiced understanding and agreement. Said he is willing to try the med. Will call side effects occurs.       Reason for Disposition    [1] Caller has NON-URGENT medicine question about med that PCP prescribed AND [2] triager unable to answer question    Additional Information    Negative: [1] Intentional drug overdose AND [2] suicidal thoughts or ideas    Negative: Drug overdose and triager unable to answer question    Negative: Caller requesting a renewal or refill of a medicine patient is currently taking    Negative: Caller requesting information unrelated to medicine    Negative: Caller requesting information about COVID-19 Vaccine    Negative: Caller requesting information about Emergency Contraception    Negative: Caller requesting information about Combined Birth Control Pills    Negative: Caller requesting information about Progestin Birth Control Pills    Negative: Caller requesting information about Post-Op pain or medicines    Negative: Caller requesting a prescription antibiotic (such as Penicillin) for Strep throat and has a positive culture result    Negative: Caller requesting a prescription anti-viral med (such as Tamiflu) and has influenza (flu) symptoms    Negative: Immunization reaction suspected    Negative: Rash while taking a medicine or within 3 days of stopping it    Negative: [1] Asthma and [2] having symptoms of asthma (cough, wheezing, etc.)    Negative: [1]  Symptom of illness (e.g., headache, abdominal pain, earache, vomiting) AND [2] more than mild    Negative: Breastfeeding questions about mother's medicines and diet    Negative: MORE THAN A DOUBLE DOSE of a prescription or over-the-counter (OTC) drug    Negative: [1] DOUBLE DOSE (an extra dose or lesser amount) of prescription drug AND [2] any symptoms (e.g., dizziness, nausea, pain, sleepiness)    Negative: [1] DOUBLE DOSE (an extra dose or lesser amount) of over-the-counter (OTC) drug AND [2] any symptoms (e.g., dizziness, nausea, pain, sleepiness)    Negative: Took another person's prescription drug    Negative: [1] DOUBLE DOSE (an extra dose or lesser amount) of prescription drug AND [2] NO symptoms (Exception: a double dose of antibiotics)    Negative: Diabetes drug error or overdose (e.g., took wrong type of insulin or took extra dose)    Negative: [1] Prescription not at pharmacy AND [2] was prescribed by PCP recently (Exception: triager has access to EMR and prescription is recorded there. Go to Home Care and confirm for pharmacy.)    Negative: [1] Pharmacy calling with prescription question AND [2] triager unable to answer question    Negative: [1] Caller has URGENT medicine question about med that PCP or specialist prescribed AND [2] triager unable to answer question    Negative: Medicine patch causing local rash or itching    Negative: [1] Caller has medicine question about med NOT prescribed by PCP AND [2] triager unable to answer question (e.g., compatibility with other med, storage)    Negative: Prescription request for new medicine (not a refill)    Protocols used: MEDICATION QUESTION CALL-A-

## 2022-11-28 NOTE — TELEPHONE ENCOUNTER
Patient has severe depression and recent suicidal ideation.  Was seen in the ER 11/26/2022 and evaluated by [facility] psych and started on MIrtazepine.  To help further with severe depression and lack of motivation, etc. would recommend patient  STOP mirtazapine for now and instead start bupropion  mg tablet, 1 tablet daily in the morning.  Start first dose today.  Prescription sent to [facility] pharmacy.  Patient has an appointment scheduled for evaluation for being admitted into  the senior outpatient program for mental health on 12/5/2022.  Cannot admit patient to a nursing home for mental health and does not appear to be medically requiring nursing home rehab versus outpatient Skilled nursing home stay would likely not be covered by insurance and therefore would be out-of-pocket and cost.  Would recommend starting the bupropion and if mental health symptoms worsen, then suggest patient be seen back in ER/[facility] for possible psych admission to hospital though I cannot say whether they would admit patient for sure and because of bed availability, potentially would involve admission to other hospital somewhere in the [facility] instead.  I cannot directly admit the patient to mental health blevins in hospital.  Hopefully bupropion will help perk up pt's mental health and should not have effect on his sodium levels. MD out of the clinic today and not able to see pt. Keep mental health appt 12/5/22 as scheduled and update me this Friday via iPositioning message how he is doing

## 2022-11-28 NOTE — TELEPHONE ENCOUNTER
MD out of the clinic today but because of some conflicting information regarding current mental health and other multiple medical issues, MD spoke by phone directly with patient and his daughter Elke.  Notes earlier had implied poor motivation as part of depression symptoms and insomnia issue with not known so I had previously today recommended patient use bupropion rather than mirtazapine.  However, patient states he has had insomnia last 2 nights despite mirtazapine 7.5 mg daily and bupropion with potentially worsened the insomnia.  Also having more anxiety.  Patient has lost weight due to decreased eating because of mild discomfort issues with new dental bridge which has been adjusted multiple times.  Has macular degeneration and patient states ophthalmologist has wanted him to see occupational therapy for adaptive equipment.  Has gait abnormality and general weakness.  Patient did have some breakfast and lunch today.  Having constipation issues.  Last bowel movement about 3 days ago.  Therefore recommended this plan.  1.  Patient not to take bupropion and messages left with pharmacy to cancel that prescription which was not picked up by family yet  2.  Patient to increase mirtazapine to 7.5 mg tablet, 2 tablets (total 15 mg) at bedtime for insomnia, decreased appetite and depression/anxiety  3. With SIADH, patient to limit fluid intake to approximately 36 ounces total per day and follow-up with nephrology as scheduled  4.  Patient will have nonfasting BMP lab drawn this Friday or the soonest available time after that through home care to recheck sodium level.  When home care scheduler calls family to set up for his first appointment, daughter will request/inform homecare of this.  Lab order placed in chart  5.  MiraLAX 17 g daily for constipation.  May get over-the-counter  6.  Family will send me updates this Friday via [employer] message regarding patient's response to higher dose mirtazapine    7.  Referral  placed for home care with home care nurse, occupational therapy and physical therapy.  Family will be contacted by home care scheduler to set this up  8.  Family to cancel outpatient occupational therapy appointment scheduled for 12/5/2022  9.  Patient to keep other behavioral health evaluation appointment scheduled for 12/5/2022 for addition to Senior mental health program

## 2022-11-28 NOTE — TELEPHONE ENCOUNTER
Prescription refill for lisinopril and glimepiride sent [facility] pharmacy.  Recent blood pressure also noted but when   patient was seen ER with health crisis.

## 2022-11-28 NOTE — TELEPHONE ENCOUNTER
FYI - Status Update    Who is Calling: family member, PT SPOUSE     Update: Pt was told to have blood sugar's tested for 5 days and to call that into the clinic. Pt has been waiting on hold for awhile and is requesting a call back to give report to RN.     Does caller want a call/response back: Yes     Could we send this information to you in [employer] or would you prefer to receive a phone call?:   Patient would prefer a phone call   Okay to leave a detailed message?: Yes at Cell number on file:    Telephone Information:   Mobile [phone]

## 2022-11-28 NOTE — PROGRESS NOTES
Clinic Care Coordination Contact  Community Health Worker Initial Outreach    Spoke to patient and patient's daughter, Elke.    CHW Initial Information Gathering:  Referral Source: PCP    Reason for Referral:    Advance Care Planning    Complex Medical Concerns/Education    Mental Wellness (Health) (Mental Illness/Chemical Dependency)     Patient/Caregiver Support     Serious illness or Goals of Care     Chronic Diagnosis - Use Comments    Resources of Behavioral Health Services     Resources for Support      Preferred Hospital: [facility]  [phone]  Current living arrangement:: I live in a private home with spouse  Type of residence:: Private home - stairs  Community Resources: None  Supplies Currently Used at Home: None  Equipment Currently Used at Home: none  Informal Support system:: Children, Spouse  No PCP office visit in Past Year: No  Transportation means:: Family  CHW Additional Questions  If ED/Hospital discharge, follow-up appointment scheduled as recommended?: N/A  Medication changes made following ED/Hospital discharge?: N/A  MyChart active?: Yes  Patient sent Social Determinants of Health questionnaire?: Yes    Discussed:    Patient's daughter stated she would like to discuss her dad's medications.  There have been many changes.  Patient's daughter stated her dad is feeling exhausted.  Patient's daughter stated her dad started on Wellbutrin today.  Her dad was in the [facility] unit at [facility] on 11/26/222 for an assessment.  Patient's daughter stated her dad was feeling hopeless.  Her dad is not sleeping well.    Patient stated he would like to get his blood sugars in control.  Patient stated he would like a monitor / pump for his diabetes.  Patient has vision loss, and it is very difficult to do the finger stick to check his blood.    Patient's daughter stated her parents need assistance in navigating the healthcare system.    CHW introduced self, gave contact info,  and offered the CC program.    Patient accepts CC: Yes. Patient scheduled for assessment with DANIEL Sosa RN on 12/2/22 at 10:00am. Patient noted desire to discuss medication changes, assistance with navigating the health care system / appointments, getting his blood sugars under control, mental health, and CC support.     ARIADNE Funez  Clinic Care Coordination  [facility]: [place], [place], [place], and [place] [facility]  Phone: [phone]

## 2022-11-28 NOTE — TELEPHONE ENCOUNTER
Routing refill request to provider for review/approval because:  Medication is reported/historical  BP Readings from Last 3 Encounters:   11/26/22 (!) 165/90   11/21/22 (!) 146/84   10/06/22 135/72     Zenaida FINN RN  [facility]

## 2022-11-28 NOTE — TELEPHONE ENCOUNTER
Pt spouse called and stated that the Amaryl was cancelled and this needs to be filled as the pharmacy has not received this prescription.     Asia Dunaway  Lead     [facility]

## 2022-11-28 NOTE — TELEPHONE ENCOUNTER
Order/Referral Request    Who is requesting: Pt spouse     Orders being requested: Home Health RN     Reason service is needed/diagnosis: pt is feeling hopeless and is requesting to be dropped off at a nursing home, he has balance issues and no hope and does not leave the house. Pt is struggling to leave the house even for PT.     When are orders needed by: asap     Has this been discussed with Provider: Yes    Does patient have a preference on a Group/Provider/Facility? NA     Does patient have an appointment scheduled?: No    Where to send orders: N/A    Could we send this information to you in [employer] or would you prefer to receive a phone call?:   Patient would prefer a phone call   Okay to leave a detailed message?: Yes at Cell number on file:    Telephone Information:   Mobile [phone]

## 2022-11-28 NOTE — PROGRESS NOTES
Clinic Care Coordination Contact    Encounter entered to ensure [facility]- Care Coordination program was generated following recent referral placed by DEE DEE Terrazas CNP during 11/26/22 [facility] consultation.     Excerpt from provider referral: He is open to a referral for the Senior program and for Care Coordination to help navigate the number of referrals and procedures he has.  He would also benefit from advanced care planning.

## 2022-11-28 NOTE — TELEPHONE ENCOUNTER
Called patient to obtain blood sugar readings (SEE BELOW for readings). Upon answering the phone, patient stated he was not doing well. He was in the hospital on 11/26 for suicidal ideation. He was given Mirtazapine, which he states has not been helping him sleep better. Patient states he is still feeling anxious, continiues to have the ongoing gut issues he has had in the past.    Patient had upper teeth removed in October is on a soft and liquid diet, patient has had a hard time eating and has lost weight. Daughter states it has been challenging to see weight loss but also try to keep blood sugars under control. Family is overall concerned with patient, as patient and wife feel there is nothing else to do and they are losing hope.     During the phone call, Triage RN was able to inform patient and daughter (verbal consent to communicate given) of message from Dr. Alejo. Patient and daughter verbalized understanding and plan on picking up RX for wellbutrin today. They also stated they will send a [employer] message with updates.      Patient and daughter clarified that they do not want the patient admitted to hospital or facility for mental health. They just feel they need help at home with PT and medication management. Patient is weak and has a hard time ambulating/going out to appointment. They are hoping insurance covers, but they are willing to consider out of pocket costs.    Pt wondering wanting refill on medications. Upon chart review, scripts for lisinopril and glimepiride were addressed by PCP    Patient requesting additional sleep aid as he has had a very hard time sleeping and believes lack of sleep in affecting him.    Can we leave a detailed message on this number? YES  Phone number patient can be reached at: Home number on file [phone] (home)    Patient states he has been taking 12 units most evenings. Pt took it upon himself to take more than prescribed when he felt his sugars were too high.  This  morning Blood Sugar:  Monday 11/21/22: AM  108, 11 PM  234  Tuesday 11/22/22: AM  121, PM  204  Wednesday 11/23/22: ,   Thursday 11/24/22 ,  (thanksgiving meal) took it upon himself to up his insulin dose so Pt states he took 22 units  Friday 11/25/22 ,  (pt took 15 units)   Saturday 11/26/22 --went into hospital overnight does not know what blood sugar was.  (at 10 PM--took 15 units)  Sunday 11/27/22: ,  (took 18 units)  Monday 11/28/22: This AM 82--took glucose pill and and orange juice back to baseline.    Suma Garcia, RN

## 2022-12-02 NOTE — TELEPHONE ENCOUNTER
Please see mychart from patient and advise appropriate course of action.      Juan Carlos Booth RN  [facility] Triage Nurse

## 2022-12-05 NOTE — PROGRESS NOTES
"      [facility]      PATIENT'S NAME: Jose Francisco Gonzalez  PREFERRED NAME: Shar  PRONOUNS:     He/him  MRN: [id number]  : 1940  ADDRESS: [address] [facility] [address]  ACCT. NUMBER:  [id number]  DATE OF SERVICE: 22  START TIME: 12:04pm  END TIME: 12:55pm  PREFERRED PHONE: [phone]   [email or web address]   Emergency Contact: Wife Divya Gonzalez [phone]  [email or web address]  May we leave a program related message: Yes  SERVICE MODALITY:  Video Visit:      Provider verified identity through the following two step process.  Patient provided:  Patient  and Patient address    Telemedicine Visit: The patient's condition can be safely assessed and treated via synchronous audio and visual telemedicine encounter.      Reason for Telemedicine Visit: Services only offered telehealth    Originating Site (Patient Location): Patient's home    Distant Site (Provider Location): Provider Remote Setting- Home Office    Consent:  The patient/guardian has verbally consented to: the potential risks and benefits of telemedicine (video visit) versus in person care; bill my insurance or make self-payment for services provided; and responsibility for payment of non-covered services.     Patient would like the video invitation sent by:  My Chart    Mode of Communication:  Video Conference via [employer]    As the provider I attest to compliance with applicable laws and regulations related to telemedicine.    UNIVERSAL ADULT Mental Health DIAGNOSTIC ASSESSMENT    Identifying Information:  Patient is a 82 year old individual.  Patient was referred for an assessment by \"[facility].\"  Patient attended the session with his wife Divya.    Chief Complaint:   The reason for seeking services at this time is: \"Depression, anxiety\".  The problem(s) began \"22.\" He feels he has too many physical ailments that are happening and they are " impacting him. He has friends who are still driving, but he stopped driving due to his eye sight. He has had diabetes for 20 years and takes daily injections. He has rheumatoid arthritis; he doesn't have a lot of pain, but he is very weak.  In early 10/2022, he had his upper teeth removed, a plate inserted. It is difficult and he isn't eating as much, so he is losing weight. He and his wife want him to have individual therapy. He would like in-person therapy. He and his wife don't want him to keep calling the 911 or the hospital. On 11/26/2022, he spent the day at [facility].  The [facility] unit started him on Mirtazapine and he takes 2 at night. He has been sleeping okay, but sometimes wakes up early. He will start having home healthcare for physical therapy and occupational therapy. Patient was referred to [employer] 55+ program by [facility] unit.  explained the 55+ program. Patient stated he has a hard time with technology and his wife has to set things up. He also has macular degeneration and is hard of hearing, so he has difficulty on his iPhone and computers. He would like the peer support of people closer to his age, but he doesn't think a virtual program will work. Patient stated he can't read either, but [facility] recommended reading to feel better. He stated he listens to audiobooks through the library.      **Per 11/26/2022 Crisis Assessment: Patient is an 82 year old male who called EMS independently with passive suicidal ideation, depression, and anxiety.  He was accompanied to the ED by his wife, Divya.  Patient reports that his will to live is pretty low due to the long list of physical alignment/complications he has been experiencing: arthritis, macular degeneration, weight loss, diabetes, unsteady gait, chronic low sodium, and hard of hearing.  He identifies feeling ashamed of all of this requiring his wife to take on more of his care and responsibilities.  Patient notes that he does not feel connected in  "social situations as he cannot always hear what is going on or following conversations.....Patient endorses passive suicidal ideation with \"fantasized\" attempts and clearly no intent to act on these thoughts.  He does not want to hurt others and following through with these thoughts to end his life would bring his family undue pain.  Patient has one previous suicide attempt when he was battling a gambling addiction and notes that his family has forgiven him for that attempt.....Patient reports struggling with depression and anxiety, not being able to get much sleep at night as he awakes after a couple hours of sleep with racing thoughts of worry.  He reports having a \"not good\" appetite due to depression and dental concerns.  He currently wears a top plate of dentures which negatively impacts his sense of taste and smell.  Patient denies hallucinations (unless he is experiencing low blood sugar), paranoia, delusions, HI, and NSSI.      Social/Family History:  Patient reported they grew up in \"[place].  They were raised by biological parents.  Parents were always together.\" Patient was 2nd of 4 children. His elder brother  at age 51. Patient had 2 younger sisters, but 1 sister  of early onset Alzheimer's. Patient reported that their childhood was: \"normal and healthy.\" He denied all forms of abuse.      The patient describes their cultural background as \".\"  Cultural influences and impact on patient's life structure, values, norms, and healthcare: \"not Sabianism, strong family connections.\"  Contextual influences on patient's health include: none.    These factors will be addressed in the Preliminary Treatment plan. Patient identified their preferred language to be \"English.\" Patient reported they does not need the assistance of an  or other support involved in therapy.     Patient reported no significant delays in developmental tasks. Patient's highest education level was \"college " "graduate.\"  Patient identified the following learning problems: none reported.  Modifications will not be used to assist communication in therapy. Patient reports they are able to understand written materials.    Patient explained he was  right out of college to his college girlfriend. They were  for 9 years, until she  in car a accident. Patient and his current wife, Divya, have been  for 52 years. He stated she has been wonderful, and he is loco that he has his wife, kids, grandkids, and many friends who are supportive.  Patient identified their sexual orientation as \"heterosexual.\"  Patient reported having 3 children. Patient identified \"siblings; adult child; friends; spouse\" as part of their support system.  Patient identified the quality of these relationships as \"stable and meaningful.\"  **Per 2022 Crisis Assessment: Patient currently lives with his wife, Divya, of 52 years in the same [facility] home they have occupied for the duration of their wedded life.  He notes [place] and the rhoades are spent in [place].   This is patient's 2nd marriage as his first wife was killed in a car accident on their 10th anniversary leaving him to care for their two sons.  Patient remarried and had one daughter with current wife.  Patient is proud of his four grandchildren as well.  He does note that one of his sons had struggled with alcohol use and is now in recovery after several treatment programs.     Patient's current living/housing situation involves staying in own home.  The immediate members of family and household include \"Divya Gonzalez, 76, wife\" and they report that housing is stable.     Patient is \"retired and no longer working.\"  Patient reports their finances are obtained through \"spouse.\" His wife is retired from teaching. Dominique is 8 years younger and worked for a little longer. Patient does not identify finances as a " current stressor.    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  He notes through senior living that he is financially stable.     Patient reported that they have not been involved with the legal system. Patient does not report being under probation/ parole/ jurisdiction.     Patient's Strengths and Limitations:  Patient identified the following strengths or resources that will help them succeed in treatment: family support, insight, intelligence, motivation, strong social skills and work ethic. Things that may interfere with the patient's success in treatment include: physical health concerns.     Assessments:  The following assessments were completed by patient for this visit:  PHQ9:   PHQ-9 SCORE 11/7/2017 5/28/2019 6/7/2019 10/22/2021 11/21/2022 11/28/2022   PHQ-9 Total Score MyChart - - - - 9 (Mild depression) 13 (Moderate depression)   PHQ-9 Total Score 5 4 3 1 9 13     GAD7:   CARIDAD-7 SCORE 6/7/2019 10/22/2021 9/12/2022 11/21/2022 11/28/2022   Total Score - - 2 (minimal anxiety) 6 (mild anxiety) 6 (mild anxiety)   Total Score 3 0 2 6 6     PROMIS 10-Global Health (only subscores and total score):   PROMIS-10 Scores Only 12/1/2022   Global Mental Health Score 9   Global Physical Health Score 11   PROMIS TOTAL - SUBSCORES 20     [place] Suicide Severity Rating Scale (Short Version)  [place] Suicide Severity Rating (Short Version) 7/10/2019 9/11/2019 2/28/2021 10/3/2022 11/26/2022 12/5/2022   Over the past 2 weeks have you felt down, depressed, or hopeless? no no no no yes -   Over the past 2 weeks have you had thoughts of killing yourself? no no no no yes -   Have you ever attempted to kill yourself? no no no no no -   Q1 Wished to be Dead (Past Month) - - - - yes yes   Q2 Suicidal Thoughts (Past Month) - - - - yes yes   Q3 Suicidal Thought Method - - - - yes yes   Q4 Suicidal Intent without  "Specific Plan - - - - no no   Q5 Suicide Intent with Specific Plan - - - - yes no   Q6 Suicide Behavior (Lifetime) - - - - no yes   Within the Past 3 Months? - - - - - no   Level of Risk per Screen - - - - high risk moderate risk   High Risk Required Interventions - - - - Provider notified;On continuous in person observation -   Required Interventions - - - - Room searched;Room made safe;Patient searched;Belongings removed -   Interventions - - - - DEC consulted;Monitored via video -   Most Lethal Attempt Date - - - - (No Data) -   Actual Lethality/Medical Damage Code (Most Lethal Attempt) - - - - 1 -   Potential Lethality Code (Most Lethal Attempt) - - - - 1 -       Personal and Family Medical History:  Patient does report a family history of mental health concerns.  Patient reports family history includes Alzheimer Disease in his sister; Coronary Artery Disease in his brother and father; Heart Disease in his brother and father; No Known Problems in his mother; Substance Abuse in his son..     Patient reported the following previous diagnoses which include(s): \"depression.\"  Patient has received mental health services in the past:  \"therapy.\"  Psychiatric Hospitalizations: \"[facility], recently [facility].\"  Patient denies a history of civil commitment.  Currently, patient is not receiving other mental health services.    **Per 11/26/2022 Crisis Assessment: Patient does not have a diagnosed mental health history as he endorses current struggles with grief, depression, and anxiety.   He has never been psychiatrically hospitalized either.  Patient has seen a therapist previously to help with his gambling addiction and some grief resolution after first wife passed away.     Patient has had a physical exam to rule out medical causes for current symptoms.  Date of last physical exam was within the past year. The patient has a [place] Primary Care Provider, who is named Kin Alejo. Patient reports the " following current medical concerns: arthritis, macular degeneration, weight loss, diabetes, unsteady gait, chronic low sodium, and hard of hearing. Patient denies issues with pain. There are significant appetite / nutritional concerns / weight changes - Patient has been losing weight because he is not eating due to dental issues.  Patient does not report a history of head injury / trauma / cognitive impairment. - He stated he fell 2 years ago and had a fractured disc. He spent a lot of time in bed and he thinks that's when he started to lose strength and balance.     Current Outpatient Medications   Medication     alendronate (FOSAMAX) 70 MG tablet     amLODIPine (NORVASC) 2.5 MG tablet     aspirin 81 MG tablet     atorvastatin (LIPITOR) 20 MG tablet     blood glucose (NO BRAND SPECIFIED) lancets standard     Contour Next EZ (CONTOUR NEXT EZ W/DEVICE KIT) w/Device KIT     CONTOUR NEXT TEST test strip     glimepiride (AMARYL) 4 MG tablet     insulin glargine (BASAGLAR KWIKPEN) 100 UNIT/ML pen     insulin pen needle (B-D U/F) 31G X 8 MM miscellaneous     ipratropium (ATROVENT) 0.06 % nasal spray     lisinopril (ZESTRIL) 10 MG tablet     metoprolol succinate ER (TOPROL XL) 50 MG 24 hr tablet     mirtazapine (REMERON) 7.5 MG tablet     Multiple Vitamins-Minerals (PRESERVISION AREDS 2) CAPS     Psyllium-Calcium (METAMUCIL PLUS CALCIUM) CAPS     RESTASIS 0.05 % ophthalmic emulsion     sulfaSALAzine ER (AZULFIDINE EN) 500 MG EC tablet     Medication Adherence:  Patient reports taking medications as prescribed.     Patient Allergies:  No Known Allergies    Medical History:    Past Medical History:   Diagnosis Date     Arthropathy, unspecified, site unspecified     palindromic rheumatism; takes aleve bid      Compression fracture of body of thoracic vertebra (H)      Compression fracture of L1 lumbar vertebra (H) 06/2019    see MRI     Hyperlipidemia LDL goal <100 11/2/2012    Was on fenofibrate plus simva, in [facility] study;  LDL 84 in Lipitor 20 in 05/2012; 97 in 3/13     Hyponatremia      Rheumatoid arthritis (H)      Sialoadenitis 6/13/2015     Syncope      Type 2 diabetes, HbA1C goal < 8% (H) 11/2/2012       Current Mental Status Exam:   Appearance:  Appropriate    Eye Contact:  Good   Psychomotor:  Restless       Gait / station:  sitting  Attitude / Demeanor: Cooperative  Speech      Rate / Production: Normal/ Responsive      Volume:  Normal  volume      Language:  intact  Mood:   Depressed  Irritable   Affect:   Appropriate  Subdued    Thought Content: Clear   Thought Process: Coherent  Logical       Associations: No loosening of associations  Insight:   Good   Judgment:  Intact   Orientation:  All  Attention/concentration: Good      Substance Use:  Patient did report a family history of substance use concerns - His parents drank socially. One of his son is in recovery for alcohol. His other son and daughter drink socially. Patient has not received chemical dependency treatment in the past.  Patient has not been to detox. Patient is not currently receiving any chemical dependency treatment.       Substance History of use Age of first use Date of last use     Pattern and duration of use (include amounts and frequency)   Alcohol currently use 19 y.o. 11/01/22 He stated he drank most days, having 1 drink of wine or whiskey. He stated he never drink very much and didn't like to get drunk because he didn't want to lose control. He never drank alone and was a social drinker. He thinks he got drunk once in college. Patient and his wife denied concerns for his drinking.     Cannabis never used        Amphetamines   never used        Cocaine/crack    never used          Hallucinogens never used            Inhalants never used            Heroin never used            Other Opiates never used        Benzodiazepine   never used        Barbiturates never used        Over the counter meds never used        Caffeine currently use child     "  Nicotine  used in the past 17 08/01/99    Other substances not listed above: never used          Patient reported the following problems as a result of their substance use: \"no problems, not applicable.\" Substance Use: No symptoms    CAGE-AID Total Score 12/1/2022   Total Score 0   Total Score MyChart 0 (A total score of 2 or greater is considered clinically significant)     Based on the negative CAGE score and clinical interview there are not indications of drug or alcohol abuse.    Significant Losses / Trauma / Abuse / Neglect Issues:   Patient did serve in the .  **Per 11/26/2022 Crisis Assessment: Patient served a short time with the [employer] and does not receive any benefits due to the limited duration of service.     There are indications or report of significant loss, trauma, abuse or neglect issues related to: untimely death of first wife, patient losing eye sight and hearing, other medical difficulties and physical weakness  Concerns for possible neglect are not present.     Safety Assessment: Patient denied current suicidal ideation, plans, and intent to hurt himself. Patient stated he feels like a burden on his wife, but she doesn't complain about it. He knows she has had to skip things to care for him. His daughter and son have come over to help. Patient stated he was close to attempting suicide about 30 years ago and he went to the hospital for about 1 week. **Per 11/26/2022 Crisis Assessment: He reports \"fantasizing\" about a plan of taking the car, driving down the freeway, and crashing.  Patient is clear about not going to act upon this thought as he does not want to bring hurt to his family. Chronic Risk Factors: history of suicide attempts (late 90s-early 2000s), chronic and ongoing sleep difficulties and chronic health problems   Patient denies current homicidal ideation and behaviors.  Patient denies current self-injurious ideation and behaviors.    Patient denied risk " "behaviors associated with substance use.  Patient denies any high risk behaviors associated with mental health symptoms.  Patient reports the following current concerns for their personal safety: None.  Patient reports there are not firearms in the house.       History of Safety Concerns:  Patient denied a history of homicidal ideation.     Patient denied a history of personal safety concerns.    Patient denied a history of assaultive behaviors.    Patient denied a history of sexual assault behaviors.     Patient denied a history of risk behaviors associated with substance use.  Patient denies any history of high risk behaviors associated with mental health symptoms.  Patient reports the following protective factors: \"dedication to family or friends; safe and stable environment; effectively controls impulses; sense of belonging; help seeking behaviors when distressed; adherence with prescribed medication; living with other people; structured day; positive social skills; healthy fear of risky behaviors or pain; financial stability\"    Risk Plan:  See Recommendations for Safety and Risk Management Plan    Review of Symptoms per patient report:   Depression: Change in sleep, Change in energy level, Change in appetite, Psychomotor slowing or agitation, Suicidal ideation, Feelings of hopelessness, Feelings of helplessness, Low self-worth, Ruminations, Feeling sad, down, or depressed and Withdrawn - He stated he was diagnosed with depression years ago. He has the fear of having an incontinence accident, so he has canceled several engagements with others - this causes him anxiety and depression. He would like to go to the social events.  Eliana:  No Symptoms  Psychosis: No Symptoms  Anxiety: Worry and Irritability - Lately, he has worried about unexpected urination and bowel movements. He had 2 incidents where he had trouble with bowels. He now wears a pad most days.   Panic:  No Symptoms - He thinks he was close to a panic " "attack when he called the emergency line and went to [facility].   Post Traumatic Stress Disorder:  No Symptoms   Eating Disorder: No Symptoms  ADD / ADHD:  No symptoms  Conduct Disorder: No symptoms  Autism Spectrum Disorder: No symptoms  Obsessive Compulsive Disorder: No Symptoms    Patient reports the following compulsive behaviors and treatment history: None.      Diagnostic Criteria:   Major Depressive Disorder  A) Recurrent episode(s) - symptoms have been present during the same 2-week period and represent a change from previous functioning 5 or more symptoms (required for diagnosis)   - Depressed mood. Note: In children and adolescents, can be irritable mood.     - Diminished interest or pleasure in all, or almost all, activities.    - Significant weight loss when not dieting    - Decreased sleep.    - Fatigue or loss of energy.    - Feelings of worthlessness or inappropriate and excessive guilt.    - Diminished ability to think or concentrate, or indecisiveness.    - Recurrent thoughts of death (not just fear of dying), recurrent suicidal ideation without a specific plan, or a suicide attempt or a specific plan for committing suicide.   B) The symptoms cause clinically significant distress or impairment in social, occupational, or other important areas of functioning  C) The episode is not attributable to the physiological effects of a substance or to another medical condition  D) The occurence of major depressive episode is not better explained by other thought / psychotic disorders  E) There has never been a manic episode or hypomanic episode    Functional Status:  Patient reports the following functional impairments:  \"social interactions.\"       Programmatic care:  Current WHODAS was assigned and patient needs the following level of care based on score 33.    Clinical Summary:  1. Reason for assessment: Patient was referred to [employer] 55+ program by [facility] unit to address depression, anxiety, suicidal " ideation, physical health, and aging related concerns.  2. Psychosocial, Cultural and Contextual Factors: living with wife, retired, physical limitations.   3. Principal DSM5 Diagnoses  (Sustained by DSM5 Criteria Listed Above):   296.33 (F33.2) Major Depressive Disorder, Recurrent Episode, Severe     4. Other Diagnoses that is relevant to services:   300.00 (F41.9) Unspecified Anxiety Disorder  5. Provisional Diagnosis:     6. Prognosis: Relieve Acute Symptoms and Maintain Current Status / Prevent Deterioration  7. Likely consequences of symptoms if not treated: Patient may need higher level of care  8. Client strengths include:  educated, goal-focused, insightful, intelligent, motivated, support of family, friends and providers and work history      Recommendations:     1. Plan for Safety and Risk Management: A safety and risk management plan has been developed including: Patient consented to co-developed safety plan. Safety and risk management plan was completed.  Patient agreed to use safety plan should any safety concerns arise.  Report to child / adult protection services was NA.      2. Patient did not identify concerns with a cultural influence.     3. Initial Treatment will focus on: Depressed Mood.     4. Resources/Service Plan:    services are not indicated.   Modifications to assist communication are not indicated.   Additional disability accommodations are not indicated.      5. Collaboration:  Collaboration / coordination of treatment may be initiated with the following support professionals: NA.   Emergency Contact: Wife Divya Gonzalez [phone].       6.  Referrals:   Patient was referred to [facility]+ program by [facility] unit to address depression, anxiety, suicidal ideation, physical health, and aging related concerns. However, patient is unable to participate in programming because it is virtual only and he has difficulty with eyesight, hearing, and technology. Patient and his wife  would like him to have individual therapy at [facility] because his providers are at [facility]. Patient wants in-person therapy.  explained [facility] has a 6 month wait for individual therapy.  explained [facility], which can provide individual therapy counseling and psychiatry.  confirmed that [facility] has in-[facility].  [facility] and placed orders for [facility] and [facility].      also provided patient and his wife with information about [facility] for occupational therapy and mental health support.   [address] [facility]. [facility] [address]  [place] [address]  Phone: [phone]  Fax: [phone]  [email or web address]     Patient may benefit from the PEAR program through [facility]. Counselors meet with older adults to provide 8 free one-on-one support sessions over the course of five months. Counseling sessions occur either in-person at the client s home, by Zoom, or over the phone depending on the client s comfort level.   Contact: Aiyana Peralta at [phone]  [email or web address]    7. EFRAIN:    EFRAIN:  Discussed the general effects of drugs and alcohol on health and well-being. Recommendations: Continue to abstain from alcohol.     8. Records were reviewed at time of assessment. Information in this assessment was obtained from the medical record and provided by patient who is a good historian. Patient will have open access to their mental health medical record.    9. Interactive Complexity: No       Provider Name/ Credentials:  JACKIE Garcia, LICSW, LADC  December 5, 2022              Outpatient Mental Health Services - Adult    MY COPING PLAN FOR SAFETY    Name: Jose Francisco Gonzalez  YOB: 1940  Date: 12/05/22  My primary care provider: Sapna  Kin MCCRACKEN My Triggers:  Feeling like a burden, physical health concerns     Additional People, Places, and Things that I can access for support: his wife, children, friends.         What is important to me and makes life worth living: Patient stated his wife, 3 children, 4 grandchildren, a sister he is close too and her . He also has a lot of nieces and nephews and sees them often.       GREEN    Good Control  1. I feel good  2. No suicidal thoughts   3. Can work, sleep and play      Action Steps  1. Self-care: balanced meals, exercising, sleep practices, etc.  2. Take your medications as prescribed.  3. Continue meetings with therapist and prescriber.  4.  Do the healthy things that I enjoy.           YELLOW  Getting Worse  I have ANY of these:  1. I do not feel good  2. Difficulty Concentrating  3. Sleep is changing  4. Increase/Change in my thoughts to hurt self and/or others, but I can still manage and not act on it.   5. Not taking care of self.             Action Steps (in addition to the above):  1. Inform your therapist and psychiatric prescriber/PCP.  2. Keep taking your medications as prescribed.    3. Turn to people you can ask for help.  4. Use internal coping strategies -see below.  5. Create safe environment: notify friends/family of increase in symptoms           RED  Get Help  If I have ANY of these:  1. Current and uncontrollable thoughts and/or behaviors to hurt self and/or others.  Actions to manage my safety  1. Contact your emergency person: Wife Divya Gonzalez [phone]  [email or web address]  2. Call my crisis team- [facility] [phone] [facility] Psych Emergencies  3. Or Call 911 or go to the emergency room right away        My Internal Coping Strategies include the following:  Listening to audiobooks, watching sports, listening to music. He likes baseball and basketball. He used to play sports.     Safety Concerns  How To Identify Situations That Make Your  Mental Health Worse:  Triggers are things that make your mental health worse.  Look at the list below to help you find your triggers and what you can do about them.     1. Identify Early Warning Signs:    Sometimes symptoms return, even when people do their best to stay well. Symptoms can develop over a short period of time with little or no warning, but most of the time they emerge gradually over several weeks.  Early warning signs are changes that people experience when a relapse is starting. Some early warning signs are common and others are not as common.   Common Early Warning Signs:    Feeling tense or nervous, Eating less or eating more, Trouble sleeping -either too much or too little sleep, Feeling depressed or low, Feeling irritable, Feeling like not being around other people, Trouble concentrating and Urges to harm self     2. Identify action steps to take when warning signs are noticed:    Taking Action- It is important to take action if you are experiencing early warning signs of a relapse.  The faster you act, the more likely it is that you can avoid a full relapse.  It is helpful to identify several specific ways to cope with symptoms.      The following is my list of symptoms and coping strategies that I can use when they are present:    Symptom Coping Strategies   Anxiety -Talk with someone in your support system and let him or her know how you are feeling.  -Use relaxation techniques such as deep breathing or imagery.  -Use positive affirmations to counteract negative self-talk such as  I am learning to let go of worry.    Depression - Schedule your day; include activities you have to do and activities you enjoy doing.  - Get some exercise - walk, run, bike, or swim.  - Give yourself credit for even the smallest things you get done.   Sleep Difficulties   - Go to sleep at the same time every day.  - Do something relaxing before bed, such as drinking herbal tea or listening to music.  - Avoid having  discussions about upsetting topics before going to bed.   Concentration Difficulties - Minimize distractions so there is only one thing for you to focus on at a time.    - Ask the person you are having a conversation with to slow down or repeat things you are unsure of.

## 2022-12-05 NOTE — PATIENT INSTRUCTIONS
Jackson Myles,  It was a pleasure meeting you and Divya today and thank you for doing the assessment. I made referrals to our [facility] ([facility]) for individual therapy. Our scheduling department will be calling you, or you may call them at [phone]. Since [facility] has a waitlist, I also made a referral to our [facility] that can provide individual therapy and medication management. I verified that the therapists can meet with you in-person. The [facility] will be calling you, but you may also call them at [phone].      I also mentioned [facility] for occupational therapy and mental health support. Please contact them for more information or as a supplement to your current OT/PT referrals.   [address] [place] [address]  Phone: [phone]  Fax: [phone]  [email or web address]     Another resource may be the [facility] program through [facility]. Counselors meet with older adults to provide 8 free one-on-one support sessions over the course of five months. Counseling sessions occur either in-person at the client s home, by Zoom, or over the phone depending on the client s comfort level.   Contact: Aiyana Peralta at [phone]  [email or web address]

## 2022-12-05 NOTE — TELEPHONE ENCOUNTER
[facility] Call Center    Phone Message    May a detailed message be left on voicemail: yes     Reason for Call: Other: Wondering if her  needs to fast for 12/9 labs for Dr. Hurley.  I did tell her that generally someone would call if fasting is required for the appointment. If patient doesn't need to fast, no need to call, per Divya. Thank you!    Action Taken: Message routed to:  [facility] Nephrology    Travel Screening: Not Applicable

## 2022-12-06 NOTE — TELEPHONE ENCOUNTER
Mirtazapine medication refilled.   Per chart, patient has appointment with behavioral health tomorrow in addition.

## 2022-12-06 NOTE — TELEPHONE ENCOUNTER
Called and spoke to BRYAN Prieto. Relayed message from provider. Conner verbalized understanding message from provider and stated he will update the patient. Conner requesting new orders are faxed to home care.     Fax: [phone] Attn: BRYAN Prieto      No additional questions at this time. Routing to team so orders can be faxed.     Suma Garcia RN

## 2022-12-06 NOTE — TELEPHONE ENCOUNTER
Conner, home health nurse, called with an update for the provider and requesting orders.     Pt has told Conner that he has been adjusting prn, his daily insulin glargine 12 units dose. He woke up with his bg 74 and was feeling groggy one morning.     Requesting order review/provider recommendations.     Can we leave a detailed message on this number? YES  Phone number patient can be reached at: Other phone number:  BRYAN Prieto [phone]    Vielka Finch RN  [facility] Triage

## 2022-12-06 NOTE — TELEPHONE ENCOUNTER
Coordinated with referral source who will be making a referral for patient to Psychiatry at [facility].     Med Management referral forwarded to TC RN notifying that referral source is making referral to [facility] Psychiatry.    Scheduled initial Therapy with Ej MARIANO over the phone. Patient offered in person, but stated would try virtual as it offered sooner appointment that would with with their schedule.     Coordinator explained that patient would be contacted again by TC once referral approved for Psychiatry. Patient stated they'd like Md's to be able to talk to each other as PCP started on new med Mirtazapine. Said almost out of medication. Coordinator encouraged to contact PCP for refill.     Deana Garcia  [facility] Coordinator  Date and Time: 12/06/22 9:15 AM                    ----- Message from GUS Garcia sent at 12/5/2022  6:02 PM CST -----  Regarding: need individual therapy and medications  Transition Clinic Referral   [place]         Please Check Type of Referral Requested:       _X___THERAPY: The Transition clinic is able to schedule patients without current medical insurance; these patient will be referred to our Social Work Care Coordinator for Medical Insurance              Assistance. We are open for referral for psychotherapy. [facility]      __X__MEDICATION:  Referrals for Medication are ONLY accepted from the following areas (select): [facility]                                       Suboxone and Opioid Management Referrals are automatically denied. [facility] Psychiatry cannot see patient without active medical insurance.         Referring Provider Contact Name: JACKIE Garcia, GUS, [facility], [phone]    Reason for Transition Clinic Referral: Patient wants in-person therapy. He also wants a doctor (other than his primary care doctor) to manage his medications. [facility]. He shouldn't have been referred to [place]'s 55+ program  because it is video groups only and he has difficulty with eyesight, hearing, and technology. Please consider this as a referral from [facility] rather than from the [facility] Center.     Next Level of Care Patient Will Be Transitioned To: I placed [facility] order today. Patient prefers to keep his services at [facility]. Patient may need referral to long-term psychiatry.    What Would Be Helpful from the Transition [facility]: individual therapy for coping with depression and health concerns.      Needs: NO    Does Patient Have Access to Technology: yes, but he has difficulty with eyesight, hearing, and technology.    Patient E-mail Address: [email or web address]    Current Patient Phone Number: [phone] or Wife Divya Gonzalez [phone]  [email or web address]    Clinician Gender Preference (if applicable): NO    Patient location preference: In person    GUS Garcia

## 2022-12-06 NOTE — PROGRESS NOTES
Clinic Care Coordination Contact  Care Team Conversations    Divya, wife of patient, is calling requesting a refill of patients Mirtazapine 15 mg (2 pills) at bedtime.  She called [facility], where Gabrielle Monroe NP prescribed and she was instructed to reach out to PCP to refill.  RNCC will reach out to PCP/team to inquire and will relay message to Divya once obtained.     Soila Mai RN, BSN, PHN  Primary Care / Care Coordinator   [facility]  E-mail [email or web address]   [phone]

## 2022-12-06 NOTE — TELEPHONE ENCOUNTER
Patient needs to quit changing his Lantus dose up and down which will cause wide variation in blood sugars.  I will reduce patient's baseline Lantus dose to 8 units once a day and patient is NOT to change that dose over the next week unless he has a blood sugar less than 70.  If he has a blood sugar less than 70, then have some juice at that time and contact clinic that business day.  Otherwise do NOT change the Lantus dose at all and stay at 8 units for the next week and recorded blood sugar in the morning at that time so I can see how blood sugars are acting when patient has the same dose of Lantus every single day.  Cannot give recommendations to  the patient if he keeps changing things

## 2022-12-07 NOTE — TELEPHONE ENCOUNTER
"Per pharmacy    \"Plan does not cover this medication. Please call plan at [phone] to initiate prior authorization or call/fax pharmacy to change medication.  Pt ID # is [id number]\"    agustín   [phone]   [phone] fx  "

## 2022-12-07 NOTE — TELEPHONE ENCOUNTER
No need to fast. Orders in. Writer did not call per message  Mallory Wang LPN  Nephrology  [phone]

## 2022-12-07 NOTE — PROGRESS NOTES
Clinical Pharmacy Consult:                                                    Jose Francisco Gonzalez is a 82 year old male coming in for a clinical pharmacist consult.  He was referred to me as a self referral.  He was joined by his daughter Elke and wife Divya.     Reason for Consult: education on using a freestyle mike 2 monitor    Discussion: Patient has been noticing some low blood sugars at times, and thinks it would be helpful to have the low blood glucose alarms.  He is also having difficulty using a regular glucometer due to dexterity issues with his hands.  His wife Divya doesn't know how to check blood glucose using a glucometer, and doesn't think she would be comfortable doing it.  His vision isn't as good as it used to be, and the newer versions on the phone byron can read out the glucose reading for the patient.  We tried getting Elke and Divya set up with the [employer] byron on their phones so they could view his glucose, but I wasn't able to figure out how to connect them during the visit.  I was able to successfully connect Shar's phone byron to my account and to [facility].  Discussed typical cost for the sensors if not covered by insurance.  Medicare part B still is covering them if using 1 or more insulin injections per day.  Some plans may only cover if on 3 or more.  If not covered typically cost about $70 per month at [facility] with a coupon card.  He also mentioned he's having difficulty chewing the glucose tablets due to some dental work.  I can send in a prescription for the glucose gel instead.  If not covered his pharmacist will help him find it OTC.    Plan:    1.  Helped patient get set up with the Freestyle Mike 2 byron on his phone.    2.  Education provided on how to use the Freestyle Mike byron on his phone, how to scan to check glucose readings, and how to apply a new sensor.    3.  Helped patient place the first sensor on his left arm and gave him  another sample Mike sensor when his current one is done.  4.  Sent in a prescription for glucose gel to his local pharmacy.  Sent in prescription for the Mike reader and sensors to his pharmacy.  Medicare requires patients to also fill a reader, even if using the phone byron.    Post Discharge Medication Reconciliation Status: medication reconcilation previously completed during another office visit.     Michel OchoaD  Medication Therapy Management Pharmacist  Pager: [phone]    We did not have time to review all of his medications today, so did not do a comprehensive visit due to time.  Will try to review his medications with him at future phone visit.

## 2022-12-08 NOTE — TELEPHONE ENCOUNTER
Writer spoke with patient on que and scheduled psychiatry appointment for 12/09/22 @ 10:30 am with Chacha. Writer provided TC contact information.Tracker completed.    Va Urrutiarera  12/08/22  903    ----- Message from Florentino Knapp RN sent at 12/8/2022  8:44 AM CST -----  Regarding: FW: need individual therapy and medications   Mental Health &Addiction (MH&A)Transition Clinic (TC):     Provides Patient Support While Waiting to Access Programmatic and Outpatient MH&A Care and Provides Select Crisis Assessment Services     NURSING Referral Review  _________________________________________    This RN has reviewed this Medication Management referral to the Transition Clinic and deemed the referral    Appropriate x   Inappropriate   Consulting     Based on the following criteria:    Pt has a psychiatric provider (or pending plan) in place for future prescribing: Yes:     Appointment Date: 1/10/2023   Appointment Time: 11:00 AM   Location: [facility]   Amrita Murphy CNP,RN   [address]   [address]   [place] [address] [phone]      Timeframe until pt's scheduled psychiatry appointment is less than 6 months: Yes: 33 days     Pt takes psychiatric medications: Yes:   mirtazapine (REMERON) 7.5 MG tablet Take 2 tablets (15 mg) by mouth At Bedtime       Pt's goals seem to align with this temporary service: Yes: Transition Clinic to bridge psychiatric care and psychiatric medication management until next Level of Care.         Any additional pertinent information regarding this referral:     Per ED HPI on 12/8/22. Jose Francisco Gonzalez is a 82 year old male with history significant mental health history.  Complex medical history with multiple specialists involve in his care.  Worsening symptoms of decreasing eyesight, hearing, ability to be as mobile as he had, and incontinence.  He was experiencing acute insomnia and panic last night with suicidal ideation (passive) and presented to the emergency  "department.  Patient was evaluated by the ED provider, who medically cleared patient to transfer to [facility] for psychiatric assessment, this is reviewed along with all pertinent labs and tests performed.    Per DEC assessment on 11/26/22. Patient is an 82 year old male who called EMS independently with passive suicidal ideation, depression, and anxiety.  He was accompanied to the ED by his wife, Divya.  Patient reports that his will to live is pretty low due to the long list of physical alignment/complications he has been experiencing: arthritis, macular degeneration, weight loss, diabetes, unsteady gait, chronic low sodium, and hard of hearing.  He identifies feeling ashamed of all of this requiring his wife to take on more of his care and responsibilities.  Patient notes that he does not feel connected in social situations as he cannot always hear what is going on or following conversations.      Patient endorses passive suicidal ideation with \"fantasized\" attempts and clearly no intent to act on these thoughts.  He does not want to hurt others and following through with these thoughts to end his life would bring his family undue pain.  Patient has one previous suicide attempt when he was battling a gambling addiction and notes that his family has forgiven him for that attempt.      Patient reports struggling with depression and anxiety, not being able to get much sleep at night as he awakes after a couple hours of sleep with racing thoughts of worry.  He reports having a \"not good\" appetite due to depression and dental concerns.  He currently wears a top plate of dentures which negatively impacts his sense of taste and smell.  Patient denies hallucinations (unless he is experiencing low blood sugar), paranoia, delusions, HI, and NSSI.         Initial contact w/ patient/parent: TC Coordinator to contact this patient/patients guardian to schedule a New Person Visit with TC Provider Chacha Bartholomew.        Additional " Scheduling Instructions for Transition Clinic Coordinator:     TC Coordinators:  This is a Medication management and Therapy Referral.        Please schedule this patient with TC Provider Chacha Bartholomew as soon as possible or as indicated by the patient.    TC Coordinator, please educate this (patient/ parent/guardian/facility staff member) as to the purpose and benefit of the TC.      The Transition Clinic is a Temporary Service that helps to bridge the time to your next appointment.  It is not intended to be a long-term service and you are expected to attend your scheduled appointment with your next provider.      Patient/Parent/ Facility Staff Member verbalized understanding     If you need support between appointments, please call [phone] and let them know you're seen by Transition Clinic Psychiatry.  You may also send a [employer] message to reach us.         RN Signature Florentino Knapp RN on 12/8/2022 at 8:23 AM       Received: 2 days ago  Deana Garcia Transition [facility] Rn Pool  Coordinated with referral source this morning, who reports that they are placing a referral for Psychiatry for patient.Is this sufficient for next loc for med management? I am not sure how long it will take for that order to get placed an for patient to get scheduled an appointment.     Deana Garcia   Transition Clinic Coordinator   Date and Time: 12/06/22 8:58 AM         Previous Messages     ----- Message -----   From: Sujey Lucas LICSW   Sent: 12/5/2022   6:08 PM CST   To: Transition Clinic   Subject: need individual therapy and medications           Transition Clinic Ref[place]   [place]         Please Check Type of Referral Requested:       _X___THERAPY: The Transition clinic is able to schedule patients without current medical insurance; these patient will be referred to our Social Work Care Coordinator for Medical Insurance              Assistance. We are open for referral for psychotherapy. Patient  is referred from:  [facility]       __X__MEDICATION:  Referrals for Medication are ONLY accepted from the following areas (select): [facility]                                       Suboxone and Opioid Management Referrals are automatically denied.  Psychiatry cannot see patient without active medical insurance.         Referring Provider Contact Name: Sujey JACKIE Lucas, GSU, [facility], [phone]     Reason for Transition Clinic Referral: Patient wants in-person therapy. He also wants a doctor (other than his primary care doctor) to manage his medications. Patient was seen in [facility] on 11/26/2022. He shouldn't have been referred to [facility] 55+ program because it is video groups only and he has difficulty with eyesight, hearing, and technology. Please consider this as a referral from [facility] rather than from the Assessment Center.     Next Level of Care Patient Will Be Transitioned To: I placed [facility] order today. Patient prefers to keep his services at [facility]. Patient may need referral to long-term psychiatry.     What Would Be Helpful from the Transition Clinic: individual therapy for coping with depression and health concerns.      Needs: NO     Does Patient Have Access to Technology: yes, but he has difficulty with eyesight, hearing, and technology.     Patient E-mail Address: [email or web address]     Current Patient Phone Number: [phone] or Wife Divya Gonzalez [phone]  [email or web address]     Clinician Gender Preference (if applicable): NO     Patient location preference: In person     GUS Garcia                      Comments    Needs LOC             ----- Message -----  From: Deana Garcia  Sent: 12/6/2022   8:58 AM CST  To: Transition Clinic Rn Pool  Subject: FW: need individual therapy and medications      Coordinated with referral source this morning, who reports that they are placing a referral for Psychiatry for patient.Is this sufficient for next loc for med  management? I am not sure how long it will take for that order to get placed an for patient to get scheduled an appointment.    Deana Garcia  Transition Clinic Coordinator  Date and Time: 12/06/22 8:58 AM    ----- Message -----  From: Sujey Lucas LICSW  Sent: 12/5/2022   6:08 PM CST  To: Transition Clinic  Subject: need individual therapy and medications          Transition Clinic Referral   [place]         Please Check Type of Referral Requested:       _X___THERAPY: The Transition clinic is able to schedule patients without current medical insurance; these patient will be referred to our Social Work Care Coordinator for Medical Insurance              Assistance. We are open for referral for psychotherapy. [facility]      __X__MEDICATION:  Referrals for Medication are ONLY accepted from the following areas (select): [facility]                                       Suboxone and Opioid Management Referrals are automatically denied.  Psychiatry cannot see patient without active medical insurance.         Referring Provider Contact Name: JACKIE Garcia, GUS, [facility], [phone]    Reason for Transition Clinic Referral: Patient wants in-person therapy. He also wants a doctor (other than his primary care doctor) to manage his medications. Patient was seen in [facility] on 11/26/2022. He shouldn't have been referred to [facility] 55+ program because it is video groups only and he has difficulty with eyesight, hearing, and technology. Please consider this as a referral from [facility] rather than from the Assessment Center.     Next Level of Care Patient Will Be Transitioned To: I placed [facility] order today. Patient prefers to keep his services at [facility]. Patient may need referral to long-term psychiatry.    What Would Be Helpful from the Transition Clinic: individual therapy for coping with depression and health concerns.      Needs: NO    Does Patient Have Access to  Technology: yes, but he has difficulty with eyesight, hearing, and technology.    Patient E-mail Address: [email or web address]    Current Patient Phone Number: [phone] or Wife Divya Gonzalez [phone]  [email or web address]    Clinician Gender Preference (if applicable): NO    Patient location preference: In person    GUS Garcia

## 2022-12-08 NOTE — TELEPHONE ENCOUNTER
Dr. Alejo,    Do you want to submit a PA for Remeron or change to an alternative medication?    Olesya Funes RN

## 2022-12-08 NOTE — TELEPHONE ENCOUNTER
Mental Health &Addiction (MH&A)Transition Clinic (TC):     Provides Patient Support While Waiting to Access Programmatic and Outpatient MH&A Care and Provides Select Crisis Assessment Services     NURSING Referral Review  _________________________________________    This RN has reviewed this Medication Management referral to the Transition Clinic and deemed the referral   [x] Appropriate  [] Inappropriate   []Consulting     Based on the following criteria:    Pt has a psychiatric provider (or pending plan) in place for future prescribing: Yes:     Appointment Date: 1/10/2023   Appointment Time: 11:00 AM   Location: [facility]   Amrita Murphy CNP,RN   [address]   [address]   [place] [address] [phone]      Timeframe until pt's scheduled psychiatry appointment is less than 6 months: Yes: 33 days     Pt takes psychiatric medications: Yes:   mirtazapine (REMERON) 7.5 MG tablet Take 2 tablets (15 mg) by mouth At Bedtime       Pt's goals seem to align with this temporary service: Yes: Transition Clinic to bridge psychiatric care and psychiatric medication management until next Level of Care.         Any additional pertinent information regarding this referral:     Per ED HPI on 12/8/22. Jose Francisco Gonzalez is a 82 year old male with history significant mental health history.  Complex medical history with multiple specialists involve in his care.  Worsening symptoms of decreasing eyesight, hearing, ability to be as mobile as he had, and incontinence.  He was experiencing acute insomnia and panic last night with suicidal ideation (passive) and presented to the emergency department.  Patient was evaluated by the ED provider, who medically cleared patient to transfer to [facility] for psychiatric assessment, this is reviewed along with all pertinent labs and tests performed.    Per DEC assessment on 11/26/22. Patient is an 82 year old male who called EMS independently with passive suicidal ideation,  "depression, and anxiety.  He was accompanied to the ED by his wife, Divya.  Patient reports that his will to live is pretty low due to the long list of physical alignment/complications he has been experiencing: arthritis, macular degeneration, weight loss, diabetes, unsteady gait, chronic low sodium, and hard of hearing.  He identifies feeling ashamed of all of this requiring his wife to take on more of his care and responsibilities.  Patient notes that he does not feel connected in social situations as he cannot always hear what is going on or following conversations.      Patient endorses passive suicidal ideation with \"fantasized\" attempts and clearly no intent to act on these thoughts.  He does not want to hurt others and following through with these thoughts to end his life would bring his family undue pain.  Patient has one previous suicide attempt when he was battling a gambling addiction and notes that his family has forgiven him for that attempt.      Patient reports struggling with depression and anxiety, not being able to get much sleep at night as he awakes after a couple hours of sleep with racing thoughts of worry.  He reports having a \"not good\" appetite due to depression and dental concerns.  He currently wears a top plate of dentures which negatively impacts his sense of taste and smell.  Patient denies hallucinations (unless he is experiencing low blood sugar), paranoia, delusions, HI, and NSSI.         Initial contact w/ patient/parent: TC Coordinator to contact this patient/patients guardian to schedule a New Person Visit with TC Provider Chacha Bartholomew.        Additional Scheduling Instructions for Transition Clinic Coordinator:     TC Coordinators:  This is a Medication management and Therapy Referral.        Please schedule this patient with TC Provider Chacha Bartholomew as soon as possible or as indicated by the patient.    TC Coordinator, please educate this (patient/ parent/guardian/facility " staff member) as to the purpose and benefit of the TC.      The Transition Clinic is a Temporary Service that helps to bridge the time to your next appointment.  It is not intended to be a long-term service and you are expected to attend your scheduled appointment with your next provider.      Patient/Parent/ Facility Staff Member verbalized understanding     If you need support between appointments, please call [phone] and let them know you're seen by Transition Clinic Psychiatry.  You may also send a [employer] message to reach us.         RN Signature Florentino Knapp RN on 12/8/2022 at 8:23 AM       Received: 2 days ago  [facility] Garcia [facility] Rn Pool  Coordinated with referral source this morning, who reports that they are placing a referral for Psychiatry for patient.Is this sufficient for next loc for med management? I am not sure how long it will take for that order to get placed an for patient to get scheduled an appointment.     Deana Garcia   Transition Clinic Coordinator   Date and Time: 12/06/22 8:58 AM           Previous Messages     ----- Message -----   From: Sujey Lucas LICSW   Sent: 12/5/2022   6:08 PM CST   To: Transition Clinic   Subject: need individual therapy and medications           Transition Clinic Referral   [place]         Please Check Type of Referral Requested:       _X___THERAPY: The Transition clinic is able to schedule patients without current medical insurance; these patient will be referred to our Social Work Care Coordinator for Medical Insurance              Assistance. We are open for referral for psychotherapy. Patient is referred from:  [facility]       __X__MEDICATION:  Referrals for Medication are ONLY accepted from the following areas (select): [facility]                                       Suboxone and Opioid Management Referrals are automatically denied. TC Psychiatry cannot see patient without active medical insurance.         Referring  Provider Contact Name: JACKIE Garcia, GUS, [facility] [place], [phone]     Reason for Transition Clinic Referral: Patient wants in-person therapy. He also wants a doctor (other than his primary care doctor) to manage his medications. Patient was seen in [facility] on 11/26/2022. He shouldn't have been referred to [facility]'s 55+ program because it is video groups only and he has difficulty with eyesight, hearing, and technology. Please consider this as a referral from [facility] rather than from the Assessment Center.     Next Level of Care Patient Will Be Transitioned To: I placed [facility] order today. Patient prefers to keep his services at [facility]. Patient may need referral to long-term psychiatry.     What Would Be Helpful from the Transition Clinic: individual therapy for coping with depression and health concerns.      Needs: NO     Does Patient Have Access to Technology: yes, but he has difficulty with eyesight, hearing, and technology.     Patient E-mail Address: [email or web address]     Current Patient Phone Number: [phone] or Wife Divya Gonzalez [phone]  [email or web address]     Clinician Gender Preference (if applicable): NO     Patient location preference: In person     GUS Garcia                      Comments    Needs LOC

## 2022-12-08 NOTE — TELEPHONE ENCOUNTER
Please complete DM Education referral and sign. Please inform DANIEL Mai when finished. Can refer to today's outreach message if needed. Thank you, Akilah Noguera, CMA

## 2022-12-08 NOTE — PROGRESS NOTES
"[facility]      Mental Health & Addiction Service Line    Transition Clinic: Psychiatry Note  Medication Management              Charts/documentation read prior to the appointment:  -2022    -2022    -11/26/2022 x2          VISIT INFORMATION    Date:  2022     Number:  -Initial     Referral source:  -ED/[facility]      Patient Identifying Information:  Legal name: Jose Francisco Gonzalez  Preferred name: Shar  : 1940  Preferred pronouns: He/him      Participants:   -Patient  -Provider  -Wife: Divya (not in camera view)          Telehealth visit details:  Type of service:  Video  Patient location:  At home, Off site  Provider Location:  [facility]  Platform utilized:  [email or web address]    Start time:   12:37 pm  End time:      1:14 pm      HPI      Copied/Pasted from 2022 encounter:    Patient is an 82 year old male who called EMS independently with passive suicidal ideation, depression, and anxiety.  He was accompanied to the ED by his wife, Divya.  Patient reports that his will to live is pretty low due to the long list of physical alignment/complications he has been experiencing: arthritis, macular degeneration, weight loss, diabetes, unsteady gait, chronic low sodium, and hard of hearing.  He identifies feeling ashamed of all of this requiring his wife to take on more of his care and responsibilities.  Patient notes that he does not feel connected in social situations as he cannot always hear what is going on or following conversations.      Patient endorses passive suicidal ideation with \"fantasized\" attempts and clearly no intent to act on these thoughts.  He does not want to hurt others and following through with these thoughts to end his life would bring his family undue pain.  Patient has one previous suicide attempt when he was battling a gambling addiction and notes that his family has forgiven him for that attempt. " "     Patient reports struggling with depression and anxiety, not being able to get much sleep at night as he awakes after a couple hours of sleep with racing thoughts of worry.  He reports having a \"not good\" appetite due to depression and dental concerns.  He currently wears a top plate of dentures which negatively impacts his sense of taste and smell.  Patient denies hallucinations (unless he is experiencing low blood sugar), paranoia, delusions, HI, and NSSI.      ----------------    -Met with the nephrologist and am now on limited fluid intake  -Getting used to trying to swallow everything without drinking so much water          PSYCHIATRIC ROS    Sleep:   -The Mirtazapine is effective for helping with falling asleep  -Went to bed at 11 pm last night, however woke up feeling anxious  -Wife told me, \"you can do this Shar\" and was able to get back to sleep      Appetite/Weight Changes:   -What I like and want to eat is harder to consume due to having a dental plate   -Still getting used to swallowing + the texture and tastes of food with the dental plate      Energy Levels:   -5/10        Trauma hx and or PTSD:   -Did not discuss in detail during the appointment        Depression/Anxiety:   -A little less depressed, down since being on the Mirtazapine  -Still having bouts of feeling anxious, overwhelmed due to ongoing medical issues        Eating Disorder:   -No former dx     -Within the past 12 months denies: calorie restriction, bingeing/purging, intentional use of laxatives or exercising in excess      Eliana/Hypomania:   -No hx of 7+ consecutive days of symptoms during periods of sobriety        Psychotic Symptoms:   -No hx of:  AH/VH, delusions, paranoia, thought insertion or broadcasting during periods of sobriety      Suicidal ideations:   -Denies at this time      SIB:  -Denies hx or current engagement of self harm       Side effects:  -None reported               MENTAL HEALTH HISTORY      Suicide " attempts:  -1x in the 1990s or early 2000's      Inpatient psychiatric hospitalizations:  -None reported   -No hx of commitments       ECT:  -None reported         Medication Trials:  -None besides Mirtazapine        Family mental health, and chemical dependency history:  -Was reviewed within the EMR per: 12/5/2022 encounter by GUS Zaman            SUBSTANCE USE    Prior use:  -Denies any problematic history of alcohol or recreational substances resulting in legal issues, c/d programming, or withdrawal symptoms      Current use:  -See 12/5/2022 encounter by GUS Zaman for full summary            SOCIAL HISTORY  Was reviewed within the EMR per:    -12/5/2022 encounter by GUS Zaman    -11/26/2022 encounter by GARY Sims          MEDICAL HISTORY    Current:  -The problem list was reviewed prior to the appointment  -The patient denies any concerning physical and or medical symptoms during the interviewing process      Developmental:   -Mother had normal pregnancy: Yes  -Met age appropriate milestones: Yes  -Participated in special education classes and or had an IEP: No  -Hx of autism spectrum disorder, learning disability, and or other cognitive disorder: No      Neurological:  -Denies any hx of: seizures, concussions, or TBI        MEDICATIONS      Current Outpatient Medications:      alendronate (FOSAMAX) 70 MG tablet, TAKE 1 TABLET(70 MG) BY MOUTH EVERY 7 DAYS, Disp: 12 tablet, Rfl: 3     amLODIPine (NORVASC) 2.5 MG tablet, TAKE 1 TABLET(2.5 MG) BY MOUTH DAILY, Disp: 90 tablet, Rfl: 3     aspirin 81 MG tablet, Take 81 mg by mouth daily, Disp: , Rfl:      atorvastatin (LIPITOR) 20 MG tablet, TAKE 1 TABLET(20MG) BY MOUTH DAILY, Disp: 90 tablet, Rfl: 3     blood glucose (NO BRAND SPECIFIED) lancets standard, Use to test blood sugar 3 times daily or as directed., Disp: 300 each, Rfl: 1     Contour Next EZ (CONTOUR NEXT EZ W/DEVICE KIT) w/Device KIT, USE TO TEST BLOOD SUGAR THREE TIMES DAILY  OR AS DIRECTED, Disp: 1 kit, Rfl: 1     CONTOUR NEXT TEST test strip, USE TO TEST BLOOD SUGAR 3 TIMES DAILY OR AS DIRECTED, Disp: 300 strip, Rfl: 11     glimepiride (AMARYL) 4 MG tablet, Take  1.5  tabs daily in AM for diabetes, Disp: 135 tablet, Rfl: 3     insulin glargine (BASAGLAR KWIKPEN) 100 UNIT/ML pen, INJECT 8 UNITS SUBCUTANEOUS EVERY DAY, Disp: 15 mL, Rfl: 1     insulin pen needle (B-D U/F) 31G X 8 MM miscellaneous, USE DAILY AS DIRECTED, Disp: 100 each, Rfl: 11     ipratropium (ATROVENT) 0.06 % nasal spray, Spray 1-2 sprays into both nostrils 2 times daily (Patient taking differently: Spray 1-2 sprays into both nostrils 2 times daily as needed for rhinitis), Disp: 15 mL, Rfl: 11     lisinopril (ZESTRIL) 10 MG tablet, TAKE 1 TABLET(10 MG) BY MOUTH DAILY, Disp: 90 tablet, Rfl: 3     metoprolol succinate ER (TOPROL XL) 50 MG 24 hr tablet, Take 1 tablet (50 mg) by mouth daily, Disp: 90 tablet, Rfl: 4     mirtazapine (REMERON) 7.5 MG tablet, Take 2 tablets (15 mg) by mouth At Bedtime, Disp: 60 tablet, Rfl: 11     Multiple Vitamins-Minerals (PRESERVISION AREDS 2) CAPS, Take 2 capsules by mouth daily, Disp: , Rfl:      order for DME, Test strips for pt's glucometer, brand as covered by insurance Test QID and prn. He is on insulin. Patients preferred brand-Verio One Touch., Disp: 400 each, Rfl: 1     Psyllium-Calcium (METAMUCIL PLUS CALCIUM) CAPS, Take 2 capsules by mouth nightly as needed, Disp: , Rfl:      RESTASIS 0.05 % ophthalmic emulsion, Place 1 drop into both eyes 2 times daily as needed for dry eyes, Disp: , Rfl:      sulfaSALAzine ER (AZULFIDINE EN) 500 MG EC tablet, TAKE 1 TABLET BY MOUTH TWICE DAILY AFTER MEALS, Disp: , Rfl:           If a controlled substance has been prescribed during the appointment:    -The [place] Prescription Monitoring Program has been reviewed and there are no current concerns with: diversionary activity, early refill requests, and or obtaining the medication from multiple  providers.          VITALS    BP Readings from Last 3 Encounters:   11/26/22 (!) 165/90   11/21/22 (!) 146/84   10/06/22 135/72       Pulse Readings from Last 3 Encounters:   11/26/22 78   11/21/22 60   10/06/22 84       Wt Readings from Last 3 Encounters:   11/26/22 64.9 kg (143 lb)   11/21/22 67.2 kg (148 lb 3.2 oz)   10/06/22 69.2 kg (152 lb 9.6 oz)               LABS    The following have been reviewed prior to or during the appointment:  -11/26 + 12/6/2022          SCALES      PHQ 10/22/2021 11/21/2022 11/28/2022   PHQ-9 Total Score 1 9 13   Q9: Thoughts of better off dead/self-harm past 2 weeks Not at all Not at all Several days   F/U: Thoughts of suicide or self-harm - - No   F/U: Safety concerns - - No        CARIDAD-7 SCORE 11/21/2022 11/28/2022 12/7/2022   Total Score 6 (mild anxiety) 6 (mild anxiety) 6 (mild anxiety)   Total Score 6 6 6        Answers for HPI/ROS submitted by the patient on 12/7/2022  CARIDAD 7 TOTAL SCORE: 6          MENTAL STATUS EXAMINATION    Appearance: Adequately Groomed, Attire Appropriate for the Season  General Behavior:  Cooperative, Direct Eye Contact  Speech: Fluent, Normal rate and volume  Musculoskeletal:    -Gait not observed during t.h. visit  -No facial tics/tremors observed   -Motor coordination is grossly intact   Mood: Anxious  Affect: Mildly anxious  Attention: Intact  Orientation:  Person, Place, Time, Situation  Thought Associations:  Intact  Thought Content: Reality based   Thought Processes: Organized, Normal rate  Memory: No overt impairment; no screenings or formal testing performed  Language: Intact  Judgement: Good  Insight: Good         ASSESSMENT/CLINICAL IMPRESSIONS    Summary:    Shar Gonzalez is an 81 y/o male with a dx of: MDD (see 12/5/2022 Diagnostic Assessment encounter for further details) and is medically complex, seeing multiple speciality clinicians.    Is attending today's appointment for the management of psychotropics/bridging until able to establish  long term outpatient psychiatric services.    Recently went to the ED/[facility] on 11/26/2022 in the context of: worsening depressive symptoms including suicidal ideations, severe anxiety/panic attacks, and insomnia in part related to all the physical/medical symptoms he's been managing for many years.    Shar and his wife: Divya outline the Mirtazapine 15 mg/night has improved mood and appetite somewhat.   Still having difficulty with food intake, more so because of dental plate he's been getting used to for the past 2 months.      Discussed r/b of possibly starting a very low dosage of Amitriptyline 10 mg (has worries/anxiety about urinary incontinence + still experiencing ongoing bouts/flares of anxiety), however Shar prefers to remain on the Mirtazapine and instead try a small amount of Hydroxyzine prn.    Currently denies suicidal ideations/thoughts of hurting others/or engaging in SIB.          DSM-V and or working diagnosis:    1.  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  Anxiety disorder, unspecified type          SAFETY EVALUATION:  Suicidal ideations:  -denies  Homicidal ideations:  -denies  Risk factors:  -male, age  -recent escalation of depressive symptoms  Protective and mitigating factors:  -wife, children  -no prior attempts  -becoming involved in outpatient mental health services  Risk assessment:  -low to medium            TREATMENT PLAN      Medications:  Start:  Hydroxyzine/Atarax 10 mg (1 tab) as needed up to 3x/day for anxiety    Continue:  Mirtazapine/Remeron 15 mg at bedtime      Labs:  -None Obtained        Therapy:  -Initiate individual therapy + psychotropics          Non-pharmacological modalities:  -Evelio seeds + Flax seeds/or flax seed meal = good sources of plant protein and fiber          Return to Clinic or Referrals:  -You do not need to return to the Transition Clinic for medication management  -Please make sure to keep the appointment on:  1/10/2023   for longitudinal outpatient psychiatry services          Total time:  60 minutes per:    -Review of EMR   -Appointment time  -Documentation           SAMUEL Rivera          --------------------------------------------------------------------------------------------------------------------------        TREATMENT RISK STATEMENT    The risks, benefits, alternatives, and potential adverse effects have been explained and are understood by the patient.  The patient agrees to the treatment plan with their ability to do so.      The patient knows to call the clinic: [phone]  for any problems or concerns until the next psychiatry visit, regardless if it is within or outside of the [facility] system.     If unable to reach clinic staff (via phone call or medical messaging) during the normal business hours: 8:00 am to 4:30 pm then it is recommended accessing the nearest: emergency department, urgent care facility, or utilizing local (varies based on county of residence) and national crisis #'s or text messaging services for immediate assistance.          --------------------------------------------------------------------------------------------------------------------------        If applicable the following has been discussed with the patient, parent/guardian, and or attending family member during the appointment:      1. Risks of polypharmacy and possible drug interactions with current medication list + common OTC products, herbs, and supplements.    Moving forward, it is suggested to intermittently check-in with a clinic or retail pharmacist whenever new medications or OTC/h/s are consumed.    2. Recommendation to adhere to CDC guidelines as it relates alcohol consumption.  If taking benzodiazepines, you should abstain from alcohol intake due to increased risks of CNS and respiratory depression, as well as psychomotor impairment.    3. If possible, it is recommended to avoid  concurrent use of prescribed:  opioids  +  benzodiazepines due to increased risks of CNS and respiratory depression, as well as the increased risk of overdose.     4. Recommendation to minimize and or abstain from THC use (unless the pt. is prescribed medical marijuana).    5. Recommendation to abstain from illicit substances including but not limited to the following: heroin, street fentanyl, cocaine, methamphetamines, and bath salts.    6. Do not take opioids, stimulants, and or other prescription medications unless they are specifically prescribed for you.    7. Recommendation to abstain from: alcohol,  tobacco/smoking, vaping, THC, and all illicit substances if trying to become or are pregnant.    8. Black Box Warnings associated with the prescribed psychotropic(s).    9. Potential adverse effects of antipsychotics including but not limited to the following: weight gain, metabolic syndrome, EPS/Tardive Dyskinesias.    10. Potential CV and neurological adverse effects of stimulants including but not limited to the following:  sudden death, MI, stroke, HTN, cardiomyopathy (long term use) as well as seizures.

## 2022-12-08 NOTE — PROGRESS NOTES
Clinic Care Coordination Contact  Care Team Conversations    Divya, wife, reached out to [facility] inquiring about the refill of Mirtazapine and the Certified Diabetic Educator referral.  [facility] reached out to PCP/team again, emailed PCP, and In-basket  working with PCP today.  Per TC, patient needs to have refilled through Behavior Health/Mental Health provider.  Patient has MH appointment tomorrow and NP will hopefully refill.  [facility] still waiting for the CDE referral.    [facility] reached out to Divya to apprise of the above; left voice mail message.  [facility] will follow up with patient in the next 3-4 weeks.     Soila Mai RN, BSN, PHN  Primary Care / Care Coordinator   [facility] [place]  E-mail [email or web address]   [phone]

## 2022-12-08 NOTE — PROGRESS NOTES
"        [facility]                                     Progress Note    Patient Name: Jose Francisco Gonzalez  Date: 2022         Service Type: Individual      Session Start Time: 1210  Session End Time: 101     Session Length: 50    Session #: 1    Attendees: Client attended alone    Service Modality:  Phone Visit:      Provider verified identity through the following two step process.  Patient provided:  Patient  and Patient address    The patient has been notified of the following:      \"We have found that certain health care needs can be provided without the need for a face to face visit.  This service lets us provide the care you need with a phone conversation.       I will have full access to your [facility] medical record during this entire phone call.   I will be taking notes for your medical record.      Since this is like an office visit, we will bill your insurance company for this service.       There are potential benefits and risks of telephone visits (e.g. limits to patient confidentiality) that differ from in-person visits.?Confidentiality still applies for telephone services, and nobody will record the visit.  It is important to be in a quiet, private space that is free of distractions (including cell phone or other devices) during the visit.??      If during the course of the call I believe a telephone visit is not appropriate, you will not be charged for this service\"     Consent has been obtained for this service by care team member: Yes     DATA  Interactive Complexity: No  Crisis: No        Progress Since Last Session (Related to Symptoms / Goals / Homework):   Symptoms: No change first session    Homework: first session      Episode of Care Goals: Minimal progress - PRECONTEMPLATION (Not seeing need for change); Intervened by educating the patient about the effects of current behavior on health.  Evoked information about reasons to continue behavior, express concern " "/ recommendations, and explored any change talk     Current / Ongoing Stressors and Concerns:    Patient was referred for bridging services due to extensive waitlist for [facility], following MH referral placed by PCP dues to symptoms of depression.   Patient reports he has several health issues that interferes with his enjoyment in life. He reports he is diabetic, has dental issues including increased salivation. He reports he has little appetite and can't gain weight. He is losing weight despite his efforts to eat. He shared he has macular degeneration which is impacting his balance. He states he has been unable to sleep well.  He wakes and then is unable to get back to sleep. He states he was recently given mirtazapine which has helped with anxiety and sleep. He reports he has a home health aid 5x a week and a physical therapist home visit 1x a week for 3 weeks. He shared he recently decided to [facility] thinking it was his only alternative to finding relief for his depression with anxiety.     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 Patient shared he now feels he would be better off dead.  He states \"I'm not going to kill myself. I wouldn't do that\". He states he has no weapons in his home. He states his wife takes good care of him.  He only partially completed the [place].         Treatment Objective(s) Addressed in This Session:   identify 3 fears / thoughts that contribute to feeling anxious  Decrease frequency and intensity of feeling down, depressed, hopeless     Intervention:   Motivational Interviewing    MI Intervention: Expressed Empathy/Understanding, Permission to raise concern or advise, Open-ended questions and Reflections: simple and complex     Change Talk Expressed by the Patient: Desire to change Ability to change Reasons to change    Provider Response to Change Talk: E - Evoked more info from patient about behavior change and A - Affirmed patient's thoughts, decisions, or " attempts at behavior change      Assessments completed prior to visit:  The following assessments were completed by patient for this visit:  PHQ9:   PHQ-9 SCORE 11/7/2017 5/28/2019 6/7/2019 10/22/2021 11/21/2022 11/28/2022   PHQ-9 Total Score Chaitanyahart - - - - 9 (Mild depression) 13 (Moderate depression)   PHQ-9 Total Score 5 4 3 1 9 13     GAD7:   CARIDAD-7 SCORE 6/7/2019 10/22/2021 9/12/2022 11/21/2022 11/28/2022 12/7/2022   Total Score - - 2 (minimal anxiety) 6 (mild anxiety) 6 (mild anxiety) 6 (mild anxiety)   Total Score 3 0 2 6 6 6     PROMIS 10-Global Health (only subscores and total score):   PROMIS-10 Scores Only 12/1/2022   Global Mental Health Score 9   Global Physical Health Score 11   PROMIS TOTAL - SUBSCORES 20     [place] Suicide Severity Rating Scale (Lifetime/Recent)  [place] Suicide Severity Rating (Lifetime/Recent) 11/26/2022 12/5/2022 12/7/2022   Q1 Wished to be Dead (Past Month) yes yes -   Q2 Suicidal Thoughts (Past Month) yes yes -   Q3 Suicidal Thought Method yes yes -   Q4 Suicidal Intent without Specific Plan no no -   Q5 Suicide Intent with Specific Plan yes no -   Q6 Suicide Behavior (Lifetime) no yes -   Within the Past 3 Months? - no -   Level of Risk per Screen high risk moderate risk -   1. Wish to be Dead (Lifetime) 1 - -   1. Wish to be Dead (Past 1 Month) 1 - -   2. Non-Specific Active Suicidal Thoughts (Lifetime) 1 - -   2. Non-Specific Active Suicidal Thoughts (Past 1 Month) 1 - 0   3. Active Suicidal Ideation with any Methods (Not Plan) Without Intent to Act (Lifetime) 1 - -   3. Active Suicidal Ideation with any Methods (Not Plan) Without Intent to Act (Past 1 Month) 0 - -   4. Active Suicidal Ideation with Some Intent to Act, Without Specific Plan (Lifetime) 1 - -   4. Active Suicidal Ideation with Some Intent to Act, Without Specific Plan (Past 1 Month) 0 - -   5. Active Suicidal Ideation with Specific Plan and Intent (Lifetime) 1 - -   5. Active Suicidal Ideation with Specific  Plan and Intent (Past 1 Month) 0 - -   Most Severe Ideation Rating (Lifetime) 5 - -   Most Severe Ideation Rating (Past 1 Month) 1 - -   Frequency (Lifetime) 1 - -   Frequency (Past 1 Month) 2 - -   Duration (Lifetime) 3 - -   Duration (Past 1 Month) 2 - -   Controllability (Lifetime) 4 - -   Controllability (Past 1 Month) 3 - -   Deterrents (Lifetime) 5 - -   Deterrents (Past 1 Month) 1 - -   Reasons for Ideation (Lifetime) 5 - -   Reasons for Ideation (Past 1 Month) 5 - -   Actual Attempt (Lifetime) 1 - -   Total Number of Actual Attempts (Lifetime) 1 - -   Actual Attempt (Past 3 Months) 0 - -   Has subject engaged in non-suicidal self-injurious behavior? (Lifetime) 0 - -   Interrupted Attempts (Lifetime) 1 - -   Total Number of Interrupted Attempts (Lifetime) 1 - -   Interrupted Attempts (Past 3 Months) 0 - -   Aborted or Self-Interrupted Attempt (Lifetime) 0 - -   Preparatory Acts or Behavior (Lifetime) 0 - -   Most Recent Attempt Date (No Data) - -   Actual Lethality/Medical Damage Code (Most Recent Attempt) 1 - -   Potential Lethality Code (Most Recent Attempt) 1 - -   Most Lethal Attempt Date (No Data) - -   Actual Lethality/Medical Damage Code (Most Lethal Attempt) 1 - -   Potential Lethality Code (Most Lethal Attempt) 1 - -   Initial/First Attempt Date (No Data) - -   Actual Lethality/Medical Damage Code (Initial/First Attempt) 1 - -   Potential Lethality Code (Initial/First Attempt) 1 - -   Calculated C-SSRS Risk Score (Lifetime/Recent) Moderate Risk - No Risk Indicated        Validity of evaluation is not impacted by presenting factors during interview .   Comments regarding subjective versus objective responses to [place] tool: Pt presentation matches screening results   Environmental or Psychosocial Events: helplessness/hopelessness, new diagnosis of major illness and worsening chronic illness  Chronic Risk Factors: chronic and ongoing sleep difficulties and chronic health problems   Warning Signs:  seeking access to means to hurt or kill self, talking or writing about death, dying, or suicide, hopelessness, anxiety, agitation, unable to sleep, sleeping all the time and no reason for living, no sense of purpose in life  Protective Factors: strong bond to family unit, community support, or employment, intact marriage or domestic partnership, responsibilities and duties to others, including pets and children, lives in a responsibly safe and stable environment, good treatment engagement, able to access care without barriers, supportive ongoing medical and mental health care relationships, help seeking, good impulse control and good problem-solving, coping, and conflict resolution skills  Interpretation of Risk Scoring, Risk Mitigation Interventions and Safety Plan: Risk scoring appears valid. Patient open in session. Safety plan in place.      ASSESSMENT: Current Emotional / Mental Status (status of significant symptoms):   Risk status (Self / Other harm or suicidal ideation)   Patient denies current fears or concerns for personal safety.   Patient reports the following current or recent suicidal ideation or behaviors: Feel he would be better off dead. No plan or intent..   Patient denies current or recent homicidal ideation or behaviors.   Patient denies current or recent self injurious behavior or ideation.   Patient denies other safety concerns.   Patient reports there has been no change in risk factors since their last session.     Patient reports there has been no change in protective factors since their last session.     A safety and risk management plan has been developed including: Patient consented to co-developed safety plan on 11/30/2022.  Safety and risk management plan was reviewed.   Patient agreed to use safety plan should any safety concerns arise.  A copy was made available to the patient.     Appearance:   Appropriate    Eye Contact:   Good    Psychomotor Behavior: Normal     Attitude:   Cooperative    Orientation:   All   Speech    Rate / Production: Normal/ Responsive Normal     Volume:  Normal    Mood:    Depressed    Affect:    Appropriate    Thought Content:  Clear    Thought Form:  Coherent  Goal Directed    Insight:    Good      Medication Review:   No changes to current psychiatric medication(s)     Medication Compliance:   Yes     Changes in Health Issues:   Yes: Diabetes, Associated Psychological Distress  Sleep disturbance, Associated Psychological Distress  Weight / dietary issues, No Psychological Distress  Macular degeneration     Chemical Use Review:   Substance Use: Chemical use reviewed, no active concerns identified      Tobacco Use: No current tobacco use.      Diagnosis:  1. Severe episode of recurrent major depressive disorder, without psychotic features (H)        Collateral Reports Completed:   Routed note to PCP    PLAN: (Patient Tasks / Therapist Tasks / Other)  Patient will follow his safety plan. He will use techniques of relaxation. Return in 1 week.      GUS Churchill

## 2022-12-09 NOTE — TELEPHONE ENCOUNTER
Referral to [facility] ordered and spoke with pt. Instructed to stay at lantus 8 units for now fort the next 1 week to  Get a sense of what the sugars do and then will be able to see if some fast actinv insulin needed  With any of the meals vs addition of Jardiance vs other are options. Pt states lowest blood sugar so far with Lantus 8 has been 79. Pt will send me blood sugar results in a week. Per MA, this note being routed to  CC to make them aware

## 2022-12-09 NOTE — PROGRESS NOTES
" This video/telephone visit will be conducted virtually between you and your provider. We have found that certain health care needs can be provided without the need for an in-person physical exam. This service lets us provide the care you need with a video /telephone conversation. If a prescription is necessary we can send it directly to your pharmacy. If lab work is needed we can place an order for that and you can then stop by our lab to have the test done at a later time.\"   Just as we bill insurance for in-person visits, we also bill insurance for video/telephone visits. If you have questions about your insurance coverage, we recommend that you speak with your insurance company.      Patient/Parent has given verbal consent for video/Telephone visit? Yes      Patient would like the video visit invitation sent by: [employer] if connection issue: [phone]    Patient verified allergies, medications and pharmacy via phone. Patient states they are ready for visit.      [facility] Health [facility]Addiction ([facility]&A)[facility] (TC):     Provides Patient Support While Waiting to Access Programmatic and Outpatient MH&A Care and Provides [facility] Crisis Assessment Services     INTAKE  ____________________________________________________    \"The Transition [facility] is a temporary psychiatry service that helps to bridge the time to your next appointment. It is not intended to be a long-term service and you are expected to attend your scheduled appointment with your next provider.\"  [x] Patient/Parent verbalized understanding     If you need support between appointments, please call [phone] and let them know you're seen by [facility]. You may also send a [employer] message to reach us.    General-     Most pressing MH needs at this time: Anxiety - stops him from completing task. Worry about losing control of urinary related.       Any physical health conditions or diagnoses we should be aware of or that are impacting " you: Seen by kidney specialist recently due to low sodium.       Medications-     Injectable medications currently prescribed: Yes  -insulin   If yes, do you need an appointment for the next injection: No    Any Controlled Substances that you are prescribed: No       Primary care provider: Kin Alejo MD        CARIDAD-7 scores:    CARIDAD-7 SCORE 11/28/2022 12/7/2022   Total Score 6 (mild anxiety) 6 (mild anxiety)   Total Score 6 6       PHQ-9 scores:   PHQ-9 SCORE 11/21/2022 11/28/2022   PHQ-9 Total Score MyChart 9 (Mild depression) 13 (Moderate depression)   PHQ-9 Total Score 9 13   Answers for HPI/ROS submitted by the patient on 12/7/2022  CARIDAD 7 TOTAL SCORE: 6        Anything the provider should be aware of for today's appointment: Anxiety has a lots of co-medical issues occurring currently.     New (awaiting) Mental health provider or next programming: [facility]            Amrita Murphy CNP,RN            [address]            [address]            [place] [address] [phone]        Date of scheduled apt: 1/10/2023  @ 11:00 AM         Sabiha Edwards on December 9, 2022 at 12:28 PM

## 2022-12-09 NOTE — Clinical Note
Alon ODOM.  Shar wants to remain on Mirtazapine 15 mg for now ... 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  He will be transferring long term outpatient psychiatry care outside of [facility] as of 1/10/2023.  Thanks for your coordination in care.  Chacha

## 2022-12-09 NOTE — TELEPHONE ENCOUNTER
Mirtazapine previously prescribed by psych at [facility] for depression, insomnia, and anxiety and suicidal ideation.  Symptoms improving.  Spoke with patient this evening.  He states he has enough medication taking current 15 mg dose(7.5mg tab x2 daily)  at bedtime to last for 3 more nights. Unclear if issue is that insurance will not allow this med or if issue with quantity with doubling up the 7.5mg tabs. Rx changed to  Mirtazepine 15mg tab, 1 tab daily at bedtime at Rx sent to pharmacy to see if Rx goes through and if only a quantity issue. WIll check with pharmacy later tonight to see if went through. If not, will then request stat PA request

## 2022-12-09 NOTE — PATIENT INSTRUCTIONS
It was a pleasure taking care of you today.  I've included a brief summary of our discussion and care plan from today's visit below.  Please review this information with your primary care provider.    My recommendations are summarized as follows:  - Fluid restriction of 1.5 liter daily.  - Nurse visit in two weeks to check BP.  - In the future, if you need a medication to control your blood pressure, I would recommend a loop diuretic such as torsemide.  - Check your blood pressure at home. Goal blood pressure 130/80  - Return to Nephrology Clinic in 3 months to review your progress.     Who do I call with any questions after my visit?  Please be in touch if there are any further questions that arise following today's visit.  There are multiple ways to contact your nephrology care team.      During business hours, you may reach your Nephrology Care Team or schedule or reschedule an appointment or lab at [phone].      If you need to schedule imaging, please call [phone].   To schedule a COVID test, please call [phone].    You can always send a secure message through [employer]. [employer] messages are answered by your nurse or doctor typically within 24-48 hours. Please allow extra time on weekends and holidays.      For urgent/emergent questions after business hours, you may reach the on-call Nephrology Fellow by contacting the [facility]  at [phone].     How will I get the results of any tests ordered?    You will receive all of your results.  If you have signed up for [employer], any tests ordered at your visit will be available to you once resulted on [employer]. Typically the physician reviews them and may or may not make further recommendations. If there are urgent results that require a change in your care plan, your physician or nurse will call you to discuss the next steps. If you are not on [employer], a letter may be generated and mailed to you with your  results.      Sincerely,    Saman Purcell MD  [facility]  Division of Nephrology and Hypertension

## 2022-12-09 NOTE — PROGRESS NOTES
Nephrology Clinic Note      December 9, 2022      CC: Hyponatremia    Pertinent Renal Hx:  - Chronic hyponatremia - as long as our records in 2011   - Hypertension  - Type 2 DM  - Hx of Microalbuminuria    HPI: Jose Francisco Gonzalez is a 82 year old male with PMHx of Hypertension and Type 2 DM without diabetic retinopathy, prior hx of microalbuminuria. He has had chronic hyponatremia for a long time and his levels have been mostly around 126- 128 with occasional low values of ~ 123. Most recently he had hyponatremia of 123 in Oct 2022 and 124 in November 2022.  His urine osmolality at that time was elevated at 465. We do not have urine sodium on same date. His serum cortisol and TSH levels were within normal limits at that time. Today his serum sodium is at 131 with blood glucose level of 208, corrected serum sodium level is 133. His urine osmolality today is 465. He has slightly elevated blood pressure with systolic at 144. His serum osmolality is at 291. Denies nausea, pain. No cough. Occasional dizziness. Denies lightheadedness.       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 I have added on urine sodium to the collected lab sample. Work-up in past for thyroid and cortisol has been normal. Reviewed CXR and CT Abdomen and Pelvis in recent years, did not reveal occult malignancy.   - It is possible that SIADH is secondary to his psychiatry medications.   - Counseled on free water restriction of 1 L.. Discussed that ensure will not count towards his fluid restriction.     Seen and discussed with Dr Purcell, who agrees with the plan.     Andrew Hurley  Nephrology Fellow  [phone]       No Known Allergies    alendronate (FOSAMAX) 70 MG tablet, TAKE 1 TABLET(70 MG) BY MOUTH EVERY 7 DAYS  amLODIPine (NORVASC) 2.5 MG tablet, TAKE 1 TABLET(2.5 MG) BY MOUTH  DAILY  aspirin 81 MG tablet, Take 81 mg by mouth daily  atorvastatin (LIPITOR) 20 MG tablet, TAKE 1 TABLET(20MG) BY MOUTH DAILY  blood glucose (NO BRAND SPECIFIED) lancets standard, Use to test blood sugar 3 times daily or as directed.  Contour Next EZ (CONTOUR NEXT EZ W/DEVICE KIT) w/Device KIT, USE TO TEST BLOOD SUGAR THREE TIMES DAILY OR AS DIRECTED  CONTOUR NEXT TEST test strip, USE TO TEST BLOOD SUGAR 3 TIMES DAILY OR AS DIRECTED  glimepiride (AMARYL) 4 MG tablet, Take  1.5  tabs daily in AM for diabetes  insulin glargine (BASAGLAR KWIKPEN) 100 UNIT/ML pen, INJECT 8 UNITS SUBCUTANEOUS EVERY DAY  insulin pen needle (B-D U/F) 31G X 8 MM miscellaneous, USE DAILY AS DIRECTED  lisinopril (ZESTRIL) 10 MG tablet, TAKE 1 TABLET(10 MG) BY MOUTH DAILY  metoprolol succinate ER (TOPROL XL) 50 MG 24 hr tablet, Take 1 tablet (50 mg) by mouth daily  mirtazapine (REMERON) 15 MG tablet, Take 1 tablet (15 mg) by mouth At Bedtime  Multiple Vitamins-Minerals (PRESERVISION AREDS 2) CAPS, Take 2 capsules by mouth daily  order for DME, Test strips for pt's glucometer, brand as covered by insurance Test QID and prn. He is on insulin. Patients preferred brand-Verio One Touch.  Psyllium-Calcium (METAMUCIL PLUS CALCIUM) CAPS, Take 2 capsules by mouth nightly as needed  sulfaSALAzine ER (AZULFIDINE EN) 500 MG EC tablet, TAKE 1 TABLET BY MOUTH TWICE DAILY AFTER MEALS  ipratropium (ATROVENT) 0.06 % nasal spray, Spray 1-2 sprays into both nostrils 2 times daily (Patient taking differently: Spray 1-2 sprays into both nostrils 2 times daily as needed for rhinitis)    No current facility-administered medications on file prior to visit.      Past Medical History:   Diagnosis Date     Arthropathy, unspecified, site unspecified     palindromic rheumatism; takes aleve bid      Compression fracture of body of thoracic vertebra (H)      Compression fracture of L1 lumbar vertebra (H) 06/2019    see MRI     Hyperlipidemia LDL goal <100 11/2/2012    Was  on fenofibrate plus simva, in [facility] study; LDL 84 in Lipitor 20 in 2012; 97 in 3/13     Hyponatremia      Rheumatoid arthritis (H)      Sialoadenitis 2015     Syncope      Type 2 diabetes, HbA1C goal < 8% (H) 2012       Past Surgical History:   Procedure Laterality Date     ENT SURGERY  2009    nasal; removal of pyogenic granuloma L     EYE SURGERY  murray 15 and22 2009    cataract     HC TOOTH EXTRACTION W/FORCEP       TONSILLECTOMY         Social History     Tobacco Use     Smoking status: Former     Packs/day: 1.00     Years: 20.00     Pack years: 20.00     Types: Cigarettes     Quit date: 1993     Years since quittin.8     Smokeless tobacco: Never   Vaping Use     Vaping Use: Never used   Substance Use Topics     Alcohol use: Yes     Comment: socially     Drug use: No       Family History   Problem Relation Age of Onset     Heart Disease Father      Coronary Artery Disease Father      Heart Disease Brother      Coronary Artery Disease Brother      Alzheimer Disease Sister      No Known Problems Mother      Substance Abuse Son         alcohol       ROS: A 12 system review of systems was negative other than noted here or above.     Exam:  BP (!) 144/81   Pulse 102   Temp 98.4  F (36.9  C) (Oral)   Wt 66.1 kg (145 lb 11.2 oz)   SpO2 96%   BMI 20.32 kg/m      GENERAL APPEARANCE: alert and no distress  EYES: PERRL, no scleral icterus  HENT: mouth without ulcers or lesions  NECK: supple, no adenopathy  RESP: lungs clear to auscultation   CV: regular rhythm, normal rate, no rub  Extremities: no clubbing, cyanosis, or edema  SKIN: no rash  NEURO: mentation intact and speech normal  PSYCH: affect normal/bright    Results:    Lab on 2022   Component Date Value Ref Range Status     Sodium 2022 131 (L)  136 - 145 mmol/L Final     Potassium 2022 4.4  3.4 - 5.3 mmol/L Final     Chloride 2022 93 (L)  98 - 107 mmol/L Final     Carbon Dioxide (CO2) 2022 30 (H)  22 - 29  mmol/L Final     Anion Gap 12/09/2022 8  7 - 15 mmol/L Final     Urea Nitrogen 12/09/2022 19.9  8.0 - 23.0 mg/dL Final     Creatinine 12/09/2022 0.76  0.67 - 1.17 mg/dL Final     Calcium 12/09/2022 9.3  8.8 - 10.2 mg/dL Final     Glucose 12/09/2022 208 (H)  70 - 99 mg/dL Final     GFR Estimate 12/09/2022 89  >60 mL/min/1.73m2 Final    Effective December 21, 2021 eGFRcr in adults is calculated using the 2021 CKD-EPI creatinine equation which includes age and gender (Adama et al., NEJ, DOI: 10.1056/MNOWkn4669749)     Color Urine 12/09/2022 Yellow  Colorless, Straw, Light Yellow, Yellow Final     Appearance Urine 12/09/2022 Clear  Clear Final     Glucose Urine 12/09/2022 Negative  Negative mg/dL Final     Bilirubin Urine 12/09/2022 Negative  Negative Final     Ketones Urine 12/09/2022 Negative  Negative mg/dL Final     Specific Gravity Urine 12/09/2022 1.014  1.003 - 1.035 Final     Blood Urine 12/09/2022 Negative  Negative Final     pH Urine 12/09/2022 6.5  5.0 - 7.0 Final     Protein Albumin Urine 12/09/2022 Negative  Negative mg/dL Final     Urobilinogen Urine 12/09/2022 Normal  Normal, 2.0 mg/dL Final     Nitrite Urine 12/09/2022 Negative  Negative Final     Leukocyte Esterase Urine 12/09/2022 Negative  Negative Final     Mucus Urine 12/09/2022 Present (A)  None Seen /LPF Final     RBC Urine 12/09/2022 1  <=2 /HPF Final     WBC Urine 12/09/2022 1  <=5 /HPF Final     Squamous Epithelials Urine 12/09/2022 <1  <=1 /HPF Final     Hyaline Casts Urine 12/09/2022 1  <=2 /LPF Final

## 2022-12-09 NOTE — PATIENT INSTRUCTIONS
Start:  Hydroxyzine/Atarax 10 mg (1 tab) as needed up to 3x/day for anxiety    Continue:  Mirtazapine/Remeron 15 mg at bedtime      -----------------------    -You do not need to return to the [facility] for medication management  -Please make sure to keep the appointment for longitudinal outpatient psychiatry services (see below)      Amrita Murphy, CNP,RN   [id number]   [address]   [place] [address]   [phone]      Appointment time:  1/10/2023  @ 11:00 AM   ---

## 2022-12-09 NOTE — NURSING NOTE
Chief Complaint   Patient presents with     New Patient       BP (!) 144/81   Pulse 102   Temp 98.4  F (36.9  C) (Oral)   Wt 66.1 kg (145 lb 11.2 oz)   SpO2 96%   BMI 20.32 kg/m      Jerry Vidal on 12/9/2022 at 10:00 AM

## 2022-12-09 NOTE — TELEPHONE ENCOUNTER
Spoke with pharmacy. Mirtazepine covered at 15 mg tablet, 1 tablet daily.  The issue was with a quantity of 60 tablets for the previous 7.5 mg dose.  Updated patient that prescription was approved at [facility].  Patient will meet with psychiatry tomorrow.  If they want him to continue that medication he will  the prescription.  If they want to change it to something else, then will defer to recommendations of psychiatry

## 2022-12-12 NOTE — TELEPHONE ENCOUNTER
Patient calling clinic back after missing call. RN relayed PCP 12/12/2022 message. Patient verbalized understanding and had no additional questions.    06-Sep-2020 13:25

## 2022-12-12 NOTE — PROGRESS NOTES
Diabetes Self-Management Education & Support    Presents for: Individual review    Type of Service: Telephone Visit    Originating Location (Patient Location): Home  Distant Location (Provider Location): Home  Mode of Communication:  Telephone    Telephone Visit Start Time: 9:01  Telephone Visit End Time (telephone visit stop time): 9:55    How would patient like to obtain AVS? Alycia    Assessment Type:   ASSESSMENT:  Shar on phone with wife for visit today. Shar has concerns regarding weight loss he has experienced since having upper teeth removed and getting a plate, has lost ~10# current weight ~147#. We discussed importance of calorie dense foods and eating more frequently. He is drinking 1 ensure plus per day, discussed increasing to 2 per day as snacks between meals. Discussed that if BG rise with increased eating we can make adjustments to his medications. He provided BG numbers, fasting (goal ) at goals and majority of bedtime BG above goal (goal=<180). He is mostly taking medications as prescribed since MD told him to, sticking to 8u Lantus however did reduce to 6u last night as he was concerned that he would go too low BG bedtime . Reports he likes bedtime BG to be around 240 so he doesn't drop low in the morning. We discussed that for now if BG is <200 at night reduce Lantus to 6u, otherwise take 8u. We will keep Glimepiride the samedose however expect we will make adjustments once Shar gets started on the Mike and we have more data. He has appt with pharmacist for this tomorrow. We have f/u 1/2.    Patient's most recent   Lab Results   Component Value Date    A1C 7.4 11/21/2022    A1C 8.6 06/10/2021     is meeting goal of <7.0    Diabetes knowledge and skills assessment:   Patient is knowledgeable in diabetes management concepts related to: Monitoring and Problem Solving    Continue education with the following diabetes management concepts: Healthy Eating, Monitoring, Taking Medication  "and Problem Solving    Based on learning assessment above, most appropriate setting for further diabetes education would be: Individual setting.      PLAN  1. Look for higher calorie/fat foods when choosing at the grocery store.  2. Aim for 3 meals per day + 3 snacks between meals (2 ensure plus)  3. Continue current medication doses however if BG is less than 200 at bedtime reduce Basaglar dose to 6u  4. Get started with the Mike  5. Follow-up with Yolette in January    Topics to cover at upcoming visits: Healthy Eating, Being Active, Monitoring, Taking Medication, Problem Solving, Reducing Risks and Healthy Coping    Follow-up: January    See Care Plan for co-developed, patient-state behavior change goals.  AVS provided for patient today.    Education Materials Provided:  tips to increase calories/protein in diet, high calorie meal plan      SUBJECTIVE/OBJECTIVE:  Presents for: Individual review  Accompanied by: Self, Spouse  Focus of Visit: Other (upper plate for dentures so not eating as much)  Diabetes type: Type 2  Cultural Influences/Ethnic Background:  Not  or     Diabetes Symptoms & Complications:  Weight trend: Decreasing (10#-since October)       Patient Problem List and Family Medical History reviewed for relevant medical history, current medical status, and diabetes risk factors.    Vitals:  There were no vitals taken for this visit.  Estimated body mass index is 20.32 kg/m  as calculated from the following:    Height as of 11/26/22: 1.803 m (5' 11\").    Weight as of 12/9/22: 66.1 kg (145 lb 11.2 oz).   Last 3 BP:   BP Readings from Last 3 Encounters:   12/09/22 (!) 144/81   11/26/22 (!) 165/90   11/21/22 (!) 146/84       History   Smoking Status     Former     Packs/day: 1.00     Years: 20.00     Types: Cigarettes     Quit date: 1/23/1993   Smokeless Tobacco     Never       Labs:  Lab Results   Component Value Date    A1C 7.4 11/21/2022    A1C 8.6 06/10/2021     Lab Results   Component " Value Date     12/09/2022     11/26/2022     11/26/2022     06/10/2021     Lab Results   Component Value Date    LDL 43 04/14/2022    LDL 76 06/10/2021     HDL Cholesterol   Date Value Ref Range Status   06/10/2021 53 >39 mg/dL Final     Direct Measure HDL   Date Value Ref Range Status   04/14/2022 65 >=40 mg/dL Final   ]  GFR Estimate   Date Value Ref Range Status   12/09/2022 89 >60 mL/min/1.73m2 Final     Comment:     Effective December 21, 2021 eGFRcr in adults is calculated using the 2021 CKD-EPI creatinine equation which includes age and gender (Adama et al., NEJM, DOI: 10.1056/FHDZen9280324)   06/10/2021 83 >60 mL/min/[1.73_m2] Final     Comment:     Non  GFR Calc  Starting 12/18/2018, serum creatinine based estimated GFR (eGFR) will be   calculated using the Chronic Kidney Disease Epidemiology Collaboration   (CKD-EPI) equation.       GFR Estimate If Black   Date Value Ref Range Status   06/10/2021 >90 >60 mL/min/[1.73_m2] Final     Comment:      GFR Calc  Starting 12/18/2018, serum creatinine based estimated GFR (eGFR) will be   calculated using the Chronic Kidney Disease Epidemiology Collaboration   (CKD-EPI) equation.       Lab Results   Component Value Date    CR 0.76 12/09/2022    CR 0.79 06/10/2021     No results found for: MICROALBUMIN    Healthy Eating:  Healthy Eating Assessed Today: Yes  Breakfast: banana + yogurt + OJ OR smart start cereal + fruit with 2%-started whole milk  Lunch: scrambled eggs + duran + toast + butter OR hotdogs + bread + apple sauce + ham OR chicken wild rice soup, eats alot of canned soup  Dinner: chicken divaan + brocolli + 4 cookies OR chicken wild rice soup OR pork tenderloin + noodles  Snacks: AM-ensure plus (360kcals/16g) PM- doesnt snack unless HS-sometimes  Beverages: Milk, Juice, Other (ensures)    Being Active:  Being Active Assessed Today: No    Monitoring:  Monitoring Assessed Today: Yes  Did patient bring  glucose meter to appointment? : Yes  Date Breakfast  Lunch  Dinner  Bedtime    Before After Before After Before After    12/6 12/7       314   12/8 145      242   12/9 118      224   12/10 114      231   12/11 125      168   12/12 120         *took 16u Basaglar when  HS  *took 6u Basaglar when  HS    Taking Medications:  Diabetes Medication(s)     Insulin       insulin glargine (BASAGLAR KWIKPEN) 100 UNIT/ML pen    INJECT 8 UNITS SUBCUTANEOUS EVERY DAY    Sulfonylureas       glimepiride (AMARYL) 4 MG tablet    Take  1.5  tabs daily in AM for diabetes          Taking Medication Assessed Today: Yes  Current Treatments: Insulin Injections, Oral Medication (taken by mouth)  Dose schedule: At bedtime    Problem Solving:  Problem Solving Assessed Today: Yes  Is the patient at risk for hypoglycemia?: Yes  Hypoglycemia Frequency: Rarely        Reducing Risks:  Reducing Risks Assessed Today: No    Healthy Coping:  Stage of change: ACTION (Actively working towards change)  Patient Activation Measure Survey Score:  No flowsheet data found.      Care Plan and Education Provided:  Care Plan: Diabetes   Updates made by Yolette Hinojosa since 12/12/2022 12:00 AM      Problem: HbA1C Not In Goal       Goal: Establish Regular Follow-Ups with PCP       Task: Discuss with PCP the recommended timing for patient's next follow up visit(s)    Responsible User: Yolette Hinojosa      Task: Discuss schedule for PCP visits with patient    Responsible User: Yolette Hinojosa      Goal: Get HbA1C Level in Goal       Task: Educate patient on diabetes education self-management topics    Responsible User: Yolette Hinojosa      Task: Educate patient on benefits of regular glucose monitoring    Responsible User: Yolette Hinojosa      Task: Refer patient to appropriate extended care team member, as needed (Medication Therapy Management, Behavioral Health, Physical Therapy, etc.)    Responsible User: Yolette Hinojosa      Task: Discuss  diabetes treatment plan with patient    Responsible User: Yolette Hinojosa      Problem: Diabetes Self-Management Education Needed to Optimize Self-Care Behaviors       Goal: Understand diabetes pathophysiology and disease progression       Task: Provide education on diabetes pathophysiology and disease progression specfic to patient's diabetes type    Responsible User: Yolette Hinojosa      Goal: Healthy Eating - follow a healthy eating pattern for diabetes    This Visit's Progress: 30%   Note:    I will eat snacks between meals daily-aim for 3     Task: Provide education on portion control and consistency in amount, composition and timing of food intake Completed 12/12/2022   Responsible User: Yolette Hinojosa      Task: Provide education on managing carbohydrate intake (carbohydrate counting, plate planning method, etc.)    Responsible User: Yolette Hinojosa      Task: Provide education on weight management Completed 12/12/2022   Responsible User: Yolette Hinojosa   Note:    Discussed tips to increase wt: eating more frequently, nutrient dense foods, etc.     Task: Provide education on heart healthy eating    Responsible User: Yolette Hinojosa      Task: Provide education on eating out    Responsible User: Yolette Hinojosa      Task: Develop individualized healthy eating plan with patient    Responsible User: Yolette Hinojosa      Goal: Being Active - get regular physical activity, working up to at least 150 minutes per week       Task: Provide education on relationship of activity to glucose and precautions to take if at risk for low glucose    Responsible User: Yolette Hinojosa      Task: Discuss barriers to physical activity with patient    Responsible User: Yolette Hinojosa      Task: Develop physical activity plan with patient    Responsible User: Yolette Hinojosa      Task: Explore community resources including walking groups, assistance programs, and home videos    Responsible User: Yolette Hinojosa      Goal:  Monitoring - monitor glucose and ketones as directed       Task: Provide education on blood glucose monitoring (purpose, proper technique, frequency, glucose targets, interpreting results, when to use glucose control solution, sharps disposal)    Responsible User: Yolette Hinojosa      Task: Provide education on continuous glucose monitoring (sensor placement, use of byron or /reader, understanding glucose trends, alerts and alarms, differences between sensor glucose and blood glucose)    Responsible User: Yolette Hinojosa      Task: Provide education on ketone monitoring (when to monitor, frequency, etc.)    Responsible User: Yolette Hinojosa      Goal: Taking Medication - patient is consistently taking medications as directed       Task: Provide education on action of prescribed medication, including when to take and possible side effects Completed 12/12/2022   Responsible User: Yolette Hinojosa      Task: Provide education on insulin and injectable diabetes medications, including administration, storage, site selection and rotation for injection sites    Responsible User: Yolette Hinojosa      Task: Discuss barriers to medication adherence with patient and provide management technique ideas as appropriate    Responsible User: Yolette Hinojosa      Task: Provide education on frequency and refill details of medications    Responsible User: Yolette Hinojosa      Goal: Problem Solving - know how to prevent and manage short-term diabetes complications       Task: Provide education on high blood glucose - causes, signs/symptoms, prevention and treatment    Responsible User: Yolette Hinojosa      Task: Provide education on low blood glucose - causes, signs/symptoms, prevention, treatment, carrying a carbohydrate source at all times, and medical identification    Responsible User: oYlette Hinojosa      Task: Provide education on safe travel with diabetes    Responsible User: Yolette Hinojosa      Task: Provide education on  how to care for diabetes on sick days    Responsible User: Yolette Hinojosa      Task: Provide education on when to call a health care provider    Responsible User: Yolette Hinojosa      Goal: Reducing Risks - know how to prevent and treat long-term diabetes complications       Task: Provide education on major complications of diabetes, prevention, early diagnostic measures and treatment of complications    Responsible User: Yolette Hinojosa      Task: Provide education on recommended care for dental, eye and foot health    Responsible User: Yolette Hinojosa      Task: Provide education on Hemoglobin A1c - goals and relationship to blood glucose levels    Responsible User: Yolette Hinojosa      Task: Provide education on recommendations for heart health - lipid levels and goals, blood pressure and goals, and aspirin therapy, if indicated    Responsible User: Yolette Hinojosa      Task: Provide education on tobacco cessation    Responsible User: Yolette Hinojosa      Goal: Healthy Coping - use available resources to cope with the challenges of managing diabetes       Task: Discuss recognizing feelings about having diabetes    Responsible User: Yolette Hinojosa      Task: Provide education on the benefits of making appropriate lifestyle changes    Responsible User: Yolette Hinojosa      Task: Provide education on benefits of utilizing support systems    Responsible User: Yolette Hinojosa      Task: Discuss methods for coping with stress    Responsible User: Yolette Hinojosa      Task: Provide education on when to seek professional counseling    Responsible User: Yolette Hinojosa RD, BESSY, [facility]        Time Spent: 50+ minutes  Encounter Type: Individual    Any diabetes medication dose changes were made via the CDE Protocol per the patient's primary care provider. A copy of this encounter was shared with the provider.

## 2022-12-12 NOTE — TELEPHONE ENCOUNTER
Attempted to contact pt to relay dr. Alejo's message No answer. Left VM.     Upon call back please relay Dr. Alejo's message.       Patient Contact    Attempt # 1    Was call answered?  No.  Left message on voicemail with information to call me back.

## 2022-12-12 NOTE — PROGRESS NOTES
MH&A TC   NURSING Post-Appointment Chart-check:    Correct pharmacy verified with patient and updated in chart? [x] yes []no    Medications ordered this visit were e-scribed.  Verified by order class [x] yes  [] no    List Medications: hydrOXYzine (ATARAX) 10 MG tablet; mirtazapine (REMERON) 15 MG tablet    Medication changes or discontinuations were communicated to patient's pharmacy: [] yes  [x] no    UA collected [] yes  [] no  [x] n/a-virtual     Future appointment was made: [] yes  [] no  [x] n/a   Amrita Murphy CNP, RN @ [facility] 01/10/2023 @ 11 am    Dictation completed at time of chart check: [x] yes  [] no    I have checked the documentation for today s encounters and the above information has been reviewed and completed.      Sabiha Edwards on December 12, 2022 at 10:09 AM

## 2022-12-12 NOTE — LETTER
"    12/12/2022         RE: Jose Francisco Gonzalez  [address]  [place] [address]        Dear Colleague,    Thank you for referring your patient, Jose Francisco Gonzalez, to the [facility]. Please see a copy of my visit note below.    Diabetes Self-Management Education & Support    Presents for: Individual review    Type of Service: Telephone Visit    Originating Location (Patient Location): Home  Distant Location (Provider Location): Home  Mode of Communication:  Telephone    Telephone Visit Start Time: 9:01  Telephone Visit End Time (telephone visit stop time): 9:55    How would patient like to obtain AVS? MyChart    Assessment Type:   ASSESSMENT:  Shar on phone with wife for visit today. Shar has concerns regarding weight loss he has experienced since getting dentures    Patient's most recent   Lab Results   Component Value Date    A1C 7.4 11/21/2022    A1C 8.6 06/10/2021     {is/is not:328371::\"is\"} meeting goal of {A1C GOALS:249457}    Diabetes knowledge and skills assessment:   Patient is knowledgeable in diabetes management concepts related to: {AADE 7:685249}    Continue education with the following diabetes management concepts: {AADE 7:254528}    Based on learning assessment above, most appropriate setting for further diabetes education would be: {Visit Type:123936} setting.      PLAN    ***  Topics to cover at upcoming visits: {AADE 7:584717}    Follow-up: ***    See Care Plan for co-developed, patient-state behavior change goals.  AVS provided for patient today.    Education Materials Provided:  {CDE EDUCATION MATERIALS:556921}      SUBJECTIVE/OBJECTIVE:  Presents for: Individual review  Accompanied by: Self, Spouse  Focus of Visit: Other (upper plate for dentures so not eating as much)  Diabetes type: Type 2  Cultural Influences/Ethnic Background:  Not  or   ***    Diabetes Symptoms & Complications:  Weight trend: Decreasing (10#-since October)   " "    Patient Problem List and Family Medical History reviewed for relevant medical history, current medical status, and diabetes risk factors.    Vitals:  There were no vitals taken for this visit.  Estimated body mass index is 20.32 kg/m  as calculated from the following:    Height as of 11/26/22: 1.803 m (5' 11\").    Weight as of 12/9/22: 66.1 kg (145 lb 11.2 oz).   Last 3 BP:   BP Readings from Last 3 Encounters:   12/09/22 (!) 144/81   11/26/22 (!) 165/90   11/21/22 (!) 146/84       History   Smoking Status     Former     Packs/day: 1.00     Years: 20.00     Types: Cigarettes     Quit date: 1/23/1993   Smokeless Tobacco     Never       Labs:  Lab Results   Component Value Date    A1C 7.4 11/21/2022    A1C 8.6 06/10/2021     Lab Results   Component Value Date     12/09/2022     11/26/2022     11/26/2022     06/10/2021     Lab Results   Component Value Date    LDL 43 04/14/2022    LDL 76 06/10/2021     HDL Cholesterol   Date Value Ref Range Status   06/10/2021 53 >39 mg/dL Final     Direct Measure HDL   Date Value Ref Range Status   04/14/2022 65 >=40 mg/dL Final   ]  GFR Estimate   Date Value Ref Range Status   12/09/2022 89 >60 mL/min/1.73m2 Final     Comment:     Effective December 21, 2021 eGFRcr in adults is calculated using the 2021 CKD-EPI creatinine equation which includes age and gender (Adama alvarez al., NEJM, DOI: 10.1056/DOKUtx6198514)   06/10/2021 83 >60 mL/min/[1.73_m2] Final     Comment:     Non  GFR Calc  Starting 12/18/2018, serum creatinine based estimated GFR (eGFR) will be   calculated using the Chronic Kidney Disease Epidemiology Collaboration   (CKD-EPI) equation.       GFR Estimate If Black   Date Value Ref Range Status   06/10/2021 >90 >60 mL/min/[1.73_m2] Final     Comment:      GFR Calc  Starting 12/18/2018, serum creatinine based estimated GFR (eGFR) will be   calculated using the Chronic Kidney Disease Epidemiology Collaboration "   (CKD-EPI) equation.       Lab Results   Component Value Date    CR 0.76 12/09/2022    CR 0.79 06/10/2021     No results found for: MICROALBUMIN    Healthy Eating:  Healthy Eating Assessed Today: Yes  Breakfast: banana + yogurt + OJ OR smart start cereal + fruit with 2%-started whole milk  Lunch: scrambled eggs + duran + toast + butter OR hotdogs + bread + apple sauce + ham OR chicken wild rice soup, eats alot of canned soup  Dinner: chicken divaan + brocolli + 4 cookies OR chicken wild rice soup OR pork tenderloin + noodles  Snacks: AM-ensure plus (360kcals/16g) PM- doesnt snack unless HS-sometimes  Beverages: Milk, Juice, Other (ensures)    Being Active:  Being Active Assessed Today: No    Monitoring:  Monitoring Assessed Today: Yes  Did patient bring glucose meter to appointment? : Yes    ***    Taking Medications:  Diabetes Medication(s)     Insulin       insulin glargine (BASAGLAR KWIKPEN) 100 UNIT/ML pen    INJECT 8 UNITS SUBCUTANEOUS EVERY DAY    Sulfonylureas       glimepiride (AMARYL) 4 MG tablet    Take  1.5  tabs daily in AM for diabetes          Taking Medication Assessed Today: Yes  Current Treatments: Insulin Injections, Oral Medication (taken by mouth)  Dose schedule: At bedtime    Problem Solving:  Problem Solving Assessed Today: Yes  Is the patient at risk for hypoglycemia?: Yes  Hypoglycemia Frequency: Rarely              Reducing Risks:  Reducing Risks Assessed Today: No    Healthy Coping:  Stage of change: ACTION (Actively working towards change)  Patient Activation Measure Survey Score:  No flowsheet data found.      Care Plan and Education Provided:  {Care Plan and Eduction Provided:190719}    ***    Time Spent: {cde time spent:502925} minutes  Encounter Type: Individual    Any diabetes medication dose changes were made via the CDE Protocol per the patient's {diabetes education provider list:801760}. A copy of this encounter was shared with the provider.      Date Breakfast  Lunch  Dinner   Bedtime    Before After Before After Before After    12/6 12/7       314   12/8 145      242   12/9 118      224   12/10 114      231   12/11 125      168   12/12 120           8u Basaglar  Glimepiride 6mg  314* took 16u     Likes to be 240 to take 8u or feels BG will go low.     Did take 6u Basaglar last night.     Eat 7pm, bedtime BG 10:30

## 2022-12-12 NOTE — TELEPHONE ENCOUNTER
Nurse Triage SBAR    Is this a 2nd Level Triage? YES, LICENSED PRACTITIONER REVIEW IS REQUIRED    Situation: Patient accidentally took mirtazapin 30 mg Saturday and Sunday night. States that he mixed up the 7.5 mg and 15 mg tablet bottles.   Denies any symptoms. States that he is always a little unsteady on his feet.    Background: NA    Assessment: Took double dose of mirtazapine for 2 nights and no unusual symptoms.    Protocol Recommended Disposition:   Route to provider now.     Recommendation: Patient given number for poison control if develops symptoms.  Advised ED if dizziness, nausea or sleepiness.     Routed to provider  Can we leave a detailed message on this number? YES  Phone number patient can be reached at: Cell number on file:    Telephone Information:   Mobile [phone]       Does the patient meet one of the following criteria for ADS visit consideration? 16+ years old, with an [facility] PCP     TIP  Providers, please consider if this condition is appropriate for management at one of our Acute and Diagnostic Services sites.     If patient is a good candidate, please use dotphrase <dot>triageresponse and select Refer to ADS to document.    Reason for Disposition    Drug overdose and triager unable to answer question    DOUBLE DOSE (an extra dose or lesser amount) of prescription drug and NO symptoms  (Exception: Double dose of antibiotic can be handled with reassurance and self-care at home disposition if asymptomatic.)    Additional Information    Negative: Intentional drug overdose and suicidal thoughts or ideas    Negative: SEVERE difficulty breathing (e.g., struggling for each breath, speaks in single words)    Negative: Bluish (or gray) lips or face    Negative: Seizure    Negative: Difficult to awaken or acting confused (e.g., disoriented, slurred speech)    Negative: Shock suspected (e.g., cold/pale/clammy skin, too weak to stand, low BP, rapid pulse)    Negative: Intentional overdose and  suicidal thoughts or ideas    Negative: Suicide attempt, known or suspected    Negative: Sounds like a life-threatening emergency to the triager    Negative: Chemical in eye    Negative: HARMFUL SUBSTANCE or ACID or ALKALI ingestion (e.g., toilet , drain , lye, Clinitest tablets, ammonia, bleaches) AND any symptoms (e.g., mouth pain, sore throat, breathing difficulty)    Negative: PETROLEUM PRODUCT ingestion (e.g., kerosene, gasoline, benzene, furniture polish, lighter fluid) AND any symptoms (e.g., breathing difficulty, coughing, vomiting)    Negative: Poison Center advised caller to go to ED and caller seeking second opinion    Negative: Patient sounds very sick or weak to the triager    Negative: HARMFUL SUBSTANCE or ACID or ALKALI ingestion (e.g., toilet , drain , lye, Clinitest tablets, ammonia, bleaches) AND NO symptoms    Negative: PETROLEUM PRODUCT ingestion (e.g., kerosene, gasoline, benzene, furniture polish, lighter fluid) AND NO symptoms    Negative: Carbon monoxide exposure suspected    Negative: MORE THAN A DOUBLE DOSE of a prescription or over-the-counter (OTC) drug    Negative: DOUBLE DOSE (an extra dose or lesser amount) of prescription drug and any symptoms (e.g., dizziness, nausea, pain, sleepiness)    Negative: DOUBLE DOSE (an extra dose or lesser amount) of over-the-counter (OTC) drug and any symptoms (e.g., dizziness, nausea, pain, sleepiness)    Negative: Took another person's prescription drug    Negative: Mercury spill (e.g., broken glass thermometer, broken spiral CFL lightbulb)    Negative: Wild mushroom ingestion, questions about    Negative: All OTHER POTENTIALLY HARMFUL SUBSTANCES (e.g., nearly all chemicals, plants, more than a double dose of a drug, took someone else's medicine)  (Exception: Known harmless substances and asymptomatic double dose of OTC drug.)    Negative: Triager unable to answer question    Negative: Patient or caller provides unclear  "information about type or amount of substance    Answer Assessment - Initial Assessment Questions  1. NAME of MEDICATION: \"What medicine are you calling about?\"      mirtazapine  2. QUESTION: \"What is your question?\" (e.g., double dose of medicine, side effect)      Took 30 mg Saturday and Sunday evening. Takes at about 11 pm.   3. PRESCRIBING HCP: \"Who prescribed it?\" Reason: if prescribed by specialist, call should be referred to that group.      Dr. Alejo  4. SYMPTOMS: \"Do you have any symptoms?\"      No unusual symptoms. Reports that he is always a little unsteady.   5. SEVERITY: If symptoms are present, ask \"Are they mild, moderate or severe?\"      Mild unsteadiness.   6. PREGNANCY:  \"Is there any chance that you are pregnant?\" \"When was your last menstrual period?\"      NA    Protocols used: MEDICATION QUESTION CALL-A-OH, POISONING-A-OH    Brigid Irby RN    "

## 2022-12-12 NOTE — PATIENT INSTRUCTIONS
PLAN  1. Look for higher calorie/fat foods when choosing at the grocery store.  2. Aim for 3 meals per day + 3 snacks between meals (2 ensure plus)  3. Continue current medication doses however if BG is less than 200 at bedtime reduce Basaglar dose to 6u  4. Get started with the Mike  5. Follow-up with Yolette in January

## 2022-12-12 NOTE — Clinical Note
Pts BG numbers are in my note, they wanted you to see them. Once he gets a CGM will do more adjusting to things, likely needs less Basaglar and more Glimepiride. I did tell him if BG <200 at night to reduce Basaglar to 6u, he did this last night on his own and BG looked good this AM. Thanks!

## 2022-12-12 NOTE — TELEPHONE ENCOUNTER
To avoid further confusion episodes with the dosing, would recommend patient throw away remaining mirtazapine 7.5 mg tablets now and take mirtazapine 15 mg tablet, 1 tablet daily at bedtime as prescribed.  Asymptomatic per triage note with 2 nights of higher dosage.  No need for Poison control as that dose with still be within safe range.  No further management needed

## 2022-12-13 NOTE — Clinical Note
Hi Dr. Alejo and Soila,  I am a [facility] pharmacist that works at [employer].  I met with Mr. Gonzalez today to review trying a freestyle mike monitor out.  A family member had recommended he consider trying one.  He has been noticing sometimes his blood glucose goes too low overnight, and he's having some difficulty using his regular glucometer.  I helped him get the Mike set up on his phone and gave him 2 sample sensors to try it out and see how he likes it.  I helped him connect to the Mike account through [facility] account, and my clinic account so I can help upload/share his mike graphs with you.  Do you have a specific mike account you'd also like me to help connect him to?  I am planning on calling him in a couple weeks to see how its going, if he's liking it, and if seeing any blood glucose trends can share them with you to help with adjusting medications if needed.  Thanks, Deepthi Esquivel, PharmD Medication Therapy Management Pharmacist Pager: [phone]

## 2022-12-13 NOTE — Clinical Note
Jackson De La Vega,  I got him set up with the Mike 2 on his phone and gave him a couple sample sensors to try for a month.  He isn't completely sure if he'll want to continue using it, but thought it'd be helpful with the concern for blood glucose sometimes going low overnight.  I was able to get him connected to your account so you should also be sammie to start seeing his blood glucose readings once the sensor is active.    I plan to reach out to him in a couple weeks to check in on readings/how it's going.    Thanks, Deepthi

## 2022-12-15 NOTE — TELEPHONE ENCOUNTER
Spoke with Shar and his wife Divya.  Has a few days of blood glucose readings so far from the Mike monitor.  He is concerned with the high blood glucose readings during the day.  See below.  He increased the dose of glimepiride from 4 mg to 6 mg about 2 weeks ago.  Was having glucose go too low sometimes overnight when on higher doses of Basaglar.  Will discuss with Dr. Alejo.      1. Recommend increasing glimepiride to 8 mg daily in the morning.    2.  In the future could consider changing Basaglar to the morning.  Appears to drop his blood glucose a lot overnight, and may better cover daytime blood glucose if taken in the morning.

## 2022-12-15 NOTE — PROGRESS NOTES
"Clinic Care Coordination Contact    Follow Up Progress Note      Assessment:   Patient and patient's wife are calling to see if RNCC can \"see\" patients blood sugar results since starting the new FreeStyle Mike continuous blood monitor on 12/13/2022.  Divya, wife, is wondering how PCP receives the BS results.  RNCC is unaware of how this works and suggested patient/wife to reach out to Deepthi Esquivel PharmD, [facility] to review.  Divya has Deepthi's number and will reach out.    RNCC reached out to Deepthi to apprise of patient reaching out to her regarding the above and Deepthi states she will reach out to the patient/wife today.    Care Gaps:    Health Maintenance Due   Topic Date Due     DTAP/TDAP/TD IMMUNIZATION (2 - Td or Tdap) 02/26/2016     DIABETIC FOOT EXAM  04/24/2020     MEDICARE ANNUAL WELLNESS VISIT  05/28/2020       Care Plans  Care Plan: Help At Home     Problem: Insufficient In-home support     Goal: Establish adequate home support     Start Date: 12/2/2022 Expected End Date: 3/2/2023    This Visit's Progress: 60% Recent Progress: 60%    Note:     Barriers: Advanced age, losing vision  Strengths: Strong family support  Patient expressed understanding of goal: Yes  Action steps to achieve this goal:  1. I will follow up with my home care providers (RN/Physical Therapy/OT) as recommended/suggested  2. I will contact my care team with questions, concerns, support needs   3. I will use the clinic as a resource, and I understand I can contact my clinic with 24/7 after hours services available                     Care Plan: Diabetes     Problem: Lifestyle choices     Goal: Stabilize blood sugars     Start Date: 12/2/2022 Expected End Date: 3/2/2023    Note:     Barriers: Losing vision due to Macular Degeneration  Strengths: Strong family support  Patient expressed understanding of goal: Yes  Action steps to achieve this goal:  1. I will follow up with my Certified Diabetic Educator as recommended/suggested  2. " I will contact my care team with questions, concerns, support needs   3. I will use the clinic as a resource, and I understand I can contact my clinic with 24/7 after hours services available                       Intervention/Education provided during outreach:   RNCC called and spoke with patient; introduced self, discussed role of Care Coordination and explained reason for call    Plan:   -Patient will contact the care team with questions, concerns, support needs   -Patient will use the clinic as a resource and understands (s)he can contact the [facility] with 24/7 after hours services available  -Care Coordinator will remain available as needed  -RNCC will follow up in one month for follow up appointments/recommendations and goal progression     Soila Mai RN, BSN, PHN  Primary Care / Care Coordinator   [facility]  [email or web address] [email or web address]   [phone]

## 2022-12-19 NOTE — TELEPHONE ENCOUNTER
Nurse Triage SBAR    Is this a 2nd Level Triage? YES, LICENSED PRACTITIONER REVIEW IS REQUIRED    Situation: Pt called the clinic stating he is concerned about his BG levels.     Background: Pts BG levels have been high this morning and last night. Pt took 8u of basaglar last night due to hiss BG being 296. This morning before breakfast pts BG was 202. After breakfast (banana, yogurt and some fruit) pts BG is currently 342 (finger stick BG). Pt does not check for ketones at home.      Assessment: Balance seems worse with the high blood sugars, dry mouth (not new with high BG) but worse now, weakness that is worse with the high BG, more frequent urination, anxiety.     Pt stated his BP is 155/100 but that he feels anxious about his BG levels. Pt denies HA.      Protocol Recommended Disposition:   Call Transferred To PCP Now. Pt is wondering if he needs to take more of his medication? Pt is worried and doesn't want his BG to drop low but is concerned about high BG levels.     Advised pt this message would be sent to PCP and to call us back if any symptoms worsen or new ones develop before we are able to contact him back with PCPs recommendations. Asdvised pt to call us back in an hour if he hasnt heard anything from us yet. Pt agrees to plan.        Routing to PCP for recommendations.   Huddled with Dr. Alejo. Dr. Alejo stated he would take a look at the encounter.   Please call pt back with PCPs recommendations.       Phone number patient can be reached at: [phone]    Ange Ruiz RN  [facility] Triage    Routed to provider    Does the patient meet one of the following criteria for ADS visit consideration? 16+ years old, with an [facility] PCP     TIP  Providers, please consider if this condition is appropriate for management at one of our Acute and Diagnostic Services sites.     If patient is a good candidate, please use dotphrase <dot>triageresponse and select Refer to ADS to document.        1.  "BLOOD GLUCOSE: \"What is your blood glucose level?\"       342- now taken with a finger stick   2. ONSET: \"When did you check the blood glucose?\"      About 10 mins ago  3. USUAL RANGE: \"What is your glucose level usually?\" (e.g., usual fasting morning value, usual evening value)      Its higher than it normally is   4. KETONES: \"Do you check for ketones (urine or blood test strips)?\" If yes, ask: \"What does the test show now?\"       no  5. TYPE 1 or 2:  \"Do you know what type of diabetes you have?\"  (e.g., Type 1, Type 2, Gestational; doesn't know)       Type 2 DM  6. INSULIN: \"Do you take insulin?\" \"What type of insulin(s) do you use? What is the mode of delivery? (syringe, pen; injection or pump)?\"       Pen  7. DIABETES PILLS: \"Do you take any pills for your diabetes?\" If yes, ask: \"Have you missed taking any pills recently?\"      No  8. OTHER SYMPTOMS: \"Do you have any symptoms?\" (e.g., fever, frequent urination, difficulty breathing, dizziness, weakness, vomiting)      Weakness (seems worse now), more frequent urination, anxious      Reason for Disposition    Caller has URGENT medication or insulin pump question and triager unable to answer question    Additional Information    Negative: Unconscious or difficult to awaken    Negative: Acting confused (e.g., disoriented, slurred speech)    Negative: Very weak (can't stand)    Negative: Sounds like a life-threatening emergency to the triager    Negative: Vomiting and signs of dehydration (e.g., very dry mouth, lightheaded, dark urine)    Negative: Blood glucose > 240 mg/dL (13.3 mmol/L) and rapid breathing    Negative: Blood glucose > 500 mg/dL (27.8 mmol/L)    Negative: Blood glucose > 240 mg/dL (13.3 mmol/L) AND urine ketones moderate-large (or more than 1+)    Negative: Blood glucose > 240 mg/dL (13.3 mmol/L) and blood ketones > 1.4 mmol/L    Negative: Blood glucose > 240 mg/dL (13.3 mmol/L) AND vomiting AND unable to check for ketones (in blood or urine)    " Negative: Vomiting lasting > 4 hours    Negative: Patient sounds very sick or weak to the triager    Negative: Fever > 100.4 F (38.0 C)    Protocols used: DIABETES - HIGH BLOOD SUGAR-A-OH

## 2022-12-19 NOTE — TELEPHONE ENCOUNTER
Pt to  NOT take Basaglar tonight and instead start taking the Basaglar insulin in the AM every day  with breakfast and still take 8 units per day for now  Stop Glimepiride   Start fasting acting Novolog insulin 3 units with breakfast and 3 units with lunch and 2 units with supper for now and take within 15 min of starting to eat the food.  Check blood pressure again at home. If BP > 150/90, then take extra Lisinopril 10mg  Now. If BP is below that reading, however, then do not take extra dosing of Lisinopril as one time extra dose  Schedule pt for virtual visit with me for this Friday at 4:30 in current open time slot ( if filled, then hold on scheduling and let me know) to review blood sugars further.  Monitor blood  Pressure daily in the after noon and let me know if frequently > 140/90. Otherwise will review further later this week  Rx for Novolog sent to pharmacy along with extra needles

## 2022-12-19 NOTE — TELEPHONE ENCOUNTER
Patient was called with provider's message. Patient and wife agreed with this plan. Telephone OV scheduled.     Appointments in Next Year    Dec 23, 2022  4:30 PM  (Arrive by 4:10 PM)  Provider Visit with Kin Alejo MD  [facility] ([facility] ) [phone]

## 2022-12-19 NOTE — TELEPHONE ENCOUNTER
Patient had requested that I call him today, but I had called him first at 4 PM when the clinic RN was calling him.  They did have some follow up questions so spoke to Shar and his wife Divya at about 4:30 PM.  They said that the Novolog prescription that was sent in wasn't covered, likely means that Humalog is the preferred brand.  Let them know that we can send an alternative.      Reiterated Dr. Alejo's plan from earlier today:  Change Basaglar and take 8 units in the morning (No Basaglar tonight, resume tomorrow morning).  Start Novolog (or Humalog) 3 units with breakfast, 2 units with lunch, and 2 units with dinner.  Stop Glimepiride.     - I can upload the blood glucose graphs into [employer] Friday for Dr. Alejo to be able to view at their virtual visit.  Will try to coordinate with someone in the [facility] to have acces to the diabetes educators account to be able to download his blood glucose readings.  Can also connect them to mine if preferred.    Discussed that the glimepiride wasn't working well enough, increasing to 8 mg from 6 mg may not have been sufficient to help control the post prandial hyperglycemia.  They were a bit concerned with having to carry around another insulin pen, but likely the safest option.  Dr. Alejo had previously noted concern for Metformin due to history of IBS, should avoid GLP1 agonists due to weight loss risk, also could see some weight loss with SGLT2 inhibitors.  Could consider DPP4 inhibitors, but also unclear if they'd be effective enough. Could consider retrial on metformin ER formulation, but still could come with risk of diarrhea.  With his history of hyponatremia may not be ideal option.    Will need to open new TE since I opened this one under the wrong clinic account.  Refill requests would be routed to the wrong fax number.

## 2022-12-19 NOTE — TELEPHONE ENCOUNTER
Patient called back and stated that the pharmacy notified him that his insurance did not approve the Novalog. Pharmacy stated that they can fill the Novalog, but it will be $600. Patient is wondering if he should pay for the Novalog or if their is another medication. Pharmacy suggested to the patient Humalog. Pharmacy is pended.     Macy Yoo RN  [facility]

## 2022-12-19 NOTE — TELEPHONE ENCOUNTER
Patient had requested that I call him today, but I had called him first at 4 PM when the clinic RN was calling him.  They did have some follow up questions so spoke to Shar and his wife Divya at about 4:30 PM.  They said that the Novolog prescription that was sent in wasn't covered, likely means that Humalog is the preferred brand.  Let them know that we can send an alternative.       Reiterated Dr. Alejo's plan from earlier today:  Change Basaglar and take 8 units in the morning (No Basaglar tonight, resume tomorrow morning).  Start Novolog (or Humalog) 3 units with breakfast, 2 units with lunch, and 2 units with dinner.  Stop Glimepiride.      Plan:  - Changed the Novolog prescription to Humalog   - I can upload the blood glucose graphs into [employer] Friday for Dr. Alejo to be able to view at their virtual visit.  Will try to coordinate with someone in the [facility] to have acces to the diabetes educators account to be able to download his blood glucose readings.  Can also connect them to mine if preferred.     Discussed that the glimepiride wasn't working well enough, increasing to 8 mg from 6 mg may not have been sufficient to help control the post prandial hyperglycemia.  They were a bit concerned with having to carry around another insulin pen, but likely the safest option.  Dr. Alejo had previously noted concern for Metformin due to history of IBS, should avoid GLP1 agonists due to weight loss risk, also could see some weight loss with SGLT2 inhibitors.  Could consider DPP4 inhibitors, but also unclear if they'd be effective enough. Could consider retrial on metformin ER formulation, but still could come with risk of diarrhea.  With his history of hyponatremia may not be ideal option.    Sent in new prescription for Humalog, removed Novolog from medication list.

## 2022-12-20 NOTE — TELEPHONE ENCOUNTER
Deepthi Esquivel, [facility]  You; Kin Alejo MD 1 hour ago (4:05 PM)     HR  Hi Tia - Thanks for the update on Shar     I would defer dosing changes to Dr. Alejo - he has a follow up visit with Shar this Friday to review blood glucose/adjust insulin doses if needed.  If we are seeing a trend in the next 1-2 days of 2-hour post prandial blood glucose very high (over 180) it may be appropriate to increase the dose of Humalog by 1 unit with each meal.  It isn't letting my copy today's Mike graph into this message but I see that his after lunch reading was 327.     He just started Humalog today, so we likely will want to see how the current doses (3 units with breakfast, 2 units with lunch, and 2 with dinner) work for him before adjusting the dose up.  We wouldn't want him to take additional insulin after he already ate because it could cause hypoglycemia.       - Deepthi

## 2022-12-20 NOTE — TELEPHONE ENCOUNTER
See other TE from today with pharm D.  Reviewed and she dsicussed with pt. Rx was changed from Novolog to Humalog by pharmD already

## 2022-12-21 NOTE — PROGRESS NOTES
Meant to open documentation only encounter, closing this as erroneous and will open correct encounter type

## 2022-12-21 NOTE — PROGRESS NOTES
Uploading patient's blood glucose readings from the Mike program.   Patient started the Humalog insulin on 12/20 with lunch.  Will update this message later in the week with the rest of his blood glucose readings for Dr. Alejo to review.

## 2022-12-21 NOTE — Clinical Note
Jackson Alejo - Here is Mr. Gonzalez's most recent glucose readings from the last few days for your appointment with him later this evening.  His sadi sensor fell off 12/21, and was replaced 12/22 midday.  He started the Humalog Tuesday 12/20.    Thanks, Deetphi Esquivel, PharmD Medication Therapy Management Pharmacist Pager: [phone]

## 2022-12-22 NOTE — PROGRESS NOTES
Clinical Pharmacy Consult:                                                    Jose Francisco Gonzalez is a 82 year old male called for a clinical pharmacist consult.  He was referred to me as a self-referral.    Reason for Consult: Having some problems with getting the sadi sensor to stay on, and had some questions about administering the Humalog insulin    Discussion: The patient's sadi sensor fell off last night when he was sleeping.  We discussed trying out some sadi over patches to help it stay on better.  We will send them links to some products that they could use (will also send this information to their daughter Elke will help them purchase products).  They could purchase a sadi over patches online, or could use a clear waterproof transparent Tegaderm dressing which can be purchased locally (is a bit more expensive).  They are hoping the family member can help them put the next sensor on so that he can start using it again, but has been using his regular glucometer until they can get this in place.  Patient has been injecting his Humalog into his abdomen but does not feel like the medication is getting incorrectly and is not sure if he is injecting it in the best spot.  He feels like there is no fat tissue for him to inject into.  They also saw some beads of liquid on the tip of the pen needle and they removed it from his stomach.  We discussed trying to pinch a small amount of the stomach to create a pouch, and then after when injecting the Humalog should hold it under his skin for 8 seconds and then remove it.  They will try doing this with his next injection.      Plan:  1.  Recommend trying to use a freestyle sadi over patch to help the sensor stay on better. (included links to products below)   -Tegaderm transparent film dressing (or similar product)      -Freestyle sadi over patch    2.  Please call [facility] back to discuss getting the refills for the sadi sensors mailed to you at  home.  Their phone number is [phone]     3.  Until you are able to get the mike sensor on again, please continue to check your blood sugars before each meal and before bedtime (4 times daily).    4.  When you inject the Humalog insulin, you can inject it on your abdomen or on the tops of your thighs.    5.  Please let me know if you need help placing your next mike sensor, we could try to schedule a follow-up to meet in person or I could join you for a video call while you are putting it on to help guide you through placing the mike sensor.      Mike Over patch products:  -Tegaderm transparent film dressing (or similar product)  Link to product:  https://www.[employer]/store/c/nexcare-tegaderm-waterproof-transparent-dressing/TF=upyr972658-bkzawwa?ext=bfjSA80_SYH_KBS_KPQOPGIUWWBLWVS_Gjlueogolta+Max+-+Health+Care_REV_SRC_PMAX_PMAX_NA_PMAX_ENG__pla_local&gclsrc=aw.ds&&ibhvw=OZBjXJwxXmZNrxq817qI_AWWen7qTu5P6V6eIGQIUMTBBfGV2bG_MjA     -Freestyle mike over patch    Link to product:  https://www.amazon.com/Patches-55-Transparent-Waterproof-Overpatch-Tape-Days-Without/dp/S07NQFBYA5/ref=sr_1_1_sspa?smuo=1DR468D6W97FP&keywords=mike%2Bover%2Bpatch&axw=[id number]&sprefix=libr%2Caps%2C616&sr=8-1-spons&jpFh=FR1mbbsboNMlJYIxdSjheMLoUXUbIt2LXWxXS6DCPVp7IvVsT4D7gZHdVNokASAhZQBbRiYvIPZYT8kTUJg8U0gJZKDelzCmoPV5OJIZWZjcPZKhRpGmXlSuXeCHT6f9WCnVMSyaYJY5mALoDLFNIO3eIMSrB4R6GtEhN4Tev154Y3rbU0cYIHNhktJnbODmb51rsOamV8NimULbJLFxrPH&th=1    https://www.amazon.com/Freestyle-Adhesive-Waterproof-Patches-Transparent-Center-Enlite-Guardian-Freestyle/dp/O31XUCTA6D/ref=sr_1_6?lpae=1PB551Q5G50UH&keywords=mike+over+patch&xbe=3334599317&sprefix=libr%2Caps%2C616&sr=8-6      Deepthi Esquivel PharmD  Medication Therapy Management Pharmacist  Pager: [phone]    When I am in clinic next I will mail them one of the 'pocket book' Mike guides that has pictures/helpful guides in it.  Didn't have one when we first met in clinic last  week.

## 2022-12-23 NOTE — PROGRESS NOTES
Shar is a 82 year old who is being evaluated via a billable telephone visit.      What phone number would you like to be contacted at? [phone]  How would you like to obtain your AVS? Rel[employer]ware[employer]    Distant Location (provider location):  On-site    ASSESSMENT:   1. Type 2 diabetes mellitus with diabetic nephropathy, with long-term current use of insulin (H)  Recent A1c 7.4 though would like to tighten blood sugar although better during parts of the day.  Patient will continue Basaglar 9 units daily and use Humalog 5 units with breakfast, 3 units with lunch and 3 units with supper.  We will follow-up in 1 month and repeat A1c in early March 2023    2. Syndrome of inappropriate antidiuretic hormone secretion (H)  Improved.  Sodium now 131 with fluid restriction.  Repeat BMP in 1 month      PLAN:  Continue Basaglar 9 units daily for now.  Increase Humalog to 5 units with breakfast, 3 units with lunch and 3 units with supper  If morning blood sugars remain greater than 130 after a week,   increase Basaglar further to 10 units daily  Repeat nonfasting BMP lab in 1 month and repeat nonfasting A1c lab and early March 2023.  Use [employer] or call the appointment line at [phone] to schedule lab appointments  Continue other medications    Phone call contact time  Call Started at 4:34pm  Call Ended at  4:48pm  Total minutes: 14 min    (Chart documentation was completed, in part, with [employer] voice-recognition software. Even though reviewed, some grammatical, spelling, and word errors may remain.)    Kin Alejo MD  Internal Medicine Department  [facility]   Shar is a 82 year old accompanied by his wife, presenting for the following health issues:  Diabetes      HPI     Diabetes Follow-up    How often are you checking your blood sugar? Continuous glucose monitor  What time of day are you checking your blood sugars (select all that apply)?  Before meals, At bedtime and  early in the morning  Have you had any blood sugars above 200?  Yes 340  Have you had any blood sugars below 70?  No    What symptoms do you notice when your blood sugar is low?  None and Not applicable    What concerns do you have today about your diabetes? Blood sugar is often over 200 and Other: Hard time chewing food since upper teeth were removed, lost a lot of weight and having issues with getting healthy foods     Do you have any of these symptoms? (Select all that apply)  Weight loss      BP Readings from Last 2 Encounters:   12/09/22 (!) 144/81   11/26/22 (!) 165/90     Hemoglobin A1C (%)   Date Value   11/21/2022 7.4 (H)   04/14/2022 7.7 (H)   06/10/2021 8.6 (H)   02/22/2021 7.6 (H)     LDL Cholesterol Calculated (mg/dL)   Date Value   04/14/2022 43   06/10/2021 76   06/07/2019 53     LDL Cholesterol Direct (mg/dL)   Date Value   06/09/2020 71                   Most recent lab results reviewed with pt.        Component      Latest Ref Rng & Units 11/21/2022 11/26/2022 12/9/2022   Sodium      136 - 145 mmol/L 124 (L)  131 (L)   Potassium      3.4 - 5.3 mmol/L 5.1  4.4   Chloride      98 - 107 mmol/L 87 (L)  93 (L)   Carbon Dioxide (CO2)      22 - 29 mmol/L 27  30 (H)   Anion Gap      7 - 15 mmol/L 10  8   Urea Nitrogen      8.0 - 23.0 mg/dL 17.4  19.9   Creatinine      0.67 - 1.17 mg/dL 0.67  0.76   Calcium      8.8 - 10.2 mg/dL 9.7  9.3   Glucose      70 - 99 mg/dL 199 (H)  208 (H)   Alkaline Phosphatase      40 - 129 U/L 73     AST      10 - 50 U/L 29     ALT      10 - 50 U/L 10     Protein Total      6.4 - 8.3 g/dL 7.7     Albumin      3.5 - 5.2 g/dL 4.0     Bilirubin Total      <=1.2 mg/dL 0.5     GFR Estimate      >60 mL/min/1.73m2 >90  89   Hemoglobin A1C      0.0 - 5.6 % 7.4 (H)     Urine Osmolality      100 - 1,200 mmol/kg 465  465   Cortisol Serum      ug/dL 18.6     Sodium Urine mmol/L      mmol/L 97  80   TSH      0.40 - 4.00 mU/L  1.66        Recent continuous glucose monitor results from  12/18/2022 through 12/23/2022 reviewed from chart.  Tends to have elevation of blood sugars in the afternoon after lunch.  Some blood sugar rise also with breakfast.  Controlled from suppertime to midnight.  Morning blood sugars consistently just below or above 180.  Patient denies current chest pain, shortness of breath or abdominal pain.  History chronic mild hyponatremia and will be due for repeat labs in early January.  Currently on Basaglar 9 units at bedtime and using Humalog 3 units with breakfast, 2 units with lunch and 2units with supper.  Overall patient had 65% of blood sugars in target from December 10 through December 23.  Average blood sugar was 231.  Did not have any hypoglycemic episodes  Patient maintaining fluid restrictions         Additional ROS:   Constitutional, HEENT, Cardiovascular, Pulmonary, GI and , Neuro, MSK and Psych review of systems/symptoms are otherwise negative or unchanged from previous, except as noted above.           Objective:  Any reported vitals from today were reviewed in chart. See nursing documentation for details    RESP: No cough, no audible wheezing, able to talk in full sentences  GEN: Normal affect. Normal though content/speech  Remainder of exam unable to be completed due to telephone visits

## 2022-12-25 NOTE — TELEPHONE ENCOUNTER
"Last BM 3 days ago and feels constipated. Took Senakot 1 tab yesterday and 2 tabs today - no results w/ this yet. Explained this may take awhile. No abdominal pain or distention, no vomiting. Has not tried warm bath or suppository. Advised home care. Suggested warm bath but he said he \"doesn't think that is going to work\". Advised he try it as it is only viable option at this time. He does not have any suppositories and nearly all  pharmacies are closed. Very limited pharmacies open tomorrow. Wife says Senakot box says can take up to 4 tabs a day. We cannot give advice on this as it is not part of our care advice but it is up to them; however it will not help tonight.     Reason for Disposition    Over-The-Counter (OTC) medicines for constipation, questions about    Additional Information    Negative: [1] Abdomen pain is main symptom AND [2] male    Negative: [1] Abdomen pain is main symptom AND [2] adult female    Negative: Rectal bleeding or blood in stool is main symptom    Negative: Rectal pain or itching is main symptom    Negative: Constipation in a cancer patient who is currently (or recently) receiving chemotherapy or radiation therapy, or cancer patient who has metastatic or end-stage cancer and is receiving palliative care    Negative: Patient sounds very sick or weak to the triager    Negative: [1] Vomiting AND [2] abdomen looks much more swollen than usual    Negative: [1] Vomiting AND [2] contains bile (green color)    Negative: [1] Constant abdominal pain AND [2] present > 2 hours    Negative: [1] Rectal pain or fullness from fecal impaction (rectum full of stool) AND [2] NOT better after SITZ bath, suppository or enema    Negative: [1] Intermittent mild abdominal pain AND [2] fever    Negative: Abdomen is more swollen than usual    Negative: Last bowel movement (BM) > 4 days ago    Negative: Leaking stool    Negative: Unable to have a bowel movement (BM) without manually removing stool (using finger " to pull out stool or perform disimpaction)    Negative: Unable to have a bowel movement (BM) without laxative or enema    Negative: [1] Constipation persists > 1 week AND [2] no improvement after using CARE ADVICE    Negative: [1] Weight loss > 10 pounds (5 kg) AND [2] not dieting    Negative: Pencil-like, narrow stools    Negative: Taking new prescription medication    Negative: [1] Minor bleeding from rectum (e.g., blood just on toilet paper, few drops, streaks on surface of normal formed BM) AND [2] 3 or more times    Negative: [1] Uses laxative (e.g., PEG / Miralax. Milk of magnesia) or enema AND [2] > once a month    Negative: Constipation is a chronic symptom (recurrent or ongoing AND present > 4 weeks)    Negative: MILD constipation    Protocols used: CONSTIPATION-A-

## 2022-12-27 NOTE — PROGRESS NOTES
"        [facility]                                     Progress Note      Patient Name: Jose Francisco Gonzalez  Date: 2022         Service Type: Individual      Session Start Time: 110  Session End Time: 114     Session Length: 45    Session #: 2    Attendees: Client attended alone    Service Modality:  Phone Visit:      Provider verified identity through the following two step process.  Patient provided:  Patient  and Patient address    The patient has been notified of the following:      \"We have found that certain health care needs can be provided without the need for a face to face visit.  This service lets us provide the care you need with a phone conversation.       I will have full access to your [facility] medical record during this entire phone call.   I will be taking notes for your medical record.      Since this is like an office visit, we will bill your insurance company for this service.       There are potential benefits and risks of telephone visits (e.g. limits to patient confidentiality) that differ from in-person visits.?Confidentiality still applies for telephone services, and nobody will record the visit.  It is important to be in a quiet, private space that is free of distractions (including cell phone or other devices) during the visit.??      If during the course of the call I believe a telephone visit is not appropriate, you will not be charged for this service\"     Consent has been obtained for this service by care team member: Yes     DATA  Interactive Complexity: No  Crisis: No      Progress Since Last Session (Related to Symptoms / Goals / Homework):   Symptoms: No change first session    Homework: first session      Episode of Care Goals: Minimal progress - PRECONTEMPLATION (Not seeing need for change); Intervened by educating the patient about the effects of current behavior on health.  Evoked information about reasons to continue behavior, express " "concern / recommendations, and explored any change talk     Current / Ongoing Stressors and Concerns:    Patient was referred for bridging services due to extensive waitlist for [facility], following MH referral placed by PCP dues to symptoms of depression.   Patient reports he has several health issues that interferes with his enjoyment in life. He reports he is diabetic, has dental issues including increased salivation. He reports he has little appetite and can't gain weight. He is losing weight despite his efforts to eat. He shared he has macular degeneration which is impacting his balance. He states he has been unable to sleep well.  He wakes and then is unable to get back to sleep. He states he was recently given mirtazapine which has helped with anxiety and sleep. He reports he has a home health aid 5x a week and a physical therapist home visit 1x a week for 3 weeks. He shared he recently decided to [facility] thinking it was his only alternative to finding relief for his depression with anxiety. 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 Patient shared he now feels he would be better off dead.  He states \"I'm not going to kill myself. I wouldn't do that\". He states he has no weapons in his home. He states his wife takes good care of him.  He only partially completed the [email or web address].      Today 12/16/2022  Patient presented reporting little change in mood. He noted increased anxiety with medical issues. He will see a dietician to discuss a diet for diabetics and he will start physical therapy.  He shared he is worried about his digestion at [place]. He states he will be with his children. He is worried about having a bowel accident. He wears attends but states sometimes they are not enough.  He states he also has an extended family get together he is looking forward to.   Patient states he is not ever hungry. He states food doesn't taste the way they used to.  He shared he uses ensure to " "supplement his diet. He states its hard to eat with his upper bridge and looks forward to implants this spring. Patient states he would like to improve his mood in therapy. Patient reports symptoms of depression incu]ludes persistent sadness, feeling anxious, decreased energy or fatigue, eating less making weight retention more difficult.  Symptoms are a response to current difficulties in everyday relationships with other people.   Patient reports no suicidal ideations this past week stating \"No not really\".      Treatment Objective(s) Addressed in This Session:   identify 3 fears / thoughts that contribute to feeling anxious  Decrease frequency and intensity of feeling down, depressed, hopeless     Intervention:   Motivational Interviewing    MI Intervention: Expressed Empathy/Understanding, Permission to raise concern or advise, Open-ended questions and Reflections: simple and complex     Change Talk Expressed by the Patient: Desire to change Ability to change Reasons to change    Provider Response to Change Talk: E - Evoked more info from patient about behavior change and A - Affirmed patient's thoughts, decisions, or attempts at behavior change     Interpersonal therapy: relieving symptoms by improving interpersonal functioning. Utilize a structured model for to address current concerns and improve  interpersonal relationships.    Assessments completed prior to visit:   Patient reports no changes since his visit on 12/7/2022.  PHQ9:   PHQ-9 SCORE 11/7/2017 5/28/2019 6/7/2019 10/22/2021 11/21/2022 11/28/2022   PHQ-9 Total Score [facility] - - - - 9 (Mild depression) 13 (Moderate depression)   PHQ-9 Total Score 5 4 3 1 9 13     GAD7:   CARIDAD-7 SCORE 6/7/2019 10/22/2021 9/12/2022 11/21/2022 11/28/2022 12/7/2022   Total Score - - 2 (minimal anxiety) 6 (mild anxiety) 6 (mild anxiety) 6 (mild anxiety)   Total Score 3 0 2 6 6 6     PROMIS 10-Global Health (only subscores and total score):   PROMIS-10 Scores Only 12/1/2022 "   Global Mental Health Score 9   Global Physical Health Score 11   PROMIS TOTAL - SUBSCORES 20     [place] Suicide Severity Rating Scale (Lifetime/Recent)  [place] Suicide Severity Rating (Lifetime/Recent) 11/26/2022 12/5/2022 12/7/2022   Q1 Wished to be Dead (Past Month) yes yes -   Q2 Suicidal Thoughts (Past Month) yes yes -   Q3 Suicidal Thought Method yes yes -   Q4 Suicidal Intent without Specific Plan no no -   Q5 Suicide Intent with Specific Plan yes no -   Q6 Suicide Behavior (Lifetime) no yes -   Within the Past 3 Months? - no -   Level of Risk per Screen high risk moderate risk -   1. Wish to be Dead (Lifetime) 1 - -   1. Wish to be Dead (Past 1 Month) 1 - -   2. Non-Specific Active Suicidal Thoughts (Lifetime) 1 - -   2. Non-Specific Active Suicidal Thoughts (Past 1 Month) 1 - 0   3. Active Suicidal Ideation with any Methods (Not Plan) Without Intent to Act (Lifetime) 1 - -   3. Active Suicidal Ideation with any Methods (Not Plan) Without Intent to Act (Past 1 Month) 0 - -   4. Active Suicidal Ideation with Some Intent to Act, Without Specific Plan (Lifetime) 1 - -   4. Active Suicidal Ideation with Some Intent to Act, Without Specific Plan (Past 1 Month) 0 - -   5. Active Suicidal Ideation with Specific Plan and Intent (Lifetime) 1 - -   5. Active Suicidal Ideation with Specific Plan and Intent (Past 1 Month) 0 - -   Most Severe Ideation Rating (Lifetime) 5 - -   Most Severe Ideation Rating (Past 1 Month) 1 - -   Frequency (Lifetime) 1 - -   Frequency (Past 1 Month) 2 - -   Duration (Lifetime) 3 - -   Duration (Past 1 Month) 2 - -   Controllability (Lifetime) 4 - -   Controllability (Past 1 Month) 3 - -   Deterrents (Lifetime) 5 - -   Deterrents (Past 1 Month) 1 - -   Reasons for Ideation (Lifetime) 5 - -   Reasons for Ideation (Past 1 Month) 5 - -   Actual Attempt (Lifetime) 1 - -   Total Number of Actual Attempts (Lifetime) 1 - -   Actual Attempt (Past 3 Months) 0 - -   Has subject engaged in  non-suicidal self-injurious behavior? (Lifetime) 0 - -   Interrupted Attempts (Lifetime) 1 - -   Total Number of Interrupted Attempts (Lifetime) 1 - -   Interrupted Attempts (Past 3 Months) 0 - -   Aborted or Self-Interrupted Attempt (Lifetime) 0 - -   Preparatory Acts or Behavior (Lifetime) 0 - -   Most Recent Attempt Date (No Data) - -   Actual Lethality/Medical Damage Code (Most Recent Attempt) 1 - -   Potential Lethality Code (Most Recent Attempt) 1 - -   Most Lethal Attempt Date (No Data) - -   Actual Lethality/Medical Damage Code (Most Lethal Attempt) 1 - -   Potential Lethality Code (Most Lethal Attempt) 1 - -   Initial/First Attempt Date (No Data) - -   Actual Lethality/Medical Damage Code (Initial/First Attempt) 1 - -   Potential Lethality Code (Initial/First Attempt) 1 - -   Calculated C-SSRS Risk Score (Lifetime/Recent) Moderate Risk - No Risk Indicated        Validity of evaluation is not impacted by presenting factors during interview .   Comments regarding subjective versus objective responses to [place] tool: Pt presentation matches screening results   Environmental or Psychosocial Events: helplessness/hopelessness, new diagnosis of major illness and worsening chronic illness  Chronic Risk Factors: chronic and ongoing sleep difficulties and chronic health problems   Warning Signs: seeking access to means to hurt or kill self, talking or writing about death, dying, or suicide, hopelessness, anxiety, agitation, unable to sleep, sleeping all the time and no reason for living, no sense of purpose in life  Protective Factors: strong bond to family unit, community support, or employment, intact marriage or domestic partnership, responsibilities and duties to others, including pets and children, lives in a responsibly safe and stable environment, good treatment engagement, able to access care without barriers, supportive ongoing medical and mental health care relationships, help seeking, good impulse  control and good problem-solving, coping, and conflict resolution skills  Interpretation of Risk Scoring, Risk Mitigation Interventions and Safety Plan: Risk scoring appears valid. Patient open in session. Safety plan in place.      ASSESSMENT: Current Emotional / Mental Status (status of significant symptoms):   Risk status (Self / Other harm or suicidal ideation)   Patient denies current fears or concerns for personal safety.   Patient denies current or recent suicidal ideation or behaviors.   Patient reports current or recent homicidal ideation or behaviors including See above; safety plan completed   Patient denies current or recent self injurious behavior or ideation.   Patient denies other safety concerns.   Patient reports there has been no change in risk factors since their last session.     Patient reports there has been no change in protective factors since their last session.     A safety and risk management plan has been developed including: When the [place] Suicide Severity Rating Scale has been completed, the patient identifies lifetime history of suicidal ideation and/or Suicidal Behavior that is greater than 10 years.      The recommendation is to provide the Brief Safety Plan: completed    Patient consented to co-developed safety plan. Safety and risk management plan was completed on 12/5/2022. Patient agreed to use safety plan should any safety concerns arise.  A copy was given to the patient.     Appearance:   Phone session: not able to assess.    Eye Contact:   Phone session: not able to assess.    Psychomotor Behavior: Phone session: not able to assess.    Attitude:   Cooperative  Friendly   Orientation:   All   Speech    Rate / Production: Normal/ Responsive Normal     Volume:  Normal    Mood:    Anxious  Depressed  Euphoric    Affect:    Appropriate    Thought Content:  Clear    Thought Form:  Coherent  Logical    Insight:    Fair      Medication Review:   No changes to current psychiatric  medication(s)     Medication Compliance:   Yes     Changes in Health Issues:   Yes: Diabetes, Associated Psychological Distress  Sleep disturbance, Associated Psychological Distress  Weight / dietary issues, No Psychological Distress  Macular degeneration,  Bowel control concerns     Chemical Use Review:   Substance Use: Chemical use reviewed, no active concerns identified      Tobacco Use: No current tobacco use.      Diagnosis:  1. 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 Anxiety disorder, unspecified type        Collateral Reports Completed:   Routed note to PCP    PLAN: (Patient Tasks / Therapist Tasks / Other)  Patient will follow his safety plan. He will use techniques of relaxation. He will engage with family over [place].  He will return in 2 week.      Sharron Goyal, [facility]       ___________________________________________________________________________________________________________________      IndividualIndividual Treatment Plan         Patient's Name: Jose Francisco Gonzalez YOB: 1969       Date of Creation: 12/16/2022   Date Treatment Plan Last Reviewed/Revised: Initial       DSM5 Diagnoses: 296.32 (F33.1) Major Depressive Disorder, Recurrent Episode, Moderate _ and With anxious distress   Psychosocial / Contextual Factors: Multiple hellth concerns including loss of sight/eventual blindness. Relationship concerns    PROMIS (reviewed every 90 days):  10      Referral / Collaboration:    Kin Alejo MD,  PCP    Referral to another professional/service is not indicated at this time..     Anticipated number of session for this episode of care: 3-6 sessions   Anticipation frequency of session: Weekly   Anticipated Duration of each session: 38-52 minutes   Treatment plan will be reviewed in 90 days or when goals have been changed.          MeasurableTreatment Goal(s) related to diagnosis / functional impairment(s)      Goal 1: Patient will increase  awareness of anxiety/depression symptoms and their impact on functioning.  Patient will develop skills to reduce negative  impact.     I will know I've met my goal when I'm not sad or worried about things constantly and have more motivation.            Objective #A (Patient Action)                                         Patient will identify triggers and/or causes that contribute to feelings of anxiety/depression.                 Status: New - Date: 12/16/2022 Until 3/16/2022 or transfer to long term therapy         Intervention(s)           Therapist will teach emotional recognition/identification and process with patient how these emotions  have impacted them.                       Objective #B                 Patient will learn and utilize 3 coping skills to manage anxiety/depression.                 Status: New - Date: 12/16/2022 Until 3/16/2022 or transfer to long term therapy                    Intervention(s)                 Therapist will help patient explore his relationship concers and model open communication.                     Patient has not reviewed nor agreed to the above plan.          Sharron Goyal, [facility]  December 16, 2022

## 2022-12-28 NOTE — TELEPHONE ENCOUNTER
[facility] Call Center    Phone Message    May a detailed message be left on voicemail: yes     Reason for Call: The patients wife called with blood pressure and pulse readings:     12/10/22: 103/89 ; pulse: 79  12/11/22: 150/94 ; pulse: 86  12/12/22: 141/83 ; pulse: 88  12/13/22: 155/86 ; pulse: 90  12/14/22: 135/78 ; pulse: 87  12/15/22: 130/85 ; pulse: 90  12/16/22: 147/89 ; pulse: 85  12/17/22: 157/93 ; pulse: 82  12/18/22: 162/100; pulse: 89  12/19/22: 134/86 ; pulse: 91  12/20/22: 115/66 ; pulse: 90  12/21/22: 116/82 ; pulse: 89  12/22/22: 119/74 ; pulse: 90  12/23/22: 137/84 ; pulse: 85  12/24/22: 134/85 ; pulse: not noted  12/25/22: not taken ; pulse: not noted  12/26/22: not taken ; pulse: not noted  12/27/22: 117/79 ; pulse: not noted    They are also concerned with the amount of water he was told to consume, it does not seem like enough for anybody, especially with diabetes. Please review and advise with patient. If unable to reach patient, contact spouse. Thank you.    Action Taken: Message routed to:  Other: Nephrology    Travel Screening: Not Applicable

## 2022-12-28 NOTE — PROGRESS NOTES
"Clinic Care Coordination Contact  Care Team Conversations    Divya, wife, consent to communicate in EMR, called to apprise PCP and CDE:    Patient had a toothache for 2 days and \"it got really bad\" and he was seen by Periodontist. Divya reports the Periodontist need to \"take out an implant for bone graph\" and now he has \"stitches\" and is on a soft diet.  Patient has take \"one day of Amoxicillin\" and is to alternate between Tylenol/ibuprofen for pain.      Divya reports that patient is \"struggling\" with the \"rapid extra insulin\" and is not sure if it \"goes in or not\" because there's \"insulin left on the needle\".  Divya states patients \"stomach is all bruised up\".  Divya adds, the patient is really down and \"this is a lot for a kid\".    RNCC will relay the message as requested via in-basket messaging.  RNCC will reach out to patient in 3-4 weeks for provider recommendations/suggestions and goal progression.     Soila Mai RN, BSN, PHN  Primary Care / Care Coordinator   [facility]  E-mail [email or web address]   [phone]         "

## 2022-12-30 NOTE — TELEPHONE ENCOUNTER
EP called 12/30 to schedule 3 month (around 3/9/23) follow up with Dr. Hurley with labs prior per checkout notes from 12/9/22. Pt said he wanted to wait to schedule until after he sees the nurse on 1/4/23.

## 2023-01-01 ENCOUNTER — TELEPHONE (OUTPATIENT)
Dept: NEPHROLOGY | Facility: CLINIC | Age: 83
End: 2023-01-01

## 2023-01-01 ENCOUNTER — TRANSITIONAL CARE UNIT VISIT (OUTPATIENT)
Dept: GERIATRICS | Facility: CLINIC | Age: 83
End: 2023-01-01
Payer: COMMERCIAL

## 2023-01-01 ENCOUNTER — DOCUMENTATION ONLY (OUTPATIENT)
Dept: OTHER | Facility: CLINIC | Age: 83
End: 2023-01-01

## 2023-01-01 ENCOUNTER — PATIENT OUTREACH (OUTPATIENT)
Dept: NURSING | Facility: CLINIC | Age: 83
End: 2023-01-01
Payer: COMMERCIAL

## 2023-01-01 ENCOUNTER — NURSE TRIAGE (OUTPATIENT)
Dept: INTERNAL MEDICINE | Facility: CLINIC | Age: 83
End: 2023-01-01

## 2023-01-01 ENCOUNTER — VIRTUAL VISIT (OUTPATIENT)
Dept: PHARMACY | Facility: PHYSICIAN GROUP | Age: 83
End: 2023-01-01
Payer: COMMERCIAL

## 2023-01-01 ENCOUNTER — VIRTUAL VISIT (OUTPATIENT)
Dept: NEPHROLOGY | Facility: CLINIC | Age: 83
End: 2023-01-01
Attending: INTERNAL MEDICINE
Payer: COMMERCIAL

## 2023-01-01 ENCOUNTER — TRANSFERRED RECORDS (OUTPATIENT)
Dept: HEALTH INFORMATION MANAGEMENT | Facility: CLINIC | Age: 83
End: 2023-01-01

## 2023-01-01 ENCOUNTER — PATIENT OUTREACH (OUTPATIENT)
Dept: CARE COORDINATION | Facility: CLINIC | Age: 83
End: 2023-01-01
Payer: COMMERCIAL

## 2023-01-01 ENCOUNTER — LAB REQUISITION (OUTPATIENT)
Dept: LAB | Facility: CLINIC | Age: 83
End: 2023-01-01
Payer: COMMERCIAL

## 2023-01-01 ENCOUNTER — VIRTUAL VISIT (OUTPATIENT)
Dept: EDUCATION SERVICES | Facility: CLINIC | Age: 83
End: 2023-01-01
Payer: COMMERCIAL

## 2023-01-01 ENCOUNTER — OFFICE VISIT (OUTPATIENT)
Dept: INTERNAL MEDICINE | Facility: CLINIC | Age: 83
End: 2023-01-01
Payer: COMMERCIAL

## 2023-01-01 ENCOUNTER — LAB (OUTPATIENT)
Dept: LAB | Facility: CLINIC | Age: 83
End: 2023-01-01
Payer: COMMERCIAL

## 2023-01-01 ENCOUNTER — DOCUMENTATION ONLY (OUTPATIENT)
Dept: INTERNAL MEDICINE | Facility: CLINIC | Age: 83
End: 2023-01-01
Payer: COMMERCIAL

## 2023-01-01 ENCOUNTER — MYC MEDICAL ADVICE (OUTPATIENT)
Dept: INTERNAL MEDICINE | Facility: CLINIC | Age: 83
End: 2023-01-01
Payer: COMMERCIAL

## 2023-01-01 ENCOUNTER — TELEPHONE (OUTPATIENT)
Dept: INTERNAL MEDICINE | Facility: CLINIC | Age: 83
End: 2023-01-01

## 2023-01-01 ENCOUNTER — TELEPHONE (OUTPATIENT)
Dept: INTERNAL MEDICINE | Facility: CLINIC | Age: 83
End: 2023-01-01
Payer: COMMERCIAL

## 2023-01-01 ENCOUNTER — MEDICAL CORRESPONDENCE (OUTPATIENT)
Dept: HEALTH INFORMATION MANAGEMENT | Facility: CLINIC | Age: 83
End: 2023-01-01
Payer: COMMERCIAL

## 2023-01-01 ENCOUNTER — MEDICAL CORRESPONDENCE (OUTPATIENT)
Dept: HEALTH INFORMATION MANAGEMENT | Facility: CLINIC | Age: 83
End: 2023-01-01

## 2023-01-01 ENCOUNTER — DOCUMENTATION ONLY (OUTPATIENT)
Dept: OTHER | Facility: CLINIC | Age: 83
End: 2023-01-01
Payer: COMMERCIAL

## 2023-01-01 ENCOUNTER — HOSPITAL ENCOUNTER (OUTPATIENT)
Facility: CLINIC | Age: 83
Setting detail: OBSERVATION
Discharge: NURSING, REHAB OR LONG-TERM CARE | End: 2023-04-25
Attending: EMERGENCY MEDICINE | Admitting: EMERGENCY MEDICINE
Payer: COMMERCIAL

## 2023-01-01 ENCOUNTER — NURSE TRIAGE (OUTPATIENT)
Dept: NURSING | Facility: CLINIC | Age: 83
End: 2023-01-01
Payer: COMMERCIAL

## 2023-01-01 ENCOUNTER — APPOINTMENT (OUTPATIENT)
Dept: PHYSICAL THERAPY | Facility: CLINIC | Age: 83
End: 2023-01-01
Attending: HOSPITALIST
Payer: COMMERCIAL

## 2023-01-01 ENCOUNTER — APPOINTMENT (OUTPATIENT)
Dept: PHYSICAL THERAPY | Facility: CLINIC | Age: 83
End: 2023-01-01
Payer: COMMERCIAL

## 2023-01-01 ENCOUNTER — PATIENT OUTREACH (OUTPATIENT)
Dept: CARE COORDINATION | Facility: CLINIC | Age: 83
End: 2023-01-01

## 2023-01-01 ENCOUNTER — DOCUMENTATION ONLY (OUTPATIENT)
Dept: BEHAVIORAL HEALTH | Facility: CLINIC | Age: 83
End: 2023-01-01

## 2023-01-01 ENCOUNTER — NURSING HOME VISIT (OUTPATIENT)
Dept: GERIATRICS | Facility: CLINIC | Age: 83
End: 2023-01-01
Payer: COMMERCIAL

## 2023-01-01 ENCOUNTER — TELEPHONE (OUTPATIENT)
Dept: MULTI SPECIALTY CLINIC | Facility: CLINIC | Age: 83
End: 2023-01-01
Payer: COMMERCIAL

## 2023-01-01 ENCOUNTER — TRANSFERRED RECORDS (OUTPATIENT)
Dept: HEALTH INFORMATION MANAGEMENT | Facility: CLINIC | Age: 83
End: 2023-01-01
Payer: COMMERCIAL

## 2023-01-01 ENCOUNTER — APPOINTMENT (OUTPATIENT)
Dept: SPEECH THERAPY | Facility: CLINIC | Age: 83
End: 2023-01-01
Payer: COMMERCIAL

## 2023-01-01 ENCOUNTER — APPOINTMENT (OUTPATIENT)
Dept: SPEECH THERAPY | Facility: CLINIC | Age: 83
End: 2023-01-01
Attending: PHYSICIAN ASSISTANT
Payer: COMMERCIAL

## 2023-01-01 ENCOUNTER — TELEPHONE (OUTPATIENT)
Dept: PHARMACY | Facility: PHYSICIAN GROUP | Age: 83
End: 2023-01-01
Payer: COMMERCIAL

## 2023-01-01 ENCOUNTER — APPOINTMENT (OUTPATIENT)
Dept: GENERAL RADIOLOGY | Facility: CLINIC | Age: 83
End: 2023-01-01
Attending: INTERNAL MEDICINE
Payer: COMMERCIAL

## 2023-01-01 ENCOUNTER — DISCHARGE SUMMARY NURSING HOME (OUTPATIENT)
Dept: GERIATRICS | Facility: CLINIC | Age: 83
End: 2023-01-01
Payer: COMMERCIAL

## 2023-01-01 VITALS
RESPIRATION RATE: 18 BRPM | HEIGHT: 71 IN | DIASTOLIC BLOOD PRESSURE: 61 MMHG | HEART RATE: 98 BPM | TEMPERATURE: 97.7 F | OXYGEN SATURATION: 96 % | SYSTOLIC BLOOD PRESSURE: 120 MMHG | BODY MASS INDEX: 19.01 KG/M2 | WEIGHT: 135.8 LBS

## 2023-01-01 VITALS
OXYGEN SATURATION: 99 % | DIASTOLIC BLOOD PRESSURE: 58 MMHG | RESPIRATION RATE: 18 BRPM | SYSTOLIC BLOOD PRESSURE: 94 MMHG | WEIGHT: 130.4 LBS | HEIGHT: 71 IN | HEART RATE: 105 BPM | BODY MASS INDEX: 18.26 KG/M2 | TEMPERATURE: 98 F

## 2023-01-01 VITALS
HEART RATE: 104 BPM | TEMPERATURE: 97.7 F | SYSTOLIC BLOOD PRESSURE: 132 MMHG | BODY MASS INDEX: 20.04 KG/M2 | OXYGEN SATURATION: 98 % | HEIGHT: 70 IN | RESPIRATION RATE: 18 BRPM | WEIGHT: 140 LBS | DIASTOLIC BLOOD PRESSURE: 73 MMHG

## 2023-01-01 VITALS
RESPIRATION RATE: 18 BRPM | BODY MASS INDEX: 19.33 KG/M2 | HEIGHT: 70 IN | SYSTOLIC BLOOD PRESSURE: 118 MMHG | TEMPERATURE: 98 F | OXYGEN SATURATION: 97 % | DIASTOLIC BLOOD PRESSURE: 70 MMHG | WEIGHT: 135 LBS | HEART RATE: 92 BPM

## 2023-01-01 VITALS
WEIGHT: 147 LBS | RESPIRATION RATE: 18 BRPM | DIASTOLIC BLOOD PRESSURE: 68 MMHG | OXYGEN SATURATION: 97 % | HEIGHT: 71 IN | TEMPERATURE: 98.2 F | BODY MASS INDEX: 20.58 KG/M2 | HEART RATE: 89 BPM | SYSTOLIC BLOOD PRESSURE: 104 MMHG

## 2023-01-01 VITALS
HEART RATE: 113 BPM | OXYGEN SATURATION: 94 % | TEMPERATURE: 97.1 F | WEIGHT: 140 LBS | HEIGHT: 71 IN | SYSTOLIC BLOOD PRESSURE: 134 MMHG | BODY MASS INDEX: 19.6 KG/M2 | RESPIRATION RATE: 16 BRPM | DIASTOLIC BLOOD PRESSURE: 82 MMHG

## 2023-01-01 VITALS
HEART RATE: 100 BPM | RESPIRATION RATE: 18 BRPM | TEMPERATURE: 97.1 F | WEIGHT: 126.5 LBS | BODY MASS INDEX: 17.71 KG/M2 | OXYGEN SATURATION: 93 % | DIASTOLIC BLOOD PRESSURE: 72 MMHG | SYSTOLIC BLOOD PRESSURE: 130 MMHG | HEIGHT: 71 IN

## 2023-01-01 VITALS
BODY MASS INDEX: 19.95 KG/M2 | RESPIRATION RATE: 16 BRPM | HEART RATE: 98 BPM | DIASTOLIC BLOOD PRESSURE: 58 MMHG | WEIGHT: 142.5 LBS | HEIGHT: 71 IN | OXYGEN SATURATION: 98 % | SYSTOLIC BLOOD PRESSURE: 91 MMHG | TEMPERATURE: 97.9 F

## 2023-01-01 VITALS
TEMPERATURE: 97.9 F | DIASTOLIC BLOOD PRESSURE: 71 MMHG | WEIGHT: 135 LBS | HEART RATE: 78 BPM | SYSTOLIC BLOOD PRESSURE: 117 MMHG | RESPIRATION RATE: 18 BRPM | OXYGEN SATURATION: 95 % | BODY MASS INDEX: 18.9 KG/M2 | HEIGHT: 71 IN

## 2023-01-01 VITALS
OXYGEN SATURATION: 94 % | HEIGHT: 71 IN | DIASTOLIC BLOOD PRESSURE: 67 MMHG | TEMPERATURE: 97.6 F | RESPIRATION RATE: 24 BRPM | WEIGHT: 130.4 LBS | BODY MASS INDEX: 18.26 KG/M2 | HEART RATE: 98 BPM | SYSTOLIC BLOOD PRESSURE: 105 MMHG

## 2023-01-01 VITALS
RESPIRATION RATE: 18 BRPM | SYSTOLIC BLOOD PRESSURE: 136 MMHG | HEART RATE: 95 BPM | OXYGEN SATURATION: 97 % | BODY MASS INDEX: 20.04 KG/M2 | HEIGHT: 70 IN | TEMPERATURE: 98.1 F | WEIGHT: 140 LBS | DIASTOLIC BLOOD PRESSURE: 81 MMHG

## 2023-01-01 VITALS
BODY MASS INDEX: 19.44 KG/M2 | SYSTOLIC BLOOD PRESSURE: 108 MMHG | HEIGHT: 70 IN | DIASTOLIC BLOOD PRESSURE: 65 MMHG | TEMPERATURE: 97.9 F | WEIGHT: 135.8 LBS | RESPIRATION RATE: 18 BRPM | HEART RATE: 102 BPM | OXYGEN SATURATION: 97 %

## 2023-01-01 VITALS — HEIGHT: 71 IN | BODY MASS INDEX: 20.02 KG/M2 | WEIGHT: 143 LBS

## 2023-01-01 DIAGNOSIS — Z79.4 TYPE 2 DIABETES MELLITUS WITH DIABETIC NEPHROPATHY, WITH LONG-TERM CURRENT USE OF INSULIN (H): Primary | ICD-10-CM

## 2023-01-01 DIAGNOSIS — F43.23 ADJUSTMENT DISORDER WITH MIXED ANXIETY AND DEPRESSED MOOD: ICD-10-CM

## 2023-01-01 DIAGNOSIS — C43.59 MELANOMA OF BACK (H): ICD-10-CM

## 2023-01-01 DIAGNOSIS — E87.1 HYPONATREMIA: ICD-10-CM

## 2023-01-01 DIAGNOSIS — E11.21 TYPE 2 DIABETES MELLITUS WITH DIABETIC NEPHROPATHY, WITH LONG-TERM CURRENT USE OF INSULIN (H): ICD-10-CM

## 2023-01-01 DIAGNOSIS — R53.1 GENERALIZED WEAKNESS: ICD-10-CM

## 2023-01-01 DIAGNOSIS — M81.0 AGE-RELATED OSTEOPOROSIS WITHOUT CURRENT PATHOLOGICAL FRACTURE: ICD-10-CM

## 2023-01-01 DIAGNOSIS — R63.4 WEIGHT LOSS: ICD-10-CM

## 2023-01-01 DIAGNOSIS — F41.9 ANXIETY AND DEPRESSION: Primary | ICD-10-CM

## 2023-01-01 DIAGNOSIS — E46 UNSPECIFIED PROTEIN-CALORIE MALNUTRITION (H): ICD-10-CM

## 2023-01-01 DIAGNOSIS — I10 BENIGN ESSENTIAL HYPERTENSION: ICD-10-CM

## 2023-01-01 DIAGNOSIS — R53.1 GENERALIZED WEAKNESS: Primary | ICD-10-CM

## 2023-01-01 DIAGNOSIS — F32.A ANXIETY AND DEPRESSION: ICD-10-CM

## 2023-01-01 DIAGNOSIS — B37.0 THRUSH: ICD-10-CM

## 2023-01-01 DIAGNOSIS — E55.9 VITAMIN D DEFICIENCY: ICD-10-CM

## 2023-01-01 DIAGNOSIS — E11.21 TYPE 2 DIABETES MELLITUS WITH DIABETIC NEPHROPATHY, WITH LONG-TERM CURRENT USE OF INSULIN (H): Primary | ICD-10-CM

## 2023-01-01 DIAGNOSIS — E87.1 HYPO-OSMOLALITY AND HYPONATREMIA: ICD-10-CM

## 2023-01-01 DIAGNOSIS — E87.1 HYPO-OSMOLALITY AND HYPONATREMIA: Primary | ICD-10-CM

## 2023-01-01 DIAGNOSIS — R62.7 FAILURE TO THRIVE IN ADULT: ICD-10-CM

## 2023-01-01 DIAGNOSIS — E78.5 HYPERLIPIDEMIA LDL GOAL <100: ICD-10-CM

## 2023-01-01 DIAGNOSIS — E16.2 HYPOGLYCEMIA: ICD-10-CM

## 2023-01-01 DIAGNOSIS — I11.9 HYPERTENSIVE HEART DISEASE WITHOUT HEART FAILURE: ICD-10-CM

## 2023-01-01 DIAGNOSIS — Z79.4 TYPE 2 DIABETES MELLITUS WITH DIABETIC NEPHROPATHY, WITH LONG-TERM CURRENT USE OF INSULIN (H): ICD-10-CM

## 2023-01-01 DIAGNOSIS — E87.1 CHRONIC HYPONATREMIA: ICD-10-CM

## 2023-01-01 DIAGNOSIS — M06.9 RHEUMATOID ARTHRITIS, INVOLVING UNSPECIFIED SITE, UNSPECIFIED WHETHER RHEUMATOID FACTOR PRESENT (H): ICD-10-CM

## 2023-01-01 DIAGNOSIS — Z78.9 TAKES DIETARY SUPPLEMENTS: ICD-10-CM

## 2023-01-01 DIAGNOSIS — F41.9 ANXIETY AND DEPRESSION: ICD-10-CM

## 2023-01-01 DIAGNOSIS — I10 PRIMARY HYPERTENSION: ICD-10-CM

## 2023-01-01 DIAGNOSIS — R13.10 DYSPHAGIA, UNSPECIFIED TYPE: ICD-10-CM

## 2023-01-01 DIAGNOSIS — E22.2 SYNDROME OF INAPPROPRIATE ANTIDIURETIC HORMONE SECRETION (H): ICD-10-CM

## 2023-01-01 DIAGNOSIS — M80.00XD AGE-RELATED OSTEOPOROSIS WITH CURRENT PATHOLOGICAL FRACTURE WITH ROUTINE HEALING: ICD-10-CM

## 2023-01-01 DIAGNOSIS — R29.6 FALLS FREQUENTLY: ICD-10-CM

## 2023-01-01 DIAGNOSIS — R53.81 PHYSICAL DECONDITIONING: ICD-10-CM

## 2023-01-01 DIAGNOSIS — I10 HYPERTENSION, UNSPECIFIED TYPE: ICD-10-CM

## 2023-01-01 DIAGNOSIS — E22.2 SYNDROME OF INAPPROPRIATE ANTIDIURETIC HORMONE SECRETION (H): Primary | ICD-10-CM

## 2023-01-01 DIAGNOSIS — E43 SEVERE PROTEIN-CALORIE MALNUTRITION (H): ICD-10-CM

## 2023-01-01 DIAGNOSIS — H35.30 MACULAR DEGENERATION, UNSPECIFIED LATERALITY, UNSPECIFIED TYPE: ICD-10-CM

## 2023-01-01 DIAGNOSIS — Z77.21 EXPOSURE TO BLOOD OR BODY FLUID: ICD-10-CM

## 2023-01-01 DIAGNOSIS — Z01.818 ENCOUNTER FOR OTHER PREPROCEDURAL EXAMINATION: ICD-10-CM

## 2023-01-01 DIAGNOSIS — Z53.9 DIAGNOSIS NOT YET DEFINED: Primary | ICD-10-CM

## 2023-01-01 DIAGNOSIS — K08.9 TOOTH DISORDER: ICD-10-CM

## 2023-01-01 DIAGNOSIS — E87.1 HYPONATREMIA: Primary | ICD-10-CM

## 2023-01-01 DIAGNOSIS — F32.A DEPRESSION, UNSPECIFIED: ICD-10-CM

## 2023-01-01 DIAGNOSIS — K08.9 DENTAL DISORDER: ICD-10-CM

## 2023-01-01 DIAGNOSIS — F33.1 MODERATE EPISODE OF RECURRENT MAJOR DEPRESSIVE DISORDER (H): ICD-10-CM

## 2023-01-01 DIAGNOSIS — E78.5 HYPERLIPIDEMIA, UNSPECIFIED HYPERLIPIDEMIA TYPE: ICD-10-CM

## 2023-01-01 DIAGNOSIS — I95.9 HYPOTENSION, UNSPECIFIED: ICD-10-CM

## 2023-01-01 DIAGNOSIS — R26.9 ABNORMAL GAIT: ICD-10-CM

## 2023-01-01 DIAGNOSIS — E87.1 HYPOSMOLALITY SYNDROME: Primary | ICD-10-CM

## 2023-01-01 DIAGNOSIS — R13.10 DYSPHAGIA, UNSPECIFIED TYPE: Primary | ICD-10-CM

## 2023-01-01 DIAGNOSIS — R00.0 TACHYCARDIA: ICD-10-CM

## 2023-01-01 DIAGNOSIS — K59.00 CONSTIPATION, UNSPECIFIED CONSTIPATION TYPE: ICD-10-CM
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
## 2023-01-01 DIAGNOSIS — F32.A ANXIETY AND DEPRESSION: Primary | ICD-10-CM

## 2023-01-01 DIAGNOSIS — I10 PRIMARY HYPERTENSION: Primary | ICD-10-CM
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
## 2023-01-01 DIAGNOSIS — Z77.21 EXPOSURE TO BLOOD OR BODY FLUID: Primary | ICD-10-CM

## 2023-01-01 DIAGNOSIS — K04.7 TOOTH INFECTION: ICD-10-CM

## 2023-01-01 DIAGNOSIS — F32.A DEPRESSION, UNSPECIFIED DEPRESSION TYPE: ICD-10-CM

## 2023-01-01 DIAGNOSIS — R63.0 POOR APPETITE: ICD-10-CM

## 2023-01-01 DIAGNOSIS — S32.010G COMPRESSION FRACTURE OF L1 VERTEBRA WITH DELAYED HEALING, SUBSEQUENT ENCOUNTER: Primary | ICD-10-CM

## 2023-01-01 LAB
ALBUMIN SERPL BCG-MCNC: 3.8 G/DL (ref 3.5–5.2)
ALBUMIN SERPL BCG-MCNC: 3.9 G/DL (ref 3.5–5.2)
ALBUMIN SERPL BCG-MCNC: 3.9 G/DL (ref 3.5–5.2)
ALBUMIN SERPL BCG-MCNC: 4 G/DL (ref 3.5–5.2)
ALBUMIN SERPL BCG-MCNC: 4.1 G/DL (ref 3.5–5.2)
ALBUMIN UR-MCNC: NEGATIVE MG/DL
ALP SERPL-CCNC: 73 U/L (ref 40–129)
ALP SERPL-CCNC: 73 U/L (ref 40–129)
ALP SERPL-CCNC: 78 U/L (ref 40–129)
ALT SERPL W P-5'-P-CCNC: 10 U/L (ref 10–50)
ALT SERPL W P-5'-P-CCNC: 11 U/L (ref 10–50)
ALT SERPL W P-5'-P-CCNC: 11 U/L (ref 10–50)
ANION GAP SERPL CALCULATED.3IONS-SCNC: 10 MMOL/L (ref 7–15)
ANION GAP SERPL CALCULATED.3IONS-SCNC: 10 MMOL/L (ref 7–15)
ANION GAP SERPL CALCULATED.3IONS-SCNC: 11 MMOL/L (ref 7–15)
ANION GAP SERPL CALCULATED.3IONS-SCNC: 12 MMOL/L (ref 7–15)
ANION GAP SERPL CALCULATED.3IONS-SCNC: 14 MMOL/L (ref 7–15)
ANION GAP SERPL CALCULATED.3IONS-SCNC: 6 MMOL/L (ref 3–14)
ANION GAP SERPL CALCULATED.3IONS-SCNC: 7 MMOL/L (ref 7–15)
ANION GAP SERPL CALCULATED.3IONS-SCNC: 8 MMOL/L (ref 7–15)
APPEARANCE UR: CLEAR
AST SERPL W P-5'-P-CCNC: 17 U/L (ref 10–50)
AST SERPL W P-5'-P-CCNC: 25 U/L (ref 10–50)
AST SERPL W P-5'-P-CCNC: 34 U/L (ref 10–50)
ATRIAL RATE - MUSE: 107 BPM
ATRIAL RATE - MUSE: 96 BPM
BASOPHILS # BLD AUTO: 0 10E3/UL (ref 0–0.2)
BASOPHILS NFR BLD AUTO: 1 %
BILIRUB SERPL-MCNC: 0.4 MG/DL
BILIRUB SERPL-MCNC: 0.5 MG/DL
BILIRUB SERPL-MCNC: 0.6 MG/DL
BILIRUB UR QL STRIP: NEGATIVE
BUN SERPL-MCNC: 11.4 MG/DL (ref 8–23)
BUN SERPL-MCNC: 13 MG/DL (ref 8–23)
BUN SERPL-MCNC: 13.6 MG/DL (ref 8–23)
BUN SERPL-MCNC: 20.8 MG/DL (ref 8–23)
BUN SERPL-MCNC: 23.1 MG/DL (ref 8–23)
BUN SERPL-MCNC: 23.2 MG/DL (ref 8–23)
BUN SERPL-MCNC: 23.6 MG/DL (ref 8–23)
BUN SERPL-MCNC: 24.6 MG/DL (ref 8–23)
BUN SERPL-MCNC: 27.4 MG/DL (ref 8–23)
BUN SERPL-MCNC: 29 MG/DL (ref 7–30)
BUN SERPL-MCNC: 29.7 MG/DL (ref 8–23)
BUN SERPL-MCNC: 37.5 MG/DL (ref 8–23)
CALCIUM SERPL-MCNC: 10.4 MG/DL (ref 8.8–10.2)
CALCIUM SERPL-MCNC: 8.8 MG/DL (ref 8.8–10.2)
CALCIUM SERPL-MCNC: 8.9 MG/DL (ref 8.8–10.2)
CALCIUM SERPL-MCNC: 9 MG/DL (ref 8.5–10.1)
CALCIUM SERPL-MCNC: 9 MG/DL (ref 8.8–10.2)
CALCIUM SERPL-MCNC: 9.1 MG/DL (ref 8.8–10.2)
CALCIUM SERPL-MCNC: 9.4 MG/DL (ref 8.8–10.2)
CALCIUM SERPL-MCNC: 9.7 MG/DL (ref 8.8–10.2)
CALCIUM SERPL-MCNC: 9.7 MG/DL (ref 8.8–10.2)
CALCIUM SERPL-MCNC: 9.8 MG/DL (ref 8.8–10.2)
CALCIUM SERPL-MCNC: 9.9 MG/DL (ref 8.8–10.2)
CALCIUM SERPL-MCNC: 9.9 MG/DL (ref 8.8–10.2)
CHLORIDE BLD-SCNC: 94 MMOL/L (ref 94–109)
CHLORIDE SERPL-SCNC: 87 MMOL/L (ref 98–107)
CHLORIDE SERPL-SCNC: 88 MMOL/L (ref 98–107)
CHLORIDE SERPL-SCNC: 88 MMOL/L (ref 98–107)
CHLORIDE SERPL-SCNC: 89 MMOL/L (ref 98–107)
CHLORIDE SERPL-SCNC: 90 MMOL/L (ref 98–107)
CHLORIDE SERPL-SCNC: 91 MMOL/L (ref 98–107)
CHLORIDE SERPL-SCNC: 91 MMOL/L (ref 98–107)
CHLORIDE SERPL-SCNC: 92 MMOL/L (ref 98–107)
CHLORIDE SERPL-SCNC: 92 MMOL/L (ref 98–107)
CHLORIDE SERPL-SCNC: 94 MMOL/L (ref 98–107)
CHLORIDE SERPL-SCNC: 96 MMOL/L (ref 98–107)
CHOLEST SERPL-MCNC: 141 MG/DL
CO2 SERPL-SCNC: 29 MMOL/L (ref 20–32)
COLOR UR AUTO: YELLOW
CREAT SERPL-MCNC: 0.55 MG/DL (ref 0.67–1.17)
CREAT SERPL-MCNC: 0.56 MG/DL (ref 0.67–1.17)
CREAT SERPL-MCNC: 0.59 MG/DL (ref 0.67–1.17)
CREAT SERPL-MCNC: 0.61 MG/DL (ref 0.67–1.17)
CREAT SERPL-MCNC: 0.62 MG/DL (ref 0.67–1.17)
CREAT SERPL-MCNC: 0.63 MG/DL (ref 0.67–1.17)
CREAT SERPL-MCNC: 0.66 MG/DL (ref 0.67–1.17)
CREAT SERPL-MCNC: 0.67 MG/DL (ref 0.67–1.17)
CREAT SERPL-MCNC: 0.7 MG/DL (ref 0.66–1.25)
CREAT SERPL-MCNC: 0.71 MG/DL (ref 0.67–1.17)
CREAT SERPL-MCNC: 0.74 MG/DL (ref 0.67–1.17)
CREAT SERPL-MCNC: 0.75 MG/DL (ref 0.67–1.17)
CREAT UR-MCNC: 58.8 MG/DL
CREATININE (EXTERNAL): 0.77 MG/DL (ref 0.7–1.3)
DEPRECATED CALCIDIOL+CALCIFEROL SERPL-MC: 37 UG/L (ref 20–75)
DEPRECATED HCO3 PLAS-SCNC: 23 MMOL/L (ref 22–29)
DEPRECATED HCO3 PLAS-SCNC: 24 MMOL/L (ref 22–29)
DEPRECATED HCO3 PLAS-SCNC: 25 MMOL/L (ref 22–29)
DEPRECATED HCO3 PLAS-SCNC: 26 MMOL/L (ref 22–29)
DEPRECATED HCO3 PLAS-SCNC: 26 MMOL/L (ref 22–29)
DEPRECATED HCO3 PLAS-SCNC: 27 MMOL/L (ref 22–29)
DEPRECATED HCO3 PLAS-SCNC: 33 MMOL/L (ref 22–29)
DIASTOLIC BLOOD PRESSURE - MUSE: NORMAL MMHG
DIASTOLIC BLOOD PRESSURE - MUSE: NORMAL MMHG
EOSINOPHIL # BLD AUTO: 0 10E3/UL (ref 0–0.7)
EOSINOPHIL NFR BLD AUTO: 1 %
ERYTHROCYTE [DISTWIDTH] IN BLOOD BY AUTOMATED COUNT: 13.6 % (ref 10–15)
ERYTHROCYTE [DISTWIDTH] IN BLOOD BY AUTOMATED COUNT: 14 % (ref 10–15)
ERYTHROCYTE [DISTWIDTH] IN BLOOD BY AUTOMATED COUNT: 14 % (ref 10–15)
ERYTHROCYTE [DISTWIDTH] IN BLOOD BY AUTOMATED COUNT: 14.1 % (ref 10–15)
ERYTHROCYTE [DISTWIDTH] IN BLOOD BY AUTOMATED COUNT: 14.1 % (ref 10–15)
GFR ESTIMATED (EXTERNAL): >60 ML/MN
GFR SERPL CREATININE-BSD FRML MDRD: 89 ML/MIN/1.73M2
GFR SERPL CREATININE-BSD FRML MDRD: 89 ML/MIN/1.73M2
GFR SERPL CREATININE-BSD FRML MDRD: 90 ML/MIN/1.73M2
GFR SERPL CREATININE-BSD FRML MDRD: >90 ML/MIN/1.73M2
GLUCOSE (EXTERNAL): 255 MG/DL (ref 70–100)
GLUCOSE BLD-MCNC: 196 MG/DL (ref 70–99)
GLUCOSE BLDC GLUCOMTR-MCNC: 127 MG/DL (ref 70–99)
GLUCOSE BLDC GLUCOMTR-MCNC: 144 MG/DL (ref 70–99)
GLUCOSE BLDC GLUCOMTR-MCNC: 156 MG/DL (ref 70–99)
GLUCOSE BLDC GLUCOMTR-MCNC: 165 MG/DL (ref 70–99)
GLUCOSE BLDC GLUCOMTR-MCNC: 172 MG/DL (ref 70–99)
GLUCOSE BLDC GLUCOMTR-MCNC: 178 MG/DL (ref 70–99)
GLUCOSE BLDC GLUCOMTR-MCNC: 179 MG/DL (ref 70–99)
GLUCOSE BLDC GLUCOMTR-MCNC: 186 MG/DL (ref 70–99)
GLUCOSE BLDC GLUCOMTR-MCNC: 190 MG/DL (ref 70–99)
GLUCOSE BLDC GLUCOMTR-MCNC: 194 MG/DL (ref 70–99)
GLUCOSE BLDC GLUCOMTR-MCNC: 198 MG/DL (ref 70–99)
GLUCOSE BLDC GLUCOMTR-MCNC: 199 MG/DL (ref 70–99)
GLUCOSE BLDC GLUCOMTR-MCNC: 208 MG/DL (ref 70–99)
GLUCOSE BLDC GLUCOMTR-MCNC: 209 MG/DL (ref 70–99)
GLUCOSE BLDC GLUCOMTR-MCNC: 225 MG/DL (ref 70–99)
GLUCOSE BLDC GLUCOMTR-MCNC: 225 MG/DL (ref 70–99)
GLUCOSE BLDC GLUCOMTR-MCNC: 239 MG/DL (ref 70–99)
GLUCOSE BLDC GLUCOMTR-MCNC: 240 MG/DL (ref 70–99)
GLUCOSE BLDC GLUCOMTR-MCNC: 241 MG/DL (ref 70–99)
GLUCOSE BLDC GLUCOMTR-MCNC: 252 MG/DL (ref 70–99)
GLUCOSE BLDC GLUCOMTR-MCNC: 256 MG/DL (ref 70–99)
GLUCOSE BLDC GLUCOMTR-MCNC: 270 MG/DL (ref 70–99)
GLUCOSE BLDC GLUCOMTR-MCNC: 272 MG/DL (ref 70–99)
GLUCOSE BLDC GLUCOMTR-MCNC: 294 MG/DL (ref 70–99)
GLUCOSE BLDC GLUCOMTR-MCNC: 298 MG/DL (ref 70–99)
GLUCOSE BLDC GLUCOMTR-MCNC: 298 MG/DL (ref 70–99)
GLUCOSE BLDC GLUCOMTR-MCNC: 361 MG/DL (ref 70–99)
GLUCOSE SERPL-MCNC: 119 MG/DL (ref 70–99)
GLUCOSE SERPL-MCNC: 137 MG/DL (ref 70–99)
GLUCOSE SERPL-MCNC: 142 MG/DL (ref 70–99)
GLUCOSE SERPL-MCNC: 154 MG/DL (ref 70–99)
GLUCOSE SERPL-MCNC: 169 MG/DL (ref 70–99)
GLUCOSE SERPL-MCNC: 196 MG/DL (ref 70–99)
GLUCOSE SERPL-MCNC: 200 MG/DL (ref 70–99)
GLUCOSE SERPL-MCNC: 214 MG/DL (ref 70–99)
GLUCOSE SERPL-MCNC: 286 MG/DL (ref 70–99)
GLUCOSE SERPL-MCNC: 290 MG/DL (ref 70–99)
GLUCOSE SERPL-MCNC: 303 MG/DL (ref 70–99)
GLUCOSE UR STRIP-MCNC: >=1000 MG/DL
HBA1C MFR BLD: 6.9 % (ref 0–5.6)
HBV SURFACE AB SERPL IA-ACNC: 0.18 M[IU]/ML
HBV SURFACE AB SERPL IA-ACNC: NONREACTIVE M[IU]/ML
HBV SURFACE AG SERPL QL IA: NONREACTIVE
HCT VFR BLD AUTO: 35.5 % (ref 40–53)
HCT VFR BLD AUTO: 36 % (ref 40–53)
HCT VFR BLD AUTO: 37 % (ref 40–53)
HCT VFR BLD AUTO: 37.8 % (ref 40–53)
HCT VFR BLD AUTO: 38.2 % (ref 40–53)
HCV AB SERPL QL IA: NONREACTIVE
HDLC SERPL-MCNC: 47 MG/DL
HGB BLD-MCNC: 11.8 G/DL (ref 13.3–17.7)
HGB BLD-MCNC: 12.2 G/DL (ref 13.3–17.7)
HGB BLD-MCNC: 12.3 G/DL (ref 13.3–17.7)
HGB BLD-MCNC: 12.5 G/DL (ref 13.3–17.7)
HGB BLD-MCNC: 12.7 G/DL (ref 13.3–17.7)
HGB UR QL STRIP: NEGATIVE
HIV 1+2 AB+HIV1 P24 AG SERPL QL IA: NONREACTIVE
HIV 1+2 AB+HIV1P24 AG SERPLBLD IA.RAPID: NON REACTIVE
HIV 1+2 AB+HIV1P24 AG SERPLBLD IA.RAPID: NON REACTIVE
HIV INTERPRETATION: NORMAL
IMM GRANULOCYTES # BLD: 0 10E3/UL
IMM GRANULOCYTES NFR BLD: 0 %
INTERPRETATION ECG - MUSE: NORMAL
INTERPRETATION ECG - MUSE: NORMAL
KETONES UR STRIP-MCNC: ABNORMAL MG/DL
LDLC SERPL CALC-MCNC: 75 MG/DL
LEUKOCYTE ESTERASE UR QL STRIP: NEGATIVE
LYMPHOCYTES # BLD AUTO: 0.6 10E3/UL (ref 0.8–5.3)
LYMPHOCYTES NFR BLD AUTO: 10 %
MCH RBC QN AUTO: 29.4 PG (ref 26.5–33)
MCH RBC QN AUTO: 29.7 PG (ref 26.5–33)
MCH RBC QN AUTO: 29.7 PG (ref 26.5–33)
MCH RBC QN AUTO: 29.8 PG (ref 26.5–33)
MCH RBC QN AUTO: 29.9 PG (ref 26.5–33)
MCHC RBC AUTO-ENTMCNC: 32.7 G/DL (ref 31.5–36.5)
MCHC RBC AUTO-ENTMCNC: 33.2 G/DL (ref 31.5–36.5)
MCHC RBC AUTO-ENTMCNC: 33.2 G/DL (ref 31.5–36.5)
MCHC RBC AUTO-ENTMCNC: 33.6 G/DL (ref 31.5–36.5)
MCHC RBC AUTO-ENTMCNC: 33.9 G/DL (ref 31.5–36.5)
MCV RBC AUTO: 88 FL (ref 78–100)
MCV RBC AUTO: 89 FL (ref 78–100)
MCV RBC AUTO: 89 FL (ref 78–100)
MCV RBC AUTO: 90 FL (ref 78–100)
MCV RBC AUTO: 90 FL (ref 78–100)
MICROALBUMIN UR-MCNC: <12 MG/L
MICROALBUMIN/CREAT UR: NORMAL MG/G{CREAT}
MONOCYTES # BLD AUTO: 0.5 10E3/UL (ref 0–1.3)
MONOCYTES NFR BLD AUTO: 9 %
MUCOUS THREADS #/AREA URNS LPF: PRESENT /LPF
NEUTROPHILS # BLD AUTO: 4.8 10E3/UL (ref 1.6–8.3)
NEUTROPHILS NFR BLD AUTO: 79 %
NITRATE UR QL: NEGATIVE
NONHDLC SERPL-MCNC: 94 MG/DL
NRBC # BLD AUTO: 0 10E3/UL
NRBC BLD AUTO-RTO: 0 /100
P AXIS - MUSE: 60 DEGREES
P AXIS - MUSE: 61 DEGREES
PH UR STRIP: 6 [PH] (ref 5–7)
PHOSPHATE SERPL-MCNC: 3.1 MG/DL (ref 2.5–4.5)
PHOSPHATE SERPL-MCNC: 3.5 MG/DL (ref 2.5–4.5)
PHOSPHATE SERPL-MCNC: 4.4 MG/DL (ref 2.5–4.5)
PLATELET # BLD AUTO: 240 10E3/UL (ref 150–450)
PLATELET # BLD AUTO: 275 10E3/UL (ref 150–450)
PLATELET # BLD AUTO: 287 10E3/UL (ref 150–450)
PLATELET # BLD AUTO: 297 10E3/UL (ref 150–450)
PLATELET # BLD AUTO: 340 10E3/UL (ref 150–450)
POTASSIUM (EXTERNAL): 4.2 MMOL/L (ref 3.5–5.1)
POTASSIUM BLD-SCNC: 4.4 MMOL/L (ref 3.4–5.3)
POTASSIUM SERPL-SCNC: 3.9 MMOL/L (ref 3.4–5.3)
POTASSIUM SERPL-SCNC: 3.9 MMOL/L (ref 3.4–5.3)
POTASSIUM SERPL-SCNC: 4.1 MMOL/L (ref 3.4–5.3)
POTASSIUM SERPL-SCNC: 4.3 MMOL/L (ref 3.4–5.3)
POTASSIUM SERPL-SCNC: 4.4 MMOL/L (ref 3.4–5.3)
POTASSIUM SERPL-SCNC: 4.4 MMOL/L (ref 3.4–5.3)
POTASSIUM SERPL-SCNC: 4.5 MMOL/L (ref 3.4–5.3)
POTASSIUM SERPL-SCNC: 4.6 MMOL/L (ref 3.4–5.3)
POTASSIUM SERPL-SCNC: 4.6 MMOL/L (ref 3.4–5.3)
POTASSIUM SERPL-SCNC: 4.9 MMOL/L (ref 3.4–5.3)
PR INTERVAL - MUSE: 196 MS
PR INTERVAL - MUSE: 202 MS
PROT SERPL-MCNC: 7.1 G/DL (ref 6.4–8.3)
PROT SERPL-MCNC: 7.9 G/DL (ref 6.4–8.3)
PROT SERPL-MCNC: 8 G/DL (ref 6.4–8.3)
QRS DURATION - MUSE: 72 MS
QRS DURATION - MUSE: 74 MS
QT - MUSE: 332 MS
QT - MUSE: 362 MS
QTC - MUSE: 443 MS
QTC - MUSE: 457 MS
R AXIS - MUSE: 32 DEGREES
R AXIS - MUSE: 54 DEGREES
RBC # BLD AUTO: 3.95 10E6/UL (ref 4.4–5.9)
RBC # BLD AUTO: 4.1 10E6/UL (ref 4.4–5.9)
RBC # BLD AUTO: 4.14 10E6/UL (ref 4.4–5.9)
RBC # BLD AUTO: 4.25 10E6/UL (ref 4.4–5.9)
RBC # BLD AUTO: 4.27 10E6/UL (ref 4.4–5.9)
RBC URINE: <1 /HPF
SODIUM SERPL-SCNC: 125 MMOL/L (ref 136–145)
SODIUM SERPL-SCNC: 125 MMOL/L (ref 136–145)
SODIUM SERPL-SCNC: 126 MMOL/L (ref 136–145)
SODIUM SERPL-SCNC: 127 MMOL/L (ref 136–145)
SODIUM SERPL-SCNC: 128 MMOL/L (ref 136–145)
SODIUM SERPL-SCNC: 128 MMOL/L (ref 136–145)
SODIUM SERPL-SCNC: 129 MMOL/L (ref 133–144)
SODIUM SERPL-SCNC: 129 MMOL/L (ref 136–145)
SODIUM SERPL-SCNC: 130 MMOL/L (ref 136–145)
SP GR UR STRIP: 1.02 (ref 1–1.03)
SYSTOLIC BLOOD PRESSURE - MUSE: NORMAL MMHG
SYSTOLIC BLOOD PRESSURE - MUSE: NORMAL MMHG
T AXIS - MUSE: 64 DEGREES
T AXIS - MUSE: 69 DEGREES
TRIGL SERPL-MCNC: 93 MG/DL
TSH SERPL DL<=0.005 MIU/L-ACNC: 1.32 UIU/ML (ref 0.3–4.2)
TSH SERPL DL<=0.005 MIU/L-ACNC: 2.09 UIU/ML (ref 0.3–4.2)
UROBILINOGEN UR STRIP-MCNC: NORMAL MG/DL
VENTRICULAR RATE- MUSE: 107 BPM
VENTRICULAR RATE- MUSE: 96 BPM
VIT B12 SERPL-MCNC: 712 PG/ML (ref 232–1245)
WBC # BLD AUTO: 10.5 10E3/UL (ref 4–11)
WBC # BLD AUTO: 11.2 10E3/UL (ref 4–11)
WBC # BLD AUTO: 12.1 10E3/UL (ref 4–11)
WBC # BLD AUTO: 6 10E3/UL (ref 4–11)
WBC # BLD AUTO: 6.2 10E3/UL (ref 4–11)
WBC URINE: <1 /HPF

## 2023-01-01 PROCEDURE — 80048 BASIC METABOLIC PNL TOTAL CA: CPT | Mod: ORL | Performed by: NURSE PRACTITIONER

## 2023-01-01 PROCEDURE — 36415 COLL VENOUS BLD VENIPUNCTURE: CPT | Performed by: EMERGENCY MEDICINE

## 2023-01-01 PROCEDURE — 85027 COMPLETE CBC AUTOMATED: CPT | Performed by: NURSE PRACTITIONER

## 2023-01-01 PROCEDURE — 85027 COMPLETE CBC AUTOMATED: CPT | Performed by: INTERNAL MEDICINE

## 2023-01-01 PROCEDURE — 250N000012 HC RX MED GY IP 250 OP 636 PS 637: Performed by: INTERNAL MEDICINE

## 2023-01-01 PROCEDURE — 250N000013 HC RX MED GY IP 250 OP 250 PS 637

## 2023-01-01 PROCEDURE — 250N000013 HC RX MED GY IP 250 OP 250 PS 637: Performed by: HOSPITALIST

## 2023-01-01 PROCEDURE — 96361 HYDRATE IV INFUSION ADD-ON: CPT

## 2023-01-01 PROCEDURE — 84443 ASSAY THYROID STIM HORMONE: CPT | Performed by: EMERGENCY MEDICINE

## 2023-01-01 PROCEDURE — G0378 HOSPITAL OBSERVATION PER HR: HCPCS

## 2023-01-01 PROCEDURE — 36415 COLL VENOUS BLD VENIPUNCTURE: CPT | Performed by: INTERNAL MEDICINE

## 2023-01-01 PROCEDURE — 36415 COLL VENOUS BLD VENIPUNCTURE: CPT | Performed by: NURSE PRACTITIONER

## 2023-01-01 PROCEDURE — 36415 COLL VENOUS BLD VENIPUNCTURE: CPT | Performed by: HOSPITALIST

## 2023-01-01 PROCEDURE — 36415 COLL VENOUS BLD VENIPUNCTURE: CPT | Mod: ORL | Performed by: INTERNAL MEDICINE

## 2023-01-01 PROCEDURE — 99606 MTMS BY PHARM EST 15 MIN: CPT | Performed by: PHARMACIST

## 2023-01-01 PROCEDURE — 84132 ASSAY OF SERUM POTASSIUM: CPT | Performed by: INTERNAL MEDICINE

## 2023-01-01 PROCEDURE — P9603 ONE-WAY ALLOW PRORATED MILES: HCPCS | Mod: ORL | Performed by: INTERNAL MEDICINE

## 2023-01-01 PROCEDURE — 99207 PR NO CHARGE LOS: CPT | Performed by: DIETITIAN, REGISTERED

## 2023-01-01 PROCEDURE — 99607 MTMS BY PHARM ADDL 15 MIN: CPT | Performed by: PHARMACIST

## 2023-01-01 PROCEDURE — 99221 1ST HOSP IP/OBS SF/LOW 40: CPT | Mod: AI | Performed by: HOSPITALIST

## 2023-01-01 PROCEDURE — 97530 THERAPEUTIC ACTIVITIES: CPT | Mod: GP

## 2023-01-01 PROCEDURE — 85027 COMPLETE CBC AUTOMATED: CPT | Performed by: HOSPITALIST

## 2023-01-01 PROCEDURE — 85027 COMPLETE CBC AUTOMATED: CPT

## 2023-01-01 PROCEDURE — G0108 DIAB MANAGE TRN  PER INDIV: HCPCS | Mod: 95 | Performed by: DIETITIAN, REGISTERED

## 2023-01-01 PROCEDURE — 82962 GLUCOSE BLOOD TEST: CPT

## 2023-01-01 PROCEDURE — 93005 ELECTROCARDIOGRAM TRACING: CPT

## 2023-01-01 PROCEDURE — 99316 NF DSCHRG MGMT 30 MIN+: CPT | Performed by: NURSE PRACTITIONER

## 2023-01-01 PROCEDURE — 99309 SBSQ NF CARE MODERATE MDM 30: CPT | Performed by: NURSE PRACTITIONER

## 2023-01-01 PROCEDURE — 86803 HEPATITIS C AB TEST: CPT

## 2023-01-01 PROCEDURE — 74230 X-RAY XM SWLNG FUNCJ C+: CPT

## 2023-01-01 PROCEDURE — 258N000003 HC RX IP 258 OP 636: Performed by: PHYSICIAN ASSISTANT

## 2023-01-01 PROCEDURE — 82043 UR ALBUMIN QUANTITATIVE: CPT

## 2023-01-01 PROCEDURE — 84100 ASSAY OF PHOSPHORUS: CPT

## 2023-01-01 PROCEDURE — 99305 1ST NF CARE MODERATE MDM 35: CPT | Performed by: INTERNAL MEDICINE

## 2023-01-01 PROCEDURE — 99222 1ST HOSP IP/OBS MODERATE 55: CPT

## 2023-01-01 PROCEDURE — 80048 BASIC METABOLIC PNL TOTAL CA: CPT | Performed by: NURSE PRACTITIONER

## 2023-01-01 PROCEDURE — 92526 ORAL FUNCTION THERAPY: CPT | Mod: GN | Performed by: SPEECH-LANGUAGE PATHOLOGIST

## 2023-01-01 PROCEDURE — 258N000003 HC RX IP 258 OP 636: Performed by: EMERGENCY MEDICINE

## 2023-01-01 PROCEDURE — 250N000013 HC RX MED GY IP 250 OP 250 PS 637: Performed by: EMERGENCY MEDICINE

## 2023-01-01 PROCEDURE — 97116 GAIT TRAINING THERAPY: CPT | Mod: GP

## 2023-01-01 PROCEDURE — 80053 COMPREHEN METABOLIC PANEL: CPT

## 2023-01-01 PROCEDURE — 92526 ORAL FUNCTION THERAPY: CPT | Mod: GN | Performed by: REHABILITATION PRACTITIONER

## 2023-01-01 PROCEDURE — 84443 ASSAY THYROID STIM HORMONE: CPT | Mod: ORL | Performed by: NURSE PRACTITIONER

## 2023-01-01 PROCEDURE — 99605 MTMS BY PHARM NP 15 MIN: CPT | Performed by: PHARMACIST

## 2023-01-01 PROCEDURE — 92526 ORAL FUNCTION THERAPY: CPT | Mod: GN

## 2023-01-01 PROCEDURE — 87806 HIV AG W/HIV1&2 ANTB W/OPTIC: CPT

## 2023-01-01 PROCEDURE — 86706 HEP B SURFACE ANTIBODY: CPT | Mod: DBP

## 2023-01-01 PROCEDURE — 99310 SBSQ NF CARE HIGH MDM 45: CPT | Performed by: NURSE PRACTITIONER

## 2023-01-01 PROCEDURE — 84295 ASSAY OF SERUM SODIUM: CPT | Performed by: INTERNAL MEDICINE

## 2023-01-01 PROCEDURE — 80048 BASIC METABOLIC PNL TOTAL CA: CPT | Performed by: INTERNAL MEDICINE

## 2023-01-01 PROCEDURE — G0463 HOSPITAL OUTPT CLINIC VISIT: HCPCS | Mod: PN,GT | Performed by: INTERNAL MEDICINE

## 2023-01-01 PROCEDURE — 99213 OFFICE O/P EST LOW 20 MIN: CPT | Mod: VID | Performed by: INTERNAL MEDICINE

## 2023-01-01 PROCEDURE — 250N000012 HC RX MED GY IP 250 OP 636 PS 637: Performed by: HOSPITALIST

## 2023-01-01 PROCEDURE — 93010 ELECTROCARDIOGRAM REPORT: CPT | Performed by: INTERNAL MEDICINE

## 2023-01-01 PROCEDURE — 80061 LIPID PANEL: CPT

## 2023-01-01 PROCEDURE — 99214 OFFICE O/P EST MOD 30 MIN: CPT | Performed by: INTERNAL MEDICINE

## 2023-01-01 PROCEDURE — 87340 HEPATITIS B SURFACE AG IA: CPT

## 2023-01-01 PROCEDURE — 83036 HEMOGLOBIN GLYCOSYLATED A1C: CPT

## 2023-01-01 PROCEDURE — 97161 PT EVAL LOW COMPLEX 20 MIN: CPT | Mod: GP

## 2023-01-01 PROCEDURE — 80069 RENAL FUNCTION PANEL: CPT | Performed by: INTERNAL MEDICINE

## 2023-01-01 PROCEDURE — 80048 BASIC METABOLIC PNL TOTAL CA: CPT | Mod: ORL | Performed by: INTERNAL MEDICINE

## 2023-01-01 PROCEDURE — 99233 SBSQ HOSP IP/OBS HIGH 50: CPT | Performed by: PHYSICIAN ASSISTANT

## 2023-01-01 PROCEDURE — 82306 VITAMIN D 25 HYDROXY: CPT | Mod: ORL | Performed by: NURSE PRACTITIONER

## 2023-01-01 PROCEDURE — 97803 MED NUTRITION INDIV SUBSEQ: CPT | Mod: 95 | Performed by: DIETITIAN, REGISTERED

## 2023-01-01 PROCEDURE — 99285 EMERGENCY DEPT VISIT HI MDM: CPT | Mod: 25

## 2023-01-01 PROCEDURE — 96360 HYDRATION IV INFUSION INIT: CPT

## 2023-01-01 PROCEDURE — 92611 MOTION FLUOROSCOPY/SWALLOW: CPT | Mod: GN

## 2023-01-01 PROCEDURE — 92610 EVALUATE SWALLOWING FUNCTION: CPT | Mod: GN

## 2023-01-01 PROCEDURE — 87389 HIV-1 AG W/HIV-1&-2 AB AG IA: CPT

## 2023-01-01 PROCEDURE — 99214 OFFICE O/P EST MOD 30 MIN: CPT | Mod: VID | Performed by: INTERNAL MEDICINE

## 2023-01-01 PROCEDURE — 250N000013 HC RX MED GY IP 250 OP 250 PS 637: Performed by: INTERNAL MEDICINE

## 2023-01-01 PROCEDURE — 82570 ASSAY OF URINE CREATININE: CPT

## 2023-01-01 PROCEDURE — 36415 COLL VENOUS BLD VENIPUNCTURE: CPT

## 2023-01-01 PROCEDURE — G0180 MD CERTIFICATION HHA PATIENT: HCPCS | Performed by: INTERNAL MEDICINE

## 2023-01-01 PROCEDURE — 81001 URINALYSIS AUTO W/SCOPE: CPT | Performed by: EMERGENCY MEDICINE

## 2023-01-01 PROCEDURE — 80048 BASIC METABOLIC PNL TOTAL CA: CPT | Performed by: HOSPITALIST

## 2023-01-01 PROCEDURE — 80053 COMPREHEN METABOLIC PANEL: CPT | Performed by: EMERGENCY MEDICINE

## 2023-01-01 PROCEDURE — 258N000003 HC RX IP 258 OP 636: Performed by: NURSE PRACTITIONER

## 2023-01-01 PROCEDURE — 82607 VITAMIN B-12: CPT | Mod: ORL | Performed by: NURSE PRACTITIONER

## 2023-01-01 PROCEDURE — 99239 HOSP IP/OBS DSCHRG MGMT >30: CPT | Performed by: INTERNAL MEDICINE

## 2023-01-01 PROCEDURE — 99231 SBSQ HOSP IP/OBS SF/LOW 25: CPT | Performed by: NURSE PRACTITIONER

## 2023-01-01 PROCEDURE — 85025 COMPLETE CBC W/AUTO DIFF WBC: CPT | Performed by: EMERGENCY MEDICINE

## 2023-01-01 PROCEDURE — P9604 ONE-WAY ALLOW PRORATED TRIP: HCPCS | Mod: ORL | Performed by: NURSE PRACTITIONER

## 2023-01-01 PROCEDURE — 99232 SBSQ HOSP IP/OBS MODERATE 35: CPT | Performed by: INTERNAL MEDICINE

## 2023-01-01 PROCEDURE — 80051 ELECTROLYTE PANEL: CPT | Performed by: EMERGENCY MEDICINE

## 2023-01-01 PROCEDURE — 36415 COLL VENOUS BLD VENIPUNCTURE: CPT | Mod: ORL | Performed by: NURSE PRACTITIONER

## 2023-01-01 PROCEDURE — 82040 ASSAY OF SERUM ALBUMIN: CPT | Mod: ORL | Performed by: INTERNAL MEDICINE

## 2023-01-01 RX ORDER — HYDROXYZINE HYDROCHLORIDE 25 MG/1
25 TABLET, FILM COATED ORAL 3 TIMES DAILY PRN
Status: DISCONTINUED | OUTPATIENT
Start: 2023-01-01 | End: 2023-01-01 | Stop reason: HOSPADM

## 2023-01-01 RX ORDER — INSULIN LISPRO 100 [IU]/ML
3-5 INJECTION, SOLUTION INTRAVENOUS; SUBCUTANEOUS
Qty: 15 ML | Refills: 11
Start: 2023-01-01 | End: 2023-01-01

## 2023-01-01 RX ORDER — BISACODYL 5 MG/1
5 TABLET, DELAYED RELEASE ORAL DAILY PRN
COMMUNITY
Start: 2023-01-01

## 2023-01-01 RX ORDER — ATORVASTATIN CALCIUM 20 MG/1
20 TABLET, FILM COATED ORAL DAILY
Status: DISCONTINUED | OUTPATIENT
Start: 2023-01-01 | End: 2023-01-01 | Stop reason: HOSPADM

## 2023-01-01 RX ORDER — INSULIN GLARGINE 100 [IU]/ML
INJECTION, SOLUTION SUBCUTANEOUS
Qty: 15 ML | Refills: 1 | COMMUNITY
Start: 2023-01-01 | End: 2023-01-01

## 2023-01-01 RX ORDER — INSULIN LISPRO 100 [IU]/ML
3-5 INJECTION, SOLUTION INTRAVENOUS; SUBCUTANEOUS
Qty: 15 ML | Refills: 3 | Status: SHIPPED | OUTPATIENT
Start: 2023-01-01 | End: 2023-01-01

## 2023-01-01 RX ORDER — INSULIN GLARGINE 100 [IU]/ML
INJECTION, SOLUTION SUBCUTANEOUS
Qty: 15 ML | Refills: 1 | Status: SHIPPED | OUTPATIENT
Start: 2023-01-01 | End: 2023-01-01

## 2023-01-01 RX ORDER — INSULIN LISPRO 100 [IU]/ML
3-6 INJECTION, SOLUTION INTRAVENOUS; SUBCUTANEOUS
Qty: 15 ML | Refills: 3 | COMMUNITY
Start: 2023-01-01 | End: 2023-01-01

## 2023-01-01 RX ORDER — SODIUM CHLORIDE 9 MG/ML
INJECTION, SOLUTION INTRAVENOUS CONTINUOUS
Status: DISCONTINUED | OUTPATIENT
Start: 2023-01-01 | End: 2023-01-01

## 2023-01-01 RX ORDER — INSULIN GLARGINE 100 [IU]/ML
INJECTION, SOLUTION SUBCUTANEOUS
Qty: 15 ML | Refills: 1
Start: 2023-01-01 | End: 2023-01-01

## 2023-01-01 RX ORDER — CHOLECALCIFEROL (VITAMIN D3) 50 MCG
1 TABLET ORAL DAILY
COMMUNITY
End: 2023-01-01

## 2023-01-01 RX ORDER — ONDANSETRON 4 MG/1
4 TABLET, ORALLY DISINTEGRATING ORAL EVERY 6 HOURS PRN
Status: DISCONTINUED | OUTPATIENT
Start: 2023-01-01 | End: 2023-01-01 | Stop reason: HOSPADM

## 2023-01-01 RX ORDER — MIRTAZAPINE 15 MG/1
15 TABLET, FILM COATED ORAL AT BEDTIME
Status: DISCONTINUED | OUTPATIENT
Start: 2023-01-01 | End: 2023-01-01

## 2023-01-01 RX ORDER — NICOTINE POLACRILEX 4 MG
15-30 LOZENGE BUCCAL
Status: DISCONTINUED | OUTPATIENT
Start: 2023-01-01 | End: 2023-01-01

## 2023-01-01 RX ORDER — INSULIN LISPRO 100 [IU]/ML
3-6 INJECTION, SOLUTION INTRAVENOUS; SUBCUTANEOUS
Qty: 15 ML | Refills: 3 | Status: SHIPPED | OUTPATIENT
Start: 2023-01-01 | End: 2023-01-01

## 2023-01-01 RX ORDER — AMOXICILLIN 250 MG
1 CAPSULE ORAL 2 TIMES DAILY PRN
Status: DISCONTINUED | OUTPATIENT
Start: 2023-01-01 | End: 2023-01-01 | Stop reason: HOSPADM

## 2023-01-01 RX ORDER — METOPROLOL SUCCINATE 25 MG/1
25 TABLET, EXTENDED RELEASE ORAL DAILY
Start: 2023-01-01 | End: 2023-01-01

## 2023-01-01 RX ORDER — METOPROLOL SUCCINATE 50 MG/1
50 TABLET, EXTENDED RELEASE ORAL DAILY
Status: DISCONTINUED | OUTPATIENT
Start: 2023-01-01 | End: 2023-01-01 | Stop reason: HOSPADM

## 2023-01-01 RX ORDER — UREA
15 POWDER (GRAM) MISCELLANEOUS 2 TIMES DAILY
COMMUNITY
Start: 2023-01-01 | End: 2023-01-01

## 2023-01-01 RX ORDER — INSULIN LISPRO 100 [IU]/ML
3-5 INJECTION, SOLUTION INTRAVENOUS; SUBCUTANEOUS
COMMUNITY
Start: 2023-01-01 | End: 2023-01-01

## 2023-01-01 RX ORDER — INSULIN ASPART 100 [IU]/ML
4 INJECTION, SOLUTION INTRAVENOUS; SUBCUTANEOUS
COMMUNITY
Start: 2023-01-01 | End: 2023-01-01

## 2023-01-01 RX ORDER — INSULIN LISPRO 100 [IU]/ML
2 INJECTION, SOLUTION INTRAVENOUS; SUBCUTANEOUS
Qty: 15 ML | Refills: 3
Start: 2023-01-01 | End: 2023-01-01

## 2023-01-01 RX ORDER — DAPAGLIFLOZIN 5 MG/1
5 TABLET, FILM COATED ORAL DAILY
Qty: 90 TABLET | Refills: 1 | Status: SHIPPED | OUTPATIENT
Start: 2023-01-01 | End: 2023-01-01

## 2023-01-01 RX ORDER — BISACODYL 10 MG
10 SUPPOSITORY, RECTAL RECTAL DAILY PRN
Status: DISCONTINUED | OUTPATIENT
Start: 2023-01-01 | End: 2023-01-01 | Stop reason: HOSPADM

## 2023-01-01 RX ORDER — ACETAMINOPHEN 500 MG
1000 TABLET ORAL EVERY 4 HOURS PRN
Status: ON HOLD | COMMUNITY
End: 2023-01-01

## 2023-01-01 RX ORDER — INSULIN GLARGINE 100 [IU]/ML
INJECTION, SOLUTION SUBCUTANEOUS
Qty: 15 ML | Refills: 1 | Status: ON HOLD
Start: 2023-01-01 | End: 2023-01-01

## 2023-01-01 RX ORDER — ACETAMINOPHEN 325 MG/1
650 TABLET ORAL EVERY 6 HOURS PRN
Status: DISCONTINUED | OUTPATIENT
Start: 2023-01-01 | End: 2023-01-01 | Stop reason: HOSPADM

## 2023-01-01 RX ORDER — SODIUM CHLORIDE 9 MG/ML
INJECTION, SOLUTION INTRAVENOUS ONCE
Status: COMPLETED | OUTPATIENT
Start: 2023-01-01 | End: 2023-01-01

## 2023-01-01 RX ORDER — ONDANSETRON 2 MG/ML
4 INJECTION INTRAMUSCULAR; INTRAVENOUS EVERY 6 HOURS PRN
Status: DISCONTINUED | OUTPATIENT
Start: 2023-01-01 | End: 2023-01-01 | Stop reason: HOSPADM

## 2023-01-01 RX ORDER — AMOXICILLIN 250 MG
2 CAPSULE ORAL 2 TIMES DAILY PRN
Status: DISCONTINUED | OUTPATIENT
Start: 2023-01-01 | End: 2023-01-01 | Stop reason: HOSPADM

## 2023-01-01 RX ORDER — OLANZAPINE 10 MG/2ML
5 INJECTION, POWDER, FOR SOLUTION INTRAMUSCULAR DAILY PRN
Status: DISCONTINUED | OUTPATIENT
Start: 2023-01-01 | End: 2023-01-01 | Stop reason: HOSPADM

## 2023-01-01 RX ORDER — SODIUM CHLORIDE 1 G/1
1 TABLET ORAL DAILY
Qty: 30 TABLET | Refills: 3 | Status: ON HOLD | OUTPATIENT
Start: 2023-01-01 | End: 2023-01-01

## 2023-01-01 RX ORDER — INSULIN GLARGINE 100 [IU]/ML
6 INJECTION, SOLUTION SUBCUTANEOUS EVERY MORNING
COMMUNITY
Start: 2023-01-01 | End: 2023-01-01

## 2023-01-01 RX ORDER — INSULIN LISPRO 100 [IU]/ML
3 INJECTION, SOLUTION INTRAVENOUS; SUBCUTANEOUS
Qty: 15 ML | Refills: 3 | Status: ON HOLD
Start: 2023-01-01 | End: 2023-01-01

## 2023-01-01 RX ORDER — ACETAMINOPHEN 650 MG/1
650 SUPPOSITORY RECTAL EVERY 6 HOURS PRN
Status: DISCONTINUED | OUTPATIENT
Start: 2023-01-01 | End: 2023-01-01 | Stop reason: HOSPADM

## 2023-01-01 RX ORDER — ACETAMINOPHEN 325 MG/1
650 TABLET ORAL ONCE
Status: COMPLETED | OUTPATIENT
Start: 2023-01-01 | End: 2023-01-01

## 2023-01-01 RX ORDER — HYDROXYZINE HYDROCHLORIDE 10 MG/1
10 TABLET, FILM COATED ORAL 3 TIMES DAILY PRN
COMMUNITY
End: 2023-01-01

## 2023-01-01 RX ORDER — ACETAMINOPHEN 500 MG
1000 TABLET ORAL EVERY 8 HOURS PRN
DISCHARGE
Start: 2023-01-01

## 2023-01-01 RX ORDER — DAPAGLIFLOZIN 5 MG/1
5 TABLET, FILM COATED ORAL DAILY
Qty: 90 TABLET | Refills: 1
Start: 2023-01-01 | End: 2023-01-01

## 2023-01-01 RX ORDER — NICOTINE POLACRILEX 4 MG
15-30 LOZENGE BUCCAL
Status: DISCONTINUED | OUTPATIENT
Start: 2023-01-01 | End: 2023-01-01 | Stop reason: HOSPADM

## 2023-01-01 RX ORDER — POLYETHYLENE GLYCOL 3350 17 G/17G
17 POWDER, FOR SOLUTION ORAL DAILY PRN
Status: DISCONTINUED | OUTPATIENT
Start: 2023-01-01 | End: 2023-01-01 | Stop reason: HOSPADM

## 2023-01-01 RX ORDER — DEXTROSE MONOHYDRATE 25 G/50ML
25-50 INJECTION, SOLUTION INTRAVENOUS
Status: DISCONTINUED | OUTPATIENT
Start: 2023-01-01 | End: 2023-01-01

## 2023-01-01 RX ORDER — CALCIUM POLYCARBOPHIL 625 MG
1 TABLET ORAL EVERY MORNING
COMMUNITY

## 2023-01-01 RX ORDER — LIDOCAINE 40 MG/G
CREAM TOPICAL
Status: DISCONTINUED | OUTPATIENT
Start: 2023-01-01 | End: 2023-01-01 | Stop reason: HOSPADM

## 2023-01-01 RX ORDER — DEXTROSE MONOHYDRATE 25 G/50ML
25-50 INJECTION, SOLUTION INTRAVENOUS
Status: DISCONTINUED | OUTPATIENT
Start: 2023-01-01 | End: 2023-01-01 | Stop reason: HOSPADM

## 2023-01-01 RX ADMIN — METOPROLOL SUCCINATE 50 MG: 50 TABLET, EXTENDED RELEASE ORAL at 09:06

## 2023-01-01 RX ADMIN — ATORVASTATIN CALCIUM 20 MG: 20 TABLET, FILM COATED ORAL at 08:31

## 2023-01-01 RX ADMIN — ACETAMINOPHEN 650 MG: 325 TABLET, FILM COATED ORAL at 07:08

## 2023-01-01 RX ADMIN — SODIUM CHLORIDE: 9 INJECTION, SOLUTION INTRAVENOUS at 12:32

## 2023-01-01 RX ADMIN — MIRTAZAPINE 15 MG: 15 TABLET, FILM COATED ORAL at 23:22

## 2023-01-01 RX ADMIN — HYDROXYZINE HYDROCHLORIDE 25 MG: 25 TABLET, FILM COATED ORAL at 10:18

## 2023-01-01 RX ADMIN — MIRTAZAPINE 22.5 MG: 15 TABLET, FILM COATED ORAL at 22:10

## 2023-01-01 RX ADMIN — SODIUM CHLORIDE: 9 INJECTION, SOLUTION INTRAVENOUS at 00:34

## 2023-01-01 RX ADMIN — ATORVASTATIN CALCIUM 20 MG: 20 TABLET, FILM COATED ORAL at 09:06

## 2023-01-01 RX ADMIN — HYDROXYZINE HYDROCHLORIDE 25 MG: 25 TABLET, FILM COATED ORAL at 13:05

## 2023-01-01 RX ADMIN — METOPROLOL SUCCINATE 50 MG: 50 TABLET, EXTENDED RELEASE ORAL at 08:26

## 2023-01-01 RX ADMIN — INSULIN GLARGINE 5 UNITS: 100 INJECTION, SOLUTION SUBCUTANEOUS at 09:16

## 2023-01-01 RX ADMIN — MIRTAZAPINE 22.5 MG: 15 TABLET, FILM COATED ORAL at 21:58

## 2023-01-01 RX ADMIN — METOPROLOL SUCCINATE 50 MG: 50 TABLET, EXTENDED RELEASE ORAL at 09:11

## 2023-01-01 RX ADMIN — INSULIN GLARGINE 5 UNITS: 100 INJECTION, SOLUTION SUBCUTANEOUS at 09:15

## 2023-01-01 RX ADMIN — METOPROLOL SUCCINATE 50 MG: 50 TABLET, EXTENDED RELEASE ORAL at 08:31

## 2023-01-01 RX ADMIN — MIRTAZAPINE 22.5 MG: 15 TABLET, FILM COATED ORAL at 21:53

## 2023-01-01 RX ADMIN — ATORVASTATIN CALCIUM 20 MG: 20 TABLET, FILM COATED ORAL at 09:15

## 2023-01-01 RX ADMIN — ACETAMINOPHEN 650 MG: 325 TABLET, FILM COATED ORAL at 01:11

## 2023-01-01 RX ADMIN — SODIUM CHLORIDE 500 ML: 9 INJECTION, SOLUTION INTRAVENOUS at 10:21

## 2023-01-01 RX ADMIN — HYDROXYZINE HYDROCHLORIDE 25 MG: 25 TABLET, FILM COATED ORAL at 09:11

## 2023-01-01 RX ADMIN — ACETAMINOPHEN 650 MG: 325 TABLET, FILM COATED ORAL at 05:32

## 2023-01-01 RX ADMIN — METOPROLOL SUCCINATE 50 MG: 50 TABLET, EXTENDED RELEASE ORAL at 09:15

## 2023-01-01 RX ADMIN — QUETIAPINE FUMARATE 12.5 MG: 25 TABLET ORAL at 11:31

## 2023-01-01 RX ADMIN — MIRTAZAPINE 22.5 MG: 15 TABLET, FILM COATED ORAL at 21:55

## 2023-01-01 RX ADMIN — ATORVASTATIN CALCIUM 20 MG: 20 TABLET, FILM COATED ORAL at 09:11

## 2023-01-01 RX ADMIN — INSULIN ASPART 1 UNITS: 100 INJECTION, SOLUTION INTRAVENOUS; SUBCUTANEOUS at 21:55

## 2023-01-01 RX ADMIN — SODIUM CHLORIDE 1000 ML: 9 INJECTION, SOLUTION INTRAVENOUS at 13:42

## 2023-01-01 RX ADMIN — ATORVASTATIN CALCIUM 20 MG: 20 TABLET, FILM COATED ORAL at 08:06

## 2023-01-01 RX ADMIN — MIRTAZAPINE 22.5 MG: 15 TABLET, FILM COATED ORAL at 21:30

## 2023-01-01 RX ADMIN — INSULIN ASPART 1 UNITS: 100 INJECTION, SOLUTION INTRAVENOUS; SUBCUTANEOUS at 21:52

## 2023-01-01 RX ADMIN — SODIUM CHLORIDE: 9 INJECTION, SOLUTION INTRAVENOUS at 13:10

## 2023-01-01 RX ADMIN — HYDROXYZINE HYDROCHLORIDE 25 MG: 25 TABLET, FILM COATED ORAL at 08:35

## 2023-01-01 RX ADMIN — ATORVASTATIN CALCIUM 20 MG: 20 TABLET, FILM COATED ORAL at 08:26

## 2023-01-01 RX ADMIN — INSULIN GLARGINE 5 UNITS: 100 INJECTION, SOLUTION SUBCUTANEOUS at 14:17

## 2023-01-01 RX ADMIN — INSULIN ASPART 1 UNITS: 100 INJECTION, SOLUTION INTRAVENOUS; SUBCUTANEOUS at 21:57

## 2023-01-01 RX ADMIN — METOPROLOL SUCCINATE 50 MG: 50 TABLET, EXTENDED RELEASE ORAL at 08:06

## 2023-01-01 RX ADMIN — INSULIN GLARGINE 5 UNITS: 100 INJECTION, SOLUTION SUBCUTANEOUS at 08:38

## 2023-01-01 RX ADMIN — INSULIN GLARGINE 5 UNITS: 100 INJECTION, SOLUTION SUBCUTANEOUS at 08:26

## 2023-01-01 RX ADMIN — HYDROXYZINE HYDROCHLORIDE 25 MG: 25 TABLET, FILM COATED ORAL at 01:11

## 2023-01-01 RX ADMIN — ACETAMINOPHEN 650 MG: 325 TABLET ORAL at 13:42

## 2023-01-01 ASSESSMENT — ACTIVITIES OF DAILY LIVING (ADL)
ADLS_ACUITY_SCORE: 43
ADLS_ACUITY_SCORE: 35
ADLS_ACUITY_SCORE: 43
ADLS_ACUITY_SCORE: 35
ADLS_ACUITY_SCORE: 35
ADLS_ACUITY_SCORE: 43
DEPENDENT_IADLS:: CLEANING;COOKING;LAUNDRY;SHOPPING;MEAL PREPARATION;MEDICATION MANAGEMENT;TRANSPORTATION
ADLS_ACUITY_SCORE: 39
ADLS_ACUITY_SCORE: 44
ADLS_ACUITY_SCORE: 39
ADLS_ACUITY_SCORE: 44
ADLS_ACUITY_SCORE: 37
ADLS_ACUITY_SCORE: 35
ADLS_ACUITY_SCORE: 44
ADLS_ACUITY_SCORE: 44
ADLS_ACUITY_SCORE: 35
ADLS_ACUITY_SCORE: 44
ADLS_ACUITY_SCORE: 35
ADLS_ACUITY_SCORE: 35
DEPENDENT_IADLS:: CLEANING;COOKING;LAUNDRY;SHOPPING;MEAL PREPARATION;MEDICATION MANAGEMENT;MONEY MANAGEMENT;TRANSPORTATION;INCONTINENCE
DEPENDENT_IADLS:: CLEANING;COOKING;LAUNDRY;SHOPPING;MEAL PREPARATION;MEDICATION MANAGEMENT;TRANSPORTATION
ADLS_ACUITY_SCORE: 39
ADLS_ACUITY_SCORE: 44
ADLS_ACUITY_SCORE: 35
ADLS_ACUITY_SCORE: 44
ADLS_ACUITY_SCORE: 40
ADLS_ACUITY_SCORE: 35
ADLS_ACUITY_SCORE: 37
DEPENDENT_IADLS:: CLEANING;COOKING;LAUNDRY;SHOPPING;MEAL PREPARATION;MEDICATION MANAGEMENT;TRANSPORTATION
ADLS_ACUITY_SCORE: 43
ADLS_ACUITY_SCORE: 37
ADLS_ACUITY_SCORE: 35
ADLS_ACUITY_SCORE: 44
ADLS_ACUITY_SCORE: 35
ADLS_ACUITY_SCORE: 35
ADLS_ACUITY_SCORE: 36
ADLS_ACUITY_SCORE: 44
ADLS_ACUITY_SCORE: 36
ADLS_ACUITY_SCORE: 44
DEPENDENT_IADLS:: CLEANING;COOKING;LAUNDRY;SHOPPING;MEAL PREPARATION;MEDICATION MANAGEMENT;TRANSPORTATION
ADLS_ACUITY_SCORE: 37
ADLS_ACUITY_SCORE: 36
ADLS_ACUITY_SCORE: 44
ADLS_ACUITY_SCORE: 36
ADLS_ACUITY_SCORE: 44
ADLS_ACUITY_SCORE: 43
ADLS_ACUITY_SCORE: 43
ADLS_ACUITY_SCORE: 35
ADLS_ACUITY_SCORE: 44
ADLS_ACUITY_SCORE: 39
ADLS_ACUITY_SCORE: 35
ADLS_ACUITY_SCORE: 44
ADLS_ACUITY_SCORE: 43
ADLS_ACUITY_SCORE: 44
ADLS_ACUITY_SCORE: 40
ADLS_ACUITY_SCORE: 35
ADLS_ACUITY_SCORE: 44
ADLS_ACUITY_SCORE: 44
ADLS_ACUITY_SCORE: 36
ADLS_ACUITY_SCORE: 37
ADLS_ACUITY_SCORE: 40
ADLS_ACUITY_SCORE: 44
ADLS_ACUITY_SCORE: 43
DEPENDENT_IADLS:: CLEANING;COOKING;LAUNDRY;SHOPPING;MEAL PREPARATION;MEDICATION MANAGEMENT;TRANSPORTATION
ADLS_ACUITY_SCORE: 40
ADLS_ACUITY_SCORE: 43
ADLS_ACUITY_SCORE: 40
ADLS_ACUITY_SCORE: 44

## 2023-01-01 ASSESSMENT — PATIENT HEALTH QUESTIONNAIRE - PHQ9
SUM OF ALL RESPONSES TO PHQ QUESTIONS 1-9: 8
SUM OF ALL RESPONSES TO PHQ QUESTIONS 1-9: 11
SUM OF ALL RESPONSES TO PHQ QUESTIONS 1-9: 8
SUM OF ALL RESPONSES TO PHQ QUESTIONS 1-9: 11

## 2023-01-01 ASSESSMENT — COLUMBIA-SUICIDE SEVERITY RATING SCALE - C-SSRS
2. HAVE YOU ACTUALLY HAD ANY THOUGHTS OF KILLING YOURSELF IN THE PAST MONTH?: NO
3. HAVE YOU BEEN THINKING ABOUT HOW YOU MIGHT KILL YOURSELF?: NO
1. IN THE PAST MONTH, HAVE YOU WISHED YOU WERE DEAD OR WISHED YOU COULD GO TO SLEEP AND NOT WAKE UP?: NO

## 2023-01-01 ASSESSMENT — ANXIETY QUESTIONNAIRES
IF YOU CHECKED OFF ANY PROBLEMS ON THIS QUESTIONNAIRE, HOW DIFFICULT HAVE THESE PROBLEMS MADE IT FOR YOU TO DO YOUR WORK, TAKE CARE OF THINGS AT HOME, OR GET ALONG WITH OTHER PEOPLE: SOMEWHAT DIFFICULT
GAD7 TOTAL SCORE: 6
8. IF YOU CHECKED OFF ANY PROBLEMS, HOW DIFFICULT HAVE THESE MADE IT FOR YOU TO DO YOUR WORK, TAKE CARE OF THINGS AT HOME, OR GET ALONG WITH OTHER PEOPLE?: SOMEWHAT DIFFICULT
7. FEELING AFRAID AS IF SOMETHING AWFUL MIGHT HAPPEN: SEVERAL DAYS
6. BECOMING EASILY ANNOYED OR IRRITABLE: SEVERAL DAYS
7. FEELING AFRAID AS IF SOMETHING AWFUL MIGHT HAPPEN: SEVERAL DAYS
GAD7 TOTAL SCORE: 6
4. TROUBLE RELAXING: SEVERAL DAYS
1. FEELING NERVOUS, ANXIOUS, OR ON EDGE: SEVERAL DAYS
3. WORRYING TOO MUCH ABOUT DIFFERENT THINGS: SEVERAL DAYS
2. NOT BEING ABLE TO STOP OR CONTROL WORRYING: SEVERAL DAYS
GAD7 TOTAL SCORE: 6
5. BEING SO RESTLESS THAT IT IS HARD TO SIT STILL: NOT AT ALL

## 2023-01-01 ASSESSMENT — ENCOUNTER SYMPTOMS
HEADACHES: 0
APPETITE CHANGE: 1
DYSPHORIC MOOD: 1
VOMITING: 0
ABDOMINAL PAIN: 0
WEAKNESS: 1

## 2023-01-01 ASSESSMENT — PAIN SCALES - GENERAL
PAINLEVEL: NO PAIN (0)

## 2023-01-02 NOTE — PATIENT INSTRUCTIONS
PLAN  1. Continue current insulin regimen  2. Eat 3 meals + 3 snacks: snacks= 1 ensure or no more than 30g cho  3. Follow up with Yolette in a few weeks

## 2023-01-02 NOTE — PROGRESS NOTES
Diabetes Self-Management Education & Support    Presents for:      Type of Service: Telephone Visit    Originating Location (Patient Location): Home  Distant Location (Provider Location): Home  Mode of Communication:  Telephone    Telephone Visit Start Time: 2:00  Telephone Visit End Time (telephone visit stop time): 2:45    How would patient like to obtain AVS? Alycia    Assessment Type:   ASSESSMENT:  Shar started mealtime insulin (doses: 5/3/3) after talking with MD last week with elevated BG. He is working on eating the best he can. He unfortunately had a mouth infection and is now on antibiotics causing diarrhea. He is trying to follow BRAT diet and already has a relatively low fiber diet. Did suggest he could try a probiotic but he only has 4 days left of antibiotics. The infection is also making it more difficult to eat. Reviewed importance of eating frequently with meals containing most of cho as these can be covered with insulin. He will do either an nEsure or other food with ~30g cho for snacks. His BG are above goals but he did not want to adjust insulin today, wants to give it some more time. Reviewed that we want him to be eating to promote weight gain and we will adjust insulin in the future if BG continue to be elevated. Also discussed if BG are not well controlled it is more difficult to gain weight and can also cause fatigue which he reports having. We have f/u in a few weeks.    Patient's most recent   Lab Results   Component Value Date    A1C 7.4 11/21/2022    A1C 8.6 06/10/2021     is meeting goal of <8.0    Diabetes knowledge and skills assessment:   Patient is knowledgeable in diabetes management concepts related to: Healthy Eating, Monitoring and Taking Medication    Continue education with the following diabetes management concepts: Healthy Eating, Being Active, Monitoring, Taking Medication, Problem Solving, Reducing Risks and Healthy Coping    Based on learning assessment above, most  "appropriate setting for further diabetes education would be: Individual setting.      PLAN  1. Continue current insulin regimen  2. Eat 3 meals + 3 snacks: snacks= 1 ensure or no more than 30g cho  3. Follow up with Yolette in a few weeks    Topics to cover at upcoming visits: Healthy Eating, Monitoring, Taking Medication, Problem Solving, Reducing Risks and Healthy Coping    Follow-up: 2 weeks    See Care Plan for co-developed, patient-state behavior change goals.  AVS provided for patient today.    Education Materials Provided:  No new materials provided today      SUBJECTIVE/OBJECTIVE:     Cultural Influences/Ethnic Background:  Not  or     Diabetes Symptoms & Complications:  Patient Problem List and Family Medical History reviewed for relevant medical history, current medical status, and diabetes risk factors.    Vitals:  There were no vitals taken for this visit.  Estimated body mass index is 20.32 kg/m  as calculated from the following:    Height as of 11/26/22: 1.803 m (5' 11\").    Weight as of 12/9/22: 66.1 kg (145 lb 11.2 oz).   Last 3 BP:   BP Readings from Last 3 Encounters:   12/09/22 (!) 144/81   11/26/22 (!) 165/90   11/21/22 (!) 146/84       History   Smoking Status     Former     Packs/day: 1.00     Years: 20.00     Types: Cigarettes     Quit date: 1/23/1993   Smokeless Tobacco     Never       Labs:  Lab Results   Component Value Date    A1C 7.4 11/21/2022    A1C 8.6 06/10/2021     Lab Results   Component Value Date     12/09/2022     11/26/2022     11/26/2022     06/10/2021     Lab Results   Component Value Date    LDL 43 04/14/2022    LDL 76 06/10/2021     HDL Cholesterol   Date Value Ref Range Status   06/10/2021 53 >39 mg/dL Final     Direct Measure HDL   Date Value Ref Range Status   04/14/2022 65 >=40 mg/dL Final   ]  GFR Estimate   Date Value Ref Range Status   12/09/2022 89 >60 mL/min/1.73m2 Final     Comment:     Effective December 21, 2021 eGFRcr in " adults is calculated using the 2021 CKD-EPI creatinine equation which includes age and gender (Adama alvarez al., [place], DOI: 10.1056/ZRVOls5566533)   06/10/2021 83 >60 mL/min/[1.73_m2] Final     Comment:     Non  GFR Calc  Starting 12/18/2018, serum creatinine based estimated GFR (eGFR) will be   calculated using the Chronic Kidney Disease Epidemiology Collaboration   (CKD-EPI) equation.       GFR Estimate If Black   Date Value Ref Range Status   06/10/2021 >90 >60 mL/min/[1.73_m2] Final     Comment:      GFR Calc  Starting 12/18/2018, serum creatinine based estimated GFR (eGFR) will be   calculated using the Chronic Kidney Disease Epidemiology Collaboration   (CKD-EPI) equation.       Lab Results   Component Value Date    CR 0.76 12/09/2022    CR 0.79 06/10/2021     No results found for: MICROALBUMIN    Healthy Eating:  Healthy Eating Assessed Today: Yes  Breakfast: 8AM-cream of wheat OR malt o meal OR oatmeal OR yogurt + banana  Lunch: scrambled eggs + sausage + toast jam + butter OR hotdogs + bread + apple sauce + ham OR chicken wild rice soup, eats alot of canned soup  Dinner: chicken divaan + brocolli + 4 cookies OR chicken wild rice soup OR pork tenderloin + noodles  Snacks: AM-ensure plus (360kcals/16g) +  banana  PM-pudding + banana HS-sometimes  Beverages: Milk, Other (ensures)    Being Active:  Being Active Assessed Today: No    Monitoring:  Monitoring Assessed Today: Yes  Did patient bring glucose meter to appointment? : Yes  Times checking blood sugar at home (number): 4  Times checking blood sugar at home (per): Day  Blood glucose trend: Fluctuating                Taking Medications:  Diabetes Medication(s)     Insulin       insulin glargine (BASAGLAR KWIKPEN) 100 UNIT/ML pen    INJECT 8 UNITS SUBCUTANEOUS EVERY DAY. If BG less than 200 at bedtime, reduce dose to 6u     insulin lispro (HUMALOG KWIKPEN) 100 UNIT/ML (1 unit dial) KWIKPEN    Inject 3 Units Subcutaneous 3 times  daily (before meals)      *started 5u with breakfast last Friday per MD rec    Taking Medication Assessed Today: Yes  Current Treatments: Insulin Injections, Diet  Dose schedule: Pre-breakfast, Pre-lunch, Pre-dinner  Given by: Patient  Injection/Infusion sites: Abdomen  Problems taking diabetes medications regularly?: No  Diabetes medication side effects?: No    Problem Solving:  Problem Solving Assessed Today: Yes  Is the patient at risk for hypoglycemia?: Yes  Hypoglycemia Frequency: Rarely    Reducing Risks:  Reducing Risks Assessed Today: No    Healthy Coping:  Healthy Coping Assessed Today: Yes  Emotional response to diabetes: Ready to learn  Stage of change: ACTION (Actively working towards change)  Patient Activation Measure Survey Score:  No flowsheet data found.      Care Plan and Education Provided:  Care Plan: Diabetes   Updates made by Yolette Hinojosa since 1/2/2023 12:00 AM      Problem: Diabetes Self-Management Education Needed to Optimize Self-Care Behaviors       Goal: Healthy Eating - follow a healthy eating pattern for diabetes    This Visit's Progress: 70%   Recent Progress: 30%   Note:    I will eat snacks between meals daily-aim for 3     Task: Develop individualized healthy eating plan with patient Completed 1/2/2023   Responsible User: Yolette Hinojosa      Goal: Taking Medication - patient is consistently taking medications as directed       Task: Provide education on insulin and injectable diabetes medications, including administration, storage, site selection and rotation for injection sites Completed 1/2/2023   Responsible User: Yolette Hinojosa      Goal: Problem Solving - know how to prevent and manage short-term diabetes complications       Task: Provide education on high blood glucose - causes, signs/symptoms, prevention and treatment Completed 1/2/2023   Responsible User: Yolette Hinojosa RD, BESSY, [facility]        Time Spent: 45 minutes  Encounter Type:  Individual    Any diabetes medication dose changes were made via the CDE Protocol per the patient's primary care provider. A copy of this encounter was shared with the provider.    Cant open mouth wide d/t more mouth issues

## 2023-01-02 NOTE — LETTER
1/2/2023         RE: Jose Francisco Gonzalez  [address]  [facility] [address]        Dear Colleague,    Thank you for referring your patient, Jose Francisco Gonzalez, to the [facility]. Please see a copy of my visit note below.    Diabetes Self-Management Education & Support    Presents for:      Type of Service: Telephone Visit    Originating Location (Patient Location): Home  Distant Location (Provider Location): Home  Mode of Communication:  Telephone    Telephone Visit Start Time: 2:00  Telephone Visit End Time (telephone visit stop time): 2:45    How would patient like to obtain AVS? MyChart    Assessment Type:   ASSESSMENT:  Shar started mealtime insulin (doses: 5/3/3) after talking with MD last week with elevated BG. He is working on eating the best he can. He unfortunately had a mouth infection and is now on antibiotics causing diarrhea. He is trying to follow BRAT diet and already has a relatively low fiber diet. Did suggest he could try a probiotic but he only has 4 days left of antibiotics. The infection is also making it more difficult to eat. Reviewed importance of eating frequently with meals containing most of cho as these can be covered with insulin. He will do either an nEsure or other food with ~30g cho for snacks. His BG are above goals but he did not want to adjust insulin today, wants to give it some more time. Reviewed that we want him to be eating to promote weight gain and we will adjust insulin in the future if BG continue to be elevated. Also discussed if BG are not well controlled it is more difficult to gain weight and can also cause fatigue which he reports having. We have f/u in a few weeks.    Patient's most recent   Lab Results   Component Value Date    A1C 7.4 11/21/2022    A1C 8.6 06/10/2021     is meeting goal of <8.0    Diabetes knowledge and skills assessment:   Patient is knowledgeable in diabetes management concepts related to: Healthy  "Eating, Monitoring and Taking Medication    Continue education with the following diabetes management concepts: Healthy Eating, Being Active, Monitoring, Taking Medication, Problem Solving, Reducing Risks and Healthy Coping    Based on learning assessment above, most appropriate setting for further diabetes education would be: Individual setting.      PLAN  1. Continue current insulin regimen  2. Eat 3 meals + 3 snacks: snacks= 1 ensure or no more than 30g cho  3. Follow up with Yolette in a few weeks    Topics to cover at upcoming visits: Healthy Eating, Monitoring, Taking Medication, Problem Solving, Reducing Risks and Healthy Coping    Follow-up: 2 weeks    See Care Plan for co-developed, patient-state behavior change goals.  AVS provided for patient today.    Education Materials Provided:  No new materials provided today      SUBJECTIVE/OBJECTIVE:     Cultural Influences/Ethnic Background:  Not  or     Diabetes Symptoms & Complications:  Patient Problem List and Family Medical History reviewed for relevant medical history, current medical status, and diabetes risk factors.    Vitals:  There were no vitals taken for this visit.  Estimated body mass index is 20.32 kg/m  as calculated from the following:    Height as of 11/26/22: 1.803 m (5' 11\").    Weight as of 12/9/22: 66.1 kg (145 lb 11.2 oz).   Last 3 BP:   BP Readings from Last 3 Encounters:   12/09/22 (!) 144/81   11/26/22 (!) 165/90   11/21/22 (!) 146/84       History   Smoking Status     Former     Packs/day: 1.00     Years: 20.00     Types: Cigarettes     Quit date: 1/23/1993   Smokeless Tobacco     Never       Labs:  Lab Results   Component Value Date    A1C 7.4 11/21/2022    A1C 8.6 06/10/2021     Lab Results   Component Value Date     12/09/2022     11/26/2022     11/26/2022     06/10/2021     Lab Results   Component Value Date    LDL 43 04/14/2022    LDL 76 06/10/2021     HDL Cholesterol   Date Value Ref Range " Status   06/10/2021 53 >39 mg/dL Final     Direct Measure HDL   Date Value Ref Range Status   04/14/2022 65 >=40 mg/dL Final   ]  GFR Estimate   Date Value Ref Range Status   12/09/2022 89 >60 mL/min/1.73m2 Final     Comment:     Effective December 21, 2021 eGFRcr in adults is calculated using the 2021 CKD-EPI creatinine equation which includes age and gender (Adama alvarez al., [place], DOI: 10.1056/LLITdj8700842)   06/10/2021 83 >60 mL/min/[1.73_m2] Final     Comment:     Non  GFR Calc  Starting 12/18/2018, serum creatinine based estimated GFR (eGFR) will be   calculated using the Chronic Kidney Disease Epidemiology Collaboration   (CKD-EPI) equation.       GFR Estimate If Black   Date Value Ref Range Status   06/10/2021 >90 >60 mL/min/[1.73_m2] Final     Comment:      GFR Calc  Starting 12/18/2018, serum creatinine based estimated GFR (eGFR) will be   calculated using the Chronic Kidney Disease Epidemiology Collaboration   (CKD-EPI) equation.       Lab Results   Component Value Date    CR 0.76 12/09/2022    CR 0.79 06/10/2021     No results found for: MICROALBUMIN    Healthy Eating:  Healthy Eating Assessed Today: Yes  Breakfast: 8AM-cream of wheat OR malt o meal OR oatmeal OR yogurt + banana  Lunch: scrambled eggs + sausage + toast jam + butter OR hotdogs + bread + apple sauce + ham OR chicken wild rice soup, eats alot of canned soup  Dinner: chicken divaan + brocolli + 4 cookies OR chicken wild rice soup OR pork tenderloin + noodles  Snacks: AM-ensure plus (360kcals/16g) +  banana  PM-pudding + banana HS-sometimes  Beverages: Milk, Other (ensures)    Being Active:  Being Active Assessed Today: No    Monitoring:  Monitoring Assessed Today: Yes  Did patient bring glucose meter to appointment? : Yes  Times checking blood sugar at home (number): 4  Times checking blood sugar at home (per): Day  Blood glucose trend: Fluctuating                Taking Medications:  Diabetes Medication(s)      Insulin       insulin glargine (BASAGLAR KWIKPEN) 100 UNIT/ML pen    INJECT 8 UNITS SUBCUTANEOUS EVERY DAY. If BG less than 200 at bedtime, reduce dose to 6u     insulin lispro (HUMALOG KWIKPEN) 100 UNIT/ML (1 unit dial) KWIKPEN    Inject 3 Units Subcutaneous 3 times daily (before meals)      *started 5u with breakfast last Friday per MD rec    Taking Medication Assessed Today: Yes  Current Treatments: Insulin Injections, Diet  Dose schedule: Pre-breakfast, Pre-lunch, Pre-dinner  Given by: Patient  Injection/Infusion sites: Abdomen  Problems taking diabetes medications regularly?: No  Diabetes medication side effects?: No    Problem Solving:  Problem Solving Assessed Today: Yes  Is the patient at risk for hypoglycemia?: Yes  Hypoglycemia Frequency: Rarely    Reducing Risks:  Reducing Risks Assessed Today: No    Healthy Coping:  Healthy Coping Assessed Today: Yes  Emotional response to diabetes: Ready to learn  Stage of change: ACTION (Actively working towards change)  Patient Activation Measure Survey Score:  No flowsheet data found.      Care Plan and Education Provided:  Care Plan: Diabetes   Updates made by Yolette Hinojosa since 1/2/2023 12:00 AM      Problem: Diabetes Self-Management Education Needed to Optimize Self-Care Behaviors       Goal: Healthy Eating - follow a healthy eating pattern for diabetes    This Visit's Progress: 70%   Recent Progress: 30%   Note:    I will eat snacks between meals daily-aim for 3     Task: Develop individualized healthy eating plan with patient Completed 1/2/2023   Responsible User: Yolette Hinojosa      Goal: Taking Medication - patient is consistently taking medications as directed       Task: Provide education on insulin and injectable diabetes medications, including administration, storage, site selection and rotation for injection sites Completed 1/2/2023   Responsible User: Yolette Hinojosa      Goal: Problem Solving - know how to prevent and manage short-term diabetes  complications       Task: Provide education on high blood glucose - causes, signs/symptoms, prevention and treatment Completed 1/2/2023   Responsible User: Yolette Hinojosa RD, LD, [facility]        Time Spent: 45 minutes  Encounter Type: Individual    Any diabetes medication dose changes were made via the CDE Protocol per the patient's primary care provider. A copy of this encounter was shared with the provider.    Cant open mouth wide d/t more mouth issues

## 2023-01-02 NOTE — PROGRESS NOTES
Called number on file, no answer    LVM     Need to inform mom total bili came back as 15.1, this is good and to follow up at 1 month of age for well visit        Medication Therapy Management (MTM) Encounter    ASSESSMENT:                            Medication Adherence/Access: No issues identified    Type 2 Diabetes: Patient is meeting A1c goal of < 8%. Self monitoring of blood glucose is not at goal of fasting  mg/dL and post prandial < 180 mg/dL.  Postprandial blood sugars have been improving.  Would be beneficial to keep the Humalog dosing simple so it is easier to manage.  May be beneficial to increase his mealtime insulin dose by 1 unit if he is eating a banana or high sugar food.  They had identified a few medications that cause significant spikes in blood sugars.  They also benefit from further dose increases in Basaglar, however would prefer to be cautious and adjusting slowly.  Although it is less convenient and has been a bit overwhelming, adding mealtime insulin is likely the safest option available.  Could consider trying a DPP 4 inhibitor in the future or possibly metformin extended release (if tolerated without causing diarrhea or IBS).  Should avoid GLP-1 agonist due to risk of weight loss.  SGLT2 inhibitors may also be possible option but could contribute to weight loss as well.    Tooth infection: Plan in place, tooth was recently removed and completing antibiotic therapy.    Hypertension: Patient is meeting blood pressure goal of < 140/90mmHg.      Hyponatremia: Due for BMP to be rechecked.  They are seeing if his home health care nurse can draw lab so they do not have to go into the clinic.  With the snowstorm right now going into tomorrow likely would be unsafe for them to drive.    Depression/Anxiety: Managed by psychiatry provider.  Doses of mirtazapine may be more beneficial for depression, however have to be very cautious with this medication due to potential risk for hyponatremia.  If mirtazapine were to contribute to hyponatremia, Wellbutrin may be better tolerated however would worry about risk of weight loss with Wellbutrin.       Hyperlipidemia: Stable.  Patient is on moderate intensity statin which is indicated based on 2019 ACC/AHA guidelines for lipid management.      Osteoporosis: May benefit from starting a vitamin D supplement  Noticed after conversation that patient may not be taking a vitamin D supplement, will ask patient if he has been taking 1 that is not on his medication list.  If not taking a vitamin D supplement would be beneficial to start vitamin D 2000 units once daily as the last level that was checked was low.  May also help with depression and mood.    Rheumatoid Arthritis:  Stable.    Supplements/OTCs: Stable.    PLAN:                            1. Continue taking Novolog 5 units with breakfast, 3 units with lunch, and 3 units with dinner    - if you eat a banana with breakfast add 1 unit of Humalog to your breakfast dose, so if you eat a banana with breakfast you could take 6 units with that meal instead of 5 units    Blood sugar goals:  In the morning before eating): Between  mg/dL  2 hours after eating: Below 180 mg/dL    2.  Will check with patient if he is taking a vitamin D supplement.  If not based on his previous low vitamin D levels may be beneficial to take 2000 units/day and recheck vitamin D level in 6 to 8 weeks    Follow-up: Return in about 1 week (around 1/10/2023) for MTM Follow Up.    SUBJECTIVE/OBJECTIVE:                          Jose Francisco Gonzalez is a 82 year old male called for an initial visit. He was referred to me as a self-referral, we first met to help him try a freestyle sadi monitor. Patient was accompanied by his wife Divya.     Reason for visit: Initial comprehensive review of medications.  Previously met with patient to help him get started on the sadi monitor, but reviewing his other medications today as well.    Allergies/ADRs: Reviewed in chart  Past Medical History: Reviewed in chart  Tobacco: He reports that he quit smoking about 29 years ago. His smoking use included  cigarettes. He has a 20.00 pack-year smoking history. He has never used smokeless tobacco.  Alcohol: Not discussed  Social: Lives with his wife Divya, daughter Elke is involved with his care as well.  Has a good family support.  They typically go to [place] during the winter.     Medication Adherence/Access: no issues reported  They will have to put a new mike sensor on this Thursday, his daughter Elke will be there to help.  Let them know that we could do a video call if they would like additional assistance.  They were able to put the last one on with Elke's help.      Type 2 Diabetes:  Currently taking Basaglar 9-10 units in the morning (takes 10 units most days, takes this dose if AM blood glucose over 130), and Humalog 5 units AM/ 3 units with lunch/ 3 units with dinner.  Patient said he has been taking the 10 unit dose of Basaglar almost every day because his morning blood sugars have been a bit higher.  Since we moved the Basaglar to take in the morning is not seeing his blood sugars drop down so dramatically overnight.  Previously was seeing them drop around 100 points.  No issues with hypoglycemia or other side effects from the insulin.  It has been difficult to adjust to having to take 4 insulin doses per day, patient feels overwhelmed and is frustrated with the new regimen.  He is reassured that his blood sugars have been doing better in the last week  Was on glimepiride but wasn't effective even at higher doses.  Previously on Metformin IR, but had diarrhea issues.    Recently started working with ROCIO Hinojosa.  Next follow up with her is 1/18.  One morning he accidentally took 10 units of Humalog instead of the Basaglar, when this happened he ate some food right away to prevent that from dropping too low and did not have any hypoglycemia.  Blood sugar monitoring: Continuous Glucose Monitor, Mike 2. Ranges (from patient's glucose log): See below    On 1/3 - patient accidentally put in  "calibration and input that his blood glucose was \"170 mg/dL\" and it actually wasn't.  He indicates to me that he had to input something to get off that screen  Symptoms of low blood sugar? shaky, dizzy, Frequency of lows- none recently  Symptoms of high blood sugar? none  Eye exam: up to date  Foot exam: due  Diet:  Discussed increasing Ensure to twice daily, agree and encourage them to do this.  Dental issues: he lost another tooth last week, he had an implant that went bad.  Can only chew on the left side of his mouth, chewing protein/meats is harder.  Likes to eat hot dogs  AM- When he has a banana in the morning his blood glucose go up really high, yesterday didn't have a banana.  Eats cream of wheat seems to keep the blood glucose more stable through the day  His blood glucose yesterday was doing a lot better.  He also likes to eat cereals with berries or banana.  Likes grapes but chewing is still hard to chew the skin.  Aspirin: Taking 81mg daily and denies side effects   Statin: Yes  ACEi/ARB: Yes  Urine Albumin:   Lab Results   Component Value Date    UMALCR 16.98 04/14/2022      Lab Results   Component Value Date    A1C 7.4 11/21/2022    A1C 7.7 04/14/2022    A1C 9.3 10/22/2021    A1C 8.6 06/10/2021    A1C 7.6 02/22/2021    A1C 7.0 06/09/2020    A1C 6.6 09/04/2019    A1C 7.0 04/24/2019     Tooth Infection: Taking Amoxicillin right now due to a tooth infection.  He will be done on this in a few days.  Because of his dental issues has been unable to eat solid foods as much.  Chewing certain things has been very difficult.  Cannot eat most meats and even certain fruits without a harder skin is difficult (grapes for example).    Hypertension: Current medications include Amlodipine 2.5 mg daily, Lisinopril 10 mg daily, Metoprolol ER 50 mg daily.  Patient does self-monitor blood pressure.  See telephone encounter from 12/28 for home blood pressure readings.  Patient reports no current medication side " effects.  His home blood pressure readings have been good, some are a bit higher but mostly below 140/90 mmHg.      BP Readings from Last 3 Encounters:   12/09/22 (!) 144/81   11/26/22 (!) 165/90   11/21/22 (!) 146/84     Hyponatremia: His last sodium level was improved, it was 131.  Has been improving.  Is due to have his BMP checked, they are asking    Depression/Anxiety:  Current medications include: Mirtazapine 15 mg once daily at bedtime.  Thinks this is helping some, may be helping to increase appetite. Also takes hydroxyzine 10 mg three times daily as needed for anxiety.  He has been very anxious/stressed out about the higher blood glucose readings.  Feeling down because of how high they get after meals, and overwhelmed with how many injections he has to do every day.  Following with psychiatry provider.  Hast history of hyponatremia, need to be cautious with depression medications can carry this risk.    PHQ-9 SCORE 10/22/2021 11/21/2022 11/28/2022   PHQ-9 Total Score MyChart - 9 (Mild depression) 13 (Moderate depression)   PHQ-9 Total Score 1 9 -   Some encounter information is confidential and restricted. Go to Review Flowsheets activity to see all data.     Hyperlipidemia: Current therapy includes Atorvastatin 20 mg daily.  Patient reports no significant myalgias or other side effects.    Recent Labs   Lab Test 04/14/22  1000 06/10/21  1517   CHOL 120 142   HDL 65 53   LDL 43 76   TRIG 61 67     Osteoporosis: Current therapy includes: alendronate (Fosamax) 70mg weekly (has been on current therapy since 11/2022). Patient is not experiencing side effects.  DEXA History: Up to date  Patient is getting the following sources of dietary calcium: milk, yogurt and cheese    Last vitamin D level:  Lab Results   Component Value Date    VITDT 22 10/22/2021     Rheumatoid Arthritis: Taking Sulfasalazine  mg twice daily. Joint pain is very good, doing a lot better since being on this.  Doesn't have flare ups as  often and pain well controlled.   Denies side effects, tolerating well.     Supplements/OTCs: Taking metamucil 2 capsules daily as needed,  PreserVision 2 capsules daily.  Denies issues.               --------------      I spent 50 minutes with this patient today (an extra 15 minutes was spent creating the Medication Action Plan). All changes were made via collaborative practice agreement with Kin Alejo MD. A copy of the visit note was provided to the patient's provider(s).    A summary of these recommendations was sent via [employer].    Deepthi Esquviel, PharmD  Medication Therapy Management Pharmacist  Pager: [phone]      Telemedicine Visit Details  Type of service:  Telephone visit  Start Time: 10:30 AM  End Time: 11:20 AM     Medication Therapy Recommendations  Type 2 diabetes mellitus with diabetic nephropathy, with long-term current use of insulin (H)    Current Medication: insulin lispro (HUMALOG KWIKPEN) 100 UNIT/ML (1 unit dial) KWIKPEN (Discontinued)   Rationale: Dose too low - Dosage too low - Effectiveness   Recommendation: Increase Dose   Status: Accepted per CPA

## 2023-01-02 NOTE — Clinical Note
Shar didn't want to up insulin today, wants to wait and see how BG respond to insulin further. I was hoping to increase his mealtime a bit. You can see the assessment part of my note for more details. He wanted to follow-up with me in a few weeks. I was thinking we could spread our appts out so we can make adjustments for him if that works for you :) Thanks! Yolette Hinojosa RD, LD, [facility]

## 2023-01-03 NOTE — PROGRESS NOTES
"Clinic Care Coordination Contact    Follow Up Progress Note      Assessment:   Divya, wife, consent to communicate in EMR, called and left vm message hoping an order for the home care RN can be placed to have BMP lab draw in home instead of going into the clinic.  Divya, stated it's [facility] and there fax is [phone].    RNCC returned Divya's call, reporting the home care nurse is there \"as we speak\".  RNCC did not see an order in chart for the lab draw and asked to speak with nurse.  Per Soila RN they received a verbal order for the lab draw and she will be taking the sample to [facility] for lab results.    Divya reports that patient is still having \"diarrhea\" due to the antibiotic the dentist prescribed s/p removal of implant for bone graph procedure and the end date of antibiotic is Thursday, January 5, 2023.  Per Divya, patient hasn't gained weight or lost weight on the soft diet Periodontist recommended.  RNCC recommended to stay hydrated (on a fluid restriction 1-11/2 L/day) and suggested Pedialyte to help replace electrolytes from diarrhea.  RNCC suggested bananas, rice, apple sauce, and toast which Divya states she will do.    Divya states patients blood sugar range is \"quite good\" and \"60% of the time it's in the range he's supposed to be in\".  Divya reports that it has spiked to 217 and they had an appointment with CDE this morning.  Divya is happy with CDE and she will be calling the patient in a week \"to check on us\" which makes Divya happy.    Care Gaps:    Health Maintenance Due   Topic Date Due     DTAP/TDAP/TD IMMUNIZATION (2 - Td or Tdap) 02/26/2016     DIABETIC FOOT EXAM  04/24/2020     MEDICARE ANNUAL WELLNESS VISIT  05/28/2020     Care Gaps Last addressed on 1/3/2023    Care Plans  Care Plan: Help At Home     Problem: Insufficient In-home support     Goal: Establish adequate home support     Start Date: 12/2/2022 Expected End Date: 3/2/2023    This " Visit's Progress: 70% Recent Progress: 60%    Note:     Barriers: Advanced age, losing vision  Strengths: Strong family support  Patient expressed understanding of goal: Yes  Action steps to achieve this goal:  1. I will follow up with my home care providers (RN/Physical Therapy/OT) as recommended/suggested  2. I will contact my care team with questions, concerns, support needs   3. I will use the clinic as a resource, and I understand I can contact my clinic with 24/7 after hours services available                     Care Plan: Diabetes     Problem: Lifestyle choices     Goal: Stabilize blood sugars     Start Date: 12/2/2022 Expected End Date: 3/2/2023    Note:     Barriers: Losing vision due to Macular Degeneration  Strengths: Strong family support  Patient expressed understanding of goal: Yes  Action steps to achieve this goal:  1. I will follow up with my Certified Diabetic Educator as recommended/suggested  2. I will contact my care team with questions, concerns, support needs   3. I will use the clinic as a resource, and I understand I can contact my clinic with 24/7 after hours services available                       Intervention/Education provided during outreach:   RNCC called and spoke with patient; introduced self, discussed role of Care Coordination and explained reason for call    Plan:   -Patient will contact the care team with questions, concerns, support needs   -Patient will use the clinic as a resource and understands (s)he can contact the [facility] with 24/7 after hours services available  -Care Coordinator will remain available as needed  -RNCC will follow up in one month for follow up appointments/recommendations and goal progression     Soila Mai RN, BSN, PHN  Primary Care / Care Coordinator   [facility]  [email or web address]-mail [email or web address]    [phone]

## 2023-01-03 NOTE — Clinical Note
Jackson De La Vega!  Thanks for sharing your note with me from yesterday. I reviewed it and I agree with trying to stagger our follow ups.  We talked about trying to add 1 unit to his meal time dose if he is eating a banana.  Notices those mornings the blood glucose spike up a lot (200's).  I'm not sure carb counting would be a good option for him, and his daughter had mentioned to me that even a sliding scale may be too complex.  We'll see how things go, he may begin to feel more comfortable using the insulin in the future.  I think his blood glucose is improving though!  - Deepthi

## 2023-01-03 NOTE — Clinical Note
Jackson Alejo and Sherlyn guerrero wanted to route you my note to you both after my phone appointment with Mr. Gonzalez today.  If you scroll to the bottom of it you'll see the glucose graphs from his sadi monitor that I copied in.  I think blood glucose is improving overall.  May need to increase Basaglar and Humalog in the future, but his glucose from this past week is even a lot lower then the previous week.    Thanks, Deepthi

## 2023-01-03 NOTE — TELEPHONE ENCOUNTER
Recommendations below per Dr Hurley communicated via my chart to patient  Mallory JIM Wang  Nephrology  [phone]      Blood pressure control adequate with occasional readings outside normal range. Plan to continue current medications as few blood pressure readings were on the lower side. Continue blood pressure checks.     Fluid restriction of 1-1.5 L for hyponatremia management.     No further change in management plan.     Andrew

## 2023-01-03 NOTE — LETTER
_  Medication List        Prepared on: Chuy 3, 2023     Bring your Medication List when you go to the doctor, hospital, or   emergency room. And, share it with your family or caregivers.     Note any changes to how you take your medications.  Cross out medications when you no longer use them.    Medication How I take it Why I use it Prescriber   alendronate (FOSAMAX) 70 MG tablet TAKE 1 TABLET(70 MG) BY MOUTH EVERY 7 DAYS Age-related osteoporosis with current pathological fracture with routine healing Kin Alejo MD   amLODIPine (NORVASC) 2.5 MG tablet TAKE 1 TABLET(2.5 MG) BY MOUTH DAILY Hypertension goal BP (blood pressure) < 140/80 Kin Alejo MD   aspirin 81 MG tablet Take 81 mg by mouth daily To prevent heart attack and stroke Kin Alejo MD   atorvastatin (LIPITOR) 20 MG tablet TAKE 1 TABLET(20MG) BY MOUTH DAILY Hyperlipidemia LDL Goal <100 Ching Bennett MD   hydrOXYzine (ATARAX) 10 MG tablet Take 1 tablet (10 mg) by mouth 3 times daily as needed for anxiety Anxiety disorder, unspecified type Chacha Bartholomew, APRN CNP   insulin glargine (BASAGLAR KWIKPEN) 100 UNIT/ML pen  inject 10 units once daily Type 2 diabetes mellitus with diabetic nephropathy, with long-term current use of insulin (H) Kin Alejo MD   insulin lispro (HUMALOG KWIKPEN) 100 UNIT/ML (1 unit dial) KWIKPEN Inject 3-5 Units Subcutaneous 3 times daily (before meals). Take 5 units with breakfast, 3 units with lunch, and 3 units with dinner.  Type 2 diabetes mellitus with diabetic nephropathy, with long-term current use of insulin (H) Kin Alejo MD   lisinopril (ZESTRIL) 10 MG tablet TAKE 1 TABLET(10 MG) BY MOUTH DAILY Hypertension goal BP (blood pressure) < 140/80 Kin Alejo MD   metoprolol succinate ER (TOPROL XL) 50 MG 24 hr tablet Take 1 tablet (50 mg) by mouth daily Hypertension goal BP (blood pressure) < 140/80 Kin Alejo MD   mirtazapine (REMERON) 15 MG tablet Take 1 tablet (15 mg) by mouth At Bedtime Moderate episode of  recurrent major depressive disorder (H) DEE DEE Douglas CNP   Multiple Vitamins-Minerals (PRESERVISION AREDS 2) CAPS Take 2 capsules by mouth daily  General health Kvng Richardson MD   Psyllium-Calcium (METAMUCIL PLUS CALCIUM) CAPS Take 2 capsules by mouth nightly as needed General Health Patient Reported   sulfaSALAzine ER (AZULFIDINE EN) 500 MG EC tablet TAKE 1 TABLET BY MOUTH TWICE DAILY AFTER MEALS  Rheumatoid arthritis Kin Alejo MD         Add new medications, over-the-counter drugs, herbals, vitamins, or  minerals in the blank rows below.    Medication How I take it Why I use it Prescriber                          Allergies:      No Known Allergies        Side effects I have had:               Other Information:              My notes and questions:

## 2023-01-03 NOTE — LETTER
January 3, 2023  Jose Francisco Gonzalez  [address]  [place] [address]    Dear Mr. Gonzalez, [facility]     Thank you for talking with me on Chuy 3, 2023 about your health and medications. As a follow-up to our conversation, I have included two documents:      1. Your Recommended To-Do List has steps you should take to get the best results from your medications.  2. Your Medication List will help you keep track of your medications and how to take them.    If you want to talk about these documents, please call Deepthi Esquivel RPH at phone: [phone], Monday-Friday 8-4:30pm.    I look forward to working with you and your doctors to make sure your medications work well for you.    Sincerely,  Deepthi Esquivel RPH MT Pharmacist, [facility]

## 2023-01-03 NOTE — TELEPHONE ENCOUNTER
Soila from [facility] called wondering if there are orders for them to draw a BMP.    Patient told Soila that he is suppose to have a BMP drawn today. Soila nic the blood for the lab and is in the parking lot to drop of the lab. Although she does not see a verbal order in the patient's chart for the lab.     Looked at patient's labs and no future labs are pended/ordered. Also am not seeing any telephone encounter about a BMP lab. According to patient's snapshot he is not due for a BMP until 12/9/23.    Please call Soila back on what she should do.     Macy Yoo RN  [facility] Triage Team

## 2023-01-03 NOTE — LETTER
"Recommended To-Do List      Prepared on: Chuy 3, 2023       You can get the best results from your medications by completing the items on this \"To-Do List.\"      Bring your To-Do List when you go to your doctor. And, share it with your family or caregivers.    My To-Do List:  What we talked about: What I should do:   We discussed increasing your Humalog dose a small amount if you were eating a banana with a meal.    Continue taking Novolog 5 units with breakfast, 3 units with lunch, and 3 units with dinner    - if you eat a banana with breakfast add 1 unit of Humalog to your breakfast dose, so if you eat a banana with breakfast you would take 6 units with that meal instead of 5 units    Blood sugar goals:  In the morning before eating): Between  mg/dL  2 hours after eating: Below 180 mg/dL          What we talked about: What I should do:                       "

## 2023-01-04 NOTE — TELEPHONE ENCOUNTER
[facility] Call Center    Phone Message    May a detailed message be left on voicemail: yes     Reason for Call: Other: patient calling to see if Dr. Hurley can review recent lab and let him know if any changes need to be made. Please reach out to patient. Thank you     Action Taken: Message routed to:  [facility] NEPH    Travel Screening: Not Applicable

## 2023-01-06 NOTE — TELEPHONE ENCOUNTER
Writer called back with recommendations per Dr Hurley.  Gatorade I think has low sodium. It will also be included in the fluid restriction limit of -1.5 L. As long as the total fluid intake is under that range he can drink some gatorade.     Andrew       Patient and wife and daughter were on the call and still have concerns regarding follow up and how much fluid he can get as they report that he has lost weight and they are trying to keep him hydrated as he is very thirsty. Writer re-iterated recommendations per Dr Hurley and follow up in 3 months.  Patients daughter felt that he should be seen sooner given these concerns.  Writer also encouraged them to follow up with PCP as it is hard to see him over the phone to see how much he has lost since.   Patient saw Dr Hurley 12/9.  Patients daughter also mentioned that he has a Home care nurse Soila that comes out to the house weekly but they did not know how often and believed that she checks his BP. Will contact Soila next week to see and send to Dr Hurley regarding sooner follow up.  Mallory Wang LPN  Nephrology  [phone]

## 2023-01-06 NOTE — TELEPHONE ENCOUNTER
Writer sent to Dr Hurley for recommendations.    Serum sodium slightly low at 129. Continue with free water restriction of 1-1.5 L.     Andrew     Patient and patients wife report that he had his front teeth removed in October and has been consuming a liquid, semi-liquid soft food diet ever since. Patient does report that he has been drinking more fluids recently and that includes gatorade. Will route to Dr Hurley for further recommendations  Mallory Wang LPN  Nephrology  [phone]

## 2023-01-11 NOTE — Clinical Note
Jackson Cm and Dr. Alejo - Mr. Gonzalez's blood glucose are improving a lot, he's still struggling with weight/eating food.  We discussed trying new food options that are high in protein.  Can see sadi graphs in my note at the bottom.  They leave to go to [place] for awhile, and were hoping he could have an in-person follow up with Dr. Vyas prior to leaving?  Soila - can you or someone from the clinic help him schedule if there are openings?  I was also wondering if you or clinic staff could print off the last few clinic visit notes and recent labs, so they can bring to the appointment with a doctor he is seeing in [place].  Want him to establish with someone down there he can see if needed.  Thanks, Deepthi Esquivel, PharmD Medication Therapy Management Pharmacist Pager: [phone]

## 2023-01-11 NOTE — PROGRESS NOTES
Medication Therapy Management (MTM) Encounter    ASSESSMENT:                            Medication Adherence/Access: No issues identified    Type 2 Diabetes: Patient is meeting A1c goal of < 8%. Self monitoring of blood glucose is not at goal of fasting  mg/dL and post prandial < 180 mg/dL.  Postprandial blood sugars have been improving.  Would be beneficial to keep the Humalog dosing simple so it is easier to manage.  Although it is less convenient and has been a bit overwhelming, adding mealtime insulin is likely the safest option available. May be beneficial to increase his mealtime insulin dose by 1 unit if he is eating a large meal or sugary food, they haven't tried this yet.  Overall blood glucose are looking really good, sometimes high if he ate dessert or higher carb food.    Goal to gain weight.  Discussed trying different foods that are high in protein/nutrients, but easier to chew and swallow.  May be an option to make a fruit, veggie, protein smoothie in the morning but have to consider fluid restriction for hyponatremia.  Could make the smoothie using the ensure drink.    Hyponatremia: Following fluid restriction, last sodium level was down a bit.  May be contributing to his symptoms of fatigue, but this could also be from slightly higher blood glucose although these are improving.  Urea powder may be an option to help increase sodium levels, will ask his nephrologist about this.  Typically isn't covered by insurance, but may be worth trying.    Vitamin D Deficiency:  Plan in place, recently started vitamin D supplement.  Would benefit from rechecking vitamin D level in 4-6 weeks, this will likely be when he is in [place].    PLAN:                            1. Continue taking Humalog 5 units with breakfast, 3 units with lunch, and 3 units with dinner     - if you are having a very large meal, banana with your meal, or sugary food (dessert), try adding 1 additional unit to your regular meal time  dose. So for example, if you are also having a sweet dessert with dinner you could increase your Humalog dose to 4 units instead of 3 units.      2.  We discussed trying to try new foods that are high in protein, and to consider trying to make a smoothie.    You could make a smoothie once a day that is 8-12 ounces (or one regular sized cup).  Could consider putting the following foods in your smoothie and experiment with what you like:  - orange, pineapple, flynn, blue berries, blackberries, raspberries  - Could buy frozen fruits  - Protein and fiber powder can be added   - You could also add fruit juice to help make it less thick:  POM Wonderful, [place], Naked, V8 fruit juice.  Or you could try mixing your Ensure or Boost drink in instead of fruit juice.  That may be a nice way to get more nutrition (fruits/veggies) in your diet without increasing your fluid intake too much.    Could try eating different sources of protein that may be easier to eat:  - Refried beans or ground beans,   - Chili   - Baked squash  - Ground meats: hamburger, ham loaf/ground ham or pork, spam  - Eggs  - Yogurt, cheese  - Could try to experiment with cooking with Tofu, which is a good source of protein.  If you have chili, you can dry some tofu in a pan and heat the chili up with it for one idea.    I will ask clinic RN:   - Can we print off last few provider visit notes, labs from the last month and mail to patient so he can bring with to the initial visit with doctor in [place].   - Help to schedule appointment with Dr. Alejo for in person visit if able, prior to February 4th (will be leaving for [place]).    Follow-up: Return in about 15 days (around 1/26/2023) for [facility] Follow Up.  When you set up care with the new doctor in [place] - please ask  them to check your    - basic metabolic panel (Looks at kidney function, sodium levels)   - Vitamin D level    SUBJECTIVE/OBJECTIVE:                          Jose Francisco Gonzalez is a 82  year old male called for a follow-up visit. Patient was accompanied by his wife Divya. Today's visit is a follow-up MTM visit from 1/3/23     Reason for visit: Follow up on blood sugars, nutrition/diet.    Allergies/ADRs: Reviewed in chart  Past Medical History: Reviewed in chart  Tobacco: He reports that he quit smoking about 29 years ago. His smoking use included cigarettes. He has a 20.00 pack-year smoking history. He has never used smokeless tobacco.  Alcohol: Not discussed  Social: Lives with his wife Divya, daughter Elke is involved with his care as well.  Has a good family support.  They typically go to [place] during the winter.     Medication Adherence/Access: no issues reported  They will have to put a new mike sensor on this Thursday, his daughter Elke will be there to help.  Let them know that we could do a video call if they would like additional assistance.  They were able to put the last one on with Elke's help.    Its more difficult to shoot up the insulin, because it's hard to tell if the needle is going in when he inserts it to his abdomen.  He will try injecting it on the upper part of abdomen to see it better.      Type 2 Diabetes:  Currently taking Basaglar 9-10 units in the morning (takes 10 units most days, takes this dose if AM blood glucose over 130), and Humalog 5 units AM/ 3 units with lunch/ 3 units with dinner.  No side effects, hypoglycemia.  Glimepiride not effective.  Not on metformin due to diarrhea/IBS history.    Blood sugar monitoring: Continuous Glucose Monitor, Mike 2. Ranges (from patient's glucose log): See below  Am fasting ranging 130-180s, most post prandial blood glucose very good.  Has some occasional highs after meals if eating larger portion or sugary foods.  Symptoms of low blood sugar? shaky, dizzy, Frequency of lows- none recently  Symptoms of high blood sugar? none  Eye exam: up to date  Foot exam: due   Aspirin: Taking 81mg daily and denies side effects    Statin: Yes  ACEi/ARB: Yes  Urine Albumin:         Lab Results   Component Value Date     UMALCR 16.98 04/14/2022            Lab Results   Component Value Date     A1C 7.4 11/21/2022     A1C 7.7 04/14/2022     A1C 9.3 10/22/2021     A1C 8.6 06/10/2021     A1C 7.6 02/22/2021     A1C 7.0 06/09/2020     A1C 6.6 09/04/2019     A1C 7.0 04/24/2019     Diet:   Had a pot pie with veggies last night for dinner but was difficult to eat so he had two desserts with dinner.  He was planning on having a pretty typical sized dinner but wasn't able to eat/chew it well.  Has dental issues and will take time to have the surgery.  Is struggling to get enough protein in diet due to dental issues/chewing.  Having a hard time finding new foods and trying different things.  He hasn't been able to eat foods that he usually likes.  Can't eat meat very well.  Can eat hot dogs, sausage, some fish.  Ground hamburger is a bit hard to swallow.  Takes sips of water to help wash foods down.  He is worried about eating desserts due to high blood glucose.  Is on Fluid restriction of 1.5L of liquid per day for hyponatremia.  Discussed trying smoothies with added fruit and veggies, they haven't tried this yet.     Hyponatremia:  Patient is trying to follow liquid restriction of 1.5 L per day, has been very difficult.  Feels very thirsty and worries he may not be getting enough fluids.  Haven't added in sodium tablets, there would be concern for causing edema and blood pressure going too high.    Sodium   Date Value Ref Range Status   01/03/2023 129 (L) 133 - 144 mmol/L Final   06/10/2021 125 (L) 133 - 144 mmol/L Final       Vitamin D deficiency:  After our last visit I noticed that he has history of vitamin D deficiency but didn't appear to be taking a vitamin D supplement.  Sent him portal message and asked him to start one if not already on a supplement.  He started taking vitamin D 2000 units once daily last week.    Last vitamin D level was 22  on 10/22/21              ----------------      I spent 55 minutes with this patient today. All changes were made via collaborative practice agreement with Kin Alejo MD. A copy of the visit note was provided to the patient's provider(s).    A summary of these recommendations was sent via [employer].    Michel OchoaD  Medication Therapy Management Pharmacist  Pager: [phone]      Telemedicine Visit Details  Type of service:  Telephone visit  Start Time: 10:30 AM  End Time: 11:25 AM     Medication Therapy Recommendations  Type 2 diabetes mellitus with diabetic nephropathy, with long-term current use of insulin (H)    Current Medication: insulin lispro (HUMALOG KWIKPEN) 100 UNIT/ML (1 unit dial) KWIKPEN   Rationale: Does not understand instructions - Adherence - Adherence   Recommendation: Provide Education   Status: Patient Agreed - Adherence/Education

## 2023-01-11 NOTE — Clinical Note
Jackson Huitron,  You have an upcoming appointment with this patient.  He's having difficulty with hyponatremia, and having a hard time doing the fluid restriction.  I was wondering if starting Urea would be an option to consider?  I'm not sure if sodium chloride will be an option due to his hypertension, but could consider Urea an alternative option if needed.  Thanks, Deepthi Esquivel, PharmD Medication Therapy Management Pharmacist Pager: [phone]

## 2023-01-18 NOTE — TELEPHONE ENCOUNTER
Spoke with patient to relay PCP message. Patient verbalized understanding and will hold Lisinopril until OV on 1/19/2023. Patient was not having previous mentioned symptoms at the time of call and did not have any additional questions.

## 2023-01-18 NOTE — PROGRESS NOTES
Called Shar for appointment today, he was with someone from PT who got on the phone and said Shar would need to reschedule, he was not doing well at that moment. Will send him a [employer] message if he would like to reschedule in future.     Yolette Hinojosa RD, LD, [facility]

## 2023-01-18 NOTE — TELEPHONE ENCOUNTER
Provider Response to 2nd Level Triage Request    I have reviewed the RN documentation. My recommendation is:  Refer to ED, occult to assess clinically without knowing nor seeing patient.  Based on age and health history suggest emergency room assessment

## 2023-01-18 NOTE — LETTER
1/18/2023         RE: Jose Francisco Gonzalez  [address]  [place] [address]        Dear Colleague,    Thank you for referring your patient, Jose Francisco Gonzalez, to the [facility]. Please see a copy of my visit note below.    Called Shar for appointment today, he was with someone from PT who got on the phone and said Shar would need to reschedule, he was not doing well at that moment. Will send him a [employer] message if he would like to reschedule in future.     Yolette Hinojosa RD, LD, [facility]

## 2023-01-18 NOTE — TELEPHONE ENCOUNTER
Noted. Will discuss with pt tomorrow. Call pt and also have him hold hisLisinopril between now and appt with me tomorrow. May close encounter after informing pt re: Lisinopril plan

## 2023-01-18 NOTE — TELEPHONE ENCOUNTER
[facility] home [facility], calling stating he is having varying vitals alongside dizziness, lightheadedness worse than normal.  /94 HR 44  /60 HR 90  BP 86/49   HR 82  /61 HR 86    Blood sugar was 140 this morning at 8:00 and right now it is 174.    Patient is feeling dizzy and lightheaded mostly when moving but also at rest, and also has chills. Denies feeling any cardiac abnormalities symptoms. PT states he seems more SOB than normal, denies chest pain.     Situation:   Patient is having dizziness and lightheadedness mostly while moving but also at rest. Home health pt, whom is calling, also states he is having difficulty finding words for sentences alongside completing sentences. Pt agrees to this.    Background:  Symptoms started this morning, and with pt reporting having urine incontinence for past few days alongside constipation that patient took suppository lastnight for without any luck.    Assessment:  Home health PT had taken 4 sets of vital signs,   BP: 128/44 HR 44  BP: 102/60 HR 90  BP 86/49    HR 82  /61  HR 86  - most recent    Recommendation: Reccomending to go to ED, home health PT states they are going to call 911 to get there and in order to get into ER quicker.    Reason for Disposition    Spinning or tilting sensation (vertigo) present now and one or more stroke risk factors (i.e., hypertension, diabetes mellitus, prior stroke/TIA, heart attack, age over 60) (Exception: prior physician evaluation for this AND no different/worse than usual)    Additional Information    Negative: SEVERE difficulty breathing (e.g., struggling for each breath, speaks in single words)    Negative: Shock suspected (e.g., cold/pale/clammy skin, too weak to stand, low BP, rapid pulse)    Negative: Difficult to awaken or acting confused (e.g., disoriented, slurred speech)    Negative: Fainted, and still feels dizzy afterwards    Negative: Overdose (accidental or intentional) of medications     "Negative: New neurologic deficit that is present now: * Weakness of the face, arm, or leg on one side of the body * Numbness of the face, arm, or leg on one side of the body * Loss of speech or garbled speech    Negative: Heart beating < 50 beats per minute OR > 140 beats per minute    Negative: Sounds like a life-threatening emergency to the triager    Negative: Chest pain    Negative: Rectal bleeding, bloody stool, or tarry-black stool    Negative: Vomiting is main symptom    Negative: Diarrhea is main symptom    Negative: Headache is main symptom    Negative: Heat exhaustion suspected (i.e., dehydration from heat exposure)    Negative: Patient states that they are having an anxiety or panic attack    Negative: Dizziness from low blood sugar (i.e., < 60 mg/dl or 3.5 mmol/l)    Answer Assessment - Initial Assessment Questions  1. DESCRIPTION: \"Describe your dizziness.\"      Feels like world is just rocking, and feels like having difficulty finding words.  2. LIGHTHEADED: \"Do you feel lightheaded?\" (e.g., somewhat faint, woozy, weak upon standing)      Yes, feeling unsteady on feet.  3. VERTIGO: \"Do you feel like either you or the room is spinning or tilting?\" (i.e. vertigo)      Yes  4. SEVERITY: \"How bad is it?\"  \"Do you feel like you are going to faint?\" \"Can you stand and walk?\"    - MILD: Feels slightly dizzy, but walking normally.    - MODERATE: Feels unsteady when walking, but not falling; interferes with normal activities (e.g., school, work).    - SEVERE: Unable to walk without falling, or requires assistance to walk without falling; feels like passing out now.       Moderate  5. ONSET:  \"When did the dizziness begin?\"      Began this morning.  6. AGGRAVATING FACTORS: \"Does anything make it worse?\" (e.g., standing, change in head position)      Moving or trying to get up and walking.  7. HEART RATE: \"Can you tell me your heart rate?\" \"How many beats in 15 seconds?\"  (Note: not all patients can do this)      " "  Past 4 vitals taken by home care PT in note.  8. CAUSE: \"What do you think is causing the dizziness?\"      Unsure  9. RECURRENT SYMPTOM: \"Have you had dizziness before?\" If Yes, ask: \"When was the last time?\" \"What happened that time?\"      Yes, typically has dizziness when first standing but worse today per patient and per PT.  10. OTHER SYMPTOMS: \"Do you have any other symptoms?\" (e.g., fever, chest pain, vomiting, diarrhea, bleeding)        PT and patient states taking longer to find his words and patient states just feeling unwell.  11. PREGNANCY: \"Is there any chance you are pregnant?\" \"When was your last menstrual period?\"        N/a    Protocols used: DIZZINESS-A-OH    Bereket Hamlin RN   [facility]    "

## 2023-01-18 NOTE — TELEPHONE ENCOUNTER
PT called ambulance. Patient was assessed by EMT. EMT's took vitals and EKG, all were stable and EMT's left the home. Patient says PT recorded P at 44, which may have been incorrect. Patient also says his dizziness and lightheadedness was anxiety related.     Patient seems to be stumbling on his words throughout conversation with Triage. RN asked if patient usually has difficulty finding his words, patient says no. RN again recommended patient to be seen in ED. Patient declined ED recommendations, Wife in background also declines ED stating patient does not have his dentures in currently so it's harder to talk. Patient agrees with wife.     Patient has appt with PCP tomorrow and plans to discuss his symptoms at appointment.

## 2023-01-19 NOTE — PROGRESS NOTES
ASSESSMENT:   1. Type 2 diabetes mellitus with diabetic nephropathy, with long-term current use of insulin (H)  Last A1c at goal for age.  Patient to see Endo for possible change to insulin pump  - Adult Endocrinology  Referral; Future    2. Moderate episode of recurrent major depressive disorder (H)  PHQ-9 remains elevated.  No suicidal ideation.  Patient wishes to continue same dosage of mirtazapine for now and see how things go when he has to [place] for part of the winter    3. 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  Maintaining fluid restriction.  Lab today as ordered  - Basic metabolic panel    4. Tooth disorder  Will be months per patient before able to complete implants.  Dentures bothersome to patient currently with slight poor fit.  Continue management per dentist/oral surgeon    5. Hypertension, unspecified type  Blood pressure low normal off of lisinopril.  Lightheadedness resolved with stopping that medication.  Continue to monitor    6. Constipation, unspecified constipation type  Mild.  Patient may add MiraLAX as needed      PLAN:   [employer] message or call the nurseline [phone] in 1 week with update re: bowel function and occ constipation and blood pressure    Miralax 1/2 dose  Daily for constipation   Will refer you ro Endo re: possible insulin pump. They will call to  Schedule  If not feeling  usual hunger, then wait to take the Humalog until eating some to be sure you want to eat. If no appetite and not eating, then hold Humalog  Stay off of Lisinopril for now      (Chart documentation was completed, in part, with [employer] voice-recognition software. Even though reviewed, some grammatical, spelling, and word errors may remain.)    Kin Alejo MD  Internal Medicine Department  [facility]      Cahn Myles is a 82 year old accompanied by wife, presenting for the following health issues:  Follow Up (Patient  "coming in for a follow up with provider.)     Most recent lab results reviewed with pt.      History dentures upper.  Supposed to have future implants right lower but this will take quite a while before dentist can complete.  Patient has hard time chewing more solid foods so tends to eat a lot of puréed food and soups.  Has worked with dietitian twice but frustrated with this and has lost weight.  The weight has improved some more recently.  Previously weighed 174 pounds in 2021.  Drinking Ensure 1 to 2 cans/day.  Denies chest pain, shortness of breath or abdominal pain currently.  Working with diabetes education.  On insulin therapy.  Frustrated with having to give insulin shots with meals.  Willing to consider insulin pump.  Occasional constipation issues.  Already using Senokot.  Denies seeing blood in stools.  Recent lightheadedness issues.  Was instructed by triage message to hold lisinopril.  Symptoms better off of medication.  Blood pressure still low normal.  Denies vertigo symptoms  Depression symptoms persist, especially with dental issue that will not resolve in the near future. PHQ9 = 11.  Denies suicidal ideation.  Anxiety overall improved.  Patient declines adjustment in dosage or counseling  History SIADH and generally maintaining 36 ounces of fluid or less per day for fluid restriction.  Sodium level last 129 needs recheck    Additional ROS:   Constitutional, HEENT, Cardiovascular, Pulmonary, GI and , Neuro, MSK and Psych review of systems/symptoms are otherwise negative or unchanged from previous, except as noted above.      OBJECTIVE:  /68 (BP Location: Right arm, Patient Position: Sitting, Cuff Size: Adult Regular)   Pulse 89   Temp 98.2  F (36.8  C) (Temporal)   Resp 18   Ht 1.803 m (5' 11\")   Wt 66.7 kg (147 lb)   SpO2 97%   BMI 20.50 kg/m     Estimated body mass index is 20.5 kg/m  as calculated from the following:    Height as of this encounter: 1.803 m (5' 11\").    Weight as of " this encounter: 66.7 kg (147 lb).     HENT: ear canals and TM's normal and nose and mouth without ulcers or lesions. Dentures upper  Neck: no adenopathy. Thyroid normal to palpation. No bruits  Pulm: Lungs clear to auscultation   CV: Regular rates and rhythm  GI: Soft, thin, nontender, Normal active bowel sounds, No hepatosplenomegaly or masses palpable  Ext: Peripheral pulses intact. No edema.  Neuro: Normal strength and tone, sensory exam grossly normal  Gen: Slight flattened affect.  Normal eye contact, dress and thought content       Component      Latest Ref Rng & Units 11/21/2022 12/9/2022 1/3/2023   Sodium      133 - 144 mmol/L  131 (L) 129 (L)   Potassium      3.4 - 5.3 mmol/L  4.4    Chloride      98 - 107 mmol/L  93 (L)    Carbon Dioxide (CO2)      22 - 29 mmol/L  30 (H)    Anion Gap      3 - 14 mmol/L  8 6   Urea Nitrogen      8.0 - 23.0 mg/dL  19.9    Creatinine      0.66 - 1.25 mg/dL  0.76 0.70   Calcium      8.5 - 10.1 mg/dL  9.3 9.0   Glucose      70 - 99 mg/dL  208 (H)    GFR Estimate      >60 mL/min/1.73m2  89 >90   Potassium      3.4 - 5.3 mmol/L   4.4   Chloride      94 - 109 mmol/L   94   Carbon Dioxide      20 - 32 mmol/L   29   Urea Nitrogen      7 - 30 mg/dL   29   Glucose      70 - 99 mg/dL   196 (H)   Hemoglobin A1C      0.0 - 5.6 % 7.4 (H)     TSH      0.30 - 4.20 uIU/mL 1.80     Cortisol Serum      ug/dL 18.6     Sodium Urine mmol/L      mmol/L  80

## 2023-01-19 NOTE — PATIENT INSTRUCTIONS
[employer] message or call the nurseline [phone] in 1 week with update re: bowel function and occ constipation and blood pressure    Miralax 1/2 dose  Daily for constipation   Will refer you ba [facility] re: possible insulin pump. They will call to  Schedule  If not feeling  usual hunger, then wait to take the Humalog until eating some to be sure you want to eat. If no appetite and not eating, then hold Humalog  Stay off of Lisinopril for now

## 2023-01-24 NOTE — PATIENT INSTRUCTIONS
Continue Basaglar 9 units daily for now.  Increase Humalog to 5 units with breakfast, 3 units with lunch and 3 units with supper  If morning blood sugars remain greater than 130 after a week,   increase Basaglar further to 10 units daily  Repeat nonfasting BMP lab in 1 month and repeat nonfasting A1c lab and early March 2023.  Use [employer] or call the appointment line at [phone] to schedule lab appointments  Continue other medications

## 2023-01-25 NOTE — PROGRESS NOTES
Medication Therapy Management (MTM) Encounter    ASSESSMENT:                            Medication Adherence/Access: No issues identified     Type 2 Diabetes: Patient is meeting A1c goal of < 8%. Self monitoring of blood glucose is not at goal of fasting  mg/dL and post prandial < 180 mg/dL.  Postprandial blood sugars have been improving.  Would be beneficial to keep the Humalog dosing simple so it is easier to manage.  Although it is less convenient and has been a bit overwhelming, adding mealtime insulin is likely the safest option available. May be beneficial to increase his mealtime insulin dose by 1 unit if he is eating a large meal or sugary food, they haven't tried this yet.  Overall blood glucose are looking really good, sometimes high if he ate dessert or higher carb food.  They will think about insulin pumps.  They have a visit with a endocrinologist later this summer in May.  If they were to switch to an insulin pump will take some training as the technology is a lot different, and we would have to switch to the Dexcom monitor.  We initially did not choose the Dexcom because it is a bit more complicated.  Goal to gain weight.  Discussed trying different foods that are high in protein/nutrients, but easier to chew and swallow.  May be an option to make a fruit, veggie, protein smoothie in the morning but have to consider fluid restriction for hyponatremia.  Could make the smoothie using the ensure drink.    Hyponatremia:  Sodium levels appear to be stable at 129.  Should continue fluid restriction efforts to help maintain.  Could consider addition of U[employer] medical food to help increase sodium levels.  Could consider sodium tablets, but worry about increase in blood pressure and edema.  Will defer changes to upcoming nephrology appointment.     Constipation:  If still having loose or watery stools, would be OK to decrease Miralax to 1/4 capful or take 1/2 capful every other day.  Patient will monitor  and lower dosing if needed.    Depression/Anxiety: Managed by psychiatry provider.  Doses of mirtazapine may be more beneficial for depression, however have to be very cautious with this medication due to potential risk for hyponatremia.  If mirtazapine were to contribute to hyponatremia, Wellbutrin may be better tolerated however would worry about risk of weight loss with Wellbutrin.    Could also consider low-dose of a second generation antipsychotic (Abilify, olanzapine, quetiapine).  They are much lower risk for hyponatremia and may even help with increasing weight gain/appetite.    Hypertension: Stable. Patient is meeting blood pressure goal of < 140/90mmHg.  Had a few blood pressure readings at home that were slightly elevated, but overall home readings are good.  Dose adjustments of hypertension medications not likely needed, however there is a message to patient's PCP to review his home readings and they will make changes if needed.     Hyperlipidemia: Stable.  Patient is on moderate intensity statin which is indicated based on 2019 ACC/AHA guidelines for lipid management.       Osteoporosis:  Plan in place, started bone density medication within the last 6 months.  I also recently had him start vitamin D supplement.    Vitamin D Deficiency:  Plan in place to recheck vitamin D level in the next few months.  Vitamin D supplement was started about 1-1/2 months ago.  Want to ensure it is in normal range.    Rheumatoid Arthritis:  Stable.     Supplements/OTCs: Stable.    PLAN:                            1.  Continue current medications    Follow-up: Return in about 2 months (around 3/26/2023) for MT Follow Up.   -Or sooner if needed while they are in [place]    SUBJECTIVE/OBJECTIVE:                          Jose Francisco Gonzalez is a 82 year old male called for a follow-up visit. Patient was accompanied by his wife Divya. Today's visit is a follow-up MTM visit from 1/11/2023     Reason for visit: Follow up on  diabetes and appetite.    Allergies/ADRs: Reviewed in chart  Past Medical History: Reviewed in chart  Tobacco: He reports that he quit smoking about 29 years ago. His smoking use included cigarettes. He has a 20.00 pack-year smoking history. He has never used smokeless tobacco.  Alcohol: Not discussed  Social: Lives with his wife Divya, daughter Elke is involved with his care as well.  Has a good family support.  They typically go to [place] during the winter.     Medication Adherence/Access: no issues reported  They will have to put a new mike sensor on this Thursday, his daughter Elke will be there to help.  Let them know that we could do a video call if they would like additional assistance.  They were able to put the last one on with Elke's help.    Its more difficult to shoot up the insulin, because it's hard to tell if the needle is going in when he inserts it to his abdomen.  He will try injecting it on the upper part of abdomen to see it better.      They have 4 Mike sensors left, 2 month supply.  Discussed that they will be in [place] soon and stay there until the end of March.  Then they typically come back to [place] for the summer.       Type 2 Diabetes:  Currently taking Basaglar 9-10 units in the morning (takes 10 units most days, takes this dose if AM blood glucose over 130), and Humalog 5 units AM/ 3 units with lunch/ 3 units with dinner.  No side effects, hypoglycemia.  Doing well with putting the Mike sensors on.   Glimepiride not effective.  Not on metformin due to diarrhea/IBS history.    Blood sugar monitoring: Continuous Glucose Monitor, Mike 2.  We mistyped his date of birth with setting up the Mike 2 byron on his phone and it incorrectly says '1940' in our account.   Ranges (from patient's glucose log): See below  Am fasting ranging 130-180s, most post prandial blood glucose very good.  Has some occasional highs after meals if eating larger portion or sugary foods.  Symptoms of low blood  sugar? shaky, dizzy, Frequency of lows- none recently  Symptoms of high blood sugar? none  Eye exam: up to date  Foot exam: due  Aspirin: Taking 81mg daily and denies side effects   Statin: Yes  ACEi/ARB: Yes  Urine Albumin:             Lab Results   Component Value Date     UMALCR 16.98 04/14/2022                Lab Results   Component Value Date     A1C 7.4 11/21/2022     A1C 7.7 04/14/2022     A1C 9.3 10/22/2021     A1C 8.6 06/10/2021     A1C 7.6 02/22/2021     A1C 7.0 06/09/2020     A1C 6.6 09/04/2019     A1C 7.0 04/24/2019     Diet:   Dinner: had a few nights he planned to eat a lot more but wasn't able to eat that much.  Has dental issues and will take time to have the surgery.  Is struggling to get enough protein in diet due to dental issues/chewing.  Having a hard time finding new foods and trying different things.  He hasn't been able to eat foods that he usually likes.  Can't eat meat very well.  Can eat hot dogs, sausage, some fish.  Ground hamburger is a bit hard to swallow.  Takes sips of water to help wash foods down.  He is worried about eating desserts due to high blood glucose.  Is on Fluid restriction of 1.5L of liquid per day for hyponatremia.  Discussed trying smoothies with added fruit and veggies, they haven't tried this yet.     Hyponatremia:  Patient is trying to follow liquid restriction of 1.5 L per day, has been very difficult.  Feels very thirsty and worries he may not be getting enough fluids.  Haven't added in sodium tablets, there would be concern for causing edema and blood pressure going too high.    His last two sodium levels were stable at 129  Sodium   Date Value Ref Range Status   01/19/2023 129 (L) 136 - 145 mmol/L Final   06/10/2021 125 (L) 133 - 144 mmol/L Final     Constipation:  Taking Miralax 1/2 capful daily.  He was on a full capful but felt it made his stools too soft/runny so they reduced down to 1/2 capful.  He still thinks the 1/2 dose may be a bit too  much.    Hypertension: Current medications include Amlodipine 2.5 mg daily, Lisinopril 10 mg daily, Metoprolol ER 50 mg daily.  Patient does self-monitor blood pressure.  See telephone encounter from 1/25 for home blood pressure readings.  Patient reports no current medication side effects.  His home blood pressure readings have been good, some are a bit higher but mostly below 140/90 mmHg.       BP Readings from Last 3 Encounters:   01/19/23 104/68   12/09/22 (!) 144/81   11/26/22 (!) 165/90     Depression/Anxiety:  Current medications include: Mirtazapine 15 mg once daily at bedtime.  Thinks this is helping some, may be helping to increase appetite. Also takes hydroxyzine 10 mg three times daily as needed for anxiety.  Has been feeling a bit better with managing blood glucose, not running as high and that was a big stressor. His is looking forward to being in [place], hopefully can get out for some walks and will get outside more. Is overwhelmed with how many injections he has to do every day, his health/medications have made everything very regimented.  Following with psychiatry provider.  Has history of hyponatremia, need to be cautious with depression medications can carry this risk.     PHQ-9 SCORE 10/22/2021 11/21/2022 11/28/2022   PHQ-9 Total Score MyChart - 9 (Mild depression) 13 (Moderate depression)   PHQ-9 Total Score 1 9 -   Some encounter information is confidential and restricted. Go to Review Flowsheets activity to see all data.      Hyperlipidemia: Current therapy includes Atorvastatin 20 mg daily.  Patient reports no significant myalgias or other side effects.          Recent Labs   Lab Test 04/14/22  1000 06/10/21  1517   CHOL 120 142   HDL 65 53   LDL 43 76   TRIG 61 67      Osteoporosis: Current therapy includes: alendronate (Fosamax) 70mg weekly (has been on current therapy since 11/2022). Patient is not experiencing side effects.  DEXA History: Up to date  Patient is getting the following  sources of dietary calcium: milk, yogurt and cheese     Vitamin D Deficiency:  Taking vitamin d 2000 units daily.  We started this in mid December 2022.  Discussed previously that it would be beneficial to recheck his vitamin D level once he sets up care with new doctor in [place], or when they return back to [place] in March we can check it at that time.  Last vitamin D level:        Lab Results   Component Value Date     VITDT 22 10/22/2021      Rheumatoid Arthritis: Taking Sulfasalazine  mg twice daily. Joint pain is very good, doing a lot better since being on this.  Doesn't have flare ups as often and pain well controlled.   Denies side effects, tolerating well.      Supplements/OTCs: Taking metamucil 2 capsules daily as needed,  PreserVision 2 capsules daily.  Denies issues.                  ----------------      I spent 30 minutes with this patient today (an extra 15 minutes was spent creating the Medication Action Plan). I offer these suggestions for consideration by Dr. Alejo. A copy of the visit note was provided to the patient's provider(s).    A summary of these recommendations was declined.    Deepthi Esquivel, PharmD  Medication Therapy Management Pharmacist  Pager: [phone]      Telemedicine Visit Details  Type of service:  Telephone visit  Start Time: 3:00 pm  End Time: 3:30 PM     Medication Therapy Recommendations  No medication therapy recommendations to display

## 2023-01-25 NOTE — TELEPHONE ENCOUNTER
LEI-  Pt called to report his BP readings as instructed by PCP on 01/19/2023.    See BP reading below :    01/19/2023  127/81 pulse 90  01/20/2023  133/78 pulse 88   01/21/2023  122/74 pulse 96  01/22/2023  143/88 pulse 94  01/23/2023 147/93 pulse 90  01/24/2023  138/92 pulse 88  01/25/2023  142/87 pulse 80    ,187,280    Pt also reports he is taking Miralax every other day since he was feeling dehydrated while taking Miralax daily.     Pt reported feeling unsteady. Denies acute weakness, vomiting, fever or diarrhea.     Triage advised pt to seek care at ER if worsening symptoms-weakness, dizziness, diarreah or is unable to keep foods and fluids down.       Pt is also requesting a phone call from Dietician for advice on foods he eat since his upper teeth were removed.. Pt wants to get insulin pump but unable to be seen with endocrinology until May.     Routing message to PCP  with BP update and Dietician phone care request. Please advice were can pt call to schedule phone visit.

## 2023-01-26 NOTE — Clinical Note
Jackson Alejo and Soila - no major concerns after talking with Shar today.  Blood glucose looking really good overall!  I asked them to give me a call if having issues with blood glucose in [place] or needing help with things.  I can always upload his Mike readings to his chart if needed so let me know if needed.    Thanks, Deepthi

## 2023-01-27 NOTE — PROGRESS NOTES
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   WHODAS 2.0 (12 item) Score: Not completed          Presenting Concern:  This 82 year old man presented with a list of physical health issues he is stuggling to deal with. Patient presented focused on health unsure of what his goals are.  He stated he was coming to therapy because of a bevy of illnesses.  Thi included having his teeth pulled and a bridge fitted.  He was struggling to chew and had little appetite. He states he is losing weight which concerns him. He states the issue that most bothers hi is macular degeneration.  He is afraid he will be blind soon. He states his vision also affects his balance resulting in less walking fearing a fall.   Patient was given information on [facility] for long tem therapy.        Reason for Discharge:  Client did not return      Disposition at Time of Last Encounter:   Comments:   Patient continued his maladaptive behavior to health stressors.     Risk Management:   Client denies a history of suicidal ideation, suicide attempts, self-injurious behavior, homicidal ideation, homicidal behavior and and other safety concerns  Recommended that patient call 911 or go to the local ED should there be a change in any of these risk factors.      Referred To:  Patient was not scheduled an appointment with [facility]. Provider gave patient information about [facility] including the phone number to schedule an appointment with psychotherapy.         Sharron Goyal, [facility]   1/26/2023

## 2023-01-31 NOTE — TELEPHONE ENCOUNTER
Rx was not cancelled. Unclear when cleaning up med list if [facility] is automatically sent something that makes it look like  Med cancelled. Spoke with pt and Rx for Humalog resent to pharmacy

## 2023-01-31 NOTE — TELEPHONE ENCOUNTER
Glad BP and bowel function issues better. WOuld continue current management.   OK to see me 3/28/23. Schedule pt in current open same day appt slot that day

## 2023-01-31 NOTE — TELEPHONE ENCOUNTER
"Received a call from the patient wanting to update Dr. Alejo regarding his constipation and blood pressure.    Constipation: Patient states he has been taking the Miralax as prescribed and the patient feels like this medication has been helping him to have bowel movements more frequently. In addition, the patient states his urgency has improved and he feels like he can \"control it better.\" Patient states he is going to plan to continue to take the Miralax (unless he feels like he does not need it anymore).     Blood Pressure: Patient states since stopping the Lisinopril he has been checking his blood pressure on a daily basis and his systolic blood pressures have been between 110-130s. Patient states he will continue to monitor his blood pressure daily.     Patient is scheduled to meet with an Endocrinologist in May to discuss insulin pump.     Patient would like to get an appointment scheduled with Dr. Alejo for follow-up upon his return from [place]. Patient would like to see Dr. Alejo March 28 (prior to scheduled oral surgery on March 31).     Will route to PCP for review and approval to schedule patient on March 28 either in same day or virtual spot.    Can we leave a detailed message on this number? YES  Phone number patient can be reached at: Cell number on file:    Telephone Information:   Mobile [phone]       Olesya Funes RN  [facility] Triage      "

## 2023-01-31 NOTE — TELEPHONE ENCOUNTER
"Pt called the clinic stating-  Pt stated he went to  more humalog from the pharmacy and the pharmacy said it was cancelled on 1/2/23.   -Per 1/3/23 class is listed as \"historical\".     Pt stated \"I think he changed it (referring to how many units) pts wife stated \"its confusing\". Pt & wife stated, \"no we know\" when asked if they needed clarification on how to take it.      Pt stated he needs a refill of this asap as he is leaving for out of town on Saturday and wont be back until 3/27/23.     Preferred pharm- [facility] on [address].       Also, relayed Dr. Alejo's message from below. Scheduled pt per Dr. Alejo's recommendations.       Can we leave a detailed message on this number? YES  Phone number patient can be reached at: Cell number on file:    Telephone Information:   Mobile [phone]       Ange Ruiz RN  [facility] Triage      "

## 2023-02-02 NOTE — PATIENT INSTRUCTIONS
Work on counting carbohydrates, this will be important for pump in future! Also when you have a set dose of insulin eating about the same carbohydrate amounts at meals helps to control blood sugars best  Aim for 60-75g carbohydrate at each meal and ~30g carbohydrate with meals.   -Examples day:   Breakfast: 1 serving malt-o-meal or cream of wheat (25g cho) + 1/2 cup half and half (5g cho) + 1 greek yogurt (20g cho)+ 1/2 large banana (15g cho) OR 1/2 cup peaches/pears in own juice (15g cho).  AM Snack: protein supplement drink  Lunch: eggs + 2 pieces of toast  w/ jam (40g cho) + small piece of fruit or 1/2 banana OR 1 cup peaches/pears (15g cho) + 1 cup milk (15g cho)  PM Snack:  protein supplement drink OR greek yogurt  Dinner: 2 cups chili (60g cho) + 1 cup milk (15g cho) OR 1 cup chili (30g cho) + 1 cup milk (15g cho) + 7 saltine crackers (15g cho)  Bedtime snack: 1 cup applesauce (~25g cho) with cottage cheese       Check blood sugar before bed, if it is less than 150, eat something with carbohydrates and protein. Ex: 1/2 protein supplement OR cottage cheese and piece of fruit OR yogurt    If you check your blood sugar before eating and it is >200, you can give 1 unit additional insulin.    Always carry something with you to treat low BG such as: juice box or glucose tablets (buy these at [employer]/etc in the diabetes section).

## 2023-02-02 NOTE — LETTER
2/2/2023         RE: Jose Francisco Gonzalez  [address]  [facility] [address]        Dear Colleague,    Thank you for referring your patient, Jose Francisco Gonzalez, to the [facility]. Please see a copy of my visit note below.    Diabetes Self-Management Education & Support    Presents for:      Type of Service: Telephone Visit    Originating Location (Patient Location): Home  Distant Location (Provider Location): Home  Mode of Communication:  Telephone    Telephone Visit Start Time: 1:00  Telephone Visit End Time (telephone visit stop time): 1:45    How would patient like to obtain AVS? MyChart    Assessment Type:   ASSESSMENT:  Shar is leaving for [place] until March so wanted to get an appt in before he goes. He is very interested in getting an insulin pump once he returns and meets with endocrinology. Provided brief overview of pumps and provided him with links to all pumps for him to look at on his own in [place]. We did discuss that counting cho is an important part to using a pump and now would be a good time to start this. Also discussed how eating about the same amt of cho at meals will help to control BG with aim of 60-75g per meal + 30g with snacks, goal of weight gain for Shar. Discussed different snacks/meals and appropriate servings to hit targets above. Sent his wife the cho counting book to her email. Scheduled an appt with Kimmie Castillo to discuss pumps in more detail when they get back from vacation. Informed them to send [employer] message with updates in [place] if BG are not staying at goals. We did not adjust current doses today as TIR was at 54% and numbers continue to be improving. Did give Shar the option to add 1u of insulin if BG >200, he actually did this on his own today with elevated lunch number (number elevated d/t giving less insulin at breakfast). He notes some diarrhea, he has been taking Miralax every other day, suggested he stop this and only  "resume if constipation.     Patient's most recent   Lab Results   Component Value Date    A1C 7.4 11/21/2022    A1C 8.6 06/10/2021     is meeting goal of <8.0    Diabetes knowledge and skills assessment:   Patient is knowledgeable in diabetes management concepts related to: Healthy Eating, Monitoring and Taking Medication    Continue education with the following diabetes management concepts: Healthy Eating, Being Active, Monitoring, Taking Medication, Problem Solving, Reducing Risks and Healthy Coping    Based on learning assessment above, most appropriate setting for further diabetes education would be: Individual setting.      PLAN  -See AVS    Follow-up: will f/u with Kimmie Castillo for insulin pump information     See Care Plan for co-developed, patient-state behavior change goals.  AVS provided for patient today.    Education Materials Provided:  Carbohydrate CountingBook      SUBJECTIVE/OBJECTIVE:  Cultural Influences/Ethnic Background:  Not  or       Diabetes Symptoms & Complications:  Patient Problem List and Family Medical History reviewed for relevant medical history, current medical status, and diabetes risk factors.    Vitals:  There were no vitals taken for this visit.  Estimated body mass index is 20.5 kg/m  as calculated from the following:    Height as of 1/19/23: 1.803 m (5' 11\").    Weight as of 1/19/23: 66.7 kg (147 lb).   Last 3 BP:   BP Readings from Last 3 Encounters:   01/19/23 104/68   12/09/22 (!) 144/81   11/26/22 (!) 165/90       History   Smoking Status     Former     Packs/day: 1.00     Years: 20.00     Types: Cigarettes     Quit date: 1/23/1993   Smokeless Tobacco     Never       Labs:  Lab Results   Component Value Date    A1C 7.4 11/21/2022    A1C 8.6 06/10/2021     Lab Results   Component Value Date     01/19/2023     01/03/2023     06/10/2021     Lab Results   Component Value Date    LDL 43 04/14/2022    LDL 76 06/10/2021     HDL Cholesterol "   Date Value Ref Range Status   06/10/2021 53 >39 mg/dL Final     Direct Measure HDL   Date Value Ref Range Status   04/14/2022 65 >=40 mg/dL Final   ]  GFR Estimate   Date Value Ref Range Status   01/19/2023 >90 >60 mL/min/1.73m2 Final     Comment:     Effective December 21, 2021 eGFRcr in adults is calculated using the 2021 CKD-EPI creatinine equation which includes age and gender (Adama et al., NEJ, DOI: 10.[facility]6/KQPEsf6819574)   06/10/2021 83 >60 mL/min/[1.73_m2] Final     Comment:     Non  GFR Calc  Starting 12/18/2018, serum creatinine based estimated GFR (eGFR) will be   calculated using the Chronic Kidney Disease Epidemiology Collaboration   (CKD-EPI) equation.       GFR Estimate If Black   Date Value Ref Range Status   06/10/2021 >90 >60 mL/min/[1.73_m2] Final     Comment:      GFR Calc  Starting 12/18/2018, serum creatinine based estimated GFR (eGFR) will be   calculated using the Chronic Kidney Disease Epidemiology Collaboration   (CKD-EPI) equation.       Lab Results   Component Value Date    CR 0.63 01/19/2023    CR 0.79 06/10/2021     No results found for: MICROALBUMIN    Healthy Eating:  Healthy Eating Assessed Today: Yes  Breakfast: 8AM-cream of wheat OR malt o meal (25g/cho) OR oatmeal OR yogurt + banana  Lunch: scrambled eggs + sausage + toast jam + butter OR hotdogs + bread + apple sauce + ham OR chicken wild rice soup, eats alot of canned soup  Dinner: chicken divaan + brocolli + 4 cookies OR chicken wild rice soup OR pork tenderloin + noodles OR  Snacks: AM-ensure plus (360kcals/16g) +  banana  PM-pudding + banana HS-sometimes OR cottage cheese and peaches  Beverages: Milk, Other (ensures)    Being Active:  Being Active Assessed Today: No    Monitoring:  Monitoring Assessed Today: Yes  Did patient bring glucose meter to appointment? : Yes  Times checking blood sugar at home (number): 4  Times checking blood sugar at home (per): Day  Blood glucose trend:  Decreasing              Taking Medications:  Diabetes Medication(s)     Insulin       insulin glargine (BASAGLAR KWIKPEN) 100 UNIT/ML pen    Inject 10 units under the skin once daily in the morning.  If your morning blood sugar is below 130, you can decrease Basaglar dose to 9 units     insulin lispro (HUMALOG KWIKPEN) 100 UNIT/ML (1 unit dial) KWIKPEN    Inject 3-5 Units Subcutaneous 3 times daily (before meals) Take 5 units with breakfast, 3 units with lunch, and 3 units with dinner (TDD=15u)          Taking Medication Assessed Today: Yes  Current Treatments: Insulin Injections, Diet  Dose schedule: Pre-breakfast, Pre-lunch, Pre-dinner  Given by: Patient  Injection/Infusion sites: Abdomen  Problems taking diabetes medications regularly?: No  Diabetes medication side effects?: No    Problem Solving:  Problem Solving Assessed Today: Yes  Is the patient at risk for hypoglycemia?: Yes  Hypoglycemia Frequency: Rarely    Reducing Risks:  Reducing Risks Assessed Today: No    Healthy Coping:  Healthy Coping Assessed Today: Yes  Emotional response to diabetes: Ready to learn  Stage of change: ACTION (Actively working towards change)  Patient Activation Measure Survey Score:  No flowsheet data found.      Care Plan and Education Provided:  Care Plan: Diabetes   Updates made by Yolette Hinojosa since 2/2/2023 12:00 AM      Problem: Diabetes Self-Management Education Needed to Optimize Self-Care Behaviors       Goal: Healthy Eating - follow a healthy eating pattern for diabetes    This Visit's Progress: 80%   Recent Progress: 70%   Note:    I will eat snacks between meals daily-aim for 3     Task: Provide education on managing carbohydrate intake (carbohydrate counting, plate planning method, etc.) Completed 2/2/2023   Responsible User: Yolette Hinojosa      Goal: Problem Solving - know how to prevent and manage short-term diabetes complications       Task: Provide education on low blood glucose - causes, signs/symptoms,  prevention, treatment, carrying a carbohydrate source at all times, and medical identification Completed 2/2/2023   Responsible User: Yolette Hinojosa      Goal: Healthy Coping - use available resources to cope with the challenges of managing diabetes       Task: Provide education on benefits of utilizing support systems Completed 2/2/2023   Responsible User: Yolette Hinojosa RD, BESSY, [facility]        Time Spent: 45 minutes  Encounter Type: Individual    Any diabetes medication dose changes were made via the CDE Protocol per the patient's primary care provider. A copy of this encounter was shared with the provider.

## 2023-02-02 NOTE — PROGRESS NOTES
Diabetes Self-Management Education & Support    Presents for:      Type of Service: Telephone Visit    Originating Location (Patient Location): Home  Distant Location (Provider Location): Home  Mode of Communication:  Telephone    Telephone Visit Start Time: 1:00  Telephone Visit End Time (telephone visit stop time): 1:45    How would patient like to obtain AVS? MyChart    Assessment Type:   ASSESSMENT:  Shar is leaving for [place] until March so wanted to get an appt in before he goes. He is very interested in getting an insulin pump once he returns and meets with endocrinology. Provided brief overview of pumps and provided him with links to all pumps for him to look at on his own in [place]. We did discuss that counting cho is an important part to using a pump and now would be a good time to start this. Also discussed how eating about the same amt of cho at meals will help to control BG with aim of 60-75g per meal + 30g with snacks, goal of weight gain for Shar. Discussed different snacks/meals and appropriate servings to hit targets above. Sent his wife the cho counting book to her email. Scheduled an appt with Kimmie Castillo to discuss pumps in more detail when they get back from vacation. Informed them to send [employer] message with updates in [place] if BG are not staying at goals. We did not adjust current doses today as TIR was at 54% and numbers continue to be improving. Did give Shar the option to add 1u of insulin if BG >200, he actually did this on his own today with elevated lunch number (number elevated d/t giving less insulin at breakfast). He notes some diarrhea, he has been taking Miralax every other day, suggested he stop this and only resume if constipation.     Patient's most recent   Lab Results   Component Value Date    A1C 7.4 11/21/2022    A1C 8.6 06/10/2021     is meeting goal of <8.0    Diabetes knowledge and skills assessment:   Patient is knowledgeable in diabetes management concepts  "related to: Healthy Eating, Monitoring and Taking Medication    Continue education with the following diabetes management concepts: Healthy Eating, Being Active, Monitoring, Taking Medication, Problem Solving, Reducing Risks and Healthy Coping    Based on learning assessment above, most appropriate setting for further diabetes education would be: Individual setting.      PLAN  -See AVS    Follow-up: will f/u with Kimmie Castillo for insulin pump information     See Care Plan for co-developed, patient-state behavior change goals.  AVS provided for patient today.    Education Materials Provided:  Carbohydrate CountingBook      SUBJECTIVE/OBJECTIVE:  Cultural Influences/Ethnic Background:  Not  or       Diabetes Symptoms & Complications:  Patient Problem List and Family Medical History reviewed for relevant medical history, current medical status, and diabetes risk factors.    Vitals:  There were no vitals taken for this visit.  Estimated body mass index is 20.5 kg/m  as calculated from the following:    Height as of 1/19/23: 1.803 m (5' 11\").    Weight as of 1/19/23: 66.7 kg (147 lb).   Last 3 BP:   BP Readings from Last 3 Encounters:   01/19/23 104/68   12/09/22 (!) 144/81   11/26/22 (!) 165/90       History   Smoking Status     Former     Packs/day: 1.00     Years: 20.00     Types: Cigarettes     Quit date: 1/23/1993   Smokeless Tobacco     Never       Labs:  Lab Results   Component Value Date    A1C 7.4 11/21/2022    A1C 8.6 06/10/2021     Lab Results   Component Value Date     01/19/2023     01/03/2023     06/10/2021     Lab Results   Component Value Date    LDL 43 04/14/2022    LDL 76 06/10/2021     HDL Cholesterol   Date Value Ref Range Status   06/10/2021 53 >39 mg/dL Final     Direct Measure HDL   Date Value Ref Range Status   04/14/2022 65 >=40 mg/dL Final   ]  GFR Estimate   Date Value Ref Range Status   01/19/2023 >90 >60 mL/min/1.73m2 Final     Comment:     Effective " December 21, 2021 eGFRcr in adults is calculated using the 2021 CKD-EPI creatinine equation which includes age and gender (Adama alvarez al., NEJM, DOI: 10.1056/OWOLdj8637857)   06/10/2021 83 >60 mL/min/[1.73_m2] Final     Comment:     Non  GFR Calc  Starting 12/18/2018, serum creatinine based estimated GFR (eGFR) will be   calculated using the Chronic Kidney Disease Epidemiology Collaboration   (CKD-EPI) equation.       GFR Estimate If Black   Date Value Ref Range Status   06/10/2021 >90 >60 mL/min/[1.73_m2] Final     Comment:      GFR Calc  Starting 12/18/2018, serum creatinine based estimated GFR (eGFR) will be   calculated using the Chronic Kidney Disease Epidemiology Collaboration   (CKD-EPI) equation.       Lab Results   Component Value Date    CR 0.63 01/19/2023    CR 0.79 06/10/2021     No results found for: MICROALBUMIN    Healthy Eating:  Healthy Eating Assessed Today: Yes  Breakfast: 8AM-cream of wheat OR malt o meal (25g/cho) OR oatmeal OR yogurt + banana  Lunch: scrambled eggs + sausage + toast jam + butter OR hotdogs + bread + apple sauce + ham OR chicken wild rice soup, eats alot of canned soup  Dinner: chicken divaan + brocolli + 4 cookies OR chicken wild rice soup OR pork tenderloin + noodles OR  Snacks: AM-ensure plus (360kcals/16g) +  banana  PM-pudding + banana HS-sometimes OR cottage cheese and peaches  Beverages: Milk, Other (ensures)    Being Active:  Being Active Assessed Today: No    Monitoring:  Monitoring Assessed Today: Yes  Did patient bring glucose meter to appointment? : Yes  Times checking blood sugar at home (number): 4  Times checking blood sugar at home (per): Day  Blood glucose trend: Decreasing              Taking Medications:  Diabetes Medication(s)     Insulin       insulin glargine (BASAGLAR KWIKPEN) 100 UNIT/ML pen    Inject 10 units under the skin once daily in the morning.  If your morning blood sugar is below 130, you can decrease Basaglar dose  to 9 units     insulin lispro (HUMALOG KWIKPEN) 100 UNIT/ML (1 unit dial) KWIKPEN    Inject 3-5 Units Subcutaneous 3 times daily (before meals) Take 5 units with breakfast, 3 units with lunch, and 3 units with dinner (TDD=15u)          Taking Medication Assessed Today: Yes  Current Treatments: Insulin Injections, Diet  Dose schedule: Pre-breakfast, Pre-lunch, Pre-dinner  Given by: Patient  Injection/Infusion sites: Abdomen  Problems taking diabetes medications regularly?: No  Diabetes medication side effects?: No    Problem Solving:  Problem Solving Assessed Today: Yes  Is the patient at risk for hypoglycemia?: Yes  Hypoglycemia Frequency: Rarely    Reducing Risks:  Reducing Risks Assessed Today: No    Healthy Coping:  Healthy Coping Assessed Today: Yes  Emotional response to diabetes: Ready to learn  Stage of change: ACTION (Actively working towards change)  Patient Activation Measure Survey Score:  No flowsheet data found.      Care Plan and Education Provided:  Care Plan: Diabetes   Updates made by Yolette Hinojosa since 2/2/2023 12:00 AM      Problem: Diabetes Self-Management Education Needed to Optimize Self-Care Behaviors       Goal: Healthy Eating - follow a healthy eating pattern for diabetes    This Visit's Progress: 80%   Recent Progress: 70%   Note:    I will eat snacks between meals daily-aim for 3     Task: Provide education on managing carbohydrate intake (carbohydrate counting, plate planning method, etc.) Completed 2/2/2023   Responsible User: Yolette Hinojosa      Goal: Problem Solving - know how to prevent and manage short-term diabetes complications       Task: Provide education on low blood glucose - causes, signs/symptoms, prevention, treatment, carrying a carbohydrate source at all times, and medical identification Completed 2/2/2023   Responsible User: Yolette Hinojosa      Goal: Healthy Coping - use available resources to cope with the challenges of managing diabetes       Task: Provide  education on benefits of utilizing support systems Completed 2/2/2023   Responsible User: Yolette Hinojosa RD, BESSY, [facility]        Time Spent: 45 minutes  Encounter Type: Individual    Any diabetes medication dose changes were made via the CDE Protocol per the patient's primary care provider. A copy of this encounter was shared with the provider.

## 2023-02-03 NOTE — PROGRESS NOTES
"Clinic Care Coordination Contact    Follow Up Progress Note      Assessment:   Patient states he's leaving for [place] tomorrow and he saw PCP yesterday and feels he has everything he needs for his trip.. Patient would like to utilize a continuous glucose machine and is going to   \"research\" the different models to find one that is compatible for patient and is not \"diffucutt\" to understand.  He has an appointment scheduled for 4/13/2023 with CDE/RD for further education and possible pump placement.  Patient denies any hypo/hyperglycemic events and feels his blood sugars are getting better.  Patient is excited for his trip and get out the cold [place] weather.    Care Gaps:    Health Maintenance Due   Topic Date Due     DTAP/TDAP/TD IMMUNIZATION (2 - Td or Tdap) 02/26/2016     DIABETIC FOOT EXAM  04/24/2020     MEDICARE ANNUAL WELLNESS VISIT  05/28/2020       Care Gaps Last addressed on 2/3/2023    Care Plans  Care Plan: Help At Home     Problem: Insufficient In-home support     Goal: Establish adequate home support     Start Date: 12/2/2022 Expected End Date: 3/2/2023    This Visit's Progress: 70% Recent Progress: 70%    Note:     Barriers: Advanced age, losing vision  Strengths: Strong family support  Patient expressed understanding of goal: Yes  Action steps to achieve this goal:  1. I will follow up with my home care providers (RN/Physical Therapy/OT) as recommended/suggested  2. I will contact my care team with questions, concerns, support needs   3. I will use the clinic as a resource, and I understand I can contact my clinic with 24/7 after hours services available                     Care Plan: Diabetes     Problem: Lifestyle choices     Goal: Stabilize blood sugars     Start Date: 12/2/2022 Expected End Date: 3/2/2023    This Visit's Progress: On track    Note:     Barriers: Losing vision due to Macular Degeneration  Strengths: Strong family support  Patient expressed understanding of goal: Yes  Action steps to " achieve this goal:  1. I will follow up with my Certified Diabetic Educator as recommended/suggested  2. I will contact my care team with questions, concerns, support needs   3. I will use the clinic as a resource, and I understand I can contact my clinic with 24/7 after hours services available                       Intervention/Education provided during outreach:   RNCC called and spoke with patient; introduced self, discussed role of Care Coordination and explained reason for call    Plan:   -Patient will contact the care team with questions, concerns, support needs   -Patient will use the clinic as a resource and understands (s)he can contact the [place] clinic with 24/7 after hours services available  -Care Coordinator will remain available as needed  -RNCC will follow up in 4-6 weeks once patient back from [place] for follow up appointments/recommendations and goal progression

## 2023-02-10 NOTE — TELEPHONE ENCOUNTER
Divya called me because they got a letter saying that the [employer] plan will stop covering Humalog, they got a one time refill (90 day).      When refilled we will likely need to change to a different brand.  When I look at the formulary it looks like Humalog is covered but I may be looking at wrong [facility] medicare D plan.  Recommend they call the [employer] plan and ask if the following medications are covered:    Lyumjev   Novolog  Insulin Aspart  Fiasp   Apidra  Or can ask them which rapid acting insulins are covered    Sent portal message with info on above rapid acting insulins.

## 2023-02-19 PROBLEM — F33.1 MODERATE EPISODE OF RECURRENT MAJOR DEPRESSIVE DISORDER (H): Status: ACTIVE | Noted: 2023-01-01

## 2023-02-20 NOTE — TELEPHONE ENCOUNTER
Patient called the clinic reporting last BM yesterday which was soft and brown. He felt constipated today and took 1/2 cup of Miralax which caused one large BM and one loose/wattery diarrhea episode. So, patient took two Immodium and now asking if he should take Pepto-Bismol. It has been an hour since Immodium and feeling okay. Advised to not take Bepto-Bismol if no diarrhea after Immodium. Patient currently in [place] and did not have his regular fiber pills. Did not eat much fiber for breakfast.     Educated patient that constipation is considered no BM on the 3rd day. Educated on keeping hydrated, going for walks when feeling constipated, and including more fiber into diet such as flax seeds, pamela seeds, berries, cooked fruits and veggies, smoothies, etc. Also, keep a diarrhea of what causes him to be constipated and having diarrhea, so he can look back and see a trend.     today, patient is asking if okay to take regular insulin of 3 units since he does not feel like eating.  Advised to try to keep to the same schedule of insulin and meals if he can. Wife said that she will decrease to 2 units with a light dinner.     Reason for Disposition    MILD-MODERATE diarrhea (e.g., 1-6 times / day more than normal)    Additional Information    Negative: SEVERE diarrhea (e.g., 7 or more times / day more than normal) and present > 24 hours (1 day)    Negative: MODERATE diarrhea (e.g., 4-6 times / day more than normal) and present > 48 hours (2 days)    Negative: MODERATE diarrhea (e.g., 4-6 times / day more than normal) and age > 70 years    Negative: Abdominal pain (Exception: Pain clears completely with each passage of diarrhea stool.)    Negative: Fever > 101 F (38.3 C)    Negative: Blood in the stool    Negative: Mucus or pus in stool has been present > 2 days and diarrhea is more than mild    Negative: Weak immune system (e.g., HIV positive, cancer chemo, splenectomy, organ transplant, chronic steroids)     Negative: Travel to a foreign country in past month    Negative: Recent antibiotic therapy (i.e., within last 2 months) and diarrhea present > 3 days since antibiotic was stopped    Negative: Recent hospitalization and diarrhea present > 3 days    Negative: Tube feedings (e.g., nasogastric, g-tube, j-tube)    Negative: MILD diarrhea (e.g., 1-3 or more stools than normal in past 24 hours) diarrhea without known cause and present > 7 days    Negative: Patient wants to be seen    Negative: Diarrhea is a chronic symptom (recurrent or ongoing AND lasting > 4 weeks)    Protocols used: DIARRHEA-A-OH

## 2023-03-03 NOTE — TELEPHONE ENCOUNTER
Patient's wife calling because his blood glucose are running a bit lower after dinner sometimes.  Is eating more whole foods that are higher in protein/fiber so likely not spiking his blood glucose up as much.  Patient is really worried because he doesn't want his blood glucose getting even lower below 70.  The last week he's having to drink juice and eat something sweet to make sure it doesn't keep dropping.  Hasn't been more active.  They have also reduced his lunch dose because after lunch blood glucose were going into low 70's.      Would benefit from decreasing the dinner dose of Humalog.  Discussed we can lower dose further if needed, still having large drop after dinner.      Plan:  1.  Decrease Humalog to 5 units with breakfast, 2 units with lunch, and 2 units with dinner

## 2023-03-04 NOTE — TELEPHONE ENCOUNTER
"insulin glargine (BASAGLAR KWIKPEN) 100 UNIT/ML pen 15 mL 1 1/3/2023  No   Sig: Inject 10 units under the skin once daily in the morning.  If your morning blood sugar is below 130, you can decrease Basaglar dose to 9 units     8 units not sure if any in. Needle bent.  Long acting.     5 Humalog    BS AM  260 15 minutes ago.  230 BS Now     Low BS yesterday last night  142    Nurse Triage SBAR    Is this a 2nd Level Triage? NO    Situation:   Pt's BS has been going up this AM.  He tried to inject Basaglar 8 units but, \"the need was bent and I am not sure how much I got in it any?\"     Background:   Had recently changed his Basaglar to taking in     Assessment: Pt is lightheaded and mouth is dry.  Very tired.    Protocol Recommended Disposition:   Go to ED Now    Recommendation:     Does the patient meet one of the following criteria for ADS visit consideration? 16+ years old, with an [facility] PCP     TIP  Providers, please consider if this condition is appropriate for management at one of our Acute and Diagnostic Services sites.     If patient is a good candidate, please use dotphrase <dot>triageresponse and select Refer to ADS to document.    165 this am.      Reason for Disposition    [1] Vomiting AND [2] signs of dehydration (e.g., very dry mouth, lightheaded, dark urine)    Additional Information    Negative: Unconscious or difficult to awaken    Negative: Acting confused (e.g., disoriented, slurred speech)    Negative: Very weak (e.g., can't stand)    Negative: Sounds like a life-threatening emergency to the triager    Protocols used: DIABETES - HIGH BLOOD SUGAR-A-      "

## 2023-03-07 NOTE — PROGRESS NOTES
Spoke to patients wife.  They went to the ED on Saturday 3/4 in [place] due to concern for his blood glucose spiking up that morning without eating.  The night before he had a low blood glucose even before dinner and hadn't taken any Humalog since lunch on Friday.  At the ED they also found that his sodium levels were low so he was admitted.      They will be faxing the records of hospitalization to Dr. Alejo, and has plan to have sodium levels checked within 4 days down there.  His blood glucose has been good the last few days, see below.

## 2023-03-13 NOTE — TELEPHONE ENCOUNTER
Patient had follow up labs done in [place] on 3/1/23. It was faxed to Dr. Alejo today at about 1 or 2 pm today.  Patient does not want care or interpretation form Dr. Alejo, he just wants to know what the sodium numbers.    Nephrologist got the results, but will not release to the patient.     Informed the patient and his wife and daughter that it is up to the [place] doctor to release the results since he was the ordering provider. Family understood and agrees with plan. Brigid Irby RN

## 2023-03-13 NOTE — TELEPHONE ENCOUNTER
Called and spoke to the patient and wife. Informed patient that [place] provider is able to manage care across state lines. Pt and wife state they had follow-up labs completed on Friday and are awaiting results.     Wife states that it is challenging d/t providers in [place] and psychiatrist have given different opinions regarding Mirtazapine. Advised pt and wife that medication manage by psychiatry and questions should go to them.    Informed that clinic may receive documentation from care if faxed, but follow up care would need to be completed there if still out of state.     Pt and wife verbalized understanding.     Suma Garcia, RN

## 2023-03-13 NOTE — TELEPHONE ENCOUNTER
"Call pt.  Per \"documentation only note\" in chart from 3/7/23, pt was seen in ER 3/4/23 with sodium 123 and he was admitted to a hospital. Labs were faxed to our clinic and note from 3/7/23 states that pt  \"has plan to have sodium levels checked within 4 days down there.\" Per chart, pt has f/u appt scheduled with me 3/28/23.  As pt is not in [place] at this time, I cannot manage his medical issues while out-state due to medical license restrictions.  If the hospital was suggesting pt have f/u labs after hospital stay and any hospital follow-up acutely after hospital discharge, pt will have to see a clinic/provider  there in [place] until back in [place]  "

## 2023-03-21 NOTE — TELEPHONE ENCOUNTER
Received a call from [facility], stating the patient is requesting to restart home care services including skilled RN, PT, OT, and home health aid. Patient had home care starting December 3, 2022 and patient was discharged January 1, 2023.     Referral pended for review. Patient is scheduled for an appointment with Dr. Alejo next week.     Appointments in Next Year    Mar 28, 2023 10:30 AM  (Arrive by 10:10 AM)  Provider Visit with Kin Alejo MD  [facility] ([facility][1] ) [phone]       Olesya Funes RN

## 2023-03-24 NOTE — LETTER
3/24/2023       RE: Jose Francisco Gonzalez  [address]  [facility] [address]     Dear Colleague,    Thank you for referring your patient, Jose Francisco Gonzalez, to the [facility] [place] [facility]. Please see a copy of my visit note below.    Virtual Visit Details    Type of service:  Video Visit   Video Start Time: 09:30  Video End Time:10:10 AM    Originating Location (pt. Location): Home    Distant Location (provider location):  On-site  Platform used for Video Visit: [facility]        Nephrology Clinic Note      March 24, 2023      CC: Hyponatremia    Pertinent Renal Hx:  - Chronic hyponatremia - as long as our records in 2011   - Hypertension  - Type 2 DM  - Hx of Microalbuminuria    HPI: Jose Francisco Gonzalez is a 82 year old male with PMHx of Hypertension and Type 2 DM without diabetic retinopathy, prior hx of microalbuminuria. He has had chronic hyponatremia for a long time and his levels have been mostly around 126- 128 with occasional low values of ~ 123. Prior lab work demonstrated elevated urine osmolality and elevated urine sodium suggestive of SIADH. His serum cortisol and TSH levels were within normal limits during prior check-up. He was recently hospitalized at [place] for low sodium levels (dropped to 123). During hospitalization he received urea packet and had improvement in sodium levels.        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 No recent lab work since March 10, 2023. Plans to repeat renal panel on 3/28 and in 2 weeks. In past work-up for thyroid and cortisol has been normal. CXR and CT Abdomen and Pelvis in recent years, did not reveal occult malignancy. Elevated ADH levels have been felt to be secondary to his psychiatry medications.   - To aid with free water excretion we  initiated him on SGLT2i during this visit. Advised to not start until he has lab work done on 3/28 so that we can compare levels during repeat lab work in 2 weeks. As we are starting SGLT2i we anticipate some improvement in blood sugar levels and to avoid hypoglycemia discussed that he should visit with his physicians managing his Insulin that proper dose adjustment be done to avoid hypoglycemia. I will also send message to Dr Alejo so that he is aware of this change. Mr Gonzalez has an upcoming appointment with him on 3/28.   - For management of hyponatremia also recommend continued free water restriction of 1.2 L/day.     Seen and discussed with Dr Huitron, who agrees with the plan.     Andrew Hurley  Nephrology Fellow  [phone]       No Known Allergies    alendronate (FOSAMAX) 70 MG tablet, TAKE 1 TABLET(70 MG) BY MOUTH EVERY 7 DAYS  amLODIPine (NORVASC) 2.5 MG tablet, TAKE 1 TABLET(2.5 MG) BY MOUTH DAILY  aspirin 81 MG tablet, Take 81 mg by mouth daily  atorvastatin (LIPITOR) 20 MG tablet, TAKE 1 TABLET(20MG) BY MOUTH DAILY  blood glucose (NO BRAND SPECIFIED) lancets standard, Use to test blood sugar 3 times daily or as directed.  Continuous Blood Gluc Sensor (FREESTYLE ODILIA 2 SENSOR) MISC, 1 each every 14 days Use 1 sensor every 14 days. Use to read blood sugars per 's instructions.  Contour Next EZ (CONTOUR NEXT EZ W/DEVICE KIT) w/Device KIT, USE TO TEST BLOOD SUGAR THREE TIMES DAILY OR AS DIRECTED  CONTOUR NEXT TEST test strip, USE TO TEST BLOOD SUGAR 3 TIMES DAILY OR AS DIRECTED  insulin glargine (BASAGLAR KWIKPEN) 100 UNIT/ML pen, Inject 10 units under the skin once daily in the morning.  If your morning blood sugar is below 130, you can decrease Basaglar dose to 9 units  insulin lispro (HUMALOG KWIKPEN) 100 UNIT/ML (1 unit dial) KWIKPEN, Inject 3-5 Units Subcutaneous 3 times daily (before meals) Take 5 units with breakfast, 2 units with lunch, and 2 units with dinner (TDD=15u)  insulin pen  needle (B-D U/F) 31G X 8 MM miscellaneous, Use 4 times a day  metoprolol succinate ER (TOPROL XL) 50 MG 24 hr tablet, Take 1 tablet (50 mg) by mouth daily  mirtazapine (REMERON) 15 MG tablet, Take 1 tablet (15 mg) by mouth At Bedtime  Multiple Vitamins-Minerals (PRESERVISION AREDS 2) CAPS, Take 2 capsules by mouth daily  order for DME, Test strips for pt's glucometer, brand as covered by insurance Test QID and prn. He is on insulin. Patients preferred brand-Verio One Touch.  Psyllium-Calcium (METAMUCIL PLUS CALCIUM) CAPS, Take 2 capsules by mouth nightly as needed  sulfaSALAzine ER (AZULFIDINE EN) 500 MG EC tablet, TAKE 1 TABLET BY MOUTH TWICE DAILY AFTER MEALS  vitamin D3 (CHOLECALCIFEROL) 50 mcg (2000 units) tablet, Take 1 tablet by mouth daily  hydrOXYzine (ATARAX) 10 MG tablet, Take 1 tablet (10 mg) by mouth 3 times daily as needed for anxiety (Patient not taking: Reported on 3/24/2023)    No current facility-administered medications on file prior to visit.      Past Medical History:   Diagnosis Date     Arthropathy, unspecified, site unspecified     palindromic rheumatism; takes aleve bid      Compression fracture of body of thoracic vertebra (H)      Compression fracture of L1 lumbar vertebra (H) 2019    see MRI     Hyperlipidemia LDL goal <100 2012    Was on fenofibrate plus simva, in Accord study; LDL 84 in Lipitor 20 in 2012; 97 in 3/13     Hyponatremia      Rheumatoid arthritis (H)      Sialoadenitis 2015     Syncope      Type 2 diabetes, HbA1C goal < 8% (H) 2012       Past Surgical History:   Procedure Laterality Date     ENT SURGERY  2009    nasal; removal of pyogenic granuloma L     EYE SURGERY  murray 15 and22 2009    cataract     HC TOOTH EXTRACTION W/FORCEP       TONSILLECTOMY         Social History     Tobacco Use     Smoking status: Former     Packs/day: 1.00     Years: 20.00     Pack years: 20.00     Types: Cigarettes     Quit date: 1993     Years since quittin.1      "Smokeless tobacco: Never   Vaping Use     Vaping Use: Never used   Substance Use Topics     Alcohol use: Yes     Comment: socially     Drug use: No       Family History   Problem Relation Age of Onset     Heart Disease Father      Coronary Artery Disease Father      Heart Disease Brother      Coronary Artery Disease Brother      Alzheimer Disease Sister      No Known Problems Mother      Substance Abuse Son         alcohol       ROS: A 12 system review of systems was negative other than noted here or above.     Exam:  Ht 1.803 m (5' 11\")   Wt 64.9 kg (143 lb)   BMI 19.94 kg/m      GENERAL APPEARANCE: alert and no distress  EYES: PERRL, no scleral icterus  HENT: mouth without ulcers or lesions  NECK: supple, no adenopathy  RESP: lungs clear to auscultation   CV: regular rhythm, normal rate, no rub  Extremities: no clubbing, cyanosis, or edema  SKIN: no rash  NEURO: mentation intact and speech normal  PSYCH: affect normal/bright    Results:    Lab on 12/09/2022   Component Date Value Ref Range Status     Sodium 12/09/2022 131 (L)  136 - 145 mmol/L Final     Potassium 12/09/2022 4.4  3.4 - 5.3 mmol/L Final     Chloride 12/09/2022 93 (L)  98 - 107 mmol/L Final     Carbon Dioxide (CO2) 12/09/2022 30 (H)  22 - 29 mmol/L Final     Anion Gap 12/09/2022 8  7 - 15 mmol/L Final     Urea Nitrogen 12/09/2022 19.9  8.0 - 23.0 mg/dL Final     Creatinine 12/09/2022 0.76  0.67 - 1.17 mg/dL Final     Calcium 12/09/2022 9.3  8.8 - 10.2 mg/dL Final     Glucose 12/09/2022 208 (H)  70 - 99 mg/dL Final     GFR Estimate 12/09/2022 89  >60 mL/min/1.73m2 Final    Effective December 21, 2021 eGFRcr in adults is calculated using the 2021 CKD-EPI creatinine equation which includes age and gender (Adama alvarez al., NEJM, DOI: 10.1056/YJTJtq5981903)     Color Urine 12/09/2022 Yellow  Colorless, Straw, Light Yellow, Yellow Final     Appearance Urine 12/09/2022 Clear  Clear Final     Glucose Urine 12/09/2022 Negative  Negative mg/dL Final     " Bilirubin Urine 12/09/2022 Negative  Negative Final     Ketones Urine 12/09/2022 Negative  Negative mg/dL Final     Specific Gravity Urine 12/09/2022 1.014  1.003 - 1.035 Final     Blood Urine 12/09/2022 Negative  Negative Final     pH Urine 12/09/2022 6.5  5.0 - 7.0 Final     Protein Albumin Urine 12/09/2022 Negative  Negative mg/dL Final     Urobilinogen Urine 12/09/2022 Normal  Normal, 2.0 mg/dL Final     Nitrite Urine 12/09/2022 Negative  Negative Final     Leukocyte Esterase Urine 12/09/2022 Negative  Negative Final     Mucus Urine 12/09/2022 Present (A)  None Seen /LPF Final     RBC Urine 12/09/2022 1  <=2 /HPF Final     WBC Urine 12/09/2022 1  <=5 /HPF Final     Squamous Epithelials Urine 12/09/2022 <1  <=1 /HPF Final     Hyaline Casts Urine 12/09/2022 1  <=2 /LPF Final         Attestation signed by Isabel Huitron MD at 3/26/2023  4:58 PM:  Physician Attestation   I, Isabel Huitron, saw and evaluated Jose Francisco Gonzalez with the Fellow Dr Hurley. I have reviewed and discussed with the Fellow their history, physical and plan.    I personally reviewed the vital signs, medications, labs, and imaging.    In brief, chronic hyponatremia secondary to syndrome of inappropriate diuresis induced by his psych meds. Insufficient sodium intake could also be a concern. Sodium supplementation with SGLT2 inhibitors to induce solute and water diuresis.      Rest per the fellow's note.     Total time spent 30 minutes on the date of encounter for chart review, history taking, physical exam, labs and notes reviewed, advised and coordinating care.     Isabel Huitron MD  Division of Nephrology and Hypertension  Pager [phone]  Date of Service (when I saw the patient): March 22, 2023

## 2023-03-24 NOTE — TELEPHONE ENCOUNTER
Spoke with patient and wife, his blood glucose has been a lot higher since the Basaglar dose was decreased when they were in [place].  No longer having hypoglycemia though in the evening/at dinner.  He has also been eating more at meals which is helping, but also contributing to higher blood glucose readings.  May need to further increase his morning dose of Humalog.  Could consider having him add 1-2 units to regular base dose of Humalog if eating large portion or adding the protein drink with meal as this also spikes his blood glucose up more.    Currently taking:  Humalog 5 units with breakfast, 3 units with lunch and 3 with dinner.    Basaglar 7 units once daily in the morning    Plan:  1. Increase Humalog to 6 units with breakfast, 3 units with lunch, and 3 with dinner     2.  Needs refill send to pharmacy, his insurance covers brand name Humalog and doesn't cover Lispro

## 2023-03-24 NOTE — PROGRESS NOTES
Virtual Visit Details    Type of service:  Video Visit   Video Start Time: 09:30  Video End Time:10:10 AM    Originating Location (pt. Location): Home    Distant Location (provider location):  On-site  Platform used for Video Visit: [facility]        Nephrology Clinic Note      March 24, 2023      CC: Hyponatremia    Pertinent Renal Hx:  - Chronic hyponatremia - as long as our records in 2011   - Hypertension  - Type 2 DM  - Hx of Microalbuminuria    HPI: Jose Francisco Gonzalez is a 82 year old male with PMHx of Hypertension and Type 2 DM without diabetic retinopathy, prior hx of microalbuminuria. He has had chronic hyponatremia for a long time and his levels have been mostly around 126- 128 with occasional low values of ~ 123. Prior lab work demonstrated elevated urine osmolality and elevated urine sodium suggestive of SIADH. His serum cortisol and TSH levels were within normal limits during prior check-up. He was recently hospitalized at [place] for low sodium levels (dropped to 123). During hospitalization he received urea packet and had improvement in sodium levels.        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 No recent lab work since March 10, 2023. Plans to repeat renal panel on 3/28 and in 2 weeks. In past work-up for thyroid and cortisol has been normal. CXR and CT Abdomen and Pelvis in recent years, did not reveal occult malignancy. Elevated ADH levels have been felt to be secondary to his psychiatry medications.   - To aid with free water excretion we initiated him on SGLT2i during this visit. Advised to not start until he has lab work done on 3/28 so that we can compare levels during repeat lab work in 2 weeks. As we are starting SGLT2i we anticipate some improvement in blood sugar levels and to avoid hypoglycemia discussed that he should visit with his physicians managing  his Insulin that proper dose adjustment be done to avoid hypoglycemia. I will also send message to Dr Alejo so that he is aware of this change. Mr Carlos has an upcoming appointment with him on 3/28.   - For management of hyponatremia also recommend continued free water restriction of 1.2 L/day.     Seen and discussed with Dr Huitron, who agrees with the plan.     Andrew Hurley  Nephrology Fellow  [phone]       No Known Allergies    alendronate (FOSAMAX) 70 MG tablet, TAKE 1 TABLET(70 MG) BY MOUTH EVERY 7 DAYS  amLODIPine (NORVASC) 2.5 MG tablet, TAKE 1 TABLET(2.5 MG) BY MOUTH DAILY  aspirin 81 MG tablet, Take 81 mg by mouth daily  atorvastatin (LIPITOR) 20 MG tablet, TAKE 1 TABLET(20MG) BY MOUTH DAILY  blood glucose (NO BRAND SPECIFIED) lancets standard, Use to test blood sugar 3 times daily or as directed.  Continuous Blood Gluc Sensor (FREESTYLE ODILIA 2 SENSOR) MISC, 1 each every 14 days Use 1 sensor every 14 days. Use to read blood sugars per 's instructions.  Contour Next EZ (CONTOUR NEXT EZ W/DEVICE KIT) w/Device KIT, USE TO TEST BLOOD SUGAR THREE TIMES DAILY OR AS DIRECTED  CONTOUR NEXT TEST test strip, USE TO TEST BLOOD SUGAR 3 TIMES DAILY OR AS DIRECTED  insulin glargine (BASAGLAR KWIKPEN) 100 UNIT/ML pen, Inject 10 units under the skin once daily in the morning.  If your morning blood sugar is below 130, you can decrease Basaglar dose to 9 units  insulin lispro (HUMALOG KWIKPEN) 100 UNIT/ML (1 unit dial) KWIKPEN, Inject 3-5 Units Subcutaneous 3 times daily (before meals) Take 5 units with breakfast, 2 units with lunch, and 2 units with dinner (TDD=15u)  insulin pen needle (B-D U/F) 31G X 8 MM miscellaneous, Use 4 times a day  metoprolol succinate ER (TOPROL XL) 50 MG 24 hr tablet, Take 1 tablet (50 mg) by mouth daily  mirtazapine (REMERON) 15 MG tablet, Take 1 tablet (15 mg) by mouth At Bedtime  Multiple Vitamins-Minerals (PRESERVISION AREDS 2) CAPS, Take 2 capsules by mouth daily  order for  DME, Test strips for pt's glucometer, brand as covered by insurance Test QID and prn. He is on insulin. Patients preferred brand-Verio One Touch.  Psyllium-Calcium (METAMUCIL PLUS CALCIUM) CAPS, Take 2 capsules by mouth nightly as needed  sulfaSALAzine ER (AZULFIDINE EN) 500 MG EC tablet, TAKE 1 TABLET BY MOUTH TWICE DAILY AFTER MEALS  vitamin D3 (CHOLECALCIFEROL) 50 mcg (2000 units) tablet, Take 1 tablet by mouth daily  hydrOXYzine (ATARAX) 10 MG tablet, Take 1 tablet (10 mg) by mouth 3 times daily as needed for anxiety (Patient not taking: Reported on 3/24/2023)    No current facility-administered medications on file prior to visit.      Past Medical History:   Diagnosis Date     Arthropathy, unspecified, site unspecified     palindromic rheumatism; takes aleve bid      Compression fracture of body of thoracic vertebra (H)      Compression fracture of L1 lumbar vertebra (H) 2019    see MRI     Hyperlipidemia LDL goal <100 2012    Was on fenofibrate plus simva, in Accord study; LDL 84 in Lipitor 20 in 2012; 97 in 3/13     Hyponatremia      Rheumatoid arthritis (H)      Sialoadenitis 2015     Syncope      Type 2 diabetes, HbA1C goal < 8% (H) 2012       Past Surgical History:   Procedure Laterality Date     ENT SURGERY  2009    nasal; removal of pyogenic granuloma L     EYE SURGERY  murray 15 and22 2009    cataract     HC TOOTH EXTRACTION W/FORCEP       TONSILLECTOMY         Social History     Tobacco Use     Smoking status: Former     Packs/day: 1.00     Years: 20.00     Pack years: 20.00     Types: Cigarettes     Quit date: 1993     Years since quittin.1     Smokeless tobacco: Never   Vaping Use     Vaping Use: Never used   Substance Use Topics     Alcohol use: Yes     Comment: socially     Drug use: No       Family History   Problem Relation Age of Onset     Heart Disease Father      Coronary Artery Disease Father      Heart Disease Brother      Coronary Artery Disease Brother       "Alzheimer Disease Sister      No Known Problems Mother      Substance Abuse Son         alcohol       ROS: A 12 system review of systems was negative other than noted here or above.     Exam:  Ht 1.803 m (5' 11\")   Wt 64.9 kg (143 lb)   BMI 19.94 kg/m      GENERAL APPEARANCE: alert and no distress  EYES: PERRL, no scleral icterus  HENT: mouth without ulcers or lesions  NECK: supple, no adenopathy  RESP: lungs clear to auscultation   CV: regular rhythm, normal rate, no rub  Extremities: no clubbing, cyanosis, or edema  SKIN: no rash  NEURO: mentation intact and speech normal  PSYCH: affect normal/bright    Results:    Lab on 12/09/2022   Component Date Value Ref Range Status     Sodium 12/09/2022 131 (L)  136 - 145 mmol/L Final     Potassium 12/09/2022 4.4  3.4 - 5.3 mmol/L Final     Chloride 12/09/2022 93 (L)  98 - 107 mmol/L Final     Carbon Dioxide (CO2) 12/09/2022 30 (H)  22 - 29 mmol/L Final     Anion Gap 12/09/2022 8  7 - 15 mmol/L Final     Urea Nitrogen 12/09/2022 19.9  8.0 - 23.0 mg/dL Final     Creatinine 12/09/2022 0.76  0.67 - 1.17 mg/dL Final     Calcium 12/09/2022 9.3  8.8 - 10.2 mg/dL Final     Glucose 12/09/2022 208 (H)  70 - 99 mg/dL Final     GFR Estimate 12/09/2022 89  >60 mL/min/1.73m2 Final    Effective December 21, 2021 eGFRcr in adults is calculated using the 2021 CKD-EPI creatinine equation which includes age and gender (Adama et al., NEJM, DOI: 10.1056/CYPOgx0927782)     Color Urine 12/09/2022 Yellow  Colorless, Straw, Light Yellow, Yellow Final     Appearance Urine 12/09/2022 Clear  Clear Final     Glucose Urine 12/09/2022 Negative  Negative mg/dL Final     Bilirubin Urine 12/09/2022 Negative  Negative Final     Ketones Urine 12/09/2022 Negative  Negative mg/dL Final     Specific Gravity Urine 12/09/2022 1.014  1.003 - 1.035 Final     Blood Urine 12/09/2022 Negative  Negative Final     pH Urine 12/09/2022 6.5  5.0 - 7.0 Final     Protein Albumin Urine 12/09/2022 Negative  Negative mg/dL " Final     Urobilinogen Urine 12/09/2022 Normal  Normal, 2.0 mg/dL Final     Nitrite Urine 12/09/2022 Negative  Negative Final     Leukocyte Esterase Urine 12/09/2022 Negative  Negative Final     Mucus Urine 12/09/2022 Present (A)  None Seen /LPF Final     RBC Urine 12/09/2022 1  <=2 /HPF Final     WBC Urine 12/09/2022 1  <=5 /HPF Final     Squamous Epithelials Urine 12/09/2022 <1  <=1 /HPF Final     Hyaline Casts Urine 12/09/2022 1  <=2 /LPF Final

## 2023-03-24 NOTE — NURSING NOTE
Is the patient currently in the state of [place]? YES    Visit mode:VIDEO    If the visit is dropped, the patient can be reconnected by: VIDEO VISIT: Send to e-mail at: [email or web address]    Will anyone else be joining the visit? NO      How would you like to obtain your AVS? MyChart    Are changes needed to the allergy or medication list? NO    Reason for visit: Return

## 2023-03-28 NOTE — PROGRESS NOTES
ASSESSMENT:    1. Type 2 diabetes mellitus with diabetic nephropathy, with long-term current use of insulin (H)  Most recent blood sugars controlled with use of insulin therapy but nephrology wishing patient to start Farxiga to improve hyponatremia so insulin dosing will need to be adjusted.  Patient stressed about making changes.  Also having dental procedure this week which may change oral intake.  Will therefore wait to start Farxiga until after dental procedure.  Will then also lower insulin dosages to compensate for blood sugar decrease with Farxiga use.  Recheck diabetic labs in 2 weeks.    MD will  send staff message to    Deepthi Bronson [facility]  to also get  CGM data downloaded mid next week to see how sugars are doing with initial adjustments and then adjust insulin further as needed  - insulin glargine (BASAGLAR KWIKPEN) 100 UNIT/ML pen; Inject 4 units under the skin once daily in the morning.  Dispense: 15 mL; Refill: 1  - insulin lispro (HUMALOG KWIKPEN) 100 UNIT/ML (1 unit dial) KWIKPEN; Inject 2 Units Subcutaneous 3 times daily (before meals)  Dispense: 15 mL; Refill: 3  - Hemoglobin A1c; Future  - Basic metabolic panel; Future  - Albumin Random Urine Quantitative with Creat Ratio; Future  - Home Care Referral    2. Hyponatremia   Sodium 125 on 3/10/23 in [place]. See nephrology visit note 3/24/23.  Started on Farxiga and sodium tablet.  Because of stress issues with upcoming dental procedure and insulin adjustments required with Farxiga use that is also stressing patient, will have medication started this Saturday after dental procedure done.  Lab today for baseline as ordered by nephrology and will recheck sodium levels again in a couple weeks  - Renal panel (Alb, BUN, Ca, Cl, CO2, Creat, Gluc, Phos, K, Na)  - Basic metabolic panel; Future  - Home Care Referral    3. Dental disorder  Patient states he will be having one dental implant removed and 3 put in this Friday.  No oral surgery notes available to  review.  Patient states he has not been told that he requires preop evaluation before the procedure.  Patient instructed to hold aspirin between now and the procedure    4. Hypertension, unspecified type  Blood pressure low normal.  Oral intake may also be reduced some in near future after dental procedure.  Patient instructed to stop amlodipine for now and will send the update regarding blood pressure in a week with home blood pressure monitor  - Albumin Random Urine Quantitative with Creat Ratio; Future    5. Hyperlipidemia LDL goal <100  On statin therapy.  Recent LFTs normal.  Will get lipid panel in 2 weeks when due for other labs  - Lipid panel reflex to direct LDL Non-fasting; Future      6. Adjustment disorder with mixed anxiety and depressed mood  On mirtazapine.  Some increased stresses with medication changes, upcoming dental appointment and recent vitamin change coming back from [place] to [place].  Continue current mirtazapine dose for now and monitor.  Symptoms contributing to benefit of doing PT/OT at home environment  - Home Care Referral    7. Abnormal gait  Would benefit from PT OT eval and treat  - Home Care Referral        PLAN:   Stop Aspirin between now and dental procedure this Friday and then resume on Saturday  Labs today as ordered   Stop Amlodipine completely for now with low blood pressure   Hold off on eating after midnight Thursday night into Friday AM and nothing to eat Friday AM  until dental procedure completed. May eat again when home  Friday afternoon  This Friday, then hold Bagaslar dose and hold AM dose of Humalog   Starting Saturday take Farxiga 5mg  tab daily in AM  for low sodium  Starting   Saturday then  take lower dose Basaglar  4 units daily in AM   Start  Saturday, then reduce  Humalog to 2 units  with meals   Friday AM    Send [employer] message next Tuesday or Wednesday with  blood pressure status. Take blood pressure that day with home monitor.   MD will also speak with  Deepthi Bronson to assist with downloading your CGM data mid next week to see how your sugars are doing after the start of Farxiga and with the initial lower insulin doses  Lab today for sodium. Repeat nonfasting lab again  in  2 weeks. Will need to schedule lab appt  Referral placed to home care for RN, PT, OT and health aide. RN needed with complex care and BP monitoring, making sure pt following med change instructions, being sure proper oral intake after dental procedure,etc. Aide to help with some ADLs    (Chart documentation was completed, in part, with [employer] voice-recognition software. Even though reviewed, some grammatical, spelling, and word errors may remain.)    Kin Alejo MD  Internal Medicine Department  [facility]    Most recent lab results reviewed with pt.      Patient states he is scheduled to have dental implant removed and 3 dental implants placed for upper dentures.  This will be done this coming Friday.  No oral surgery clinic notes available to review.  Patient states he has not been told he needs to have any preop clearance before the procedure.  See previous clinic notes regarding discussion of  dental/denture discomforts.  Recent neurology note from 3/24/2023 reviewed.  Patient prescribed sodium tablet and Farxiga 5 mg daily but is not started either of those medications yet.  Patient also working with [facility] re: diabetes.  Morning blood sugars after breakfast elevated and Humalog increased to 6 units with breakfast.  Patient taking 24 units with lunch and 2 to 3 units with supper and 7 units of Basaglar currently.  Since making adjustments, blood sugars yesterday with meals and bedtime were 172, 118, 182, 172  Average blood sugar over the last 30 days has been 183  Patient denies current chest pain, shortness of breath, abdominal pain.  Has general fatigue.  Some weakness and gait but no recent fall.  Requesting home physical/occupational therapy along with  "home aide and RN assess.  Previously had these therapies up until early January when he left for [place]   Pt seen with wife. Needs assistance of her to get to appts. Pt also gets stressed/overwhelmed easily    Patient is using a freestyle sadi 2 continuous glucose monitor for blood sugar monitoring.   Using  insulin 3 or more times  per day.  Because of the patient's diabetic history (Type 2), the  patient requires   adjustments in fast acting insulin and/or sometimes long-acting insulin dosing based both on the quantity of carbohydrates consumed and the blood sugar results noted with the CGM.        Additional ROS:   Constitutional, HEENT, Cardiovascular, Pulmonary, GI and , Neuro, MSK and Psych review of systems/symptoms are otherwise negative or unchanged from previous, except as noted above.      OBJECTIVE:  BP 91/58 (BP Location: Left arm, Patient Position: Sitting, Cuff Size: Adult Large)   Pulse 98   Temp 97.9  F (36.6  C) (Tympanic)   Resp 16   Ht 1.803 m (5' 11\")   Wt 64.6 kg (142 lb 8 oz)   SpO2 98%   BMI 19.87 kg/m     Estimated body mass index is 19.87 kg/m  as calculated from the following:    Height as of this encounter: 1.803 m (5' 11\").    Weight as of this encounter: 64.6 kg (142 lb 8 oz).     HENT: ear canals and TM's normal and nose and mouth without ulcers or lesions.  Upper dentures.  Partial dentition lower  Neck: no adenopathy. Thyroid normal to palpation. No bruits  Pulm: Lungs clear to auscultation   CV: Regular rates and rhythm  GI: Soft, thin, nontender, Normal active bowel sounds, No hepatosplenomegaly or masses palpable  Ext: Peripheral pulses intact. No edema.  Neuro: Normal  tone, sensory exam grossly normal.  Strength globally 5-/5.  Slower cautious and slight unsteady gait.   Gen: Mild anxious affect       "

## 2023-03-28 NOTE — PATIENT INSTRUCTIONS
Stop Aspirin between now and dental procedure this Friday and then resume on Saturday  Labs today as ordered   Stop Amlodipine completely for now with low blood pressure   Hold off on eating after midnight Thursday night into Friday AM and nothing to eat Friday AM  until dental procedure completed. May eat again when home  Friday afternoon  This Friday, then hold Bagaslar dose and hold AM dose of Humalog   Starting Saturday take Farxiga 5mg  tab daily in AM  for low sodium  Starting   Saturday then  take lower dose Basaglar  4 units daily in AM   Start  Saturday, then reduce  Humalog to 2 units  with meals   Friday AM    Send [email or web address] message next Tuesday or Wednesday with  blood pressure status. Take blood pressure that day with home monitor.   MD will also speak with Deepthi Bronson to assist with downloading your CGM data mid next week to see how your sugars are doing after the start of Farxiga and wi the initial lower insulin doses  Lab today for sodium. Repeat nonfasting lab again  in  2 weeks. Will need to schedule lab appt

## 2023-03-30 NOTE — PROGRESS NOTES
"Clinic Care Coordination Contact    Follow Up Progress Note      Assessment:   Patient reports his blood sugars are \"pretty good\" and states he has difficulty chewing food.  Per patient, he had his upper teeth removed in October due to infection and now has a plate yet \"it doesn't fit well\".  Patient states he eats \"soft food\" and supplements his diet with Ensure.    Patient states he is having dental surgery tomorrow, 3/31/2023, to have upper implants and to \"fix\" a tooth on the lower right.  Patient states he is eating lunch now with his wife and RNCC ended the call so he could finish his lunch.    Care Gaps:    Health Maintenance Due   Topic Date Due     DTAP/TDAP/TD IMMUNIZATION (1 - Tdap) 02/27/2006     DIABETIC FOOT EXAM  04/24/2020     MEDICARE ANNUAL WELLNESS VISIT  05/28/2020     LIPID  04/14/2023     MICROALBUMIN  04/14/2023       Care Gaps Last addressed on 3/30/2023    Care Plans  Care Plan: Help At Home     Problem: Insufficient In-home support     Goal: Establish adequate home support     Start Date: 12/2/2022 Expected End Date: 6/30/2023    This Visit's Progress: 90% Recent Progress: 70%    Note:     Barriers: Advanced age, losing vision  Strengths: Strong family support  Patient expressed understanding of goal: Yes  Action steps to achieve this goal:  1. I will follow up with my home care providers (RN/Physical Therapy/OT) as recommended/suggested  2. I will contact my care team with questions, concerns, support needs   3. I will use the clinic as a resource, and I understand I can contact my clinic with 24/7 after hours services available                     Care Plan: Diabetes     Problem: Lifestyle choices     Goal: Stabilize blood sugars     Start Date: 12/2/2022 Expected End Date: 3/2/2023    This Visit's Progress: On track    Note:     Barriers: Losing vision due to Macular Degeneration  Strengths: Strong family support  Patient expressed understanding of goal: Yes  Action steps to achieve this " goal:  1. I will follow up with my Certified Diabetic Educator as recommended/suggested  2. I will contact my care team with questions, concerns, support needs   3. I will use the clinic as a resource, and I understand I can contact my clinic with 24/7 after hours services available                       Intervention/Education provided during outreach:   RNCC called and spoke with patient/caregiver; introduced self, discussed role of Care Coordination and explained reason for call    Plan:   -Patient will contact the care team with questions, concerns, support needs   -Patient will use the clinic as a resource and understands (s)he can contact the [facility] with 24/7 after hours services available  -Care Coordinator will remain available as needed  -RNCC will follow up in one month for follow up appointments/recommendations and goal progression     Soila Mai RN, BSN, PHN  Primary Care / Care Coordinator   [facility]  [email or web address] [email or web address]   [phone]

## 2023-04-04 NOTE — PROGRESS NOTES
Medication Therapy Management (MTM) Encounter    ASSESSMENT:                            Medication Adherence/Access: No issues identified     Type 2 Diabetes: Patient is meeting A1c goal of < 8%. Self monitoring of blood glucose is not at goal of fasting  mg/dL and post prandial < 180 mg/dL. Would be beneficial to try Farxiga, likely will be able to reduce insulin doses and hopeful he may be able to get off meal time insulin if it works well.  Reviewed plan from Dr. Alejo's appointment.    Hyponatremia: Patient is hesitant to start both sodium tablets and Farxiga at the same time.  Reviewed risks and benefits of Farxiga - may see a slight increase in urinary frequency, often mild and more noticeable in patients with very high A1c.  May be beneficial to try a 1/2 tablet of the Farxiga and sodium chloride to start the first few days, and if tolerating could increase to the full tablet.  Patient agrees with trying.  If he were to start just the Farxiga, may see blood pressure go to low.  The sodium tablets will likely be the most effective for helping with sodium levels vs urea alone.    Urea may be reasonable to add in as well, seemed to have some benefit while in the hospital in [place].  Want to make the fewest medication changes that are needed, so we could hold off on adding that in as well.     Hypertension: Stable. Patient is meeting blood pressure goal of < 140/90mmHg.      Depression/Anxiety: Managed by psychiatry provider. May be reasonable to continue off the mirtazapine as suggested by kidney doctor in [place].  Would recommend reviewing with psychiatry provider.  Reviewed that he can use hydroxyzine as needed for anxiety, doesn't seem to make him too groggy during the day.  Could try this twice daily (anxiety often worse in the morning).  If not working, buspirone could also be considered for anxiety.  Not associated with SIADH.    PLAN:                            Starting tomorrow mornin.  Start  Farxiga 5 mg, 1/2 tablet once daily in the morning.  If you are feeling OK after 3-4 days and not having side effects you could increase to the full dose of 1 tablet.  2.  Start Sodium chloride 1 g, 1/2 tablet once daily in the morning.  If you are feeling OK after 3-4 days and not having side effects you could increase to the full dose of 1 tablet.  3.  Decrease Basaglar to 4 units once daily  4.  Decrease Humalog to 2 units three times daily before meals.    Follow-up: Return in about 15 days (around 4/19/2023) for MTM Follow Up.   - On Friday will upload his blood glucose readings and review them with Dr. Sapna Myles and Divya - they plan to send Dr. Alejo blood pressure readings, and will call me or the clinic RN Soila if needing help the next few days.    4/7: Spoke to Divya/Shar at 4:30 PM and blood glucose are slightly higher.  Also eating a bit more.  Recommend increasing to the full tablet/doses of Farxiga and sodium tablets.  They agree    SUBJECTIVE/OBJECTIVE:                          Jose Francisco Gonzalez is a 82 year old male called for a follow up visit from 1/26/23.  He was joined by his wife Divya and daughter Elke.    Reason for visit: Follow up on diabetes and recommended medication changes by nephrology.  Patient hasn't started the new medications yet due to concerns with side effects.    Allergies/ADRs: Reviewed in chart  Past Medical History: Reviewed in chart  Tobacco: He reports that he quit smoking about 29 years ago. His smoking use included cigarettes. He has a 20.00 pack-year smoking history. He has never used smokeless tobacco.  Alcohol: Not discussed  Social: Lives with his wife Divya, daughter Elke is involved with his care as well.  Has a good family support.  They typically go to [place] during the winter.     Medication Adherence/Access: no issues reported     Type 2 Diabetes:    Farxiga 10 mg daily- hasn't started yet  Basaglar 7 units in the morning   Humalog 5 units AM/ 3 units  with lunch/ 3 units with dinner.  No side effects, hypoglycemia.  He recently saw Dr. Alejo and they discussed plan to lower his insulin doses when starting Farxiga. Will decrease Basaglar to 4 units once daily in the morning, and decrease Humalog to 2 units three times daily before meals.  Doing well with putting the Mike sensors on.   Past history: Glimepiride not effective.  Not on metformin due to diarrhea/IBS history.    Blood sugar monitoring: Continuous Glucose Monitor, Mike 2.  We mistyped his date of birth with setting up the Mike 2 byron on his phone and it incorrectly says '1940' in our account.   Ranges (from patient's glucose log): See below  Having some post prandial spikes after breakfast  Symptoms of low blood sugar? shaky, dizzy, Frequency of lows- none recently  Symptoms of high blood sugar? none  Diet:  Is limited in what he can eat due to dental issues.  Had one of the surgeries Last Friday and will need another which got pushed back to May.  Eye exam: up to date  Foot exam: due  Aspirin: Taking 81mg daily and denies side effects   Statin: Yes  ACEi/ARB: Yes  Urine Albumin:             Lab Results   Component Value Date     UMALCR 16.98 04/14/2022     Lab Results   Component Value Date    A1C 7.4 11/21/2022    A1C 7.7 04/14/2022    A1C 9.3 10/22/2021    A1C 8.6 06/10/2021    A1C 7.6 02/22/2021    A1C 7.0 06/09/2020    A1C 6.6 09/04/2019    A1C 7.0 04/24/2019      Hyponatremia:    Farxiga 10 mg daily- hasn't started  Sodium Chloride 1 mg daily - has only taken a few tabs so far  He has taken a few of the salt tablets, he was worried about starting both salt tablets and Farxiga at the same time. Worried about the possible side effects, mainly increased urination.  Patient recently saw nephrologist and they wanted to start Farxiga and sodium tablets to help increase sodium levels.  The nephrologist in [place] had also suggested salt tablets and urea.  His sodium level went down to 125 on no  medication.  When in the hospital he had been given Urea packets and it did help.  They had also stopped the mirtazapine.  They are wondering if he can use the Urea packets or if they should continue.  The urea drink didn't taste good but neither does the salt tabs.  They purchased the urea packets and have some at home.  Following liquid restriction of 1.5 L per day, has been very difficult.     Sodium   Date Value Ref Range Status   03/28/2023 126 (L) 136 - 145 mmol/L Final   06/10/2021 125 (L) 133 - 144 mmol/L Final     Hypertension:   Lisinopril 10 mg daily,   Metoprolol ER 50 mg daily.    They recently stopped his amlodipine due to low blood pressure.  Patient does self-monitor blood pressure. Patient reports no current medication side effects.  His home blood pressure readings have been good, 105/58, 116/70, 125/84   BP Readings from Last 3 Encounters:   03/28/23 91/58   01/19/23 104/68   12/09/22 (!) 144/81     Depression/Anxiety:    Mirtazapine 15 mg once daily at bedtime. - had gone off it in [place] due to the hyponatremia, the hospital doctor had stopped it.  He isn't sure if he needs it or how much it helps with depression, anxiety.  Unsure if it's helped with appetite/weight gain  Hydroxyzine 10 mg three times daily as needed for anxiety - He tried one today, didn't feel groggy or notice side effects with it.  He is really worried about starting the new medications from nephrology, worried about side effects.  Worrying about health and medication changes is a big source of his anxiety.  Following with psychiatry provider.  Has history of hyponatremia, need to be cautious with depression medications can carry this risk.                      Blood Glucose after starting Farxiga:      ----------------    I spent 58 minutes with this patient today. I offer these suggestions for consideration by Dr. Alejo. A copy of the visit note was provided to the patient's provider(s).    A summary of these  recommendations was declined.    Deepthi Esquivel, PharmD  Medication Therapy Management Pharmacist  Pager: [phone]      Telemedicine Visit Details  Type of service:  Telephone visit  Start Time: 3:00 pm  End Time: 3:30 PM     Medication Therapy Recommendations  Hyponatremia    Current Medication: dapagliflozin (FARXIGA) 5 MG TABS tablet   Rationale: Patient prefers not to take - Adherence - Adherence   Recommendation: Provide Education   Status: Accepted per CPA

## 2023-04-04 NOTE — Clinical Note
Jackson Alejo - I spoke to Shashi on Tuesday and he hadn't starting the Farxiga or sodium tabs yet.  He was really worried about side effects and how he'd feel on the new medication.  So we discussed risks/benefits, and had him try a 1/2 tablet of both the Farxiga and sodium for a few days.  He started both on Wednesday, you can see his blood glucose readings after starting the medication.  Overall BG look pretty good, seeing post prandial BG go up a bit. He's feeling ok on it, hasn't noticed any side effects.  So tomorrow morning he's going to go up to the full prescribed tablet of both Farxiga and sodium.   They will call to schedule the BMP recheck.  I plan to check the Mike readings again mid next week and I'll upload them.  We could adjust the insulin doses then if needed, but they will reach out sooner if seeing really high readings.   Thanks! - Deepthi

## 2023-04-10 NOTE — TELEPHONE ENCOUNTER
Anneliese PT with Home Care calling to request Speech Evaluation add on to plan of care effective today 4/10/2023. Anneliese PT did not have any additional order requests at this time.     Routing to PCP for review.     Can we leave a detailed message on this number? YES  Phone number patient can be reached at: Other phone number: Anneliese PT with Home [facility] [phone] Confidential Line.     Justice L. Phoenix, RN  [facility] Triage

## 2023-04-11 NOTE — PROGRESS NOTES
Patient started Farxiga 5 mg 1/2 tab daily on 4/5, then increased to the full dose of 5 mg daily on 4/8.  blood glucose are a little high after breakfast.  From their call into the nephrology provider he is feeling some weakness and more thirsty, could be from blood glucose running a little higher then he's used to.  Currently taking Humalog 2 units before meals.    Recommendation:  1.  Recommend increasing Humalog to 3 units before breakfast, 2 units with lunch, and 3 units with dinner

## 2023-04-12 NOTE — PROGRESS NOTES
Insulin dosing had been lowered a lot at first when pt started Farxiga to be sure sugars not dropping too  Low.  CGM results reviewed and appreciate   Deepthi Bronson getting them downloaded. Would increase Humalog to 3 units with all 3 meals and increase Basaglar one unit to 5 units daily. Will route to  Deepthi to discuss with pt and so she is aware

## 2023-04-12 NOTE — PROGRESS NOTES
Spoke to patient, let him know we'd like to increase the insulins as described below:  - Increase Humalog 3 units three units daily  - Increase Basaglar 5 units daily    They agree and will increase doses.  Orders already updated in his chart.  His A1c looked really good, so overall is doing great with managing blood glucose.

## 2023-04-13 NOTE — PATIENT INSTRUCTIONS
Insulin pump websites:    [email or web address]  [email or web address]  [email or web address] -- this one is the patch pump, that doesn't involve any technology    [facility]  Triage: [phone]  [phone]

## 2023-04-13 NOTE — TELEPHONE ENCOUNTER
Left detailed message on Anneliese's number with verbal approval for requested orders.     Home care to fax requested orders to clinic for provider review and signature.     Suma Garcia RN

## 2023-04-13 NOTE — PROGRESS NOTES
Diabetes Self-Management Education & Support    Presents for: Individual review    Type of Service: Telephone Visit    Audio only visit done, as patient does not have access to audio-visual technology.    Individual visit provided, given no group classes are available for 2 months.     Originating Location (Patient Location): Home  Distant Location (Provider Location): [facility]  Mode of Communication:  Telephone    Telephone Visit Start Time: 12:00  Telephone Visit End Time (telephone visit stop time): 1:00    How would patient like to obtain AVS? Alycia    Assessment Type:   ASSESSMENT:  Shar, his wife and daughter are all on the call today. Requesting some information on insulin pumping. They increased insulin dose todau amd Shar is now taking 5 units of Basaglar and 3 units of Novolog before each meal. Today we discussed diabetes pathophysiology of diabetes. Discussed healthy eating for diabetes. Shar has some problems with his teeth/dentures and is only able to eat really soft/liquid foods which has been really tough for him. Writer agrees to looking for some soft food handouts to mail to patient.    We discussed insulin pumps, Shar and his families main goal is to make diabetes management easier for him. After discussion, we are all in agreement that the insulin pump might make things more complicated. We discussed Cequr, the insulin patch. This might be a better option for them, as they are concerned about if Shar is getting the full insulin dose. They state that sometimes when he does the insulin injection, the needle comes out bent and there is still some insulin dripping out of the needle.     Shar has upper denture, lost teeth on lower side. Having issues chewing. Hoping to get some implants to anchor new denture.  Drinking Ensure  Farxiga - taking full tablet now    Also has hyponatremia  Having higher BG after breakfast   Has lost 30 pounds- the last 6 months, since  he lost his teeth. Losing some muscle with not being able to walk with low vision    Patient's most recent   Lab Results   Component Value Date    A1C 6.9 04/12/2023    A1C 8.6 06/10/2021     is meeting goal of <7.0    Diabetes knowledge and skills assessment:   Patient is knowledgeable in diabetes management concepts related to: unclear    Continue education with the following diabetes management concepts: Healthy Eating, Being Active, Monitoring, Taking Medication, Problem Solving, Reducing Risks and Healthy Coping    Based on learning assessment above, most appropriate setting for further diabetes education would be: Individual setting.      PLAN  Continue with current insulin doing:   Basaglar: 5 units  Novolog: 3-3-3-0    Consider Cequr insulin patch pump    Follow-up with Deepthi for BG review    Topics to cover at upcoming visits: Healthy Eating, Being Active, Monitoring, Taking Medication, Problem Solving, Reducing Risks and Healthy Coping    Follow-up: Follow-up with Deepthi    See Care Plan for co-developed, patient-state behavior change goals.  AVS provided for patient today.    Education Materials Provided:  No new materials provided today      SUBJECTIVE/OBJECTIVE:  Presents for: Individual review  Accompanied by: Self, Spouse, Daughter  Diabetes education in the past 24mo: Yes  Focus of Visit: Other, Insulin Pump (upper plate for dentures so not eating as much)  Type of Pump visit: Pre-pump  Diabetes type: Type 2  Date of diagnosis: about 21 years ago  Disease course: Worsening  Difficulty affording diabetes medication?: No  Difficulty affording diabetes testing supplies?: No  Other concerns:: None  Cultural Influences/Ethnic Background:  Not  or     Diabetes Symptoms & Complications:  Fatigue: Yes  Neuropathy: No  Polydipsia: Yes  Polyuria: Yes  Visual change: Yes  Slow healing wounds: No  Weight trend: Decreasing (10#-since October)  Complications assessed today?: No    Patient Problem  "List and Family Medical History reviewed for relevant medical history, current medical status, and diabetes risk factors.    Vitals:  There were no vitals taken for this visit.  Estimated body mass index is 19.87 kg/m  as calculated from the following:    Height as of 3/28/23: 1.803 m (5' 11\").    Weight as of 3/28/23: 64.6 kg (142 lb 8 oz).   Last 3 BP:   BP Readings from Last 3 Encounters:   03/28/23 91/58   01/19/23 104/68   12/09/22 (!) 144/81       History   Smoking Status     Former     Packs/day: 1.00     Years: 20.00     Types: Cigarettes     Quit date: 1/23/1993   Smokeless Tobacco     Never       Labs:  Lab Results   Component Value Date    A1C 6.9 04/12/2023    A1C 8.6 06/10/2021     Lab Results   Component Value Date     04/12/2023     01/03/2023     06/10/2021     Lab Results   Component Value Date    LDL 75 04/12/2023    LDL 76 06/10/2021     HDL Cholesterol   Date Value Ref Range Status   06/10/2021 53 >39 mg/dL Final     Direct Measure HDL   Date Value Ref Range Status   04/12/2023 47 >=40 mg/dL Final   ]  GFR Estimate   Date Value Ref Range Status   04/12/2023 89 >60 mL/min/1.73m2 Final     Comment:     eGFR calculated using 2021 CKD-EPI equation.   06/10/2021 83 >60 mL/min/[1.73_m2] Final     Comment:     Non  GFR Calc  Starting 12/18/2018, serum creatinine based estimated GFR (eGFR) will be   calculated using the Chronic Kidney Disease Epidemiology Collaboration   (CKD-EPI) equation.       GFR Estimate If Black   Date Value Ref Range Status   06/10/2021 >90 >60 mL/min/[1.73_m2] Final     Comment:      GFR Calc  Starting 12/18/2018, serum creatinine based estimated GFR (eGFR) will be   calculated using the Chronic Kidney Disease Epidemiology Collaboration   (CKD-EPI) equation.       Lab Results   Component Value Date    CR 0.74 04/12/2023    CR 0.79 06/10/2021     No results found for: MICROALBUMIN    Healthy Eating:  Healthy Eating Assessed " Today: Yes  Meals include: Breakfast  Breakfast: 8AM-cream of wheat with 2 tsp of splenda brown sugar with 1/2 and 1/2 and 1 cup (or less of milk), sometimes with Activia yogurt OR malt o meal (25g/cho) OR oatmeal OR yogurt + banana  Lunch: scrambled eggs + sausage + toast jam + butter OR hotdogs + bread + apple sauce + ham OR chicken wild rice soup, eats alot of canned soup  Dinner: chicken divaan + brocolli + 4 cookies OR chicken wild rice soup OR pork tenderloin + noodles  Snacks: AM-ensure plus (360kcals/16g) +  banana  PM-pudding + banana HS-sometimes OR cottage cheese and peaches OR fish  Other: hasn't been able to eat any meat. Ensure max protein (150 calories), Ensure plus 350 calories  Beverages: Milk, Other (ensures)  Has patient met with a dietitian in the past?: Yes    Being Active:  Being Active Assessed Today: No  Exercise:: Currently not exercising    Monitoring:  Monitoring Assessed Today: Yes  Did patient bring glucose meter to appointment? : Yes  Times checking blood sugar at home (number): 4  Times checking blood sugar at home (per): Day  Blood glucose trend: Decreasing      Taking Medications:  Diabetes Medication(s)     Insulin       insulin glargine (BASAGLAR KWIKPEN) 100 UNIT/ML pen    Inject 5 units under the skin once daily in the morning.     insulin lispro (HUMALOG KWIKPEN) 100 UNIT/ML (1 unit dial) KWIKPEN    Inject 3 Units Subcutaneous 3 times daily (before meals)    Sodium-Glucose Co-Transporter 2 (SGLT2) Inhibitors       dapagliflozin (FARXIGA) 5 MG TABS tablet    Take 1 tablet (5 mg) by mouth daily          Taking Medication Assessed Today: Yes  Current Treatments: Insulin Injections, Diet  Dose schedule: Pre-breakfast, Pre-lunch, Pre-dinner  Given by: Patient  Injection/Infusion sites: Abdomen  Problems taking diabetes medications regularly?: No  Diabetes medication side effects?: No    Problem Solving:  Problem Solving Assessed Today: Yes  Is the patient at risk for hypoglycemia?:  Yes  Hypoglycemia Frequency: Rarely              Reducing Risks:  Reducing Risks Assessed Today: No    Healthy Coping:  Healthy Coping Assessed Today: Yes  Emotional response to diabetes: Ready to learn  Stage of change: ACTION (Actively working towards change)  Patient Activation Measure Survey Score:       View : No data to display.                  Care Plan and Education Provided:  There are no care plans that you recently modified to display for this patient.      Kimmie Castillo RD [facility]  Triage: [phone]  [phone]      Time Spent: 60 minutes  Encounter Type: Individual    Any diabetes medication dose changes were made via the CDE Protocol per the patient's referring provider. A copy of this encounter was shared with the provider.

## 2023-04-13 NOTE — LETTER
4/13/2023         RE: Jose Francisco Gonzalez  [address]  [facility] [address]        Dear Colleague,    Thank you for referring your patient, Jose Francisco Gonzalez, to the [facility]. Please see a copy of my visit note below.    Diabetes Self-Management Education & Support    Presents for: Individual review    Type of Service: Telephone Visit    Audio only visit done, as patient does not have access to audio-visual technology.    Individual visit provided, given no group classes are available for 2 months.     Originating Location (Patient Location): Home  Distant Location (Provider Location): [facility]  Mode of Communication:  Telephone    Telephone Visit Start Time: 12:00  Telephone Visit End Time (telephone visit stop time): 1:00    How would patient like to obtain AVS? MyChart    Assessment Type:   ASSESSMENT:  Shar, his wife and daughter are all on the call today. Requesting some information on insulin pumping. They increased insulin dose todau amd Shar is now taking 5 units of Basaglar and 3 units of Novolog before each meal. Today we discussed diabetes pathophysiology of diabetes. Discussed healthy eating for diabetes. Shar has some problems with his teeth/dentures and is only able to eat really soft/liquid foods which has been really tough for him. Writer agrees to looking for some soft food handouts to mail to patient.    We discussed insulin pumps, Shar and his families main goal is to make diabetes management easier for him. After discussion, we are all in agreement that the insulin pump might make things more complicated. We discussed Cequr, the insulin patch. This might be a better option for them, as they are concerned about if Shar is getting the full insulin dose. They state that sometimes when he does the insulin injection, the needle comes out bent and there is still some insulin dripping out of the needle.     Shar has  upper denture, lost teeth on lower side. Having issues chewing. Hoping to get some implants to anchor new denture.  Drinking Ensure  Farxiga - taking full tablet now    Also has hyponatremia  Having higher BG after breakfast   Has lost 30 pounds- the last 6 months, since he lost his teeth. Losing some muscle with not being able to walk with low vision    Patient's most recent   Lab Results   Component Value Date    A1C 6.9 04/12/2023    A1C 8.6 06/10/2021     is meeting goal of <7.0    Diabetes knowledge and skills assessment:   Patient is knowledgeable in diabetes management concepts related to: unclear    Continue education with the following diabetes management concepts: Healthy Eating, Being Active, Monitoring, Taking Medication, Problem Solving, Reducing Risks and Healthy Coping    Based on learning assessment above, most appropriate setting for further diabetes education would be: Individual setting.      PLAN  Continue with current insulin doing:   Basaglar: 5 units  Novolog: 3-3-3-0    Consider Cequr insulin patch pump    Follow-up with Deepthi for BG review    Topics to cover at upcoming visits: Healthy Eating, Being Active, Monitoring, Taking Medication, Problem Solving, Reducing Risks and Healthy Coping    Follow-up: Follow-up with Deepthi    See Care Plan for co-developed, patient-state behavior change goals.  AVS provided for patient today.    Education Materials Provided:  No new materials provided today      SUBJECTIVE/OBJECTIVE:  Presents for: Individual review  Accompanied by: Self, Spouse, Daughter  Diabetes education in the past 24mo: Yes  Focus of Visit: Other, Insulin Pump (upper plate for dentures so not eating as much)  Type of Pump visit: Pre-pump  Diabetes type: Type 2  Date of diagnosis: about 21 years ago  Disease course: Worsening  Difficulty affording diabetes medication?: No  Difficulty affording diabetes testing supplies?: No  Other concerns:: None  Cultural Influences/Ethnic  "Background:  Not  or     Diabetes Symptoms & Complications:  Fatigue: Yes  Neuropathy: No  Polydipsia: Yes  Polyuria: Yes  Visual change: Yes  Slow healing wounds: No  Weight trend: Decreasing (10#-since October)  Complications assessed today?: No    Patient Problem List and Family Medical History reviewed for relevant medical history, current medical status, and diabetes risk factors.    Vitals:  There were no vitals taken for this visit.  Estimated body mass index is 19.87 kg/m  as calculated from the following:    Height as of 3/28/23: 1.803 m (5' 11\").    Weight as of 3/28/23: 64.6 kg (142 lb 8 oz).   Last 3 BP:   BP Readings from Last 3 Encounters:   03/28/23 91/58   01/19/23 104/68   12/09/22 (!) 144/81       History   Smoking Status     Former     Packs/day: 1.00     Years: 20.00     Types: Cigarettes     Quit date: 1/23/1993   Smokeless Tobacco     Never       Labs:  Lab Results   Component Value Date    A1C 6.9 04/12/2023    A1C 8.6 06/10/2021     Lab Results   Component Value Date     04/12/2023     01/03/2023     06/10/2021     Lab Results   Component Value Date    LDL 75 04/12/2023    LDL 76 06/10/2021     HDL Cholesterol   Date Value Ref Range Status   06/10/2021 53 >39 mg/dL Final     Direct Measure HDL   Date Value Ref Range Status   04/12/2023 47 >=40 mg/dL Final   ]  GFR Estimate   Date Value Ref Range Status   04/12/2023 89 >60 mL/min/1.73m2 Final     Comment:     eGFR calculated using 2021 CKD-EPI equation.   06/10/2021 83 >60 mL/min/[1.73_m2] Final     Comment:     Non  GFR Calc  Starting 12/18/2018, serum creatinine based estimated GFR (eGFR) will be   calculated using the Chronic Kidney Disease Epidemiology Collaboration   (CKD-EPI) equation.       GFR Estimate If Black   Date Value Ref Range Status   06/10/2021 >90 >60 mL/min/[1.73_m2] Final     Comment:      GFR Calc  Starting 12/18/2018, serum creatinine based estimated GFR " (eGFR) will be   calculated using the Chronic Kidney Disease Epidemiology Collaboration   (CKD-EPI) equation.       Lab Results   Component Value Date    CR 0.74 04/12/2023    CR 0.79 06/10/2021     No results found for: MICROALBUMIN    Healthy Eating:  Healthy Eating Assessed Today: Yes  Meals include: Breakfast  Breakfast: 8AM-cream of wheat with 2 tsp of splenda brown sugar with 1/2 and 1/2 and 1 cup (or less of milk), sometimes with Activia yogurt OR malt o meal (25g/cho) OR oatmeal OR yogurt + banana  Lunch: scrambled eggs + sausage + toast jam + butter OR hotdogs + bread + apple sauce + ham OR chicken wild rice soup, eats alot of canned soup  Dinner: chicken divaan + brocolli + 4 cookies OR chicken wild rice soup OR pork tenderloin + noodles  Snacks: AM-ensure plus (360kcals/16g) +  banana  PM-pudding + banana HS-sometimes OR cottage cheese and peaches OR fish  Other: hasn't been able to eat any meat. Ensure max protein (150 calories), Ensure plus 350 calories  Beverages: Milk, Other (ensures)  Has patient met with a dietitian in the past?: Yes    Being Active:  Being Active Assessed Today: No  Exercise:: Currently not exercising    Monitoring:  Monitoring Assessed Today: Yes  Did patient bring glucose meter to appointment? : Yes  Times checking blood sugar at home (number): 4  Times checking blood sugar at home (per): Day  Blood glucose trend: Decreasing      Taking Medications:  Diabetes Medication(s)     Insulin       insulin glargine (BASAGLAR KWIKPEN) 100 UNIT/ML pen    Inject 5 units under the skin once daily in the morning.     insulin lispro (HUMALOG KWIKPEN) 100 UNIT/ML (1 unit dial) KWIKPEN    Inject 3 Units Subcutaneous 3 times daily (before meals)    Sodium-Glucose Co-Transporter 2 (SGLT2) Inhibitors       dapagliflozin (FARXIGA) 5 MG TABS tablet    Take 1 tablet (5 mg) by mouth daily          Taking Medication Assessed Today: Yes  Current Treatments: Insulin Injections, Diet  Dose schedule:  Pre-breakfast, Pre-lunch, Pre-dinner  Given by: Patient  Injection/Infusion sites: Abdomen  Problems taking diabetes medications regularly?: No  Diabetes medication side effects?: No    Problem Solving:  Problem Solving Assessed Today: Yes  Is the patient at risk for hypoglycemia?: Yes  Hypoglycemia Frequency: Rarely              Reducing Risks:  Reducing Risks Assessed Today: No    Healthy Coping:  Healthy Coping Assessed Today: Yes  Emotional response to diabetes: Ready to learn  Stage of change: ACTION (Actively working towards change)  Patient Activation Measure Survey Score:       View : No data to display.                  Care Plan and Education Provided:  There are no care plans that you recently modified to display for this patient.      ANTOLIN Leonardo [facility]  Triage: [phone]  [phone]      Time Spent: 60 minutes  Encounter Type: Individual    Any diabetes medication dose changes were made via the CDE Protocol per the patient's referring provider. A copy of this encounter was shared with the provider.

## 2023-04-19 PROBLEM — F32.A DEPRESSION, UNSPECIFIED DEPRESSION TYPE: Status: ACTIVE | Noted: 2023-01-01

## 2023-04-19 PROBLEM — R62.7 FAILURE TO THRIVE IN ADULT: Status: ACTIVE | Noted: 2023-01-01

## 2023-04-19 NOTE — ED NOTES
"[facility]  ED Nurse Handoff Report    ED Chief complaint: Psychiatric Evaluation and Dehydration      ED Diagnosis:   Final diagnoses:   Failure to thrive in adult   Hyponatremia   Depression, unspecified depression type       Code Status: to be addressed    Allergies: No Known Allergies    Patient Story: dehydration / psych  Focused Assessment:  Jose Francisco Gonzalez is a 82 year old male with a history of Type 2 diabetes, Hypertension and Hyperlipidemia who presents via EMS for generalized weakness. EMS reports that they were originally called with chief complaint of shortness of breath. They say that he is not short of breath and has completely normal vitals. The patient has not been eating for days. EMS gave 2 mg sublingual risperidone because he was pacing all over the room  EMS reports that the patient has had vague suicidal ideations saying things like \"I just hope I'll pass out and not wake up\". EMS reports blood sugar in the 140s.      The patient says that he has been having trouble eating because he had teeth removed on October 4th and got some dentures placed. He says that he cannot chew. He also says that a couple weeks ago he had implants placed under the dentures which he just had sutures removed from. He says that he had some skin lesions removed yesterday. He denies abdominal pain, chest pain, headache, vomiting, recent falls. He says that he is feeling depressed, but wouldn't necessarily say he is suicidal. The patient says that he should not be sent home because his wife cannot care for him at home. He says that he has been incontinent of urine, but isn't sure exactly when this started.  Pt is often tearful and makes passive SI statements.    Treatments and/or interventions provided: bolus, tylenol  Patient's response to treatments and/or interventions:       To be done/followed up on inpatient unit:        Does this patient have any cognitive concerns?: Forgetful    Activity level " "- Baseline/Home:  Stand with Assist  Activity Level - Current:   Stand with Assist and Stand with assist x2    Patient's Preferred language: English   Needed?: No    Isolation: None  Infection: Not Applicable  Patient tested for COVID 19 prior to admission: NO  Bariatric?: No    Vital Signs:   Vitals:    04/19/23 1145   BP: (!) 150/82   Pulse: 100   Resp: 20   Temp: 97.6  F (36.4  C)   TempSrc: Oral   SpO2: 98%   Weight: 63.5 kg (140 lb)   Height: 1.8 m (5' 10.87\")       Cardiac Rhythm:     Was the PSS-3 completed:   Yes  What interventions are required if any?               Family Comments:     OBS brochure/video discussed/provided to patient/family: N/A              Name of person given brochure if not patient:                 Relationship to patient:       For the majority of the shift this patient's behavior was Green.   Behavioral interventions performed were   .    ED NURSE PHONE NUMBER: [id number]         "

## 2023-04-19 NOTE — ED PROVIDER NOTES
"  History     Chief Complaint:  Generalized Weakness     HPI   Jose Francisco Gonzalez is a 82 year old male with a history of Type 2 diabetes, Hypertension and Hyperlipidemia who presents via EMS for generalized weakness. EMS reports that they were originally called with chief complaint of shortness of breath. They say that he is not short of breath and has completely normal vitals. The patient has not been eating for days. EMS gave 2 mg sublingual risperidone because he was pacing all over the room  EMS reports that the patient has had vague suicidal ideations saying things like \"I just hope I'll pass out and not wake up\". EMS reports blood sugar in the 140s.     The patient says that he has been having trouble eating because he had teeth removed on October 4th and got some dentures placed. He says that he cannot chew. He also says that a couple weeks ago he had implants placed under the dentures which he just had sutures removed from. He says that he had some skin lesions removed yesterday. He denies abdominal pain, chest pain, headache, vomiting, recent falls. He says that he is feeling depressed, but wouldn't necessarily say he is suicidal.  His wife is not concerned that he would kill himself but she is concerned about his depression.  She reports that he saw his psychiatrist a week ago who did not feel comfortable changing him to a different antidepressant due to his hyponatremia, but his mirtazapine was increased at that time.  The patient says that he should not be sent home because his wife cannot care for him at home. He says that he has been incontinent of urine, but isn't sure exactly when this started.     Independent Historian:   EMS - They report as noted above.   The patient's wife also gives other history as above.    Review of External Notes: None    ROS:  Review of Systems   Constitutional: Positive for appetite change.   Cardiovascular: Negative for chest pain.   Gastrointestinal: Negative for " "abdominal pain and vomiting.   Genitourinary: Positive for enuresis.   Neurological: Positive for weakness. Negative for headaches.   Psychiatric/Behavioral: Positive for dysphoric mood.   All other systems reviewed and are negative.    Allergies:  No Known Allergies     Medications:    Alendronate   Aspirin  Atorvastatin  Dapagliflozin   basaglar  humalog  Metoprolol  Mirtazapine  Sulfasalazine   Bupropion   Norco    Past Medical History:    Arthropathy  Compression fracture L1   Hyponatremia  Rheumatoid arthritis   Sialoadenitis  Syncope   Type 2 diabetes   Hypertension   IBS    Past Surgical History:    Removal of pyogenic granuloma   Cataract  Tonsillectomy   Tooth extraction     Family History:    Alzheimer disease  CAD x 2  Substance abuse    Social History:  Presents to the ED alone via EMS.   Patient lives with wife independently.   PCP: Kin Alejo     Physical Exam     Patient Vitals for the past 24 hrs:   BP Temp Temp src Pulse Resp SpO2 Height Weight   04/19/23 1145 (!) 150/82 97.6  F (36.4  C) Oral 100 20 98 % 1.8 m (5' 10.87\") 63.5 kg (140 lb)        Physical Exam    Nursing note and vitals reviewed.  Constitutional:  Appears well-developed and well-nourished.   HENT:   Head:    Atraumatic.   Mouth/Throat:   Dry mucous membranes.  Eyes:    Pupils are equal, round, and reactive to light.   Neck:    Normal range of motion. Neck supple.      No tracheal deviation present. No thyromegaly present.   Cardiovascular:  Normal rate, regular rhythm, no murmur   Pulmonary/Chest: Breath sounds are clear and equal without wheezes or crackles.  Abdominal:   Soft. Bowel sounds are normal. Exhibits no distension and      no mass. There is no tenderness.      There is no rebound and no guarding.   Musculoskeletal:  Exhibits no edema.   Lymphadenopathy:  No cervical adenopathy.   Neurological:   Alert and oriented to person, place, and time.   Skin:    Skin is warm and dry. No rash noted. No pallor. Two separate well " healing appearing sutured wounds to the upper back.     Emergency Department Course   ECG  ECG results from 04/19/23   EKG 12-lead, tracing only     Value    Systolic Blood Pressure     Diastolic Blood Pressure     Ventricular Rate 96    Atrial Rate 96    NM Interval 202    QRS Duration 74        QTc 457    P Axis 61    R AXIS 32    T Axis 69    Interpretation ECG      Sinus rhythm  Normal ECG  When compared with ECG of 03-OCT-2022 17:59,  No significant change was found         Laboratory:  Labs Ordered and Resulted from Time of ED Arrival to Time of ED Departure   COMPREHENSIVE METABOLIC PANEL - Abnormal       Result Value    Sodium 129 (*)     Potassium 4.4      Chloride 94 (*)     Carbon Dioxide (CO2) 24      Anion Gap 11      Urea Nitrogen 23.1 (*)     Creatinine 0.67      Calcium 9.0      Glucose 142 (*)     Alkaline Phosphatase 73      AST 25      ALT 11      Protein Total 7.1      Albumin 3.8      Bilirubin Total 0.6      GFR Estimate >90     ROUTINE UA WITH MICROSCOPIC REFLEX TO CULTURE - Abnormal    Color Urine Yellow      Appearance Urine Clear      Glucose Urine >=1000 (*)     Bilirubin Urine Negative      Ketones Urine Trace (*)     Specific Gravity Urine 1.019      Blood Urine Negative      pH Urine 6.0      Protein Albumin Urine Negative      Urobilinogen Urine Normal      Nitrite Urine Negative      Leukocyte Esterase Urine Negative      Mucus Urine Present (*)     RBC Urine <1      WBC Urine <1     CBC WITH PLATELETS AND DIFFERENTIAL - Abnormal    WBC Count 6.0      RBC Count 4.14 (*)     Hemoglobin 12.3 (*)     Hematocrit 37.0 (*)     MCV 89      MCH 29.7      MCHC 33.2      RDW 14.0      Platelet Count 275      % Neutrophils 79      % Lymphocytes 10      % Monocytes 9      % Eosinophils 1      % Basophils 1      % Immature Granulocytes 0      NRBCs per 100 WBC 0      Absolute Neutrophils 4.8      Absolute Lymphocytes 0.6 (*)     Absolute Monocytes 0.5      Absolute Eosinophils 0.0       Absolute Basophils 0.0      Absolute Immature Granulocytes 0.0      Absolute NRBCs 0.0     TSH WITH FREE T4 REFLEX - Normal    TSH 1.32        Emergency Department Course & Assessments:    Interventions:  Medications   sodium chloride 0.9% infusion (0 mLs Intravenous Stopped 4/19/23 1436)   0.9% sodium chloride BOLUS (0 mLs Intravenous Stopped 4/19/23 1436)   acetaminophen (TYLENOL) tablet 650 mg (650 mg Oral $Given 4/19/23 1342)      Assessments:  1158 I examined the patient and obtained history as noted above.   1352 I rechecked the patient and updated him on findings.     Independent Interpretation (X-rays, CTs, rhythm strip):  None    Consultations/Discussion of Management or Tests:  1331 I spoke with the patient's daughter marian. She reports that he has been failing to thrive and has had some worsening depression. He feels that his body is failing him. She says that he has been getting weaker over a substantial period of time. She also reports that the patient doesn't see very well anymore. She also says that he struggles making any decisions for himself. Patient saw a psychiatrist a week ago and they upped his mirtazapine dosage.    1448 I spoke with Dr. Uriostegui, hospitalist, regarding admission of the patient.        Social Determinants of Health affecting care:   None    Disposition:  The patient was admitted to the hospital under the care of Dr. Uriostegui.     Impression & Plan      Medical Decision Making:  This patient has failure to thrive and depression.  His main issues are his trouble chewing and eating due to his dentures that he got in October and his poor vision due to macular degeneration.  I initially considered the possibility of worsening hyponatremia since he has a history of hyponatremia, however  his sodium is at his baseline at 129.  There is no sign of infection or sepsis at this time.  He has depression and saw his psychiatrist a week ago who did not feel comfortable prescribing certain  medications for depression due to his hyponatremia but increased his mirtazapine.  He has vague thought  that life is not worth living, but no specific suicidal ideations.  The patient's wife states she cannot take care of him at home any longer and the patient agrees that he does not feel safe at home, therefore he was admitted under observation care to the hospitalist Dr. Uriostegui for further evaluation treatment.    Diagnosis:    ICD-10-CM    1. Failure to thrive in adult  R62.7       2. Hyponatremia  E87.1       3. Depression, unspecified depression type  F32.A            Discharge Medications:  New Prescriptions    No medications on file        Scribe Disclosure:  I, Jaspreet Vidal, am serving as a scribe at 12:08 PM on 4/19/2023 to document services personally performed by Jeannine Maya MD based on my observations and the provider's statements to me.     4/19/2023   Jeannine Maya MD [place], Cheri Lee, MD  04/19/23 1600       Jeannine Maya MD  04/19/23 1601

## 2023-04-19 NOTE — ED TRIAGE NOTES
Pt originally called EMS for sob.  Pt in no resp distress per EMS.  Yesterday pt had some melanoma spots removed yesterday and teeth removed.  Pt c/o pain.  Pt doesn't want to eat soft diet and has decided not eat/drink for days.  Making vague suicidal statements.  Appears dehydrated.

## 2023-04-19 NOTE — PHARMACY-ADMISSION MEDICATION HISTORY
Pharmacist Admission Medication History    Admission medication history is complete. The information provided in this note is only as accurate as the sources available at the time of the update.    Medication reconciliation/reorder completed by provider prior to medication history? No    Information Source(s): Family member and CareEverywhere/SureScripts via phone    Pertinent Information:     Changes made to PTA medication list:    Added: fiber, acetaminophen, hydroxyzine    Deleted: aspirin    Left: sodium chloride on list due to remaining refills    Changed: None    Medication Affordability:  Not including over the counter (OTC) medications, was there a time in the past 12 months when you did not take your medications as prescribed because of cost?: No    Allergies reviewed with patient and updates made in EHR: yes    Medication History Completed By: Jessi Fry [facility] 4/19/2023 4:18 PM    Prior to Admission medications    Medication Sig Last Dose Taking? Auth Provider Long Term End Date   acetaminophen (TYLENOL) 500 MG tablet Take 1,000 mg by mouth every 4 hours as needed for mild pain 4/18/2023 Yes Unknown, Entered By History     alendronate (FOSAMAX) 70 MG tablet TAKE 1 TABLET(70 MG) BY MOUTH EVERY 7 DAYS 4/16/2023 Yes Kin Alejo MD Yes    atorvastatin (LIPITOR) 20 MG tablet TAKE 1 TABLET(20MG) BY MOUTH DAILY 4/18/2023 Yes Ching Bennett MD Yes    Calcium Polycarbophil (FIBER) 625 MG tablet Take 2 tablets by mouth daily 4/18/2023 Yes Unknown, Entered By History     dapagliflozin (FARXIGA) 5 MG TABS tablet Take 1 tablet (5 mg) by mouth daily 4/18/2023 Yes Kin Alejo MD     hydrOXYzine (ATARAX) 10 MG tablet Take 10 mg by mouth 3 times daily as needed for itching PRN Yes Unknown, Entered By History     insulin glargine (BASAGLAR KWIKPEN) 100 UNIT/ML pen Inject 5 units under the skin once daily in the morning. 4/18/2023 Yes Kin Alejo MD Yes    insulin lispro (HUMALOG KWIKPEN) 100 UNIT/ML (1 unit dial)  KWIKPEN Inject 3 Units Subcutaneous 3 times daily (before meals) 4/18/2023 Yes Kin Alejo MD Yes    metoprolol succinate ER (TOPROL XL) 50 MG 24 hr tablet Take 1 tablet (50 mg) by mouth daily 4/18/2023 Yes Kin Alejo MD Yes    mirtazapine (REMERON) 15 MG tablet Take 1 tablet (15 mg) by mouth At Bedtime  Patient taking differently: Take 22.5 mg by mouth At Bedtime 4/18/2023 Yes Chacha Bartholomew, APRN CNP Yes    Multiple Vitamins-Minerals (PRESERVISION AREDS 2) CAPS Take 2 capsules by mouth daily 4/18/2023 Yes Kvng Richardson MD     Psyllium-Calcium (METAMUCIL PLUS CALCIUM) CAPS Take 0.5 capsules by mouth nightly as needed (constipation) PRN Yes Reported, Patient     sulfaSALAzine ER (AZULFIDINE EN) 500 MG EC tablet TAKE 1 TABLET BY MOUTH TWICE DAILY AFTER MEALS 4/18/2023 Yes Reported, Patient     vitamin D3 (CHOLECALCIFEROL) 50 mcg (2000 units) tablet Take 1 tablet by mouth daily 4/18/2023 Yes Kin Alejo MD     blood glucose (NO BRAND SPECIFIED) lancets standard Use to test blood sugar 3 times daily or as directed.   Kvng Richardson MD     Continuous Blood Gluc Sensor (FREESTYLE ODILIA 2 SENSOR) [facility] 1 each every 14 days Use 1 sensor every 14 days. Use to read blood sugars per 's instructions.   Kin Alejo MD     Contour Next EZ (CONTOUR NEXT EZ W/DEVICE KIT) w/Device KIT USE TO TEST BLOOD SUGAR THREE TIMES DAILY OR AS DIRECTED   Kvng Richardson MD     CONTOUR NEXT TEST test strip USE TO TEST BLOOD SUGAR 3 TIMES DAILY OR AS DIRECTED   Kvng Richardson MD     insulin pen needle (B-D U/F) 31G X 8 MM miscellaneous Use 4 times a day   Kin Alejo MD     order for DME Test strips for pt's glucometer, brand as covered by insurance Test QID and prn. He is on insulin. Patients preferred brand-Verio One Touch.   Kvng Richardson MD     sodium chloride 1 GM tablet Take 1 tablet (1 g) by mouth daily  Patient not taking: Reported on 3/28/2023   Andrew Hurley MD

## 2023-04-19 NOTE — TELEPHONE ENCOUNTER
Pt's wife reports pt is unable to eat with his dentures and also had a recent oral surgery. She denies vomiting or acute weakness.  Pt is able to keep fluids down. He also complains of back pain since he had moles biopsed at dermatology yesterday. Wife reports he has been taking tylenol with relief. She is concerned pt is not eating, and he is diabetic. She wants to know if pt can he take tylenol on empty stomach or get insulin. Triage advised he schedule [facility] appt to discuss concerns and get advice on insulin dosing. Wife wants in person visit. Appt was scheduled at [facility].     Pt called 911 while triage was on the phone with wife. Triage advised wife to call clinic back if pt goes to ER after being evaluated by paramedics. She verbalized agreement with plan.     Reason for Disposition    Patient wants to be seen    Additional Information    Negative: Passed out (i.e., fainted, collapsed and was not responding)    Negative: Shock suspected (e.g., cold/pale/clammy skin, too weak to stand, low BP, rapid pulse)    Negative: Sounds like a life-threatening emergency to the triager    Negative: Major injury to the back (e.g., MVA, fall > 10 feet or 3 meters, penetrating injury, etc.)    Negative: Pain in the upper back over the ribs (rib cage) that radiates (travels) into the chest    Negative: Pain in the upper back over the ribs (rib cage) and worsened by coughing (or clearly increases with breathing)    Negative: Back pain during pregnancy    Negative: SEVERE back pain of sudden onset and age > 60 years    Negative: SEVERE abdominal pain (e.g., excruciating)    Negative: Abdominal pain and age > 60 years    Negative: Unable to urinate (or only a few drops) and bladder feels very full    Negative: Loss of bladder or bowel control (urine or bowel incontinence; wetting self, leaking stool) of new-onset    Negative: Numbness (loss of sensation) in groin or rectal area    Negative: Pain radiates into groin, scrotum     Negative: Blood in urine (red, pink, or tea-colored)    Negative: Vomiting and pain over lower ribs of back (i.e., flank - kidney area)    Negative: Weakness of a leg or foot (e.g., unable to bear weight, dragging foot)    Negative: Patient sounds very sick or weak to the triager    Negative: Fever > 100.4 F (38.0 C) and flank pain    Negative: Pain or burning with passing urine (urination)    Protocols used: BACK PAIN-A-OH

## 2023-04-19 NOTE — TELEPHONE ENCOUNTER
"4 o'clock pain.  Tylenol.  6/10.    Reason for Disposition    Suture (or staple) removal date, questions about    [1] MODERATE pain (e.g., interferes with normal activities) AND [2] pain comes and goes (cramps) AND [3] present > 24 hours  (Exception: pain with Vomiting or Diarrhea - see that Guideline)    Additional Information    Negative: [1] Major abdominal surgical wound AND [2] visible internal organs    Negative: Sounds like a life-threatening emergency to the triager    Negative: [1] Bleeding from wound AND [2] won't stop after 10 minutes of direct pressure (using correct technique)    Negative: [1] Wound gaping open after sutures (or staples) AND [2] < 48 hours since wound re-opened    Negative: Patient sounds very sick or weak to the triager    Negative: [1] Wound gaping open after sutures (or staples) AND [2] > 48 hours since wound re-opened AND [3] length of opening > 1/2 inch (12 mm)    Negative: [1] Face wound gaping open after sutures (or staples) AND [2] > 48 hours since wound re-opened AND [3] length of opening > 1/4 inch (6 mm)    Negative: Suture or staple removal is overdue    Negative: [1] Suture (or staple) came out early AND [2] > 48 hours since sutures placed AND [3] caller wants wound checked    Negative: [1] Numbness extends beyond the wound edges AND [2] lasts > 8 hours    Negative: Shock suspected (e.g., cold/pale/clammy skin, too weak to stand, low BP, rapid pulse)    Negative: Difficult to awaken or acting confused (e.g., disoriented, slurred speech)    Negative: Passed out (i.e., lost consciousness, collapsed and was not responding)    Negative: Sounds like a life-threatening emergency to the triager    Negative: Chest pain    Negative: Pain is mainly in upper abdomen  (if needed ask: \"is it mainly above the belly button?\")    Negative: Followed an abdomen (stomach) injury    Negative: [1] SEVERE pain (e.g., excruciating) AND [2] present > 1 hour    Negative: [1] SEVERE pain AND [2] age " "> 60 years    Negative: [1] Vomiting AND [2] contains red blood or black (\"coffee ground\") material  (Exception: few red streaks in vomit that only happened once)    Negative: Blood in bowel movements  (Exception: Blood on surface of BM with constipation)    Negative: Black or tarry bowel movements (Exception: chronic-unchanged black-grey bowel movements AND is taking iron pills or Pepto-bismol)    Negative: [1] Unable to urinate (or only a few drops) > 4 hours AND [2] bladder feels very full (e.g., palpable bladder or strong urge to urinate)    Negative: [1] Pain in the scrotum or testicle AND [2] present > 1 hour    Negative: Patient sounds very sick or weak to the triager    Negative: [1] MILD-MODERATE pain AND [2] constant AND [3] present > 2 hours    Negative: [1] Vomiting AND [2] abdomen looks much more swollen than usual    Negative: [1] Vomiting AND [2] contains bile (green color)    Negative: White of the eyes have turned yellow (i.e., jaundice)    Negative: Fever > 103 F (39.4 C)    Negative: [1] Fever > 101 F (38.3 C) AND [2] age > 60 years    Negative: [1] Fever > 100.0 F (37.8 C) AND [2] bedridden (e.g., nursing home patient, CVA, chronic illness, recovering from surgery)    Negative: [1] Fever > 100.0 F (37.8 C) AND [2] diabetes mellitus or weak immune system (e.g., HIV positive, cancer chemo, splenectomy, organ transplant, chronic steroids)    Negative: [1] SEVERE pain AND [2] present < 1 hour    Protocols used: SUTURE OR STAPLE [id number], ABDOMINAL PAIN - MALE-A-AH    Nurse Triage SBAR    Is this a 2nd Level Triage? NO    Situation: Pt and his wife are calling due to some pain on the pt's back from 2 spots where he had moles removed on his back outside of [facility].    Background:  Pt was on the hard table while they removed the spots.  He also has not had a BM since yesterday or the day before.      Assessment: Pt is rating his abdominal pain at 6/10.  Intermittent.  He has not taken any Tylenol since " 4 AM.      Protocol Recommended Disposition:   See PCP Within 24 Hours, Home Care    Recommendation: Can take the Tylenol at 730.  Try to have a BM. Push fluids.  F/U with Derm. About the pain from biopsy areas.      If worse on constant pain call back.      June Abarca RN on 4/19/2023 at 7:37 AM

## 2023-04-19 NOTE — H&P
[facility]    History and Physical - Hospitalist Service       Date of Admission:  4/19/2023    Assessment & Plan      Jose Francisco Gonzalez is a 82 year old male admitted on 4/19/2023 for weakness and general deconditioning, admitted for further evaluation and treatment.    Acute physical deconditioning, generalized weakness, depression: Patient presenting to the emergency department with weakness, also reports not feeling well mentally and having some element of depression, vague suicidal ideations but no reported plan, patient reportedly not eating well and recently with dental work for dentures possibly contributing.  Patient denies other issues, however, does have some memory related issues and impairment, patient with wife at home but may need higher level of care and extra assistance outside or above chest which is available at his home.  Urinalysis completed with greater than thousand glucose, however, no indication of infection.  Plan:  -Therapy consulted.  -Social work and care management consulted.  -Supportive management.  -Close hemodynamic monitoring.  -psychiatric consultation.    Hyponatremia: Patient with a sodium level of 129 on admission.  -Monitor.  Consider IV fluids as clinically indicated.    Anemia, normocytic: Patient with a hemoglobin of 12.3 on admission, no report of extraneous blood loss.  -Monitor.    Cardiac, hyperlipidemia, hypertension: Patient previously on aspirin 81 mg and atorvastatin, metoprolol in the past.  -Continue prior to admission aspirin 81 mg and atorvastatin, metoprolol when verified by pharmacy.    History of diabetes: Patient previously on Farxiga as well as insulin in the past.  -Hold home medications at this time.  -Insulin sliding scale.    Anxiety, depression: Patient previously on mirtazapine in the past.  -Continue prior to admission mirtazapine when verified by pharmacy.       DVT Prophylaxis: Pneumatic Compression Devices    Code  Status:  Full code, however patient with advanced directives and living well, may benefit from POLST and further elucidation    Clinically Significant Risk Factors Present on Admission         # Hyponatremia: Lowest Na = 129 mmol/L in last 2 days, will monitor as appropriate              # DMII: A1C = 6.9 % (Ref range: 0.0 - 5.6 %) within past 6 months           Disposition Plan      I have personally reviewed the following data over the past 24 hrs:    6.0  \   12.3 (L)   / 275     129 (L) 94 (L) 23.1 (H) /  142 (H)   4.4 24 0.67 \       ALT: 11 AST: 25 AP: 73 TBILI: 0.6   ALB: 3.8 TOT PROTEIN: 7.1 LIPASE: N/A       TSH: 1.32 T4: N/A A1C: N/A       Imaging results reviewed over the past 24 hrs:   No results found for this or any previous visit (from the past 24 hour(s)).       Barrett Uriostegui,   Hospitalist Service  [facility]  Securely message with [employer] (more info)  Text page via [employer] Paging/Directory     ______________________________________________________________________    Chief Complaint   Weakness    History of Present Illness   Jose Francisco Gonzalez is a 82 year old male admitted on 4/19/2023 for weakness and general deconditioning, admitted for further evaluation and treatment. Patient presenting to the emergency department with weakness, also reports not feeling well mentally and having some element of depression, vague suicidal ideations but no reported plan, patient reportedly not eating well and recently with dental work for dentures possibly contributing.  Patient denies other issues, however, does have some memory related issues and impairment, patient with wife at home but may need higher level of care and extra assistance outside or above chest which is available at his home.       Past Medical History    Past Medical History:   Diagnosis Date     Arthropathy, unspecified, site unspecified     palindromic rheumatism; takes aleve bid      Compression fracture of  body of thoracic vertebra (H)      Compression fracture of L1 lumbar vertebra (H) 2019    see MRI     Hyperlipidemia LDL goal <100 2012    Was on fenofibrate plus simva, in [facility] study; LDL 84 in Lipitor 20 in 2012; 97 in 3/13     Hyponatremia      Rheumatoid arthritis (H)      Sialoadenitis 2015     Syncope      Type 2 diabetes, HbA1C goal < 8% (H) 2012       Past Surgical History   Past Surgical History:   Procedure Laterality Date     ENT SURGERY  2009    nasal; removal of pyogenic granuloma L     EYE SURGERY  murray 15 and22 2009    cataract     HC TOOTH EXTRACTION W/FORCEP       TONSILLECTOMY         Prior to Admission Medications   Prior to Admission Medications   Prescriptions Last Dose Informant Patient Reported? Taking?   CONTOUR NEXT TEST test strip   No No   Sig: USE TO TEST BLOOD SUGAR 3 TIMES DAILY OR AS DIRECTED   Continuous Blood Gluc Sensor (FREESTYLE ODILIA 2 SENSOR) [facility]   No No   Si each every 14 days Use 1 sensor every 14 days. Use to read blood sugars per 's instructions.   Contour Next EZ (CONTOUR NEXT EZ W/DEVICE KIT) w/Device KIT   No No   Sig: USE TO TEST BLOOD SUGAR THREE TIMES DAILY OR AS DIRECTED   Multiple Vitamins-Minerals (PRESERVISION AREDS 2) CAPS   Yes No   Sig: Take 2 capsules by mouth daily   Psyllium-Calcium (METAMUCIL PLUS CALCIUM) CAPS   Yes No   Sig: Take 2 capsules by mouth nightly as needed   alendronate (FOSAMAX) 70 MG tablet   No No   Sig: TAKE 1 TABLET(70 MG) BY MOUTH EVERY 7 DAYS   aspirin 81 MG tablet   Yes No   Sig: Take 81 mg by mouth daily   atorvastatin (LIPITOR) 20 MG tablet   No No   Sig: TAKE 1 TABLET(20MG) BY MOUTH DAILY   blood glucose (NO BRAND SPECIFIED) lancets standard   No No   Sig: Use to test blood sugar 3 times daily or as directed.   dapagliflozin (FARXIGA) 5 MG TABS tablet   No No   Sig: Take 1 tablet (5 mg) by mouth daily   insulin glargine (BASAGLAR KWIKPEN) 100 UNIT/ML pen   No No   Sig: Inject 5 units under the  skin once daily in the morning.   insulin lispro (HUMALOG KWIKPEN) 100 UNIT/ML (1 unit dial) KWIKPEN   No No   Sig: Inject 3 Units Subcutaneous 3 times daily (before meals)   insulin pen needle (B-D U/F) 31G X 8 MM miscellaneous   No No   Sig: Use 4 times a day   metoprolol succinate ER (TOPROL XL) 50 MG 24 hr tablet   No No   Sig: Take 1 tablet (50 mg) by mouth daily   mirtazapine (REMERON) 15 MG tablet   No No   Sig: Take 1 tablet (15 mg) by mouth At Bedtime   order for DME   No No   Sig: Test strips for pt's glucometer, brand as covered by insurance Test QID and prn. He is on insulin. Patients preferred brand-Verio One Touch.   sodium chloride 1 GM tablet   No No   Sig: Take 1 tablet (1 g) by mouth daily   Patient not taking: Reported on 3/28/2023   sulfaSALAzine ER (AZULFIDINE EN) 500 MG EC tablet   Yes No   Sig: TAKE 1 TABLET BY MOUTH TWICE DAILY AFTER MEALS   vitamin D3 (CHOLECALCIFEROL) 50 mcg (2000 units) tablet   Yes No   Sig: Take 1 tablet by mouth daily      Facility-Administered Medications: None        Physical Exam   Vital Signs: Temp: 97.6  F (36.4  C) Temp src: Oral BP: (!) 150/82 Pulse: 100   Resp: 20 SpO2: 98 %      Weight: 140 lbs 0 oz    GENERAL: Alert and mildly confused. NAD. Conversational, appropriate.   HEENT: Normocephalic.  Nares normal.   LUNGS: Clear to auscultation. No dyspnea at rest.   HEART: Regular rate. Extremities perfused.   ABDOMEN: Soft, nontender, and nondistended. Positive bowel sounds.   EXTREMITIES: No LE edema noted.   NEUROLOGIC: Moves extremities x4, follows commands.    Medical Decision Making       43 MINUTES SPENT BY ME on the date of service doing chart review, history, exam, documentation & further activities per the note.      Data     I have personally reviewed the following data over the past 24 hrs:    6.0  \   12.3 (L)   / 275     129 (L) 94 (L) 23.1 (H) /  142 (H)   4.4 24 0.67 \       ALT: 11 AST: 25 AP: 73 TBILI: 0.6   ALB: 3.8 TOT PROTEIN: 7.1 LIPASE: N/A        TSH: 1.32 T4: N/A A1C: N/A       Imaging results reviewed over the past 24 hrs:   No results found for this or any previous visit (from the past 24 hour(s)).

## 2023-04-19 NOTE — TELEPHONE ENCOUNTER
Per chart review, is at the hospital now. Triage advised wife appt today with DUY Mendosa will be cancelled. She can call triage back if follow up appt is needed with PCP. She verbalized agreement with plan.

## 2023-04-20 NOTE — TELEPHONE ENCOUNTER
FYI-  Pt's wife called to make PCP pt is at the hospital. He called 911 yesterday morning while wife called triage. Wife reports pt is very anxious, loosing too much weight and has chronic hyponatremia for why his psychiatrist did not prescribe depression medication. .  She doesn't know what to do. Triage advised to discuss concerns with hospital providers. Providers at the hospital and CC  will advice on next steps. She verbalized agreement.     No further action is needed from PCP at this time.

## 2023-04-20 NOTE — PROGRESS NOTES
[facility]    Medicine Progress Note - Hospitalist Service    Date of Admission:  4/19/2023    Assessment & Plan       Jose Francisco Gonzalez is a 82 year old male admitted on 4/19/2023 for weakness and general deconditioning, admitted for further evaluation and treatment.    Acute physical deconditioning Generalized weakness  -consulted PT  -CM/SW consulted  -Supportive management.  -Close hemodynamic monitoring  -fall precautions     Failure to thrive  -consult nutritionist     Anxiety/Depression  -consulted Psych   -PTA on Remeron (resume)     Hyponatremia   -sodium level of 129 on admission.  -Na 130 today   -IVF   -repeat BMP in am   -trend level     Anemia, normocytic  -hemoglobin of 12.3 on admission  -no report of extraneous blood loss  -trend level     HTN  HLD  -PTA on atorvastatin and metoprolol (resume)   -BP controlled     History of diabetes  -PTA on Farxiga (hold)  -sliding scale insulin  -hypoglycemic protocol        Diet: Mechanical/Dental Soft Diet    DVT Prophylaxis: Pneumatic Compression Devices  Escobedo Catheter: Not present  Lines: None     Cardiac Monitoring: None  Code Status: Full Code      Clinically Significant Risk Factors Present on Admission         # Hyponatremia: Lowest Na = 129 mmol/L in last 2 days, will monitor as appropriate              # DMII: A1C = 6.9 % (Ref range: 0.0 - 5.6 %) within past 6 months           Disposition Plan      Expected Discharge Date: 04/20/2023                The patient's care was discussed with the Attending Physician, Dr. Russell.    Jeff Cervantes, CNP  Hospitalist Service  [facility]  Securely message with [employer] (more info)  Text page via [employer] Paging/Directory   ______________________________________________________________________    Interval History   -consulted nutritionist due to patient stating decrease appetite   -awaiting PT and psych eval   -NAD noted     Physical Exam   Vital Signs: Temp:  98  F (36.7  C) Temp src: Axillary BP: 117/68 Pulse: 94   Resp: 18 SpO2: 95 %      Weight: 140 lbs 0 oz  GENERAL: Alert and awake. NAD. Conversational, appropriate.   HEENT: Normocephalic.  Nares normal.   LUNGS: Clear to auscultation. No dyspnea at rest.   HEART: Regular rate. Extremities perfused.   ABDOMEN: Soft, nontender, and nondistended. Positive bowel sounds.   EXTREMITIES: No LE edema noted.   NEUROLOGIC: Moves extremities x4, follows commands.    Medical Decision Making       30 MINUTES SPENT BY ME on the date of service doing chart review, history, exam, documentation & further activities per the note.  MANAGEMENT DISCUSSED with the following over the past 24 hours: patient, nurse, and Dr. Russell        Data     I have personally reviewed the following data over the past 24 hrs:    10.5  \   11.8 (L)   / 240     130 (L) 96 (L) 13.6 /  127 (H)   4.1 24 0.56 (L) \       ALT: N/A AST: N/A AP: N/A TBILI: N/A   ALB: N/A TOT PROTEIN: N/A LIPASE: N/A       TSH: N/A T4: N/A A1C: N/A       Imaging results reviewed over the past 24 hrs:   No results found for this or any previous visit (from the past 24 hour(s)).

## 2023-04-20 NOTE — TELEPHONE ENCOUNTER
MD aware pt in hospital. NO note in chart from hospitalist since admission. Will await further info from hospital admission

## 2023-04-20 NOTE — CONSULTS
"      Initial Psychiatric Consult   Consult date: April 20, 2023         Reason for Consult, requesting source:    \"Family request\"  Requesting source: Barrett Uriostegui    Labs and imaging reviewed. Discussed with nursing.        HPI:   Jose Francisco Gonzalez, preferred name Shar, is a 82 year old male admitted on 4/19/2023 for weakness and general deconditioning, admitted for further evaluation and treatment. Psychiatry is consulted at family request for concern for depression.    I met with Shar in his room, he is seen after he apparently ate breakfast though food appears largely untouched. He perseverates on not being able to eat, which he cites is due to dental problems- got dentures back in October but triggered his gag reflex and diminished his ability to smell/taste, resulting in poor PO intake. Recently got first dental implant surgery that will allow him to wear different type of denture, but still needs to get the second surgery scheduled for 5/4/23. Since October he has been eating what is essentially a liquid/pureed diet with very soft foods that do not require chewing- mainly cream of wheat, apple sauce, 2 Ensure type shakes a day, bananas, etc. Very little in the way of protein, unable to eat any meat. Estimates he has lost at least 15 pounds since October, and his pant size has gone from 36 to 32. He also states that \"I can't go back home, my wife can't take care of me anymore\". He describes feeling depressed, which he attributes to various losses of independence/abilities in addition to eating problems, such as weakness and losing his vision. He endorses feeling like a burden on his family. He reports passive suicidal ideation in the form of death wishes- hopes to fall asleep and not wake up, but denies any active suicidal ideation or intent/plan to harm himself.    Called wife Divya for collateral, daughter Elke was also in the room with Divya. They report that he has been struggling with " "depression related to various losses of independence, and now with poor PO intake is only worsening. They both express they feel they can no longer safely care for Shar at home; he if frequently incontinent and needs assistance getting cleaned up, needs assistance ambulating, cannot make his own food, etc. At times he seems to physically struggle with these and at other times seems physically able but mentally stuck. Becomes indecisive and stuck over simple tasks such as whether or not he should eat, or take his insulin.         Past Psychiatric History:   Reports he has had episodes of \"mild depression\" off and on over the years, denies any past psychiatric hospitalizations. Did recently have a stay in the [facility] unit here in the ED back in November 2022 when he was started on mirtazapine. Sees outpatient psychiatrist, who increased mirtazapine to 22.5mg nightly about a week ago.         Substance Use and History:   Denies.        Past Medical History:   PAST MEDICAL HISTORY:   Past Medical History:   Diagnosis Date     Arthropathy, unspecified, site unspecified     palindromic rheumatism; takes aleve bid      Compression fracture of body of thoracic vertebra (H)      Compression fracture of L1 lumbar vertebra (H) 06/2019    see MRI     Hyperlipidemia LDL goal <100 11/2/2012    Was on fenofibrate plus simva, in [facility] study; LDL 84 in Lipitor 20 in 05/2012; 97 in 3/13     Hyponatremia      Rheumatoid arthritis (H)      Sialoadenitis 6/13/2015     Syncope      Type 2 diabetes, HbA1C goal < 8% (H) 11/2/2012       PAST SURGICAL HISTORY:   Past Surgical History:   Procedure Laterality Date     ENT SURGERY  11/2009    nasal; removal of pyogenic granuloma L     EYE SURGERY  murray 15 and22 2009    cataract     HC TOOTH EXTRACTION W/FORCEP       TONSILLECTOMY               Family History:   FAMILY HISTORY:   Family History   Problem Relation Age of Onset     Heart Disease Father      Coronary Artery Disease Father      " "Heart Disease Brother      Coronary Artery Disease Brother      Alzheimer Disease Sister      No Known Problems Mother      Substance Abuse Son         alcohol           Social History:   SOCIAL HISTORY:   Social History     Tobacco Use     Smoking status: Former     Packs/day: 1.00     Years: 20.00     Pack years: 20.00     Types: Cigarettes     Quit date: 1993     Years since quittin.2     Smokeless tobacco: Never   Vaping Use     Vaping status: Never Used   Substance Use Topics     Alcohol use: Yes     Comment: 1-2 drinks per month     Shar has been  twice, his first wife  in a car accident leaving him to raise his 2 sons alone for some time. Later met and  his current wife Divya, they had a daughter today.          Physical ROS:   The 10 point Review of Systems is negative other than noted in the HPI or here.    Review Of Systems  Skin: negative  Eyes: \"losing my eyesight\"  Ears/Nose/Throat: negative, dental problem  Respiratory: No shortness of breath, dyspnea on exertion, cough, or hemoptysis  Cardiovascular: negative  Gastrointestinal: incontinence at times  Genitourinary: incontinence  Musculoskeletal: muscular weakness  Neurologic: negative  Psychiatric: anxiety and depression  Hematologic/Lymphatic/Immunologic: negative  Endocrine: negative          Medications:       atorvastatin  20 mg Oral Daily     insulin aspart  1-3 Units Subcutaneous TID AC     insulin aspart  1-3 Units Subcutaneous At Bedtime     metoprolol succinate ER  50 mg Oral Daily     mirtazapine  22.5 mg Oral At Bedtime     sodium chloride (PF)  3 mL Intracatheter Q8H              Allergies:   No Known Allergies       Labs:     Recent Results (from the past 48 hour(s))   EKG 12-lead, tracing only    Collection Time: 23 12:17 PM   Result Value Ref Range    Systolic Blood Pressure  mmHg    Diastolic Blood Pressure  mmHg    Ventricular Rate 96 BPM    Atrial Rate 96 BPM    ME Interval 202 ms    QRS Duration 74 " ms     ms    QTc 457 ms    P Axis 61 degrees    R AXIS 32 degrees    T Axis 69 degrees    Interpretation ECG       Sinus rhythm  Normal ECG  When compared with ECG of 03-OCT-2022 17:59,  No significant change was found     Comprehensive metabolic panel    Collection Time: 04/19/23 12:19 PM   Result Value Ref Range    Sodium 129 (L) 136 - 145 mmol/L    Potassium 4.4 3.4 - 5.3 mmol/L    Chloride 94 (L) 98 - 107 mmol/L    Carbon Dioxide (CO2) 24 22 - 29 mmol/L    Anion Gap 11 7 - 15 mmol/L    Urea Nitrogen 23.1 (H) 8.0 - 23.0 mg/dL    Creatinine 0.67 0.67 - 1.17 mg/dL    Calcium 9.0 8.8 - 10.2 mg/dL    Glucose 142 (H) 70 - 99 mg/dL    Alkaline Phosphatase 73 40 - 129 U/L    AST 25 10 - 50 U/L    ALT 11 10 - 50 U/L    Protein Total 7.1 6.4 - 8.3 g/dL    Albumin 3.8 3.5 - 5.2 g/dL    Bilirubin Total 0.6 <=1.2 mg/dL    GFR Estimate >90 >60 mL/min/1.73m2   CBC with platelets and differential    Collection Time: 04/19/23 12:19 PM   Result Value Ref Range    WBC Count 6.0 4.0 - 11.0 10e3/uL    RBC Count 4.14 (L) 4.40 - 5.90 10e6/uL    Hemoglobin 12.3 (L) 13.3 - 17.7 g/dL    Hematocrit 37.0 (L) 40.0 - 53.0 %    MCV 89 78 - 100 fL    MCH 29.7 26.5 - 33.0 pg    MCHC 33.2 31.5 - 36.5 g/dL    RDW 14.0 10.0 - 15.0 %    Platelet Count 275 150 - 450 10e3/uL    % Neutrophils 79 %    % Lymphocytes 10 %    % Monocytes 9 %    % Eosinophils 1 %    % Basophils 1 %    % Immature Granulocytes 0 %    NRBCs per 100 WBC 0 <1 /100    Absolute Neutrophils 4.8 1.6 - 8.3 10e3/uL    Absolute Lymphocytes 0.6 (L) 0.8 - 5.3 10e3/uL    Absolute Monocytes 0.5 0.0 - 1.3 10e3/uL    Absolute Eosinophils 0.0 0.0 - 0.7 10e3/uL    Absolute Basophils 0.0 0.0 - 0.2 10e3/uL    Absolute Immature Granulocytes 0.0 <=0.4 10e3/uL    Absolute NRBCs 0.0 10e3/uL   TSH with free T4 reflex    Collection Time: 04/19/23 12:19 PM   Result Value Ref Range    TSH 1.32 0.30 - 4.20 uIU/mL   UA with Microscopic reflex to Culture    Collection Time: 04/19/23  2:36 PM     Specimen: Urine, Midstream   Result Value Ref Range    Color Urine Yellow Colorless, Straw, Light Yellow, Yellow    Appearance Urine Clear Clear    Glucose Urine >=1000 (A) Negative mg/dL    Bilirubin Urine Negative Negative    Ketones Urine Trace (A) Negative mg/dL    Specific Gravity Urine 1.019 1.003 - 1.035    Blood Urine Negative Negative    pH Urine 6.0 5.0 - 7.0    Protein Albumin Urine Negative Negative mg/dL    Urobilinogen Urine Normal Normal, 2.0 mg/dL    Nitrite Urine Negative Negative    Leukocyte Esterase Urine Negative Negative    Mucus Urine Present (A) None Seen /LPF    RBC Urine <1 <=2 /HPF    WBC Urine <1 <=5 /HPF   Glucose by meter    Collection Time: 04/19/23 11:17 PM   Result Value Ref Range    GLUCOSE BY METER POCT 144 (H) 70 - 99 mg/dL   Basic metabolic panel    Collection Time: 04/20/23  7:22 AM   Result Value Ref Range    Sodium 130 (L) 136 - 145 mmol/L    Potassium 4.1 3.4 - 5.3 mmol/L    Chloride 96 (L) 98 - 107 mmol/L    Carbon Dioxide (CO2) 24 22 - 29 mmol/L    Anion Gap 10 7 - 15 mmol/L    Urea Nitrogen 13.6 8.0 - 23.0 mg/dL    Creatinine 0.56 (L) 0.67 - 1.17 mg/dL    Calcium 8.9 8.8 - 10.2 mg/dL    Glucose 137 (H) 70 - 99 mg/dL    GFR Estimate >90 >60 mL/min/1.73m2   CBC with platelets    Collection Time: 04/20/23  7:22 AM   Result Value Ref Range    WBC Count 10.5 4.0 - 11.0 10e3/uL    RBC Count 3.95 (L) 4.40 - 5.90 10e6/uL    Hemoglobin 11.8 (L) 13.3 - 17.7 g/dL    Hematocrit 35.5 (L) 40.0 - 53.0 %    MCV 90 78 - 100 fL    MCH 29.9 26.5 - 33.0 pg    MCHC 33.2 31.5 - 36.5 g/dL    RDW 14.0 10.0 - 15.0 %    Platelet Count 240 150 - 450 10e3/uL   Glucose by meter    Collection Time: 04/20/23  8:04 AM   Result Value Ref Range    GLUCOSE BY METER POCT 127 (H) 70 - 99 mg/dL          Physical and Psychiatric Examination:     /65 (BP Location: Left arm, Patient Position: Semi-Olivas's, Cuff Size: Adult Regular)   Pulse 90   Temp 97.8  F (36.6  C) (Oral)   Resp 18   Ht 1.8 m (5'  "10.87\")   Wt 63.5 kg (140 lb)   SpO2 97%   BMI 19.60 kg/m    Weight is 140 lbs 0 oz  Body mass index is 19.6 kg/m .    Physical Exam:  I have reviewed the physical exam as documented by by the medical team and agree with findings and assessment and have no additional findings to add at this time.    Mental Status Exam:    Appearance: awake, alert and wearing hospital scrubs, appears thin  Attitude:  cooperative  Eye Contact:  poor   Mood:  depressed  Affect:  mood congruent and intensity is blunted  Speech:  clear, coherent and soft, slowed speech with long pauses  Psychomotor Behavior:  no evidence of tardive dyskinesia, dystonia, or tics  Thought Process:  linear and goal oriented  Associations:  no loose associations  Thought Content:  no evidence of psychotic thought, passive suicidal ideation present and no active suicidal ideation and no intent/plan to harm self  Insight:  fair  Judgement:  fair  Oriented to:  time, person, and place  Attention Span and Concentration:  fair  Recent and Remote Memory:  fair                 DSM-5 Diagnosis:   Major depressive disorder, recurrent, severe          Assessment:   Jose Francisco Gonzalez is a 82 year old male seen today for concern for depression. He reports past episodes of depression. He also reports anxiety with panic attacks.    Poor nutritional intake will significantly limit the effect of antidepressant medications and should be priority to restore adequate intake which is limited by dental problems. He does not endorse that appetite is decreased as a result of depression. It seems that depression is following his various limitations/losses such as ability to eat, losing his vision, increasing weakness, inability to drive, and so on. Per chart review, back in November 2022 [facility] stay he weighed 143lbs and is not 140lbs, so not a significant amount of weight loss.    Mirtazapine was increased to 22.5mg only one week ago by outpatient psychiatrist, recommend " continuing at this dose for now. Has a history of hyponatremia requiring cautious titration of antidepressant and monitoring of sodium level. Could consider adding aripiprazole as augmentation strategy, however he was not interested in adding another medication as he feels he is taking too many as it is.    He takes hydroxyzine 10mg TID as needed for anxiety at home, he is unable to tell if it is helpful. Will increase to 25mg TID PRN for anxiety.          Summary of Recommendations:   1.  Increased mirtazapine to PTA dose of 22.5mg nightly, was just increased 1 week ago so will need more time to work.    2.  Increased hydroxyzine to 25mg TID PRN for anxiety    3.  Encouraged to consider augmentation with aripiprazole, he declined.     4.  Family has reported they are no longer able to care for him, feels he may need TCU. Will defer to primary team.      DEE DEE Thomas CNP    Consult/Liaison Psychiatry   [facility]    Contact information available via [facility] Paging/Directory  If I am not available, then JIMY CL line ([phone]) should know who is covering our consult service.

## 2023-04-20 NOTE — PLAN OF CARE
Goal Outcome Evaluation:    Summary: admitted 4/20 with generalized weakness, dehydration, and depressive episodes  DATE & TIME: 4/20/2023 - 6611-5082  Cognitive Concerns/ Orientation : A&Ox4  BEHAVIOR & AGGRESSION TOOL COLOR: Green  VS/O2: VSS on RA ex HTN at times - WNL this shift  MOBILITY: SBA  PAIN MANAGMENT: denied pain throughout shift  DIET: mech/dental soft - Nutrition consult placed  BOWEL/BLADDER: PrimoFit in place; continent of bowel per ED note -- has not had BM this shift  ABNL LAB/BG: Qp=745  DRAIN/DEVICES: PrimoFit (see above)  TELEMETRY RHYTHM: n/a - not on tele  SKIN: skin tear on upper right arm with foam dressing applied. Two sites with foam dressing from melanoma spot removal on 4/19; no blanchable redness, no scabbing, no bruising  TESTS/PROCEDURES: none  D/C DAY/GOALS/PLACE: TBD  OTHER IMPORTANT INFO: on observation for possible post-discharge higher level of care; has poor vision due to macular degeneration. PT and CM/SW following. PRN Hydroxyzine given x1 for anxiety.

## 2023-04-20 NOTE — PROGRESS NOTES
"   04/20/23 1500   Appointment Info   Signing Clinician's Name / Credentials (PT) Isidro Burton, DPT, PT   Quick Adds   Quick Adds Certification   Living Environment   People in Home spouse   Current Living Arrangements house   Home Accessibility stairs to enter home;stairs within home   Number of Stairs, Main Entrance 2   Stair Railings, Main Entrance railings safe and in good condition   Number of Stairs, Within Home, Primary greater than 10 stairs   Stair Railings, Within Home, Primary railings safe and in good condition   Transportation Anticipated family or friend will provide   Living Environment Comments pt lives in house with wife. Pt states wife unable to do a lot for him.  Pt have been progressivley gettting weaker the last 6 mo since he received new dentures and is unable to eat as he normally does. this appears to be driving much of his weakness.   Self-Care   Usual Activity Tolerance good   Current Activity Tolerance moderate   Regular Exercise No   Equipment Currently Used at Home cane, straight;walker, rolling   Fall history within last six months no   General Information   Onset of Illness/Injury or Date of Surgery 04/19/23   Referring Physician Barrett Uriostegui, DO   Patient/Family Therapy Goals Statement (PT) pt prefers TCU d/c at this time   Pertinent History of Current Problem (include personal factors and/or comorbidities that impact the POC) 82 year old male with a history of Type 2 diabetes, Hypertension and Hyperlipidemia who presents via EMS for generalized weakness. EMS reports that they were originally called with chief complaint of shortness of breath. They say that he is not short of breath and has completely normal vitals. The patient has not been eating for days. EMS gave 2 mg sublingual risperidone because he was pacing all over the room  EMS reports that the patient has had vague suicidal ideations saying things like \"I just hope I'll pass out and not wake up\". EMS reports " blood sugar in the 140s.   Existing Precautions/Restrictions fall   General Observations pt is agreeable to rehab but feeling weak   Cognition   Affect/Mental Status (Cognition) WFL   Orientation Status (Cognition) oriented x 4   Follows Commands (Cognition) WFL   Cognitive Status Comments low mood   Posture    Posture Kyphosis   Range of Motion (ROM)   Range of Motion ROM is WNL   Strength (Manual Muscle Testing)   Strength (Manual Muscle Testing)   (generally weak and malaise)   Bed Mobility   Comment, (Bed Mobility) supine to sit with IND   Transfers   Comment, (Transfers) STS with mod I and SEC   Gait/Stairs (Locomotion)   Comment, (Gait/Stairs) Ambulates 10 ft with SEC and CGA or SBA with FWW   Balance   Balance Comments impaired without AD   Clinical Impression   Criteria for Skilled Therapeutic Intervention Yes, treatment indicated   PT Diagnosis (PT) impaired mobility   Influenced by the following impairments generalized weakness   Clinical Presentation (PT Evaluation Complexity) Stable/Uncomplicated   Clinical Presentation Rationale clinical judgement   Clinical Decision Making (Complexity) low complexity   Planned Therapy Interventions (PT) balance training;gait training;strengthening;stair training;transfer training;progressive activity/exercise   Risk & Benefits of therapy have been explained evaluation/treatment results reviewed;care plan/treatment goals reviewed;risks/benefits reviewed;current/potential barriers reviewed;participants voiced agreement with care plan;participants included;patient   PT Total Evaluation Time   PT Eval, Low Complexity Minutes (92155) 10   Therapy Certification   Start of care date 04/20/23   Certification date from 04/20/23   Certification date to 04/27/23   Medical Diagnosis generalized weakness   Physical Therapy Goals   PT Frequency 5x/week   PT Predicted Duration/Target Date for Goal Attainment 04/27/23   PT Goals Gait;Stairs   PT: Gait Modified independent;Straight  cane;Greater than 200 feet   PT: Stairs Modified independent;Greater than 10 stairs;Rail on left   Interventions   Interventions Quick Adds Therapeutic Activity   Therapeutic Activity   Therapeutic Activities: dynamic activities to improve functional performance Minutes (16484) 12   Treatment Detail/Skilled Intervention PT: Encountered supine in bed and agreeable to PT.  Ambulates 200ft total with FWW and 200ft total with SEC.  More stability with FWW.  With vc and FWW, pt able to increase speed but fatigues quickly.  Ascends and descends 4 steps with single railing.  Requiring BHHA on single railing.   PT Discharge Planning   PT Plan progress gait distance, increase stair trial number, SEC gait   PT Discharge Recommendation (DC Rec) Transitional Care Facility;home   PT Rationale for DC Rec pt is below baseline currently 2/2 feeling very weak and deconditioned. Primary reasoning for weakness is poor nutrition intake. Has been more sedentary over the last 6 mo furthering his decline. Attila would benefit from TCU in order to increase functional mobility tolerance and endurance. If progress is being made, pt might be safe to d/c home with HHPT.   PT Brief overview of current status SBA with FWW. should amb in halls 4-5x/day   Total Session Time   Timed Code Treatment Minutes 12   Total Session Time (sum of timed and untimed services) 22                                                                                   [facility]      OUTPATIENT PHYSICAL THERAPY EVALUATION  PLAN OF TREATMENT FOR OUTPATIENT REHABILITATION  (COMPLETE FOR INITIAL CLAIMS ONLY)  Patient's Last Name, First Name, M.I.  YOB: 1940  Jose Francisco Gonzalez                        Provider's Name  [facility] Medical Record No.  [id number]                               Onset Date:  04/19/23   Start of Care Date:  (P) 04/20/23      Type:     _X_PT   ___OT   ___SLP Medical  Diagnosis:  (P) generalized weakness                        PT Diagnosis:  impaired mobility   Visits from SOC:  1   _________________________________________________________________________________  Plan of Treatment/Functional Goals    Planned Interventions: balance training, gait training, strengthening, stair training, transfer training, progressive activity/exercise     Goals: See Physical Therapy Goals on Care Plan in [facility] electronic health record.    Therapy Frequency: 5x/week  Predicted Duration of Therapy Intervention: 04/27/23  _________________________________________________________________________________    I CERTIFY THE NEED FOR THESE SERVICES FURNISHED UNDER        THIS PLAN OF TREATMENT AND WHILE UNDER MY CARE     (Physician co-signature of this document indicates review and certification of the therapy plan).              Certification date from: (P) 04/20/23, Certification date to: (P) 04/27/23    Referring Physician: Barrett Uriostegui, DO            Initial Assessment        See Physical Therapy evaluation dated (P) 04/20/23 in Epic electronic health record.

## 2023-04-21 NOTE — PLAN OF CARE
Summary: Admitted 4/20 with generalized weakness, dehydration, and depressive episodes  DATE & TIME: 4/21/23 7-3 pm   Cognitive Concerns/ Orientation : A & O x 3-4. Mildly confused/rambling at times. Forgetful.   BEHAVIOR & AGGRESSION TOOL COLOR: Green  VS/O2: VSS on RA except tachycardia and positive orthostatics.   MOBILITY: SBA w/ walker.  PAIN MANAGMENT: Denied.   DIET: mech/dental soft, not swallowing well. DUY diallo SP consulted.   BOWEL/BLADDER: Incontinent. External catheter in place.   ABNL LAB/BG: Sodium 125. /298.   DRAIN/DEVICES: PIV SL  TELEMETRY RHYTHM: N/A  SKIN: R skin tear on upper right arm with foam dressing applied. Two sites with foam dressing from melanoma spot removal on 4/19.   TESTS/PROCEDURES: Possible video swallow Monday.   D/C DAY/GOALS/PLACE: TBD, needs placement.   OTHER IMPORTANT INFO: Has poor vision due to macular degeneration. PT, SP, and CM following. No changes.

## 2023-04-21 NOTE — PROGRESS NOTES
"   04/21/23 1500   Appointment Info   Signing Clinician's Name / Credentials (SLP) Emilie Horn MA CCC-SLP   Quick Adds Certification   General Information   Onset of Illness/Injury or Date of Surgery 04/19/23   Referring Physician Kimmie Bragg PA-C   Patient/Family Therapy Goal Statement (SLP) The patient hopes to improve his chewing/swallowing.   Pertinent History of Current Problem Jose Francisco Gonzalez is a 82 year old male admitted on 4/19/2023 for weakness and general deconditioning, admitted for further evaluation and treatment. SLP consulted   Type of Evaluation   Type of Evaluation Swallow Evaluation   Oral Motor   Oral Musculature unable to assess due to poor participation/comprehension  (perseverating on other topics, had difficulty participating)   Structural Abnormalities none present   Mucosal Quality adequate   Dentition (Oral Motor)   Dentition (Oral Motor)   (upper and lower dentures that fit loosely; he refused adhesive trial)   Facial Symmetry (Oral Motor)   Facial Symmetry (Oral Motor) WNL   Lip Function (Oral Motor)   Lip Range of Motion (Oral Motor) unable/difficult to assess  (appears WFL)   Tongue Function (Oral Motor)   Tongue ROM (Oral Motor) unable/difficult to assess  (appears WFL)   Jaw Function (Oral Motor)   Jaw Function (Oral Motor) unable/difficult to assess   Cough/Swallow/Gag Reflex (Oral Motor)   Soft Palate/Velum (Oral Motor) unable/difficult to assess   Gag Reflex (Oral Motor) not tested   Volitional Throat Clear/Cough (Oral Motor) unable/difficult to assess   Volitional Swallow (Oral Motor) unable/difficult to assess   Vocal Quality/Secretion Management (Oral Motor)   Vocal Quality (Oral Motor) WFL   General Swallowing Observations   Past History of Dysphagia no prior SLP notes, pt reports he has \"always\" had difficulty swallowing and says it's been progressively worse. Wife acknowledged difficulty at home but had to go home, was unable to provide detailed hx to " SLP.   Respiratory Support (General Swallowing Observations) none   Current Diet/Method of Nutritional Intake (General Swallowing Observations, NIS) easy to chew (level 7);thin liquids (level 0)   Swallowing Evaluation Clinical swallow evaluation   Clinical Swallow Evaluation   Feeding Assistance no assistance needed   Clinical Swallow Eval: Thin Liquid Texture Trial   Mode of Presentation, Thin Liquids cup   Volume of Liquid or Food Presented 3 sips of thin H20   Oral Phase of Swallow WFL   Pharyngeal Phase of Swallow impaired;coughing/choking;reduction in laryngeal movement  (audible swallow)   Diagnostic Statement Significant delayed coughing episodes   Clinical Swallow Eval: Mildly Thick Liquids   Mode of Presentation cup   Volume Presented 3 oz mildly thick H20   Oral Phase WFL   Pharyngeal Phase impaired;reduction in laryngeal movement   Diagnostic Statement intermittent delayed cough appeared present, difficult to assess due to high frequency of cough   Clinical Swallow Evaluation: Puree Solid Texture Trial   Mode of Presentation, Puree spoon;fed by clinician   Volume of Puree Presented 3 oz pudding   Oral Phase, Puree WFL   Pharyngeal Phase, Puree intact   Diagnostic Statement no direct signs or symptoms of aspiration   Clinical Swallow Eval: Soft & Bite Sized   Mode of Presentation self-fed   Volume Presented 4 bites of easy to chew tray (meat and carrots)   Oral Phase impaired mastication   Pharyngeal Phase impaired;cough/choking;reduction in laryngeal movement;repeated swallows   Diagnostic Statement extended mastication with min-mild oral residue; significant coughing episode. Pt spit out chewed bolus x1.   Esophageal Phase of Swallow   Patient reports or presents with symptoms of esophageal dysphagia No   Swallowing Recommendations   Diet Consistency Recommendations mildly thick liquids (level 2);pureed (level 4)   Supervision Level for Intake close supervision needed   Mode of Delivery Recommendations  bolus size, small;food moistened;slow rate of intake   Swallowing Maneuver Recommendations alternate food and liquid intake   Monitoring/Assistance Required (Eating/Swallowing) check mouth frequently for oral residue/pocketing;stop eating activities when fatigue is present;monitor for cough or change in vocal quality with intake   Recommended Feeding/Eating Techniques (Swallow Eval) maintain upright sitting position for eating;maintain upright posture during/after eating for 30 minutes   Medication Administration Recommendations, Swallowing (SLP) crushed and served in puree   Instrumental Assessment Recommendations VFSS (videofluoroscopic swallowing study)  (expect patient will likely require instrumental eval next week)   General Therapy Interventions   Planned Therapy Interventions Dysphagia Treatment   Dysphagia treatment Oropharyngeal exercise training;Modified diet education;Instruction of safe swallow strategies   Clinical Impression   Criteria for Skilled Therapeutic Interventions Met (SLP Eval) Yes, treatment indicated   SLP Diagnosis moderate oropharyngeal dysphagia   Risks & Benefits of therapy have been explained evaluation/treatment results reviewed;care plan/treatment goals reviewed;current/potential barriers reviewed;participants voiced agreement with care plan;participants included;patient;spouse/significant other   Clinical Impression Comments Clinical dysphagia eval completed per MD order. The patient has an unclear hx of dysphagia at home including difficulty chewing due to his dentition, but also increasing cough with PO. During this evaluation the patient demonstrated excessive and inefficient chewing, audible pharyngeal swallow with reduced laryngeal elevation and significant coughing episodes with meal of easy to chew food and thin liquids.  Bolus control and safety improved with puree texture and mildly thick liquid, though intermittent cough still noted with mildly thick.      Cautiously  recommend the patient change to a pureed diet (IDDSI 4) with mildly thick liquids. Rec small bites/sips at a slow pace. Rec pills be crushed and served in puree. SLP will follow closely for diet tolerance, continued assessment, and safe swallow education. Wife in agreement with modified diet, although in depth discussion was not completed because she had to go home. The patient remains very anxious and requires additional time for dysphagia intervention.  Anticipate the patient would likely benefit from a videoswallow study, which could be completed on 4/24/23 if the patient remains hospitalized.   SLP Total Evaluation Time   Eval: oral/pharyngeal swallow function, clinical swallow Minutes (65920) 96   Therapy Certification   Start of Care Date 04/21/23   Certification date from 04/21/23   Certification date to 07/20/23   Medical Diagnosis oropharyngeal dysphagia   SLP Goals   Therapy Frequency (SLP Eval) daily   SLP Predicted Duration/Target Date for Goal Attainment 05/04/23   SLP Goals Swallow   SLP: Safely tolerate diet without signs/symptoms of aspiration Regular diet;Thin liquids;With use of swallow precautions;With assistance/supervision   Interventions   Interventions Quick Adds Swallowing Dysfunction   Swallowing Dysfunction &/or Oral Function for Feeding   Treatment of Swallowing Dysfunction &/or Oral Function for Feeding Minutes (51874) 02   Symptoms Noted During/After Treatment None   Treatment Detail/Skilled Intervention Educated pt/wife about oropharyngeal swallowing, modified diet options, possible VFSS on Monday and POC. Pt very distracted and perseverative. Wife agreed with modified diet, believed it will be easier for him.   SLP Discharge Planning   SLP Plan tolerance of diet, modify diet rec as indicated, likely VFSS Monday   SLP Discharge Recommendation home with home care speech therapy   SLP Rationale for DC Rec below baseline oropharyngeal swallowing ability, requires a modified diet for safety  at this time   SLP Brief overview of current status  Cautiously recommend the patient change to a pureed diet (IDDSI 4) with mildly thick liquids. Rec small bites/sips at a slow pace. Rec pills be crushed and served in puree.   Total Session Time   Total Session Time (sum of timed and untimed services) 50                                                                                    M Psychiatric      OUTPATIENT SPEECH LANGUAGE PATHOLOGY EVALUATION  PLAN OF TREATMENT FOR OUTPATIENT REHABILITATION  (COMPLETE FOR INITIAL CLAIMS ONLY)  Patient's Last Name, First Name, M.I.  YOB: 1940  CarlosJose Francisco  ROXANNA                        Provider's Name  [facility] Medical Record No.  [id number]                               Onset Date:  04/19/23  Start of Care Date: 04/21/23    Type:     ___PT   ___OT   _X_SLP Medical Diagnosis: oropharyngeal dysphagia          SLP Diagnosis:  moderate oropharyngeal dysphagia  Visits from SOC:  1   ________________________________________________________________  Plan of Treatment/Functional Goals    Planned Interventions:   Dysphagia Treatment       Oropharyngeal exercise training, Modified diet education, Instruction of safe swallow strategies        Goals: See Speech Language Pathology Goals on Care Plan in [facility] electronic health record.    Therapy Frequency:daily   Predicted Duration of Therapy Intervention: 05/04/23  ________________________________________________________________________________    I CERTIFY THE NEED FOR THESE SERVICES FURNISHED UNDER        THIS PLAN OF TREATMENT AND WHILE UNDER MY CARE     (Physician co-signature of this document indicates review and certification of the therapy plan).                Certification date from: 04/21/23 Certification date to: 07/20/23    Referring Physician: Kimmie Bragg PA-C           Initial Assessment        See Speech Language Pathology  documentation in Epic electronic health record, evaluation dated  04/21/23

## 2023-04-21 NOTE — PLAN OF CARE
Goal Outcome Evaluation:                      Comments: -diagnostic tests and consults completed and resulted --yes  -vital signs normal or at patient baseline --yes  -tolerating oral intake to maintain hydration --no  -returns to baseline functional status --no  -safe disposition plan has been identified --no

## 2023-04-21 NOTE — PROGRESS NOTES
[facility]    Medicine Progress Note - Hospitalist Service    Date of Admission:  4/19/2023    Assessment & Plan   Jose Francisco Gonzalez is a 82 year old male admitted on 4/19/2023 for weakness and general deconditioning, admitted for further evaluation and treatment.     Generalized weakness with weight loss   Physical deconditioning:  Multifactorial in the setting of tooth extraction with denture placement and subsequent issues with dental implant infection and proper fitting dentures leading to increased gag reflex and general poor PO intake with mechanical soft diet. Reports taste is affected by the dentures. Also reports intermittent dysphagia with taking pills. Pt also on a fluid restriction of 1.2 L for months for hyponatremia (follows with Nephrology) which is likely contributing to further dehydration and weakness. Recent diagnosis of skin melanoma a few weeks ago. Uncontrolled anxiety and depression as well playing a role.   - Treat separate issues as below   - Maintained on IVF since admission, discontinue 4/21. S/P 500 ml bolus 4/21 for + orthostatics   - SLP consulted for dysphagia   - Nutrition consulted   - PT consulted, recommend TCU. Pt and wife agreeable. Per wife, plan to transition to [facility] subsequent to TCU   - CC/SW consulted, appreciate discharge planning     Wt Readings from Last 10 Encounters:   04/19/23 63.5 kg (140 lb)   03/28/23 64.6 kg (142 lb 8 oz)   03/24/23 64.9 kg (143 lb)   01/19/23 66.7 kg (147 lb)   12/09/22 66.1 kg (145 lb 11.2 oz)   11/26/22 64.9 kg (143 lb)   11/21/22 67.2 kg (148 lb 3.2 oz)   10/06/22 69.2 kg (152 lb 9.6 oz)   09/12/22 68.3 kg (150 lb 9.6 oz)   10/22/21 74.8 kg (165 lb)     Melanoma, recent diagnosis: Had suspicious mole removed at [facility] by Dr. Kasper a few weeks ago with biopsy showing melanoma. Pt has plans for follow-up with Dermatology 5/2 for suture removal. Wife and patient are unclear about any further  recommendations. Alk phos WNL. Calcium WNL.   - Given generalized weakness, fatigue, weight loss (although explainable by alternative causes above) recommend CT C/A/P for malignancy work-up. Discussed with patient who seems overwhelmed by recommendation at this time and would like to think about it. Rediscuss 4/22 AM. Attempted to call wife, Divya, and update on recommendation, but went to voicemail.     Symptomatic orthostatic hypotension: Positive vitals 4/21 AM. S/P 500 ml bolus.   - Repeat orthostatics post bolus     Dysphagia: Wife reports intermittent dysphagia at home, worse with taking medications. No hx of aspiration pneumonia. No hx of esophageal issues including stricture. No recent EGD.   - SLP consulted  - Consider outpatient GI referral at discharge pending SLP evaluation for further investigation of dysphagia     Pseudohyponatremia, Sodium corrected for hyperglycemia is 130 on 4/21  Chronic hyponatremia: Baseline mid to high 120s. Follows with Nephrology. Has been on salt tabs and a 1.2 L fluid restriction for ~5 months.   * Clinically dehydrated on admission, thus hydrated with fluids as above  - Hold on reimplementing fluid restriction for now   - Hold on salt tabs, pt doesn't tolerate. Reports his Nephrologist told him to eat more salt       Anxiety, uncontrolled  Depression: Uncontrolled. Passive suicidal ideation. Progressive vision loss accompanied by appetite issues related to dentures are major stressors.   - Psychiatry consulted on admission. Remeron had recently been increased to 22.5 mg at bedtime 1 week prior to admission, plan to continue this dose. Atarax increased to 25 mg TID PRN  - Unfortunately hyponatremia has limited medication options per wife's report     Hyperglycemia    T2DM, insulin dependent, controlled: A1C 6.9 on 4/12/23. Maintained on Basaglar 5 U qam, Humalog 3 U TID AC and Farxiga 5 mg po every day. Issues with taking due to progressive vision loss and poor oral intake  increasing risk for hypoglycemia. Recent clinic visit 4/13/23 reviewed, discussion initiated regarding possible insulin patch, Cequr, but has not been implemented into patient's treatment plan.   - Restart Lantus 5 U qam on 4/21  - Medium sliding scale insulin   - Hold Farxiga and scheduled mealtime insulin. Can always add carb ratio 4/22 if appetite improves   - Monitor for hypoglycemia     Recent Labs   Lab 04/21/23  1218 04/21/23  1052 04/21/23  0905 04/20/23  2140 04/20/23  1808 04/20/23  1448   * 303* 165* 270* 172* 225*     Leukocytosis: 11.2 on 4/21. Reports chronic cough. Afebrile. UA unremarkable.   - Monitor clinically, no indication for antibiotics at this time     Tachycardia: Noted throughout day 4/21. Pt asymptomatic. S/P 500 ml bolus with mild improvement. Did receive Toprol XL in the AM. No reported SOB. Significant anxiety which might be contributing.    - EKG to ensure sinus tachy  - Continue to monitor clinically     Chronic normocytic anemia: Baseline 11-12 over the past year. No active signs of bleeding.   - Monitor     Recent Labs   Lab 04/21/23  1052 04/20/23  0722 04/19/23  1219   HGB 12.5* 11.8* 12.3*     HTN  HLD: Continue Toprol XL 50 mg po every day with hold parameters in place. Continue statin.     Macular degeneration: Progressively losing vision which is making him more depressed.        Diet: Mechanical/Dental Soft Diet  Room Service    DVT Prophylaxis: Pneumatic Compression Devices  Escobedo Catheter: Not present  Lines: None     Cardiac Monitoring: None  Code Status: Full Code      Clinically Significant Risk Factors Present on Admission         # Hyponatremia: Lowest Na = 125 mmol/L in last 2 days, will monitor as appropriate              # DMII: A1C = 6.9 % (Ref range: 0.0 - 5.6 %) within past 6 months           Disposition Plan      Expected Discharge Date: 04/22/2023                The patient's care was discussed with the Attending Physician, Dr. Cornell, Bedside Nurse,  Care Coordinator/, Patient and Patient's Family.    Kimmie Bragg PA-C  Hospitalist Service  [facility]  Securely message with [employer] (more info)  Text page via [facility] Paging/Directory   ______________________________________________________________________    Interval History   Ongoing anxiety, received Atarax this AM with some relief. Reports ongoing frustration with dental issues and associated poor PO intake, fluid restriction related to hyponatremia, weight loss and progression of macular degeneration that are all contributing to his current status.     Reviewed plan of care with patient and later re-visited and updated pt's wife, Divya, in person. She relayed recent diagnosis of skin melanoma.     + orthostatics this AM     Physical Exam   Vital Signs: Temp: 97.7  F (36.5  C) Temp src: Oral BP: 129/79 Pulse: 112   Resp: 18 SpO2: 97 % O2 Device: None (Room air)    Weight: 140 lbs 0 oz    CONSTITUTIONAL: Pt laying in bed, dressed in hospital garb. Appears comfortable. Cooperative with interview.   HEENT: Normocephalic, atraumatic. Dentures in place, upper dentures removed, implants noted. No erythema or redness of the gums. No tenderness to palpation. No appreciable abscess.   CARDIOVASCULAR: RRR, no murmurs, rubs, or extra heart sounds appreciated. Pulses +2/4 and regular in upper and lower extremities, bilaterally.   RESPIRATORY: No increased work of breathing. CTA, bilat; no wheezes, rales, or rhonchi appreciated.  GASTROINTESTINAL:  Abdomen soft, non-distended. BS auscultated in all four quadrants. Negative for tenderness to palpation.  No masses or organomegaly noted.  MUSCULOSKELETAL: No gross deformities noted. Normal muscle tone.   HEMATOLOGIC/LYMPHATIC/IMMUNOLOGIC: Negative for lower extremity edema, bilaterally.  NEUROLOGIC: Alert and oriented to person, place, and time, forgetful. No focal neuro deficits.   SKIN: Warm, dry, intact.   PSYCH:  Anxious    Medical Decision Making       60 MINUTES SPENT BY ME on the date of service doing chart review, history, exam, documentation & further activities per the note.      Data     I have personally reviewed the following data over the past 24 hrs:    11.2 (H)  \   12.5 (L)   / 297     125 (L) 91 (L) 11.4 /  298 (H)   3.9 23 0.59 (L) \       Imaging results reviewed over the past 24 hrs:   No results found for this or any previous visit (from the past 24 hour(s)).

## 2023-04-21 NOTE — CONSULTS
Care Management Initial Consult    General Information  Assessment completed with: Patient, Spouse or significant other, Spouse Divya  Type of CM/SW Visit: Offer D/C Planning    Primary Care Provider verified and updated as needed: Yes   Readmission within the last 30 days: no previous admission in last 30 days      Reason for Consult: discharge planning  Advance Care Planning: Advance Care Planning Reviewed: present on chart          Communication Assessment  Patient's communication style: spoken language (English or Bilingual)    Hearing Difficulty or Deaf: no        Cognitive  Cognitive/Neuro/Behavioral: WDL                      Living Environment:   People in home: spouse     Current living Arrangements: house      Able to return to prior arrangements: no  Living Arrangement Comments: Upstairs bedrooms and bath    Family/Social Support:  Care provided by: self, spouse/significant other  Provides care for: no one  Marital Status:   Wife, Children  Divya       Description of Support System: Involved    Support Assessment: Caregiver experiencing high stress, Caregiver difficulty providing physical care/safety, Caregiver difficulty providing emotional support, Minimal outside structure and leisure time activities    Current Resources:   Patient receiving home care services: No     Community Resources: None  Equipment currently used at home: cane, straight  Supplies currently used at home: Diabetic Supplies    Employment/Financial:  Employment Status: retired     Employment/ Comments: Programming for the [employer]  Financial Concerns: No concerns identified           Lifestyle & Psychosocial Needs:  Social Determinants of Health     Tobacco Use: Medium Risk (4/4/2023)    Patient History      Smoking Tobacco Use: Former      Smokeless Tobacco Use: Never      Passive Exposure: Not on file   Alcohol Use: Not At Risk (11/28/2022)    AUDIT-C      Frequency of Alcohol Consumption: Monthly or  less      Average Number of Drinks: 1 or 2      Frequency of Binge Drinking: Never   Financial Resource Strain: Low Risk  (11/28/2022)    Overall Financial Resource Strain (CARDIA)      Difficulty of Paying Living Expenses: Not hard at all   Food Insecurity: No Food Insecurity (11/28/2022)    Hunger Vital Sign      Worried About Running Out of Food in the Last Year: Never true      Ran Out of Food in the Last Year: Never true   Transportation Needs: No Transportation Needs (11/28/2022)    PRAPARE - Transportation      Lack of Transportation (Medical): No      Lack of Transportation (Non-Medical): No   Physical Activity: Inactive (11/28/2022)    Exercise Vital Sign      Days of Exercise per Week: 0 days      Minutes of Exercise per Session: 0 min   Stress: Stress Concern Present (11/28/2022)    [employer] [place] [employer] - Occupational Stress Questionnaire      Feeling of Stress : Rather much   Social Connections: Unknown (11/28/2022)    Social Connection and Isolation Panel [NHANES]      Frequency of Communication with Friends and Family: More than three times a week      Frequency of Social Gatherings with Friends and Family: Twice a week      Attends Judaism Services: Patient refused      Active Member of Clubs or Organizations: Yes      Attends Club or Organization Meetings: Not on file      Marital Status:    Intimate Partner Violence: Not on file   Depression: At risk (4/4/2023)    PHQ-2      PHQ-2 Score: 5   Housing Stability: Low Risk  (11/28/2022)    Housing Stability Vital Sign      Unable to Pay for Housing in the Last Year: No      Number of Places Lived in the Last Year: 2      Unstable Housing in the Last Year: No       Functional Status:  Prior to admission patient needed assistance:   Dependent ADLs:: Ambulation-cane, Bathing, Incontinence (using a cane the past month)  Dependent IADLs:: Cleaning, Cooking, Laundry, Shopping, Meal Preparation, Medication Management, Money  Management, Transportation, Incontinence       Mental Health Status:  Mental Health Status: Current Concern  Mental Health Management: Medication, Psychiatrist    Chemical Dependency Status:  Chemical Dependency Status: No Current Concerns             Values/Beliefs:  Spiritual, Cultural Beliefs, Confucianism Practices, Values that affect care:    NA             Additional Information:  Care Coordinator met with patient this AM concerning Observation status.  He seemed very anxious and when writer tried to help him, he was very nonspecific as to what he was anxious about.  Writer called his Spouse Divya and explained role of Care Management and also explained Observation status.  Divya shared she has been having great difficulty caring for patient.  His issues started last October with more depression and problems with hyponatremia.  He then had problems with an infected dental implant and extractions ending up with a temporary top denture causing difficulty eating and then gagging when swallowing.  There are plans for him to eventually get top implants and then snap-in dentures.  He has lost 30 pounds over the last year per Divya's report.   Patient is an insulin dependent diabetic and had always been good about managing this.  He is having more difficulty with injections now.  They have been in contact with a  Diabetic Educator concerning management with his poor PO.   Patient is seeing outpatient Psychiatry, Amrita Murphy CNP,  Divya reports they have not wanted to prescribe an antidepressant due to issues with his sodium.  They live in a two level home with upstairs bedrooms and bathroom.  He has been having some urinary incontinence.  Divya feels this may be due to his poor sleep with often being awake all night and then having increased sleepiness during the day.  When she helps him he never voices appreciation and has difficulty making decisions.  He used to enjoy the theatre, loved sports and loved to read.   He has macular degeneration so is not able to read anymore.    Divya reports he has had more pain since his biopsy taken on a large mole on his back.  She reports she was told it is malignant melanoma.  Unable to find any results to confirm this in the Epic system.  In looking at notes from [facility] with patient's spouse, there may be a home care agency involved.  Current focus will be for TCU placement, as patient is deconditioned and his spouse reports she is worn out.  Divya is eight years younger than patient.  They have talked to [facility].  It is not known if patient is actually on a wait list.    Divya asked if we could send TCU referrals to [facility]. [facility] due to the location to their home.  Writer has encouraged her to go onto the Medicare web site to look over facility ratings.  SW is aware.  Patient received an IV fluid bolus due to hypotension.  Patient will need BC/BS auth for a TCU.    Care Management will continue to follow for safe discharge planning.    Leatha Rebolledo, RN   Inpatient Care Management  [phone]

## 2023-04-21 NOTE — PROGRESS NOTES
lilibeth: admitted 4/20 with generalized weakness, dehydration, and depressive episodes  DATE & TIME: 4/20/2023 - [id number]  Cognitive Concerns/ Orientation : A&Ox4  BEHAVIOR & AGGRESSION TOOL COLOR: Green  VS/O2: VSS on RA ex HTN at times - WNL this shift  MOBILITY: SBA  PAIN MANAGMENT:He requested Tylenol for pain.  DIET: mech/dental soft - Nutrition consult placed  BOWEL/BLADDER: PrimoFit in place; continent of bowel per ED note -- has not had BM this shift  ABNL LAB/BG: Qp=053  DRAIN/DEVICES: PrimoFit (see above)  TELEMETRY RHYTHM: n/a - not on tele  SKIN: skin tear on upper right arm with foam dressing applied. Two sites with foam dressing from melanoma spot removal on 4/19; no blanchable redness, no scabbing, no bruising  TESTS/PROCEDURES: none  D/C DAY/GOALS/PLACE: TBD  OTHER IMPORTANT INFO: on observation for possible post-discharge higher level of care; has poor vision due to macular degeneration. PT and CM/SW following.  PT pulled the IV line and was replaced

## 2023-04-22 NOTE — PLAN OF CARE
Goal Outcome Evaluation:                      Comments: -diagnostic tests and consults completed and resulted -yes  -vital signs normal or at patient baseline --yes  -tolerating oral intake to maintain hydration --no  -returns to baseline functional status --no  -safe disposition plan has been identified -no

## 2023-04-22 NOTE — PROVIDER NOTIFICATION
"Brief update:    Patient agitated, refusing medications, delirious.  He is uncertain as to why he is wearing dentures, wants to leave to \"go downtown.\"    Note a history of anxiety with psychiatry following.    Per nursing, had been spitting out pills earlier    5 mg IM Zyprexa x1 ordered    Tom Dumont MD  1:34 AM    "

## 2023-04-22 NOTE — PLAN OF CARE
Goal Outcome Evaluation:                      Summary: Admitted 4/19/21 with generalized weakness, dehydration, and depressive episodes  DATE & TIME: 4/21/23 pm shift  Cognitive Concerns/ Orientation : A & O x 4. Forgetful.   BEHAVIOR & AGGRESSION TOOL COLOR: Green  VS/O2: VSS except tachy.  On RA.  MOBILITY: SBA with belt and walker.  PAIN MANAGMENT: Denied.   DIET: Pureed, mildly thick liquid diet  BOWEL/BLADDER: Incontinent. External catheter in place. No bm.  ABNL LAB/BG: Sodium 125. /208. Cr 0.59, WBC 11.2  DRAIN/DEVICES: PIV SL  TELEMETRY RHYTHM: N/A  SKIN: R skin tear on lateral side of elbow with foam dressing changed. Foam on Left upper and lower back incision with sutures( melanoma spot removal) changed.  TESTS/PROCEDURES: Possible video swallow Monday.   D/C DAY/GOALS/PLACE: Discharge to TCU pending.   OTHER IMPORTANT INFO:  PT, SP, and CM following.

## 2023-04-22 NOTE — PLAN OF CARE
Goal Outcome Evaluation:        MD Notification    Notified Person: MD    Notified Person Name: Dr. Dumont    Notification Date/Time: 4/22/2023    Notification Interaction: paged    Purpose of Notification: agitation request PRN    Orders Received: yes    Comments:

## 2023-04-22 NOTE — PLAN OF CARE
Goal Outcome Evaluation:         DATE & TIME: 4/22/2023   Cognitive Concerns/ Orientation : disoriented X3  BEHAVIOR & AGGRESSION TOOL COLOR: yellow  CIWA SCORE: NA  ABNL VS/O2: RA  MOBILITY: up with walker independently  PAIN MANAGMENT: denies pain tylenol provided for comfort  DIET: DD evaluation for speech pending  BOWEL/BLADDER: to BR  ABNL LAB/BG: BS monitor  DRAIN/DEVICES: removed IV access  TELEMETRY RHYTHM: NA  SKIN: skin tears  TESTS/PROCEDURES: BS  D/C DATE: TBD  Discharge Barriers: order pending  OTHER IMPORTANT INFO: Patient alert to self, agitated with cares, at time decide to go to [facility] [place] this morning, staff have to redirect, client ripped IV access, set the bed alarms many times, difficult to take oral meds, PRN Zyprexa order received for IM injection, not given yet for possible discharge this morning, up independently to BR plan to go home pending.

## 2023-04-22 NOTE — PLAN OF CARE
Summary: Admitted 4/19/21 with generalized weakness, dehydration, and depressive episodes  DATE & TIME: 4/22/23 Day shift  Cognitive Concerns/ Orientation : A & O x 4- forgetful; does get sundowners at evening-  MD aware and started Seroquel PRN  BEHAVIOR & AGGRESSION TOOL COLOR: Green  VS/O2: VSS except tachy; on RA.  MOBILITY: SBA with belt and walker; up to chair most of shift  PAIN MANAGMENT: Denied.   DIET: Pureed, mildly thick liquid diet- good appetite; needs encouragement  BOWEL/BLADDER: Incontinent; No BM  ABNL LAB/BG: Sodium 125--> 127. /298;  DRAIN/DEVICES: PIV SL  TELEMETRY RHYTHM: N/A  SKIN: R skin tear on lateral side of elbow with foam dressing changed. Foam on Left upper and lower back incision with sutures( melanoma spot removal) changed this shift  TESTS/PROCEDURES: Needs video swallow Monday.   D/C DAY/GOALS/PLACE: Discharge to TCU pending; SW following  OTHER IMPORTANT INFO:  PT, SP, and CM following; Ortho BPs done and negative

## 2023-04-23 NOTE — PLAN OF CARE
Goal Outcome Evaluation:    Summary: Admitted 4/19/21 with generalized weakness, dehydration, and depressive episodes  DATE & TIME: 4/22/23 mids  Cognitive Concerns/ Orientation :A/O 2-3 disoriented to time/situation at times - predominately disoriented to time only; sundowns-   Seroquel PRN available for s/s  BEHAVIOR & AGGRESSION TOOL COLOR: Green  VS/O2: VSS RA.  MOBILITY: SBA with belt and walker; up to chair  - ambulated in hallway with writer  PAIN MANAGMENT: Denied.   DIET: Pureed, mildly thick liquid diet- good appetite; needs encouragement to consume thickened fluids  BOWEL/BLADDER: continent this shift with hesitancy; No BM  ABNL LAB/BG: Sodium 127 trending up. /252;    DRAIN/DEVICES: *New PIV SL  TELEMETRY RHYTHM: N/A  SKIN: R skin tear on lateral side of elbow with foam dressing CDI. New Optifoam dressing placed over melanoma site removal   TESTS/PROCEDURES: video swallow to be performed on Monday  D/C DAY/GOALS/PLACE: Discharge to TCU pending; SW following  OTHER IMPORTANT INFO:  PT, SP, and CM following

## 2023-04-23 NOTE — PROGRESS NOTES
[facility]    Medicine Progress Note - Hospitalist Service        Date of Admission:  4/19/2023 11:44 AM    Assessment & Plan:   Jose Francisco Gonzalez is a 82 year old male admitted on 4/19/2023 for weakness and general deconditioning, admitted for further evaluation and treatment.     Generalized weakness with weight loss   Physical deconditioning  -Multifactorial in the setting of tooth extraction with denture placement and subsequent issues with dental implant infection and proper fitting dentures leading to increased gag reflex and general poor PO intake with mechanical soft diet.Also reports intermittent dysphagia with taking pills. Pt also on a fluid restriction of 1.2 L for months for hyponatremia (follows with Nephrology) which is likely contributing to further dehydration and weakness. Recent diagnosis of skin melanoma a few weeks ago. Uncontrolled anxiety and depression as well playing a role.   -Treat separate issues as below   -SLP following for dysphagia   -Nutrition consulted   -PT consulted, recommend TCU. Pt and wife agreeable. Per wife, plan to transition to [facility] subsequent to TCU     Acute delirium  -Patient agitated the previous night on 4/21-4/22.  -Seroquel 12.5 mg as needed twice a day although he has been pretty consistent.    Melanoma, recent diagnosis: Had suspicious mole removed at [facility] by Dr. Kasper a few weeks ago with biopsy showing melanoma. Pt has plans for follow-up with Dermatology 5/2 for suture removal. Wife and patient are unclear about any further recommendations. Alk phos WNL. Calcium WNL.   - Given generalized weakness, fatigue, weight loss (although explainable by alternative causes above) recommend CT C/A/P for malignancy work-up. Discussed with patient who seems overwhelmed by recommendation at this time and would like to think about it. Rylan 4/22 AM. Attempted to call wife, Divya, and update on recommendation, but went to voicemail.       Symptomatic orthostatic hypotension  -Positive vitals 4/21 AM. S/P 500 ml bolus.   -Blood pressure appears stable.  Patient denies lightheadedness or dizziness  -Monitor clinically     Dysphagia   -Wife reports intermittent dysphagia at home, worse with taking medications. No hx of aspiration pneumonia. No hx of esophageal issues including stricture. No recent EGD.   -SLP following, currently on puréed diet with mildly thick liquids.    -Patient appears very frustrated with his current dysphagia situation and the diet he is placed on.  -Plan is to proceed with video swallow evaluation tomorrow  -If there is concerns for significant dysphagia limiting the choice of food that he can take will need further discussion with him and his wife regarding goals of care.     Chronic hyponatremia: Baseline mid to high 120s. Follows with Nephrology. Has been on salt tabs and a 1.2 L fluid restriction for ~5 months.   * Clinically dehydrated on admission, thus hydrated with fluids as above  -Sodium stable at 127 on 4/22, will repeat BMP in the morning      Anxiety, uncontrolled  Depression  -Uncontrolled. Passive suicidal ideation. Progressive vision loss accompanied by appetite issues related to dentures are major stressors.   -Psychiatry consulted on admission. Remeron had recently been increased to 22.5 mg at bedtime 1 week prior to admission, plan to continue this dose. Atarax increased to 25 mg TID PRN  -Unfortunately hyponatremia has limited medication options per wife's report   -He has been much calmer in the last 24 hours, utilize as needed Seroquel as needed.     Hyperglycemia    T2DM, insulin dependent, controlled: A1C 6.9 on 4/12/23. Maintained on Basaglar 5 U qam, Humalog 3 U TID AC and Farxiga 5 mg po every day. Issues with taking due to progressive vision loss and poor oral intake increasing risk for hypoglycemia. Recent clinic visit 4/13/23 reviewed, discussion initiated regarding possible insulin patch,  Cequr, but has not been implemented into patient's treatment plan.   -Continue Lantus 5 units daily  -Medium sliding scale insulin   -Hold Farxiga   -scheduled mealtime insulin.   -Monitor for hypoglycemia     Leukocytosis  -11.2 on 4/21. Reports chronic cough. Afebrile. UA unremarkable.    Monitor clinically, no indication for antibiotics at this time      Tachycardia  -Noted throughout day 4/21. Pt asymptomatic. S/P 500 ml bolus with mild improvement.      Chronic normocytic anemia  -Baseline 11-12 over the past year. No active signs of bleeding.   -Monitor     HTN  HLD  -Continue Toprol XL 50 mg po every day      Macular degeneration  -Progressively losing vision which is making him more depressed.        Diet: Room Service  Combination Diet Pureed Diet (level 4); Mildly Thick (level 2)     DVT Prophylaxis: Pneumatic Compression Devices   Escobedo Catheter: Not present  Code Status: Full Code     Disposition Plan      Expected Discharge Date: 04/25/2023      Destination: inpatient rehabilitation facility  Discharge Comments: Plan for video swallow eval on Monday; Anxious. IM Zyprexa overnight. Will need TCU placement, SW and CC to follow.      Entered: Osmin Basilio MD 04/23/2023, 9:27 AM        Clinically Significant Risk Factors Present on Admission         # Hyponatremia: Lowest Na = 125 mmol/L in last 2 days, will monitor as appropriate              # DMII: A1C = 6.9 % (Ref range: 0.0 - 5.6 %) within past 6 months              The patient's care was discussed with the Bedside Nurse and Patient.    Medical Decision Making       **CLEAR ALL SELECTIONS**        Labs/Imaging Reviewed:  See Information above and Data section below    Time SPENT BY ME on the date of service doing chart review, history, exam, documentation & further activities per the note:  35 MINUTES    Osmin Basilio MD  Hospitalist Service  [facility]  Text Page 7AM-6PM  Securely message with the Vocera Web Console  "(learn more here)  Text page via [facility] Paging/Directory    ______________________________________________________________________    Interval History   No further issues with agitation.  Patient however is frustrated by the type of touch of the food that he has been recommended.  I had a long discussion with him and the reasoning behind that.  I did also advise him that we need to have further discussion if dysphagia continues to be an issue regarding goals of care and the type of food we recommend to him.    Data reviewed today: I reviewed all medications, new labs and imaging results over the last 24 hours. I personally reviewed no images or EKG's today.    Physical Exam   Vital signs:  Temp: 97.7  F (36.5  C) Temp src: Oral BP: 110/67 Pulse: 76   Resp: 18 SpO2: 93 % O2 Device: None (Room air)   Height: 180 cm (5' 10.87\") Weight: 63.5 kg (140 lb)  Estimated body mass index is 19.6 kg/m  as calculated from the following:    Height as of this encounter: 1.8 m (5' 10.87\").    Weight as of this encounter: 63.5 kg (140 lb).      Wt Readings from Last 2 Encounters:   04/19/23 63.5 kg (140 lb)   03/28/23 64.6 kg (142 lb 8 oz)       Gen: AAOX3, NAD, somewhat forgetful, comfortable, calm  Resp: CTA B/L, normal WOB  CVS: RRR, no murmur  Abd/GI: Soft, non-tender. BS- normoactive.    Skin: Warm, dry no rashes  MSK:  no pedal edema  Neuro- CN- intact. No focal deficits.      Data   Recent Labs   Lab 04/23/23  0906 04/23/23  0746 04/23/23  0140 04/22/23  2125 04/22/23  0808 04/22/23  0753 04/21/23  1218 04/21/23  1052 04/20/23  0804 04/20/23  0722 04/19/23 2317 04/19/23  1219   WBC  --   --   --   --   --  12.1*  --  11.2*  --  10.5  --  6.0   HGB  --   --   --   --   --  12.2*  --  12.5*  --  11.8*  --  12.3*   MCV  --   --   --   --   --  88  --  90  --  90  --  89   PLT  --   --   --   --   --  287  --  297  --  240  --  275   NA  --   --   --   --   --  127*  --  125*  --  130*  --  129*   POTASSIUM  --  4.1  --   --   --  " 3.9  --  3.9  --  4.1  --  4.4   CHLORIDE  --   --   --   --   --  92*  --  91*  --  96*  --  94*   CO2  --   --   --   --   --  27  --  23  --  24  --  24   BUN  --   --   --   --   --  13.0  --  11.4  --  13.6  --  23.1*   CR  --   --   --   --   --  0.55*  --  0.59*  --  0.56*  --  0.67   ANIONGAP  --   --   --   --   --  8  --  11  --  10  --  11   DENISE  --   --   --   --   --  9.1  --  8.8  --  8.9  --  9.0   *  --  178* 179*   < > 200*   < > 303*   < > 137*   < > 142*   ALBUMIN  --   --   --   --   --   --   --   --   --   --   --  3.8   PROTTOTAL  --   --   --   --   --   --   --   --   --   --   --  7.1   BILITOTAL  --   --   --   --   --   --   --   --   --   --   --  0.6   ALKPHOS  --   --   --   --   --   --   --   --   --   --   --  73   ALT  --   --   --   --   --   --   --   --   --   --   --  11   AST  --   --   --   --   --   --   --   --   --   --   --  25    < > = values in this interval not displayed.       No results found for this or any previous visit (from the past 24 hour(s)).  Medications       atorvastatin  20 mg Oral Daily     insulin aspart  1-3 Units Subcutaneous TID AC     insulin aspart  1-3 Units Subcutaneous At Bedtime     insulin glargine  5 Units Subcutaneous QAM AC     metoprolol succinate ER  50 mg Oral Daily     mirtazapine  22.5 mg Oral At Bedtime     sodium chloride (PF)  3 mL Intracatheter Q8H

## 2023-04-23 NOTE — UTILIZATION REVIEW
Concurrent stay review; Secondary Review Determination - [facility]        Under the authority of the Utilization Management Committee, the utilization review process indicated a secondary review on the above patient.  The review outcome is based on review of the medical records, discussions with staff, and applying clinical experience noted on the date of the review.        (x) Observation/outpatient Status Appropriate - Concurrent stay review       RATIONALE FOR DETERMINATION:   82-year-old male was admitted on 4/19/2023 for weakness and general deconditioning, likely caused by multiple factors, including tooth extraction and denture placement, poor oral intake, fluid restriction, recent melanoma diagnosis, and uncontrolled anxiety and depression.   Most of the acute issues were addressed during observation, no clear indication for prolonged inpatient stay    Patient delayed discharge is related to disposition, there is no medical necessity for inpatient admission at the time of this review. If there is a change in patient status, please resend for review.    The information on this document is developed by the utilization review team in order for the business office to ensure compliance.  This only denotes the appropriateness of proper admission status and does not reflect the quality of care rendered.       The definitions of Inpatient Status and Observation Status used in making the determination above are those provided in the CMS Coverage Manual, Chapter 1 and Chapter 6, section 70.4.       Sincerely,    Nico Campos MD

## 2023-04-23 NOTE — PLAN OF CARE
Pt here with generalized weakness and dehydration. A&O X 2, disoriented to time. Vital signs stable on RA. Pureed diet, mildly thick liquids. Takes pills crushed with applesauce. Up with SBA and GB/W. Denies pain. Pt anxious after getting into chair, administered seroquel for agitation and anxiety. Intervention effective. Wife at bedside to visit. Swallow study scheduled for tomorrow. Discharge pending.

## 2023-04-23 NOTE — PLAN OF CARE
Goal Outcome Evaluation:    Summary: Admitted 4/19/21 with generalized weakness, dehydration, and depressive episodes  DATE & TIME: 4/22/23 5870-0408  Cognitive Concerns/ Orientation :A/O 2-3 disoriented to time/situation at times - predominately disoriented to time only; sundowns-   Seroquel PRN available for s/s, did not require this shift  BEHAVIOR & AGGRESSION TOOL COLOR: Green  VS/O2: VSS RA.  MOBILITY: SBA with gaitbelt and walker  PAIN MANAGMENT: Denied.   DIET: Pureed, mildly thick liquid diet- good appetite; needs encouragement to consume thickened fluids  BOWEL/BLADDER: continent this shift with hesitancy; No BM   ABNL LAB/BG: Sodium 127 trending up.  @ 2AM    DRAIN/DEVICES: PIV SL  TELEMETRY RHYTHM: N/A  SKIN: R skin tear on lateral side of elbow with foam dressing CDI. New Optifoam dressing placed over melanoma site removal   TESTS/PROCEDURES: video swallow to be performed on Monday  D/C DAY/GOALS/PLACE: Discharge to TCU pending; SW following  OTHER IMPORTANT INFO:  PT, SP, and CM following

## 2023-04-24 NOTE — PROGRESS NOTES
[facility]    Medicine Progress Note - Hospitalist Service        Date of Admission:  4/19/2023 11:44 AM    Assessment & Plan:   Jose Francisco Gonzalez is a 82 year old male admitted on 4/19/2023 for weakness and general deconditioning, admitted for further evaluation and treatment.     Generalized weakness with weight loss   Physical deconditioning  -Multifactorial in the setting of tooth extraction with denture placement and subsequent issues with dental implant infection and proper fitting dentures leading to increased gag reflex and general poor PO intake with mechanical soft diet.Also reports intermittent dysphagia with taking pills. Pt also on a fluid restriction of 1.2 L for months for hyponatremia (follows with Nephrology) which is likely contributing to further dehydration and weakness. Recent diagnosis of skin melanoma a few weeks ago. Uncontrolled anxiety and depression as well playing a role.   -Treat separate issues as below   -SLP following for dysphagia   -Nutrition consulted   -PT consulted, recommend TCU. Pt and wife agreeable. Per wife, plan to transition to [facility] subsequent to TCU     Acute delirium  -Patient agitated the night on 4/21-4/22.  -Seroquel 12.5 mg as needed twice a day although he has been pretty calm.    Melanoma, recent diagnosis  -Had suspicious mole removed at [facility] by Dr. Kasper a few weeks ago with biopsy showing melanoma. Pt has plans for follow-up with Dermatology 5/2 for suture removal. Wife and patient are unclear about any further recommendations. Alk phos WNL. Calcium WNL.   -Follow-up outpatient.     Symptomatic orthostatic hypotension  -Positive vitals 4/21 AM. S/P 500 ml bolus.   -Blood pressure appears stable.  Patient denies lightheadedness or dizziness  -Monitor clinically     Dysphagia   -Wife reports intermittent dysphagia at home, worse with taking medications. No hx of aspiration pneumonia. No hx of esophageal issues including  stricture. No recent EGD.   -SLP following, currently on puréed diet with mildly thick liquids.    -Patient appears very frustrated with his current dysphagia situation and the diet he is placed on.  -Plan is to proceed with video swallow evaluation today  -pending video swallow finding plan is to discuss about choice of food moving forward and goals of care.     Chronic hyponatremia: Baseline mid to high 120s. Follows with Nephrology. Has been on salt tabs and a 1.2 L fluid restriction for ~5 months.   * Clinically dehydrated on admission, thus hydrated with fluids as above  -Sodium stable at 127 on 4/22, awaiting recheck from this morning      Anxiety, uncontrolled  Depression  -Uncontrolled. Passive suicidal ideation. Progressive vision loss accompanied by appetite issues related to dentures are major stressors.   -Psychiatry consulted on admission. Remeron had recently been increased to 22.5 mg at bedtime 1 week prior to admission, plan to continue this dose. Atarax increased to 25 mg TID PRN  -Unfortunately hyponatremia has limited medication options per wife's report   -He has been much calmer in the last 24 hours, utilize as needed Seroquel as needed.     Hyperglycemia    T2DM, insulin dependent, controlled: A1C 6.9 on 4/12/23. Maintained on Basaglar 5 U qam, Humalog 3 U TID AC and Farxiga 5 mg po every day. Issues with taking due to progressive vision loss and poor oral intake increasing risk for hypoglycemia. Recent clinic visit 4/13/23 reviewed, discussion initiated regarding possible insulin patch, Cequr, but has not been implemented into patient's treatment plan.   -Continue Lantus 5 units daily  -Medium sliding scale insulin   -Hold Farxiga   -scheduled mealtime insulin.   -Monitor for hypoglycemia     Leukocytosis  -11.2 on 4/21. Reports chronic cough. Afebrile. UA unremarkable.    Monitor clinically, no indication for antibiotics at this time      Chronic normocytic anemia  -Baseline 11-12 over the  past year. No active signs of bleeding.   -Monitor     HTN  HLD  -Continue Toprol XL 50 mg po every day      Macular degeneration  -Progressively losing vision which is making him more depressed.        Diet: Room Service  Combination Diet Pureed Diet (level 4); Mildly Thick (level 2)     DVT Prophylaxis: Pneumatic Compression Devices   Escobedo Catheter: Not present  Code Status: Full Code     Disposition Plan       Expected Discharge Date: 04/26/2023      Destination: inpatient rehabilitation facility  Discharge Comments: Plan for video swallow eval on Monday; Anxious. Will need TCU placement, SW and CC to follow.      Entered: Osmin Basilio MD 04/24/2023, 10:25 AM        Clinically Significant Risk Factors Present on Admission                      # DMII: A1C = 6.9 % (Ref range: 0.0 - 5.6 %) within past 6 months              The patient's care was discussed with the Bedside Nurse and Patient.    Medical Decision Making       **CLEAR ALL SELECTIONS**        Labs/Imaging Reviewed:  See Information above and Data section below    Time SPENT BY ME on the date of service doing chart review, history, exam, documentation & further activities per the note:  35 MINUTES    Osmin Basilio MD  Hospitalist Service  [facility]  Text Page 7AM-6PM  Securely message with the Vocera Web Console (learn more here)  Text page via "[employer] Paging/Directory    ______________________________________________________________________    Interval History   Pretty calm from agitation standpoint.  Patient will be going for video swallow today.  He continues to be frustrated about the recommended texture and choice of food.    Data reviewed today: I reviewed all medications, new labs and imaging results over the last 24 hours. I personally reviewed no images or EKG's today.    Physical Exam   Vital signs:  Temp: 97.8  F (36.6  C) Temp src: Oral BP: 128/80 Pulse: 88   Resp: 18 SpO2: 94 % O2 Device: None (Room air)    "Height: 180 cm (5' 10.87\") Weight: 63.5 kg (140 lb)  Estimated body mass index is 19.6 kg/m  as calculated from the following:    Height as of this encounter: 1.8 m (5' 10.87\").    Weight as of this encounter: 63.5 kg (140 lb).      Wt Readings from Last 2 Encounters:   04/19/23 63.5 kg (140 lb)   03/28/23 64.6 kg (142 lb 8 oz)       Gen: AAOX3, NAD, somewhat forgetful, comfortable, calm  Resp: CTA B/L, normal WOB  CVS: RRR, no murmur  Abd/GI: Soft, non-tender. BS- normoactive.    Skin: Warm, dry no rashes  MSK:  no pedal edema  Neuro- CN- intact. No focal deficits.      Data   Recent Labs   Lab 04/24/23  0831 04/24/23  0824 04/24/23  0217 04/23/23  2053 04/23/23  0906 04/23/23  0746 04/22/23  0808 04/22/23  0753 04/21/23  1218 04/21/23  1052 04/20/23  0804 04/20/23  0722 04/19/23  2317 04/19/23  1219   WBC  --   --   --   --   --   --   --  12.1*  --  11.2*  --  10.5  --  6.0   HGB  --   --   --   --   --   --   --  12.2*  --  12.5*  --  11.8*  --  12.3*   MCV  --   --   --   --   --   --   --  88  --  90  --  90  --  89   PLT  --   --   --   --   --   --   --  287  --  297  --  240  --  275   NA  --   --   --   --   --   --   --  127*  --  125*  --  130*  --  129*   POTASSIUM  --  4.3  --   --   --  4.1  --  3.9  --  3.9  --  4.1  --  4.4   CHLORIDE  --   --   --   --   --   --   --  92*  --  91*  --  96*  --  94*   CO2  --   --   --   --   --   --   --  27  --  23  --  24  --  24   BUN  --   --   --   --   --   --   --  13.0  --  11.4  --  13.6  --  23.1*   CR  --   --   --   --   --   --   --  0.55*  --  0.59*  --  0.56*  --  0.67   ANIONGAP  --   --   --   --   --   --   --  8  --  11  --  10  --  11   DENISE  --   --   --   --   --   --   --  9.1  --  8.8  --  8.9  --  9.0   *  --  186* 272*   < >  --    < > 200*   < > 303*   < > 137*   < > 142*   ALBUMIN  --   --   --   --   --   --   --   --   --   --   --   --   --  3.8   PROTTOTAL  --   --   --   --   --   --   --   --   --   --   --   --   --  7.1 "   BILITOTAL  --   --   --   --   --   --   --   --   --   --   --   --   --  0.6   ALKPHOS  --   --   --   --   --   --   --   --   --   --   --   --   --  73   ALT  --   --   --   --   --   --   --   --   --   --   --   --   --  11   AST  --   --   --   --   --   --   --   --   --   --   --   --   --  25    < > = values in this interval not displayed.       No results found for this or any previous visit (from the past 24 hour(s)).  Medications       atorvastatin  20 mg Oral Daily     insulin aspart  1-3 Units Subcutaneous TID AC     insulin aspart  1-3 Units Subcutaneous At Bedtime     insulin glargine  5 Units Subcutaneous QAM AC     metoprolol succinate ER  50 mg Oral Daily     mirtazapine  22.5 mg Oral At Bedtime     sodium chloride (PF)  3 mL Intracatheter Q8H

## 2023-04-24 NOTE — PROGRESS NOTES
Video Fluoroscopic Swallow Study (VFSS)     04/24/23 1008   Appointment Info   Signing Clinician's Name / Credentials (SLP) Linda Joya MS CCC-SLP   General Information   Onset of Illness/Injury or Date of Surgery 04/19/23   Referring Physician Osmin Basilio MD   Pertinent History of Current Problem Jose Francisco Gonzalez is a 82 year old male admitted on 4/19/2023 for weakness and general deconditioning, admitted for further evaluation and treatment. SLP consulted, signs/sx dysphagia and aspiration on clinical swallow eval/treatment and therefore VFSS pursued.   General Observations Pt alert, upright in VFSS chair   General Swallowing Observations   Swallowing Evaluation Videofluoroscopic swallow study (VFSS)   VFSS Evaluation   Radiologist Dr. Zimmerman   Views Taken left lateral   Physical Location of Procedure [facility], Radiology Dept, Fluoroscopy Suite   VFSS Textures Trialed thin liquids;slightly thick liquids;mildly thick liquids;pureed;soft & bite-sized   VFSS Eval: Thin Liquid Texture Trial   Mode of Presentation, Thin Liquid cup;straw;self-fed   Order of Presentation 3, 4, 5, 9   Preparatory Phase poor bolus control   Oral Phase, Thin Liquid impaired AP movement;premature pharyngeal entry   Bolus Location When Swallow Triggered pyriforms   Pharyngeal Phase, Thin Liquid impaired hyolaryngeal excursion;impaired epiglottic movement;impaired tongue base retraction;residue in vallecula;residue in pyriform sinus;impaired pharyngoesophageal segment opening  (trace-mild residuals)   Rosenbek's Penetration Aspiration Scale: Thin Liquid Trial Results 5 - contrast contacts vocal cords, visible residue remains (penetration)   Strategies and Compensations supraglottic swallow strategy   Diagnostic Statement PAS 3 (pen above the cords with residue) occurring before/during the swallow 2/2 premature bolus spillage, delay in pharyngeal swallow. PAS 5 (pen to the cords with residue) occuring after  the swallow 2/2 spillage of pyriform sinus residuals into laryngeal vestibule. Coughs are strong, intermittent full clearance of glottic and supgraglottic residuals.   VFSS Eval: Slightly Thick Liquids   Mode of Presentation cup;self-fed   Order of Presentation 2, 8   Preparatory Phase poor bolus control   Oral Phase impaired AP movement;premature pharyngeal entry   Bolus Location When Swallow Triggered pyriforms   Pharyngeal Phase impaired hyolaryngel excursion;impaired epiglottic movement;impaired tongue base retraction;residue in vallecula;residue in pyriform sinus;impaired pharyngoesophageal segment opening  (trace-mild residuals)   Rosenbek's Penetration Aspiration Scale 2 - contrast enters airway, remains above the vocal cords, no residue remains (penetration)   Diagnostic Statement PAS 2 (flash penetration above the cords, no residue) before/during the swallow 2/2 premature bolus spillage, delay in pharyngeal swallow.   VFSS Eval: Mildly Thick Liquids   Mode of Presentation cup   Order of Presentation 1   Preparatory Phase poor bolus control   Oral Phase impaired AP movement;premature pharyngeal entry   Bolus Location When Swallow Triggered pyriforms   Pharyngeal Phase impaired hyolaryngel excursion;impaired epiglottic movement;impaired tongue base retraction;residue in vallecula;residue in pyriform sinus;impaired pharyngoesophageal segment opening  (mild residuals)   Rosenbek's Penetration Aspiration Scale 1 - no aspiration, contrast does not enter airway   VFSS Evaluation: Puree Solid Texture Trial   Mode of Presentation, Puree spoon   Order of Presentation 6   Preparatory Phase WFL   Oral Phase, Puree impaired AP movement   Bolus Location When Swallow Triggered posterior angle of ramus   Pharyngeal Phase, Puree impaired hyolaryngel excursion;impaired epiglottic movement;impaired tongue base retraction;residue in vallecula;residue in pyriform sinus;impaired pharyngoesophageal segment opening   Rosenbek's  Penetration Aspiration Scale: Puree Food Trial Results 1 - no aspiration, contrast does not enter airway   Diagnostic Statement Mild residuals - double swallows reducing but not clearing residue   VFSS Eval: Soft & Bite Sized   Mode of Presentation self-fed   Order of presentation 7   Preparatory Phase prolonged bolus preparation   Oral Phase impaired AP movement   Bolus Location When Swallow Triggered posterior angle of ramus   Pharyngeal Phase impaired hyolaryngel excursion;impaired epiglottic movement;impaired tongue base retraction;residue in vallecula;residue in pyriform sinus;impaired pharyngoesophageal segment opening   Rosenbek's Penetration Aspiration Scale 1 - no aspiration, contrast does not enter airway   Diagnostic Statement moderate vallecular and posterior pharyngeal wall residue - double swallows minimally reducing; liquid wash reducing to mild residue, but did not fully clear.   Swallowing Recommendations   Diet Consistency Recommendations minced & moist (level 5);slightly thick liquids (level 1)  (free water protocol)   Supervision Level for Intake close supervision needed   Mode of Delivery Recommendations bolus size, small;slow rate of intake   Swallowing Maneuver Recommendations alternate food and liquid intake;double dry swallow;extra swallow;supraglottic swallow   Recommended Feeding/Eating Techniques (Swallow Eval) maintain upright sitting position for eating;maintain upright posture during/after eating for 30 minutes;minimize distractions during oral intake;moisten oral mucosa prior to intake   Medication Administration Recommendations, Swallowing (SLP) can try whole with puree, crush if difficulty -- needs double swallow, liquid wash   General Therapy Interventions   Planned Therapy Interventions Dysphagia Treatment   Dysphagia treatment Oropharyngeal exercise training;Modified diet education;Instruction of safe swallow strategies;Compensatory strategies for swallowing   Clinical Impression    Criteria for Skilled Therapeutic Interventions Met (SLP Eval) Yes, treatment indicated   SLP Diagnosis Mild-moderate oropharyngeal dysphagia   Risks & Benefits of therapy have been explained evaluation/treatment results reviewed;care plan/treatment goals reviewed;risks/benefits reviewed;current/potential barriers reviewed;participants voiced agreement with care plan;participants included;patient   Clinical Impression Comments   Video fluoroscopic swallow study completed with thin liquids, slightly thick liquids, mildly thick liquids, puree solids, soft/bite sized solids. Mastication prolonged, effortful per pt report. Reduced oral control resulted in premature bolus spillage to the pyriform sinuses with all liquids; timely swallow at base of tongue with solids. Base of tongue retraction (BOTR) is moderately reduced, hyolaryngeal elevation/excursion is moderately reduced, epiglottic inversion is moderately reduced (initially incomplete, complete inversion as trials progressed and ?weight of bolus following solid intake), reduced pharyngeal constriction and reduced pharyngoesophageal segment (PES) opening during the swallow. ?protrusion at level C3-4 could be impacting epiglottic inversion at times.     Residuals: trace-mild (liquids) to moderate (soft/bite sized solids) vallecular and pyriform sinus residuals across consistencies, 2/2 the aforementioned deficits. Double swallows reducing residue minimally, liquid wash improving clearance of residue though did not fully clear.     Laryngeal penetration/aspiration:   - Thin liquids: PAS 3 (pen above the cords with residue) occurring before/during the swallow 2/2 premature bolus spillage, delay in pharyngeal swallow. PAS 5 (pen to the cords with residue) occuring after the swallow 2/2 spillage of pyriform sinus residuals into laryngeal vestibule. Coughs are strong, intermittent full clearance of glottic and supgraglottic residuals.   - Slightly thick liquids: PAS 2  (flash penetration above the cords, no residue) before/during the swallow 2/2 premature bolus spillage, delay in pharyngeal swallow.   - No penetration or aspiration with mildly thick or solids.    *PAS reference scale:        SLP Total Evaluation Time   Evaluation, videofluoroscopic eval of swallow function Minutes (51153) 15   SLP Goals   Therapy Frequency (SLP Eval) 5 times/wk   SLP Predicted Duration/Target Date for Goal Attainment 05/01/23   SLP Goals Swallow   SLP: Safely tolerate diet without signs/symptoms of aspiration Soft & bite sized diet;Thin liquids;With use of swallow precautions;With use of compensatory swallow strategies;With assistance/supervision   Swallowing Dysfunction &/or Oral Function for Feeding   Treatment of Swallowing Dysfunction &/or Oral Function for Feeding Minutes (60714) 34   Symptoms Noted During/After Treatment None   Treatment Detail/Skilled Intervention   Educated pt on results of VFSS following assessment in pt's room - pt appeared very overwhelmed with testing and disucssion re: results/options. Clinical trials of x3 bites of diced peaches (soft/bite sized), 2 oz slightly thick liquids, 2 oz thin liquids. Mastication prolonged and pt said incomplete, swallowing whole chunks of them. No overt signs/sx with slightly thick liquids, slight wet vocal quality/throat clear x1 with thin liquids. Discussed options and plan for the below. Printed handout re: strategies provided to pt for further reinforcement. Called pt's wife in pt's room with pt present and updated her on the results, current recommendations/options, strategies, etc. All questions answered.     SLP Discharge Planning   SLP Plan PO tolerance, strategy training, ADAT, FWP education   SLP Discharge Recommendation home with home care speech therapy   SLP Rationale for DC Rec below baseline oropharyngeal swallowing ability, requires a modified diet for safety at this time, cues for strategies   SLP Brief overview of current  status    Minced/moist solids, slightly thick liquids (IDDSI 5, 1) when fully upright, slow rate, swallow 2-3x per bite/sip, alternate between solids/liquids, cough and re-swallow PRN.     OK for free water protocol (thin water, ice chips, 30 minutes before of after meals, only after good oral hygeine of teeth/denture brushing and mouthwash).     Could consider full upgrades to thin liquids pending further discussion and swallow strategy/oral hygiene training given no known hx of PNAs.      Total Session Time   Total Session Time (sum of timed and untimed services) 49

## 2023-04-24 NOTE — PLAN OF CARE
Goal Outcome Evaluation:    Summary: Admitted 4/19/21 with generalized weakness, dehydration, and depressive episodes  DATE & TIME: 4/23/2330 0582-8332  Cognitive Concerns/ Orientation :A/O 2-3 disoriented to time/situation at times - predominately disoriented to time only; sundowns-Seroquel PRN available for s/s, did not require this shift  BEHAVIOR & AGGRESSION TOOL COLOR: Green  VS/O2: VSS RA.  MOBILITY: SBA with gaitbelt and walker  PAIN MANAGMENT: Denied.   DIET: Pureed, mildly thick liquid diet- good appetite; adequate fluid intake   BOWEL/BLADDER: continent this shift with hesitancy; No BM   ABNL LAB/BG: Sodium 127 trending up. BG = 186 @ 2AM   DRAIN/DEVICES: PIV SL  TELEMETRY RHYTHM: N/A  SKIN: R skin tear on lateral side of elbow with foam dressing Replaced yesterday CDI. TESTS/PROCEDURES: video swallow to be performed on Monday, today  D/C DAY/GOALS/PLACE: Discharge to TCU pending; SW following  OTHER IMPORTANT INFO:  PT, SP, and CM following

## 2023-04-24 NOTE — PROVIDER NOTIFICATION
Care Management Follow Up    Length of Stay (days): 0    Expected Discharge Date: 4/25/23 per Dr Basilio     Concerns to be Addressed: adjustment to diagnosis/illness, coping/stress, discharge planning, mental health  Family feel they cannot care for him due to overall weakness  Patient plan of care discussed at interdisciplinary rounds: Yes    Anticipated Discharge Disposition: Transitional Care     Anticipated Discharge Services: Transportation Services (Possibly)  Anticipated Discharge DME:      Patient/family educated on Medicare website which has current facility and service quality ratings:    Education Provided on the Discharge Plan: Yes  Patient/Family in Agreement with the Plan: yes    Referrals Placed by CM/SW: Post Acute Facilities  Private pay costs discussed: private room/amenity fees    Additional Information:  Met with wife and daughter Elke to update them of discharge for tomorrow and that [facility] can offer patient tomorrow.  They have additional TCU's they would like referrals made to before making a decision.  [facility] are [facility], [facility], [facility] and [facility]  Daughter also shares she is a Physicial Therapist [place] [facility] [place] is asking now that patient is requiring three therapies is he a candidate for ARU.  Explained referrals will be sent to the TCU's of their interest and explained PT is recommending TCU however writer can ask our ARU to review Epic and give an opinion regarding ARU vs TCU.  Dtr. Would like ARU liaison to weigh in on ARU vs TCU.  Wife is having increased difficulty in hearing due to tinuitis and asks writer to update daughter Elke tomorrow.  Transportation to a TCU discussed with Elke and she will transport patient to rehab.       GUS Abbasi

## 2023-04-24 NOTE — PROGRESS NOTES
"Hospitalist ezequiel messaged with this message\"FYI- , SS covered with 3U. thank you rajan ADAMS. \". Will continue to monitor patient. Rajan Pinto RN on 4/24/2023 at 1:04 PM    1526 pt's family at bedside. Updated family and answered questions on video swallow results. Rajan Pinto RN on 4/24/2023 at 3:27 PM    Goal Outcome Evaluation:     Summary: Admitted 4/19/21 with generalized weakness, dehydration, and depressive episodes  Cognitive Concerns/ Orientation :A/O 3 disoriented to time/situation at times - predominately disoriented to time only; sundowns-Seroquel PRN available for s/s, did not require this shift  BEHAVIOR & AGGRESSION TOOL COLOR: Green  VS/O2: VSS RA.  MOBILITY: SBA with gaitbelt and walker  PAIN MANAGMENT: Denied.   DIET: Pureed, and thin liquid protocol  BOWEL/BLADDER:  1  BM   ABNL LAB/BG: Sodium 127 trending up. BG = 186 @ 2AM   DRAIN/DEVICES: PIV SL  TELEMETRY RHYTHM: N/A  SKIN: R skin tear on lateral side of elbow with foam dressing Replaced yesterday CDI. TESTS/PROCEDURES: video swallow done.  D/C DAY/GOALS/PLACE: Discharge to TCU pending; SW following  OTHER IMPORTANT INFO:  PT, SP, and CM following Rajan Pinto RN on 4/24/2023 at 6:01 PM                      "

## 2023-04-24 NOTE — PLAN OF CARE
Goal Outcome Evaluation:    Summary: Admitted 4/19/21 with generalized weakness, dehydration, and depressive episodes  DATE & TIME: 4/23/2330 [id number]  Cognitive Concerns/ Orientation :A/O 2-3 disoriented to time/situation at times - predominately disoriented to time only; sundowns-   Seroquel PRN available for s/s, did not require this shift  BEHAVIOR & AGGRESSION TOOL COLOR: Green  VS/O2: VSS RA.  MOBILITY: SBA with gaitbelt and walker  PAIN MANAGMENT: Denied.   DIET: Pureed, mildly thick liquid diet- good appetite; adequate fluid intake this shift   BOWEL/BLADDER: continent this shift with hesitancy; No BM   ABNL LAB/BG: Sodium 127 trending up. /241@ 2AM    DRAIN/DEVICES: PIV SL  TELEMETRY RHYTHM: N/A  SKIN: R skin tear on lateral side of elbow with foam dressing Replaced CDI. TESTS/PROCEDURES: video swallow to be performed on Monday  D/C DAY/GOALS/PLACE: Discharge to TCU pending; SW following  OTHER IMPORTANT INFO:  PT, SP, and CM following

## 2023-04-25 NOTE — PROGRESS NOTES
[facility]    Medicine Progress Note - Hospitalist Service        Date of Admission:  4/19/2023 11:44 AM    Assessment & Plan:   Jose Francisco Gonzalez is a 82 year old male admitted on 4/19/2023 for weakness and general deconditioning, admitted for further evaluation and treatment.     Generalized weakness with weight loss   Physical deconditioning  -Multifactorial in the setting of tooth extraction with denture placement and subsequent issues with dental implant infection and proper fitting dentures leading to increased gag reflex and general poor PO intake with mechanical soft diet.Also reports intermittent dysphagia with taking pills. Pt also on a fluid restriction of 1.2 L for months for hyponatremia (follows with Nephrology) which is likely contributing to further dehydration and weakness. Recent diagnosis of skin melanoma a few weeks ago. Uncontrolled anxiety and depression as well playing a role.   -Treat separate issues as below   -SLP following for dysphagia   -Nutrition consulted   -Awaiting placement at TCU.    Acute delirium  -Patient agitated the night on 4/21-4/22.  -Seroquel 12.5 mg as needed twice a day although he has been pretty calm.    Melanoma, recent diagnosis  -Had suspicious mole removed at [facility] by Dr. Kasper a few weeks ago with biopsy showing melanoma. Pt has plans for follow-up with Dermatology 5/2 for suture removal. Wife and patient are unclear about any further recommendations. Alk phos WNL. Calcium WNL.   -Follow-up outpatient.     Symptomatic orthostatic hypotension  -Positive vitals 4/21 AM. S/P 500 ml bolus.   -Blood pressure appears stable.  Patient denies lightheadedness or dizziness  -Monitor clinically     Dysphagia   -Wife reports intermittent dysphagia at home, worse with taking medications. No hx of aspiration pneumonia. No hx of esophageal issues including stricture. No recent EGD.   -SLP following, currently on puréed diet with mildly thick  liquids.    -Patient appears very frustrated with his current dysphagia situation and the diet he is placed on.  -Underwent video swallow evaluation yesterday.  Please see their reports for details.  -They recommend minced/moist solids, slightly thickened liquids when fully upright, slow rate, swallow 2-3 times per bite/sip, alternate between solids/liquids.     Chronic hyponatremia: Baseline mid to high 120s. Follows with Nephrology. Has been on salt tabs and a 1.2 L fluid restriction for ~5 months.   * Clinically dehydrated on admission, thus hydrated with fluids as above  -Sodium has been slightly up-and-down but stable around 126-127.    Anxiety, uncontrolled  Depression  -Uncontrolled. Passive suicidal ideation. Progressive vision loss accompanied by appetite issues related to dentures are major stressors.   -Psychiatry consulted on admission. Remeron had recently been increased to 22.5 mg at bedtime 1 week prior to admission, plan to continue this dose. Atarax increased to 25 mg TID PRN  -Unfortunately hyponatremia has limited medication options per wife's report   -Overall has been stable     Hyperglycemia    T2DM, insulin dependent, controlled: A1C 6.9 on 4/12/23. Maintained on Basaglar 5 U qam, Humalog 3 U TID AC and Farxiga 5 mg po every day. Issues with taking due to progressive vision loss and poor oral intake increasing risk for hypoglycemia. Recent clinic visit 4/13/23 reviewed, discussion initiated regarding possible insulin patch, Cequr, but has not been implemented into patient's treatment plan.   -Continue Lantus 5 units daily  -Medium sliding scale insulin   -Hold Farxiga   -scheduled mealtime insulin.   -Monitor for hypoglycemia     Leukocytosis  -11.2 on 4/21. Reports chronic cough. Afebrile. UA unremarkable.    Monitor clinically, no indication for antibiotics at this time      Chronic normocytic anemia  -Baseline 11-12 over the past year. No active signs of bleeding.   -Monitor      HTN  HLD  -Continue Toprol XL 50 mg po every day      Macular degeneration  -Progressively losing vision which is making him more depressed.        Diet: Room Service  Combination Diet Minced and Moist Diet (level 5); Slightly Thick (level 1) (swallow 2-3x per bite/sip, alternate between solids/liquids; FREE water protocol between meals.)     DVT Prophylaxis: Pneumatic Compression Devices   Escobedo Catheter: Not present  Code Status: Full Code     Disposition Plan       Expected Discharge Date: 04/25/2023      Destination: inpatient rehabilitation facility  Discharge Comments: Plan for video swallow eval on Monday; Anxious. Will need TCU placement, SW and CC to follow.      Entered: Osmin Basilio MD 04/25/2023, 12:38 PM        Clinically Significant Risk Factors Present on Admission         # Hyponatremia: Lowest Na = 126 mmol/L in last 2 days, will monitor as appropriate              # DMII: A1C = 6.9 % (Ref range: 0.0 - 5.6 %) within past 6 months              The patient's care was discussed with the Bedside Nurse and Patient.    Medical Decision Making       **CLEAR ALL SELECTIONS**        Labs/Imaging Reviewed:  See Information above and Data section below      Osmin Basilio MD  Hospitalist Service  [facility]  Text Page 7AM-6PM  Securely message with the Vocera Web Console (learn more here)  Text page via seedchange Paging/Directory    ______________________________________________________________________    Interval History   No acute events overnight.  Patient continues to be frustrated about his dysphagia and the resultant diet that he is on.  Had a long discussion also with Elke his daughter.  Plan is to continue to have ongoing discussion about goals of care over the ensuing few weeks.    Data reviewed today: I reviewed all medications, new labs and imaging results over the last 24 hours. I personally reviewed no images or EKG's today.    Physical Exam   Vital signs:  Temp:  "97.1  F (36.2  C) Temp src: Oral BP: 134/82 Pulse: 113   Resp: 16 SpO2: 94 % O2 Device: None (Room air)   Height: 180 cm (5' 10.87\") Weight: 63.5 kg (140 lb)  Estimated body mass index is 19.6 kg/m  as calculated from the following:    Height as of this encounter: 1.8 m (5' 10.87\").    Weight as of this encounter: 63.5 kg (140 lb).      Wt Readings from Last 2 Encounters:   04/19/23 63.5 kg (140 lb)   03/28/23 64.6 kg (142 lb 8 oz)       Gen: AAOX3, NAD, somewhat forgetful, comfortable, calm  Resp: CTA B/L, normal WOB  CVS: RRR, no murmur  Abd/GI: Soft, non-tender. BS- normoactive.    Skin: Warm, dry no rashes  MSK:  no pedal edema  Neuro- CN- intact. No focal deficits.      Data   Recent Labs   Lab 04/25/23  1222 04/25/23  0906 04/25/23  0821 04/25/23  0153 04/24/23  0831 04/24/23  0824 04/23/23  0906 04/23/23  0746 04/22/23  0808 04/22/23  0753 04/21/23  1218 04/21/23  1052 04/20/23  0804 04/20/23  0722 04/19/23  2317 04/19/23  1219   WBC  --   --   --   --   --   --   --   --   --  12.1*  --  11.2*  --  10.5  --  6.0   HGB  --   --   --   --   --   --   --   --   --  12.2*  --  12.5*  --  11.8*  --  12.3*   MCV  --   --   --   --   --   --   --   --   --  88  --  90  --  90  --  89   PLT  --   --   --   --   --   --   --   --   --  287  --  297  --  240  --  275   NA  --   --   --   --   --  126*  --   --   --  127*  --  125*  --  130*  --  129*   POTASSIUM  --  4.3  --   --   --  4.3  --  4.1  --  3.9  --  3.9  --  4.1  --  4.4   CHLORIDE  --   --   --   --   --   --   --   --   --  92*  --  91*  --  96*  --  94*   CO2  --   --   --   --   --   --   --   --   --  27  --  23  --  24  --  24   BUN  --   --   --   --   --   --   --   --   --  13.0  --  11.4  --  13.6  --  23.1*   CR  --   --   --   --   --   --   --   --   --  0.55*  --  0.59*  --  0.56*  --  0.67   ANIONGAP  --   --   --   --   --   --   --   --   --  8  --  11  --  10  --  11   DENISE  --   --   --   --   --   --   --   --   --  9.1  --  8.8  --  " 8.9  --  9.0   *  --  240* 198*   < >  --    < >  --    < > 200*   < > 303*   < > 137*   < > 142*   ALBUMIN  --   --   --   --   --   --   --   --   --   --   --   --   --   --   --  3.8   PROTTOTAL  --   --   --   --   --   --   --   --   --   --   --   --   --   --   --  7.1   BILITOTAL  --   --   --   --   --   --   --   --   --   --   --   --   --   --   --  0.6   ALKPHOS  --   --   --   --   --   --   --   --   --   --   --   --   --   --   --  73   ALT  --   --   --   --   --   --   --   --   --   --   --   --   --   --   --  11   AST  --   --   --   --   --   --   --   --   --   --   --   --   --   --   --  25    < > = values in this interval not displayed.       No results found for this or any previous visit (from the past 24 hour(s)).  Medications       atorvastatin  20 mg Oral Daily     insulin aspart  1-10 Units Subcutaneous TID AC     insulin aspart  1-7 Units Subcutaneous At Bedtime     insulin glargine  5 Units Subcutaneous QAM AC     metoprolol succinate ER  50 mg Oral Daily     mirtazapine  22.5 mg Oral At Bedtime     sodium chloride (PF)  3 mL Intracatheter Q8H

## 2023-04-25 NOTE — DISCHARGE SUMMARY
[facility]    Discharge Summary  Hospitalist    Date of Admission:  4/19/2023  Date of Discharge:  4/25/2023  Discharging Provider: Osmin Basilio MD, MD    Discharge Diagnoses        Generalized weakness with weight loss   Physical deconditioning  Acute delirium-resolved  Melanoma, recent diagnosis  Symptomatic orthostatic hypotension-improved  Dysphagia   Chronic hyponatremia   Anxiety, uncontrolled  Depression  Hyperglycemia    T2DM, insulin dependent, controlled   Leukocytosis   Chronic normocytic anemia  HTN  HLD  Macular degeneration    Hospital Course:    Jose Francisco Gonzalez is a 82 year old male admitted on 4/19/2023 for weakness and general deconditioning, admitted for further evaluation and treatment.     Generalized weakness with weight loss   Physical deconditioning  -Multifactorial in the setting of tooth extraction with denture placement and subsequent issues with dental implant infection and proper fitting dentures leading to increased gag reflex and general poor PO intake with mechanical soft diet.Also reports intermittent dysphagia with taking pills. Pt also on a fluid restriction of 1.2 L for months for hyponatremia (follows with Nephrology) which is likely contributing to further dehydration and weakness. Recent diagnosis of skin melanoma a few weeks ago. Uncontrolled anxiety and depression as well playing a role.   -Treat separate issues as below   -SLP followed for dysphagia.  Please see discussion below.  -Nutrition consulted   -We will discharge to TCU     Acute delirium  -Patient agitated the night on 4/21-4/22.  -Recall was ordered but has not required months.  Subsequently has been pretty calm.     Melanoma, recent diagnosis  -Had suspicious mole removed at [facility] by Dr. Kasper a few weeks ago with biopsy showing melanoma. Pt has plans for follow-up with Dermatology 5/2 for suture removal. Wife and patient are unclear about any further  recommendations. Alk phos WNL. Calcium WNL.   -Follow-up outpatient.     Symptomatic orthostatic hypotension  -Positive vitals 4/21 AM. S/P 500 ml bolus.   -Blood pressure appears stable.  Patient denies lightheadedness or dizziness     Dysphagia   -Wife reports intermittent dysphagia at home, worse with taking medications. No hx of aspiration pneumonia. No hx of esophageal issues including stricture. No recent EGD.   -SLP following, currently on puréed diet with mildly thick liquids.    -Patient appears very frustrated with his current dysphagia situation and the diet he is placed on.  -Underwent video swallow evaluation yesterday.  Please see their reports for details.  -They recommend minced/moist solids, slightly thickened liquids when fully upright, slow rate, swallow 2-3 times per bite/sip, alternate between solids/liquids.  -Patient appears to be frustrated with his clinical situation.  I had a long discussion with him and his daughter Elke on the phone on the day of discharge.  It is unclear to me what the patient wishes exactly are.  He has been provided with options of continuing with current speech therapy recommendation versus eating what ever he chooses but understanding the risks and consequences of aspiration.  Elke's daughter also has been addressing this issue and mentioned that they will have ongoing discussion with Shar about this when he is at the TCU.  -Patient did change his CODE STATUS to DNR/DNI prior to discharge.     Chronic hyponatremia: Baseline mid to high 120s. Follows with Nephrology. Has been on salt tabs and a 1.2 L fluid restriction for ~5 months.   * Clinically dehydrated on admission, thus hydrated with fluids as above  -Sodium has been slightly up-and-down but stable around 126-127.  -Recommend rechecking BMP at the end of the week.     Anxiety, uncontrolled  Depression  -Uncontrolled. Passive suicidal ideation. Progressive vision loss accompanied by appetite issues related to  dentures are major stressors.   -Psychiatry consulted on admission. Remeron had recently been increased to 22.5 mg at bedtime 1 week prior to admission, plan to continue this dose. Atarax increased to 25 mg TID PRN  -Unfortunately hyponatremia has limited medication options per wife's report   -Overall has been stable     Hyperglycemia    T2DM, insulin dependent, controlled: A1C 6.9 on 4/12/23. Maintained on Basaglar 5 U qam, Humalog 3 U TID AC and Farxiga 5 mg po every day. Issues with taking due to progressive vision loss and poor oral intake increasing risk for hypoglycemia. Recent clinic visit 4/13/23 reviewed, discussion initiated regarding possible insulin patch, Cequr, but has not been implemented into patient's treatment plan.   -Lantus increased to 8 units daily  -Medium sliding scale insulin   -Resume Farxiga     Leukocytosis  -11.2 on 4/21. Reports chronic cough. Afebrile. UA unremarkable.    Monitor clinically, no indication for antibiotics at this time      Chronic normocytic anemia  -Baseline 11-12 over the past year. No active signs of bleeding.     HTN  HLD  -Continue Toprol XL 50 mg po every day      Macular degeneration  -Progressively losing vision which is making him more depressed.        Osmin Basilio MD, MD    Significant Results and Procedures   Please see below    Pending Results     Unresulted Labs Ordered in the Past 30 Days of this Admission     No orders found from 3/20/2023 to 4/20/2023.          Code Status   DNR/DNI       Primary Care Physician   Physician No Ref-Primary    Physical Exam   Temp: 97.1  F (36.2  C) Temp src: Oral BP: 134/82 Pulse: 113   Resp: 16 SpO2: 94 % O2 Device: None (Room air)      Please see progress note from earlier today    Discharge Disposition   Discharged to short-term care facility  Condition at discharge: Stable    Consultations This Hospital Stay   CARE MANAGEMENT / SOCIAL WORK IP CONSULT  PHYSICAL THERAPY ADULT IP CONSULT  PSYCHIATRY IP  CONSULT  NUTRITION SERVICES ADULT IP CONSULT  SPEECH LANGUAGE PATH ADULT IP CONSULT  PHYSICAL THERAPY ADULT IP CONSULT  OCCUPATIONAL THERAPY ADULT IP CONSULT  SPEECH LANGUAGE PATH ADULT IP CONSULT    Time Spent on this Encounter   I, Osmin Basilio MD, personally saw the patient today and spent greater than 30 minutes discharging this patient.    Discharge Orders      General info for SNF    Length of Stay Estimate: Short Term Care: Estimated # of Days <30  Condition at Discharge: Improving  Level of care:skilled   Rehabilitation Potential: Excellent  Admission H&P remains valid and up-to-date: Yes  Recent Chemotherapy: N/A  Use Nursing Home Standing Orders: Yes     Mantoux instructions    Give two-step Mantoux (PPD) Per Facility Policy Yes     Activity - Up with nursing assistance     Glucose monitor nursing POCT    Before meals and at bedtime     Full Code     Physical Therapy Adult Consult    Evaluate and treat as clinically indicated.    Reason:  Deconditioning     Occupational Therapy Adult Consult    Evaluate and treat as clinically indicated.    Reason:  Deconditioning     Speech Language Path Adult Consult    Evaluate and treat as clinically indicated.    Reason:  Dysphagia     Fall precautions     Diet    Follow this diet upon discharge: Orders Placed This Encounter      Room Service      Combination Diet Minced and Moist Diet (level 5); Slightly Thick (level 1) (swallow 2-3x per bite/sip, alternate between solids/liquids; FREE water protocol between meals.)     Discharge Medications   Current Discharge Medication List      START taking these medications    Details   insulin aspart (NOVOLOG PEN) 100 UNIT/ML pen Inject 1-10 Units Subcutaneous 3 times daily (before meals) HIGH INSULIN RESISTANCE DOSING     Do Not give Correction Insulin if Pre-Meal BG less than 140.   For Pre-Meal  - 164 give 1 unit.   For Pre-Meal  - 189 give 2 units.   For Pre-Meal  - 214 give 3 units.   For Pre-Meal BG  215 - 239 give 4 units.   For Pre-Meal  - 264 give 5 units.   For Pre-Meal  - 289 give 6 units.   For Pre-Meal  - 314 give 7 units.   For Pre-Meal  - 339 give 8 units.   For Pre-Meal  - 364 give 9 units.   For Pre-Meal BG greater than or equal to 365 give 10 units  Qty: 15 mL    Associated Diagnoses: Type 2 diabetes mellitus with diabetic nephropathy, with long-term current use of insulin (H)         CONTINUE these medications which have CHANGED    Details   acetaminophen (TYLENOL) 500 MG tablet Take 2 tablets (1,000 mg) by mouth every 8 hours as needed for mild pain    Associated Diagnoses: Compression fracture of L1 vertebra with delayed healing, subsequent encounter      insulin glargine (BASAGLAR KWIKPEN) 100 UNIT/ML pen Inject 8 units under the skin once daily in the morning.  Qty: 15 mL, Refills: 1    Comments: If Basaglar is not covered by insurance, may substitute Lantus or Semglee or other insulin glargine product per insurance preference at same dose and frequency.    Associated Diagnoses: Type 2 diabetes mellitus with diabetic nephropathy, with long-term current use of insulin (H)         CONTINUE these medications which have NOT CHANGED    Details   alendronate (FOSAMAX) 70 MG tablet TAKE 1 TABLET(70 MG) BY MOUTH EVERY 7 DAYS  Qty: 12 tablet, Refills: 3    Associated Diagnoses: Age-related osteoporosis with current pathological fracture with routine healing      atorvastatin (LIPITOR) 20 MG tablet TAKE 1 TABLET(20MG) BY MOUTH DAILY  Qty: 90 tablet, Refills: 3    Associated Diagnoses: Hyperlipidemia LDL goal <100      Calcium Polycarbophil (FIBER) 625 MG tablet Take 2 tablets by mouth daily      dapagliflozin (FARXIGA) 5 MG TABS tablet Take 1 tablet (5 mg) by mouth daily  Qty: 90 tablet, Refills: 1    Associated Diagnoses: Type 2 diabetes mellitus with diabetic nephropathy, with long-term current use of insulin (H)      hydrOXYzine (ATARAX) 10 MG tablet Take 10 mg by mouth 3  times daily as needed for itching      metoprolol succinate ER (TOPROL XL) 50 MG 24 hr tablet Take 1 tablet (50 mg) by mouth daily  Qty: 90 tablet, Refills: 4    Associated Diagnoses: Hypertension goal BP (blood pressure) < 140/80      mirtazapine (REMERON) 15 MG tablet Take 1 tablet (15 mg) by mouth At Bedtime  Qty: 30 tablet, Refills: 1    Comments: To replace all other Mirtazapine scripts  Associated Diagnoses: Moderate episode of recurrent major depressive disorder (H)      Multiple Vitamins-Minerals (PRESERVISION AREDS 2) CAPS Take 2 capsules by mouth daily      Psyllium-Calcium (METAMUCIL PLUS CALCIUM) CAPS Take 0.5 capsules by mouth nightly as needed (constipation)      sulfaSALAzine ER (AZULFIDINE EN) 500 MG EC tablet TAKE 1 TABLET BY MOUTH TWICE DAILY AFTER MEALS      vitamin D3 (CHOLECALCIFEROL) 50 mcg (2000 units) tablet Take 1 tablet by mouth daily      blood glucose (NO BRAND SPECIFIED) lancets standard Use to test blood sugar 3 times daily or as directed.  Qty: 300 each, Refills: 1    Comments: As covered by insurance and as compatible with pt glucometer  Associated Diagnoses: Type 2 diabetes mellitus with diabetic nephropathy (H)      Continuous Blood Gluc Sensor (FREESTYLE ODILIA 2 SENSOR) MISC 1 each every 14 days Use 1 sensor every 14 days. Use to read blood sugars per 's instructions.  Qty: 6 each, Refills: 3    Associated Diagnoses: Type 2 diabetes mellitus with diabetic nephropathy, with long-term current use of insulin (H)      Contour Next EZ (CONTOUR NEXT EZ W/DEVICE KIT) w/Device KIT USE TO TEST BLOOD SUGAR THREE TIMES DAILY OR AS DIRECTED  Qty: 1 kit, Refills: 1    Associated Diagnoses: Type 2 diabetes mellitus with diabetic nephropathy, with long-term current use of insulin (H)      CONTOUR NEXT TEST test strip USE TO TEST BLOOD SUGAR 3 TIMES DAILY OR AS DIRECTED  Qty: 300 strip, Refills: 11    Associated Diagnoses: Type 2 diabetes mellitus with diabetic nephropathy, with  long-term current use of insulin (H)      insulin pen needle (B-D U/F) 31G X 8 MM miscellaneous Use 4 times a day  Qty: 400 each, Refills: 3    Associated Diagnoses: Type 2 diabetes mellitus with diabetic nephropathy, with long-term current use of insulin (H)      order for DME Test strips for pt's glucometer, brand as covered by insurance Test QID and prn. He is on insulin. Patients preferred brand-Verio One Touch.  Qty: 400 each, Refills: 1    Associated Diagnoses: Type 2 diabetes mellitus with diabetic nephropathy, without long-term current use of insulin (H)      sodium chloride 1 GM tablet Take 1 tablet (1 g) by mouth daily  Qty: 30 tablet, Refills: 3    Associated Diagnoses: Hyponatremia         STOP taking these medications       insulin lispro (HUMALOG KWIKPEN) 100 UNIT/ML (1 unit dial) KWIKPEN Comments:   Reason for Stopping:             Allergies   No Known Allergies  Data   Most Recent 3 CBC's:  Recent Labs   Lab Test 04/22/23  0753 04/21/23  1052 04/20/23  0722   WBC 12.1* 11.2* 10.5   HGB 12.2* 12.5* 11.8*   MCV 88 90 90    297 240      Most Recent 3 BMP's:  Recent Labs   Lab Test 04/25/23  1222 04/25/23  0906 04/25/23  0821 04/25/23  0153 04/24/23  0831 04/24/23  0824 04/23/23  0906 04/23/23  0746 04/22/23  0808 04/22/23  0753 04/21/23  1218 04/21/23  1052 04/20/23  0804 04/20/23  0722   NA  --   --   --   --   --  126*  --   --   --  127*  --  125*  --  130*   POTASSIUM  --  4.3  --   --   --  4.3  --  4.1  --  3.9  --  3.9  --  4.1   CHLORIDE  --   --   --   --   --   --   --   --   --  92*  --  91*  --  96*   CO2  --   --   --   --   --   --   --   --   --  27  --  23  --  24   BUN  --   --   --   --   --   --   --   --   --  13.0  --  11.4  --  13.6   CR  --   --   --   --   --   --   --   --   --  0.55*  --  0.59*  --  0.56*   ANIONGAP  --   --   --   --   --   --   --   --   --  8  --  11  --  10   DENISE  --   --   --   --   --   --   --   --   --  9.1  --  8.8  --  8.9   *  --  240*  198*   < >  --    < >  --    < > 200*   < > 303*   < > 137*    < > = values in this interval not displayed.     Most Recent 2 LFT's:  Recent Labs   Lab Test 04/19/23  1219 04/12/23  1318   AST 25 34   ALT 11 10   ALKPHOS 73 78   BILITOTAL 0.6 0.4     Most Recent INR's and Anticoagulation Dosing History:  Anticoagulation Dose History          View : No data to display.                    Most Recent 3 Troponin's:  Recent Labs   Lab Test 02/28/21  0146   TROPI <0.015     Most Recent Cholesterol Panel:  Recent Labs   Lab Test 04/12/23  1318   CHOL 141   LDL 75   HDL 47   TRIG 93     Most Recent 6 Bacteria Isolates From Any Culture (See EPIC Reports for Culture Details):No lab results found.  Most Recent TSH, T4 and A1c Labs:  Recent Labs   Lab Test 04/19/23  1219 04/12/23  1318 06/09/20  0952 09/04/19  1415   TSH 1.32  --    < > 0.89   T4  --   --   --  1.17   A1C  --  6.9*   < > 6.6*    < > = values in this interval not displayed.       Results for orders placed or performed during the hospital encounter of 04/19/23   XR Video Swallow with SLP or OT    Narrative    VIDEO SWALLOWING EVALUATION   4/24/2023 10:06 AM     HISTORY: Dysphagia    COMPARISON: None.    FLUOROSCOPY TIME: 1.1 minutes     Number of cine runs: 9    FINDINGS:    Thin: Premature spill to the vallecula. Mild residue. Moderate flash  penetration. Aspiration of residue.    Slightly thick: Premature spill to the piriforms. Mild residue.  Otherwise normal.    Mildly thick: Premature spill to the piriforms. Mild residue.  Otherwise normal.    Moderately thick: Not administered.    Puree: Normal    Semisolid: Moderate residue. Otherwise normal.    Regular: Not administered.    This study only includes the cervical esophagus.    PREETHI LAMB MD         SYSTEM ID:  [id number]

## 2023-04-25 NOTE — PLAN OF CARE
PRIMARY DIAGNOSIS: GENERALIZED WEAKNESS    OUTPATIENT/OBSERVATION GOALS TO BE MET BEFORE DISCHARGE  1. Orthostatic performed: N/A    2. Tolerating PO medications: Yes    3. Return to near baseline physical activity: Yes    4. Cleared for discharge by consultants (if involved): Yes. 4/25    Discharge Planner Nurse   Safe discharge environment identified: Yes  Barriers to discharge: No       Entered by: Olesya Salas RN 04/24/2023 11:42 PM     Please review provider order for any additional goals.   Nurse to notify provider when observation goals have been met and patient is ready for discharge.

## 2023-04-25 NOTE — PROGRESS NOTES
PRIMARY DIAGNOSIS: GENERALIZED WEAKNESS    OUTPATIENT/OBSERVATION GOALS TO BE MET BEFORE DISCHARGE  1. Orthostatic performed: No    2. Tolerating PO medications: Yes    3. Return to near baseline physical activity: Yes    4. Cleared for discharge by consultants (if involved): Yes    Discharge Planner Nurse   Safe discharge environment identified: Yes  Barriers to discharge: No       Entered by: Olesya Salas RN 04/25/2023 6:27 AM     Please review provider order for any additional goals.   Nurse to notify provider when observation goals have been met and patient is ready for discharge.

## 2023-04-25 NOTE — PROGRESS NOTES
Care Management Discharge Note    Discharge Date: 04/25/2023       Discharge Disposition:  ([facility])    Discharge Services: Transportation Services (Possibly)    Discharge DME:      Discharge Transportation: family or friend will provide, health plan transportation    Private pay costs discussed: private room/amenity fees    PAS Confirmation Code: [id number]  Patient/family educated on Medicare website which has current facility and service quality ratings:      Education Provided on the Discharge Plan: Yes  Persons Notified of Discharge Plans: [facility], family  Patient/Family in Agreement with the Plan: yes    Handoff Referral Completed: Yes    Additional Information:  Patient family chose [facility]CU.  BCBS authorization received from [employer].  Authorization is for today 4/25 thru 5-1, authorization # is [id number].  Letter of authorization faxed to [facility] and copy placed in patient's chart.  Orders sent via In Basket.  PA approved.  Effective date: 4/25/23  PA reference #: [id number]  Pt. notified:   Wife/dtr.  Family transporting.        GUS Abbasi

## 2023-04-25 NOTE — PLAN OF CARE
Goal Outcome Evaluation:       Summary: Admitted 21 with generalized weakness, dehydration, and depressive episodes  DATE & TIME: 23 5396-0243  Cognitive Concerns/ Orientation :A&O x3, disoriented to time/situation at times, -Seroquel PRN available, did not require this shift.  BEHAVIOR & AGGRESSION TOOL COLOR: Green  VS/O2: VSS on RA.  MOBILITY: SBA with gaitbelt and walker  PAIN MANAGMENT: Denies.   DIET: Pureed, mildly thick liquid diet- thin liquid protocol w/ supervision only.  BOWEL/BLADDER: continent this shift. Used urinal in bed & up to BR.  ABNL LAB/BG: Sodium 126. B, 198  DRAIN/DEVICES: PIV SL  TELEMETRY RHYTHM: N/A  SKIN: R skin tear on lateral side of elbow with foam dressing. Incision sites covered with foam dressings on back - L upper shoulder and R. back from melanoma removal.  TESTS/PROCEDURES: video swallow done .  D/C DAY/GOALS/PLACE: Discharge tomorrow  to TCU; SW following.  OTHER IMPORTANT INFO:  PT, SP, SW following.

## 2023-04-25 NOTE — PLAN OF CARE
Goal Outcome Evaluation:      Plan of Care Reviewed With: patient, spouse, family    Overall Patient Progress: improvingOverall Patient Progress: improving    Discharge    Patient discharged to [facility] via w/c with family  Care plan note  Summary: Admitted 21 with generalized weakness, dehydration, and depressive episodes  DATE & TIME: 23 [id number]  Cognitive Concerns/ Orientation :A&O x3, disoriented to time/situation at times  BEHAVIOR & AGGRESSION TOOL COLOR: Green  VS/O2: VSS, O2 94% RA   MOBILITY: SBA with gaitbelt and walker  PAIN MANAGMENT: Denies.   DIET: Pureed, mildly thick liquid diet- thin liquid protocol w/ supervision only.  BOWEL/BLADDER: continent this shift. Used urinal in bed & up to BR.  ABNL LAB/BG: B, 156  DRAIN/DEVICES: PIV access x1 removed prior to discharge   TELEMETRY RHYTHM: N/A  SKIN: R skin tear on lateral side of elbow, improving, applied new dressing, CDI.  Incision sites covered with foam dressings on back - L upper shoulder and R. back from melanoma removal.  TESTS/PROCEDURES: none  D/C DAY/GOALS/PLACE: Discharged to [facility] today 23 at 1600, family provided transportation.   OTHER IMPORTANT INFO: went through AVS, pt/family verbalized understanding. Belongings packed and sent with the pt.     Listed belongings gathered and given to patient (including from security/pharmacy). Yes  Care Plan and Patient education resolved: Yes  Prescriptions if needed, hard copies sent with patient  NA  Medication Bin checked and emptied on discharge Yes  SW/care coordinator/charge RN aware of discharge: Yes

## 2023-04-25 NOTE — PROGRESS NOTES
Care Management Follow Up    Length of Stay (days): 0    Expected Discharge Date: 04/25/2023     Concerns to be Addressed: adjustment to diagnosis/illness, coping/stress, discharge planning, mental health  Family feel they cannot care for him due to overall weakness  Patient plan of care discussed at interdisciplinary rounds: Yes    Anticipated Discharge Disposition: Transitional Care     Anticipated Discharge Services: Transportation Services (Possibly)  Anticipated Discharge DME:      Patient/family educated on Medicare website which has current facility and service quality ratings:    Education Provided on the Discharge Plan: Yes  Patient/Family in Agreement with the Plan: yes    Referrals Placed by CM/SW: Post Acute Facilities  Private pay costs discussed: private room/amenity fees    Additional Information:  Patient has been accepted by [facility]. Daughter and spouse of patient have chose [facility].    Writer is now contacting [employer] for authorization.    GUS Abbasi

## 2023-04-25 NOTE — TELEPHONE ENCOUNTER
See chart. Pt was admitted 4/19/23 to hospital and now being discharged to TCU. See labs from adission

## 2023-04-25 NOTE — PLAN OF CARE
Goal Outcome Evaluation:    Summary: Admitted 21 with generalized weakness, dehydration, and depressive episodes  DATE & TIME: 23 8422-0721  Cognitive Concerns/ Orientation :A&O x3, disoriented to time/situation at times  BEHAVIOR & AGGRESSION TOOL COLOR: Green  VS/O2: VSS, O2 94% RA   MOBILITY: SBA with gaitbelt and walker  PAIN MANAGMENT: Denies.   DIET: Pureed, mildly thick liquid diet- thin liquid protocol w/ supervision only.  BOWEL/BLADDER: continent this shift. Used urinal in bed & up to BR.  ABNL LAB/BG: B, 156  DRAIN/DEVICES: PIV access x1 removed prior to discharge   TELEMETRY RHYTHM: N/A  SKIN: R skin tear on lateral side of elbow, improving, applied new dressing, CDI.  Applied new dressings on Lt upper back biopsy sites, CDI, no signs of infection, denies pain/discomfort.   TESTS/PROCEDURES: none  D/C DAY/GOALS/PLACE: Discharging to [facility] today 23, family providing transportation.   OTHER IMPORTANT INFO: went through AVS, pt/family verbalized understanding. Belongings packed for discharge.

## 2023-04-26 NOTE — TELEPHONE ENCOUNTER
[facility] Call Center    Phone Message    May a detailed message be left on voicemail: yes     Reason for Call: Other: . pts wife Divya is calling- pt is currently in rehab and is wondering if their appt on 4/28 could be converted to a telephone call, writer attempted to reschedule and the template wants the same, please call Dominique, thank you    Action Taken: Message routed to:  [facility] ([facility] neph    Travel Screening: Not Applicable

## 2023-04-26 NOTE — PROGRESS NOTES
Clinic Care Coordination Contact  Care Coordination Transition Communication    Referral Source: IP Handoff    Clinical Data: Patient was hospitalized at [facility] from 4/19/2023 to 4/25/2023 with diagnosis of Depression, generalized weakness, physical deconditioning, T2DM.     Transition to Facility:              Facility Name: [facility]               SW phone number/fax: [phone]    Plan: RN/SW Care Coordinator will await notification from facility staff informing RN/SW Care Coordinator of patient's discharge plans/needs. RN/SW Care Coordinator will review chart and outreach to facility staff every 4 weeks and as needed.      Soila Mai RN, BSN, PHN  Primary Care / Care Coordinator   [facility]  E-mail [email or web address]   [phone]

## 2023-04-26 NOTE — LETTER
[facility]   To:   [facility]          Please give to facility    From:   Soila Mai RN  Care Coordinator   [facility]   P: [phone]  [email or web address]   Patient Name:  Jose Francisco Gonzalez YOB: 1940   Admit date: 4/25/2023      *Information Needed:  Please contact me when the patient will discharge (or if they will move to long term care)- include the discharge date, disposition, and main diagnosis   - If the patient is discharged with home care services, please provide the name of the agency    Also- Please inform me if a care conference is being held.   Phone, Fax or Email with information      Soila Mai RN, BSN, PHN  Primary Care / Care Coordinator   [facility]  E-mail [email or web address]   [phone]                Thank you

## 2023-04-26 NOTE — PROGRESS NOTES
Speech Language Therapy Discharge Summary    Reason for therapy discharge:    Discharged to home with home therapy. - recommended per SLP note; Discharge was to TCU.    Progress towards therapy goal(s). See goals on Care Plan in [facility] electronic health record for goal details.  Goals partially met.  Barriers to achieving goals:   discharge from facility.    Therapy recommendation(s):    See below       04/25/23 1117   SLP Discharge Planning   SLP Plan SLP: Provide swallow exercises in larger print per pt/daughter request. PO tolerance, strategy training, ADAT, FWP education   SLP Discharge Recommendation home with home care speech therapy   SLP Rationale for DC Rec below baseline oropharyngeal swallowing ability, requires a modified diet for safety at this time, cues for strategies   SLP Brief overview of current status  Minced/moist solids, slightly thick liquids (IDDSI 5, 1) when fully upright, slow rate, swallow 2-3x per bite/sip, alternate between solids/liquids, cough and re-swallow PRN. OK for free water protocol (thin water, ice chips, 30 minutes before of after meals, only after good oral hygeine of teeth/denture brushing and mouthwash). Okay to leave cup of thin water at bedside (per patient request) after oral cares completed. Remove thin water during meals.

## 2023-04-26 NOTE — PLAN OF CARE
Physical Therapy Discharge Summary    Reason for therapy discharge:    Discharged to transitional care facility.    Progress towards therapy goal(s). See goals on Care Plan in [facility] electronic health record for goal details.  Goals partially met.  Barriers to achieving goals:   discharge from facility.    Therapy recommendation(s):    Continued therapy is recommended.  Rationale/Recommendations:  to address functional mobility.

## 2023-04-27 PROBLEM — R13.10 DYSPHAGIA, UNSPECIFIED TYPE: Status: ACTIVE | Noted: 2023-01-01

## 2023-04-27 PROBLEM — R63.4 WEIGHT LOSS: Status: ACTIVE | Noted: 2023-01-01

## 2023-04-27 PROBLEM — E78.5 HYPERLIPIDEMIA, UNSPECIFIED HYPERLIPIDEMIA TYPE: Status: ACTIVE | Noted: 2023-01-01

## 2023-04-27 PROBLEM — E87.1 CHRONIC HYPONATREMIA: Status: ACTIVE | Noted: 2023-01-01

## 2023-04-27 PROBLEM — R53.1 GENERALIZED WEAKNESS: Status: ACTIVE | Noted: 2023-01-01

## 2023-04-27 PROBLEM — C43.59 MELANOMA OF BACK (H): Status: ACTIVE | Noted: 2023-01-01

## 2023-04-27 PROBLEM — F41.9 ANXIETY AND DEPRESSION: Status: ACTIVE | Noted: 2023-01-01

## 2023-04-27 PROBLEM — F32.A ANXIETY AND DEPRESSION: Status: ACTIVE | Noted: 2023-01-01

## 2023-04-27 PROBLEM — R53.81 PHYSICAL DECONDITIONING: Status: ACTIVE | Noted: 2023-01-01

## 2023-04-27 PROBLEM — E11.9 TYPE 2 DIABETES MELLITUS (H): Status: ACTIVE | Noted: 2023-01-01

## 2023-04-27 PROBLEM — H61.23 EXCESSIVE CERUMEN IN BOTH EAR CANALS: Status: ACTIVE | Noted: 2020-09-26

## 2023-04-27 PROBLEM — H35.30 MACULAR DEGENERATION, UNSPECIFIED LATERALITY, UNSPECIFIED TYPE: Status: ACTIVE | Noted: 2023-01-01

## 2023-04-27 NOTE — PROGRESS NOTES
"[facility]  PRIMARY CARE PROVIDER AND CLINIC:  Physician No Ref-Primary, No address on file  Chief Complaint   Patient presents with     Establish Care     [facility] Medical Record Number:  [id number]  Place of Service where encounter took place:  [facility] [[id number]]    Jose Francisco Gnozalez  is a 82 year old  (1940), admitted to the above facility from  [facility]. Hospital stay 4/19/23 through 4/25/23..  Admitted to this facility for  rehab, medical management and nursing care.     Generalized weakness  Weight loss  Dysphagia, unspecified type  Anxiety and depression  Physical deconditioning  Melanoma of back (H)  Chronic hyponatremia  Type 2 diabetes mellitus with diabetic nephropathy, with long-term current use of insulin (H)  Hypertensive heart disease without heart failure  Hyperlipidemia, unspecified hyperlipidemia type  Macular degeneration, unspecified laterality, unspecified type  Rheumatoid arthritis, involving unspecified site, unspecified whether rheumatoid factor present (H)    HPI:    HPI information obtained from: facility chart records, facility staff, patient report, [facility] chart review and Care Everywhere [facility] chart review.   Brief Summary of Hospital Course: please see hospital and clinic records.  Pt  Also has  Records from [place] where he rhoades.  he was admitted with hypotension, weakness, weight loss and  Anxiety/depression. He was rehydrated, had been on salt tab and fluid restriction for ~ 5 mos.  Na ran 126-127, improved.  Seen by psychiatry and the Remeron was increased(*but unclear how it was then decreased in discharge orders*).  Also it sounds like he had recent dental implants with infection, and poorly fitting dentures, as well as dysphagia which is not clear in origin. Swallow Video showed mild \"spillover\" and rec'd pureed diet.  He also has chronic Hyponatremia and has had fluid " restrictions for this at times.  He has been depressed and anxious and recently had a diagnoses of melanoma prior to hospital stay (*per family f/u was fine--no acute issues with this*). Also progressive vision decline.  Followed by endocrinology and nephrology for DM and hyponatremia.   Sent for rehab. Pt was changed to DNR.DNI in hospital after d/w pt and family.    TODAY DURING EXAM HPI and ROS:  Feels tired,  Says dentures don't fit well but not painful. No CP, SOB, Cough, dizziness, fevers, chills, HA, N/V, dysuria or Bowel Abnormalities. He is incontinent both bowel and bladder at times.  Appetite is down..  No pains.      CODE STATUS/ADVANCE DIRECTIVES DISCUSSION:   DNR / DNI  Patient's living condition: lives with spouse  ALLERGIES: Patient has no known allergies.  PAST MEDICAL HISTORY:  has a past medical history of Arthropathy, unspecified, site unspecified, Compression fracture of body of thoracic vertebra (H), Compression fracture of L1 lumbar vertebra (H) (06/2019), Hyperlipidemia LDL goal <100 (11/2/2012), Hyponatremia, Rheumatoid arthritis (H), Sialoadenitis (6/13/2015), Syncope, and Type 2 diabetes, HbA1C goal < 8% (H) (11/2/2012).    He has no past medical history of Asymptomatic human immunodeficiency virus (HIV) infection status (H), Blood in semen, Cerebral palsy (H), Complication of anesthesia, Congenital renal agenesis and dysgenesis, Epididymitis, bilateral, Epididymitis, left, Epididymitis, right, Goiter, Gout, Hernia, abdominal, History of spinal cord injury, History of thrombophlebitis, Horseshoe kidney, Hydrocephalus (H), Malignant hyperthermia, Mumps, Orchitis, epididymitis, and epididymo-orchitis, with abscess, Palpitations, Parkinsons disease (H), Penile discharge, Prostate infection, Spider veins, Spina bifida (H), STD (sexually transmitted disease), Swelling of testicle, Tethered cord (H), or Tuberculosis.  PAST SURGICAL HISTORY:   has a past surgical history that includes Eye surgery  (murray 15 and22 2009); TOOTH EXTRACTION W/FORCEP; ENT surgery (11/2009); and tonsillectomy.  FAMILY HISTORY: family history includes Alzheimer Disease in his sister; Coronary Artery Disease in his brother and father; Heart Disease in his brother and father; No Known Problems in his mother; Substance Abuse in his son.  SOCIAL HISTORY:   reports that he quit smoking about 30 years ago. His smoking use included cigarettes. He has a 20.00 pack-year smoking history. He has never used smokeless tobacco. He reports current alcohol use. He reports that he does not use drugs.    Post Discharge Medication Reconciliation Status: discharge medications reconciled and changed, per note/orders     Current Outpatient Medications   Medication Sig Dispense Refill     acetaminophen (TYLENOL) 500 MG tablet Take 2 tablets (1,000 mg) by mouth every 8 hours as needed for mild pain       alendronate (FOSAMAX) 70 MG tablet TAKE 1 TABLET(70 MG) BY MOUTH EVERY 7 DAYS 12 tablet 3     atorvastatin (LIPITOR) 20 MG tablet TAKE 1 TABLET(20MG) BY MOUTH DAILY 90 tablet 3     blood glucose (NO BRAND SPECIFIED) lancets standard Use to test blood sugar 3 times daily or as directed. 300 each 1     blood glucose (NO BRAND SPECIFIED) test strip Please call IF accu-cks >350, as may need insulin adjusted.       Calcium Polycarbophil (FIBER) 625 MG tablet Take 1 tablet by mouth every morning       Continuous Blood Gluc Sensor (FREESTYLE ODILIA 2 SENSOR) MISC 1 each every 14 days Use 1 sensor every 14 days. Use to read blood sugars per 's instructions. 6 each 3     Contour Next EZ (CONTOUR NEXT EZ W/DEVICE KIT) w/Device KIT USE TO TEST BLOOD SUGAR THREE TIMES DAILY OR AS DIRECTED 1 kit 1     CONTOUR NEXT TEST test strip USE TO TEST BLOOD SUGAR 3 TIMES DAILY OR AS DIRECTED 300 strip 11     dapagliflozin (FARXIGA) 5 MG TABS tablet Take 1 tablet (5 mg) by mouth daily 90 tablet 1     hydrOXYzine (ATARAX) 10 MG tablet Take 10 mg by mouth 3 times daily as  "needed for itching       insulin aspart (NOVOLOG PEN) 100 UNIT/ML pen Inject 1-10 Units Subcutaneous 3 times daily (before meals) HIGH INSULIN RESISTANCE DOSING     Do Not give Correction Insulin if Pre-Meal BG less than 140.   For Pre-Meal  - 164 give 1 unit.   For Pre-Meal  - 189 give 2 units.   For Pre-Meal  - 214 give 3 units.   For Pre-Meal  - 239 give 4 units.   For Pre-Meal  - 264 give 5 units.   For Pre-Meal  - 289 give 6 units.   For Pre-Meal  - 314 give 7 units.   For Pre-Meal  - 339 give 8 units.   For Pre-Meal  - 364 give 9 units.   For Pre-Meal BG greater than or equal to 365 give 10 units 15 mL      insulin glargine (BASAGLAR KWIKPEN) 100 UNIT/ML pen Inject 8 units under the skin once daily in the morning. 15 mL 1     insulin pen needle (B-D U/F) 31G X 8 MM miscellaneous Use 4 times a day 400 each 3     metoprolol succinate ER (TOPROL XL) 50 MG 24 hr tablet Take 1 tablet (50 mg) by mouth daily (Patient taking differently: Take 50 mg by mouth daily HOLD if SBP <110 OR HR < 60. Call if held.) 90 tablet 4     Mirtazapine (REMERON PO) Take 22.5 mg by mouth At Bedtime Depression/Anxiety       Multiple Vitamins-Minerals (PRESERVISION AREDS 2) CAPS Take 2 capsules by mouth daily       order for DME Test strips for pt's glucometer, brand as covered by insurance Test QID and prn. He is on insulin. Patients preferred brand-Verio One Touch. 400 each 1     Psyllium-Calcium (METAMUCIL PLUS CALCIUM) CAPS Take 0.5 capsules by mouth nightly as needed (constipation)       sulfaSALAzine ER (AZULFIDINE EN) 500 MG EC tablet Take 500 mg by mouth 2 times daily For RA       vitamin D3 (CHOLECALCIFEROL) 50 mcg (2000 units) tablet Take 1 tablet by mouth daily       WOUND DRESSINGS EX Externally apply topically daily             ROS:see above under HPI.  Vitals:  /81   Pulse 95   Temp 98.1  F (36.7  C)   Resp 18   Ht 1.778 m (5' 10\")   Wt 63.5 kg (140 lb)   SpO2 " 97%   BMI 20.09 kg/m    Exam:  GENERAL APPEARANCE:  Alert, in no distress, thin, appears ill, anxious, cooperative  ENT:  Mouth and posterior oropharynx normal, moist mucous membranes, normal hearing acuity  EYES:  EOM, conjunctivae, lids, pupils and irises normal--says slightly blurry but no double vision  RESP:  respiratory effort and palpation of chest normal, lungs clear to auscultation , no respiratory distress  CV:  Palpation and auscultation of heart done , IRRR. no murmur, rub, or gallop, no edema, +2 pedal pulses  ABDOMEN:  normal bowel sounds, soft, nontender, no hepatosplenomegaly or other masses  M/S:   SHIPLEY equally, up with therapies, now in chair.  SKIN:  Inspection of skin and subcutaneous tissue baseline with 2 drsgs on back: upper left and mid left--D&I.  NEURO:   Cranial nerves 2-12 are normal tested and grossly at patient's baseline  PSYCH:  oriented X 3, affect and mood normal, flat affect, anxious    Lab/Diagnostic data:  Recent Labs   Lab Test 04/26/23  1359 04/25/23  1222 04/24/23  0824 04/22/23  0753   *  --  126* 127*   POTASSIUM 4.6  --  4.3 3.9   CHLORIDE 88*  --   --  92*   CO2 33*  --   --  27   ANIONGAP 7  --   --  8   * 156*  --  200*   BUN 20.8  --   --  13.0   CR 0.66*  --   --  0.55*   DENISE 9.9  --   --  9.1    < > = values in this interval not displayed.     CBC RESULTS: Recent Labs   Lab Test 04/26/23  1359   WBC 6.2   RBC 4.27*   HGB 12.7*   HCT 37.8*   MCV 89   MCH 29.7   MCHC 33.6   RDW 13.6              ASSESSMENT / PLAN:  (R53.1) Generalized weakness  (primary encounter diagnosis)   (R63.4) Weight loss   (R13.10) Dysphagia, unspecified type  (R53.81) Physical deconditioning  Comment/Plan: PT OT, SLP, dysphagia diet, RD to see, monitor.     (F41.9,  F32.A) Anxiety and depression  Comment/Plan: I d/w Dr Barnard--agrees with resuming higher dose of Remeron now and watching Na. Will check BMP on 5/2  No change to Atarax at this time.   Check B12, Vit D and TSH  also.    (C43.59) Melanoma of back (H): new March 2023  Comment/Plan: daughter said the had the fist f/u--telephone visit--all margins fine.   I will check with Dr Kasper if ok to removes stiches here. They will f/o later as outpt    (E87.1) Chronic hyponatremia  Comment/Plan: see above, has been low for years. -- check BMP  5/2-- sees nephrology tomorrow by video visit.    (E11.21,  Z79.4) Type 2 diabetes mellitus with diabetic nephropathy, with long-term current use of insulin (H)  Comment/Plan: cont glargine, Faarxiga, and SSI.  He is not on AC scheduled Humalog (*though discharge summary says he is*)--will watch and not add as eating variable at this time. Sugars running mostly 150-190 in the am and  Some >250. monitor and SSI until clearly eating more.    (I11.9) Hypertensive heart disease without heart failure   (E78.5) Hyperlipidemia, unspecified hyperlipidemia type  Comment/Plan: cont metoprolol, monitor.    (H35.30) Macular degeneration, unspecified laterality, unspecified type  Comment/Plan: cont Preservision Vits, monitor.       (M06.9) Rheumatoid arthritis, involving unspecified site, unspecified whether rheumatoid factor present (H)  Comment/Plan: no Vit D- will check , is on Sulfasalazine.        I called wife who asked me to talk to daughter Elke, who is on unit.  She states frustration with hospital and changes in meds with no communication documentation as to what/how with the remeron..  They are frustrated that he is not doing well: weight loss, depressing, anxiety,  I d/w her  will increase Remeron if able, after further review of records.. All questions answered and there is agreement   on the Care Plan.  **See above**    Electronically signed by:  DEE DEE Zamudio CNP

## 2023-04-27 NOTE — Clinical Note
FYI! Also hospital discharge summary said pt was on 22.5mg Remeron and 25mg atarax prn- Orders did not reflect this and no explanation noted.  Orders were for 15mg Remeron and 10mg atarax. Dr torres and I upped the Remeron, will watch Na and keep Atarax same  THanks Meaghan

## 2023-04-28 NOTE — PROGRESS NOTES
Virtual Visit Details    Type of service:  Video Visit   Video Start Time: 9:04 AM  Video End Time:9:35 AM    Originating Location (pt. Location): Assisted Living    Distant Location (provider location):  On-site  Platform used for Video Visit: [facility]     Virtual Visit Details    Type of service:  Video Visit   Video Start Time: 09:04 AM  Video End Time:09:35 AM    Originating Location (pt. Location): Home    Distant Location (provider location):  On-site  Platform used for Video Visit: [facility]        Nephrology Clinic Note      April 28, 2023      CC: Hyponatremia    Pertinent Renal Hx:  - Chronic hyponatremia - as long as our records in 2011   - Hypertension  - Type 2 DM  - Hx of Microalbuminuria    HPI: Jose Francisco Gonzalez is a 82 year old male with PMHx of Hypertension and Type 2 DM without diabetic retinopathy, prior hx of microalbuminuria. He has had chronic hyponatremia for a long time and his levels have been mostly around 126- 128 with occasional low values of ~ 123. Prior lab work demonstrated elevated urine osmolality and elevated urine sodium suggestive of SIADH. His serum cortisol and TSH levels were within normal limits during prior check-up. He was recently hospitalized at [place] for low sodium levels (dropped to 123). During hospitalization he received urea packet and had improvement in sodium levels.      April 28, 2023: Recent hospitalization for weakness, dizziness. Now in rehab. Continues to have difficulty swallowing. Blood pressures in rehab have been running on the lower end and usually have been ranging between low 80s to low 100s systolic. He also reports having urinary incontinence occassionally. Feels overall weak and ocassionally dizzy. Sodium levels have improved with Farxiga and are in lower 130s range after correction of hyperglycemia.    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 In past work-up for thyroid and cortisol has been normal. CXR and CT Abdomen and Pelvis in recent years, did not reveal occult malignancy. Elevated ADH levels have been felt to be secondary to his psychiatry medications.   - To aid with free water excretion we initiated him on SGLT2i. With this his sodium numbers did improve but he started feeling more dizzy and lightheaded. His blood pressures have been running low and he was recently hospitalized and now is recovering in rehab, where his blood pressures have continued to be low.   - Given this we will discontinue SGLT2i (Farxiga).   - With his dentures he has hard time chewing and this also limits his ability to restrict fluid intake.   - He often gags with salt tablets limiting their use.   - He has tried Urea packet recently during hospitalization in [place] and did had some success with improvement in his sodium levels, however did not like its taste.     - At this point for further management of hyponatremia we will continue with free water restriction of 1.2 L/day. Use more salt with the meals and start Urea packet 15 g two times a days. Discontinue Farxiga.   - This was discussed with patient, her spouse and the nurse in rehab unit. I will also send message to Dr Meaghan Tillman so that she is aware of this change.     Seen and discussed with Dr Mills, who agrees with the plan.     Andrew Hurley  Nephrology Fellow  [phone]       No Known Allergies    acetaminophen (TYLENOL) 500 MG tablet, Take 2 tablets (1,000 mg) by mouth every 8 hours as needed for mild pain  alendronate (FOSAMAX) 70 MG tablet, TAKE 1 TABLET(70 MG) BY MOUTH EVERY 7 DAYS  atorvastatin (LIPITOR) 20 MG tablet, TAKE 1 TABLET(20MG) BY MOUTH DAILY  blood glucose (NO BRAND SPECIFIED) lancets standard, Use to test blood sugar 3 times daily or as directed.  blood glucose (NO BRAND SPECIFIED) test strip, Please call IF accu-cks >350, as may need insulin  adjusted.  Calcium Polycarbophil (FIBER) 625 MG tablet, Take 1 tablet by mouth every morning  Continuous Blood Gluc Sensor (FREESTYLE ODILIA 2 SENSOR) MISC, 1 each every 14 days Use 1 sensor every 14 days. Use to read blood sugars per 's instructions.  Contour Next EZ (CONTOUR NEXT EZ W/DEVICE KIT) w/Device KIT, USE TO TEST BLOOD SUGAR THREE TIMES DAILY OR AS DIRECTED  CONTOUR NEXT TEST test strip, USE TO TEST BLOOD SUGAR 3 TIMES DAILY OR AS DIRECTED  dapagliflozin (FARXIGA) 5 MG TABS tablet, Take 1 tablet (5 mg) by mouth daily  hydrOXYzine (ATARAX) 10 MG tablet, Take 10 mg by mouth 3 times daily as needed for itching  insulin aspart (NOVOLOG PEN) 100 UNIT/ML pen, Inject 1-10 Units Subcutaneous 3 times daily (before meals) HIGH INSULIN RESISTANCE DOSING     Do Not give Correction Insulin if Pre-Meal BG less than 140.   For Pre-Meal  - 164 give 1 unit.   For Pre-Meal  - 189 give 2 units.   For Pre-Meal  - 214 give 3 units.   For Pre-Meal  - 239 give 4 units.   For Pre-Meal  - 264 give 5 units.   For Pre-Meal  - 289 give 6 units.   For Pre-Meal  - 314 give 7 units.   For Pre-Meal  - 339 give 8 units.   For Pre-Meal  - 364 give 9 units.   For Pre-Meal BG greater than or equal to 365 give 10 units  insulin glargine (BASAGLAR KWIKPEN) 100 UNIT/ML pen, Inject 8 units under the skin once daily in the morning.  insulin pen needle (B-D U/F) 31G X 8 MM miscellaneous, Use 4 times a day  metoprolol succinate ER (TOPROL XL) 50 MG 24 hr tablet, Take 1 tablet (50 mg) by mouth daily (Patient taking differently: Take 50 mg by mouth daily HOLD if SBP <110 OR HR < 60. Call if held.)  Mirtazapine (REMERON PO), Take 22.5 mg by mouth At Bedtime Depression/Anxiety  Multiple Vitamins-Minerals (PRESERVISION AREDS 2) CAPS, Take 2 capsules by mouth daily  order for DME, Test strips for pt's glucometer, brand as covered by insurance Test QID and prn. He is on insulin. Patients  preferred brand-Verio One Touch.  Psyllium-Calcium (METAMUCIL PLUS CALCIUM) CAPS, Take 0.5 capsules by mouth nightly as needed (constipation)  sulfaSALAzine ER (AZULFIDINE EN) 500 MG EC tablet, Take 500 mg by mouth 2 times daily For RA  vitamin D3 (CHOLECALCIFEROL) 50 mcg (2000 units) tablet, Take 1 tablet by mouth daily  WOUND DRESSINGS EX, Externally apply topically daily    No current facility-administered medications on file prior to visit.      Past Medical History:   Diagnosis Date     Arthropathy, unspecified, site unspecified     palindromic rheumatism; takes aleve bid      Compression fracture of body of thoracic vertebra (H)      Compression fracture of L1 lumbar vertebra (H) 2019    see MRI     Hyperlipidemia LDL goal <100 2012    Was on fenofibrate plus simva, in Accord study; LDL 84 in Lipitor 20 in 2012; 97 in 3/13     Hyponatremia      Rheumatoid arthritis (H)      Sialoadenitis 2015     Syncope      Type 2 diabetes, HbA1C goal < 8% (H) 2012       Past Surgical History:   Procedure Laterality Date     ENT SURGERY  2009    nasal; removal of pyogenic granuloma L     EYE SURGERY  murray 15 and22 2009    cataract     HC TOOTH EXTRACTION W/FORCEP       TONSILLECTOMY         Social History     Tobacco Use     Smoking status: Former     Packs/day: 1.00     Years: 20.00     Pack years: 20.00     Types: Cigarettes     Quit date: 1993     Years since quittin.2     Smokeless tobacco: Never   Vaping Use     Vaping status: Never Used   Substance Use Topics     Alcohol use: Yes     Comment: 1-2 drinks per month     Drug use: No       Family History   Problem Relation Age of Onset     Heart Disease Father      Coronary Artery Disease Father      Heart Disease Brother      Coronary Artery Disease Brother      Alzheimer Disease Sister      No Known Problems Mother      Substance Abuse Son         alcohol       ROS: A 12 system review of systems was negative other than noted here or  above.     Exam:  There were no vitals taken for this visit.    GENERAL APPEARANCE: alert and no distress  EYES: PERRL, no scleral icterus  HENT: mouth without ulcers or lesions  NECK: supple, no adenopathy  RESP: lungs clear to auscultation   CV: regular rhythm, normal rate, no rub  Extremities: no clubbing, cyanosis, or edema  SKIN: no rash  NEURO: mentation intact and speech normal  PSYCH: affect normal/bright    Results:    Lab on 12/09/2022   Component Date Value Ref Range Status     Sodium 12/09/2022 131 (L)  136 - 145 mmol/L Final     Potassium 12/09/2022 4.4  3.4 - 5.3 mmol/L Final     Chloride 12/09/2022 93 (L)  98 - 107 mmol/L Final     Carbon Dioxide (CO2) 12/09/2022 30 (H)  22 - 29 mmol/L Final     Anion Gap 12/09/2022 8  7 - 15 mmol/L Final     Urea Nitrogen 12/09/2022 19.9  8.0 - 23.0 mg/dL Final     Creatinine 12/09/2022 0.76  0.67 - 1.17 mg/dL Final     Calcium 12/09/2022 9.3  8.8 - 10.2 mg/dL Final     Glucose 12/09/2022 208 (H)  70 - 99 mg/dL Final     GFR Estimate 12/09/2022 89  >60 mL/min/1.73m2 Final    Effective December 21, 2021 eGFRcr in adults is calculated using the 2021 CKD-EPI creatinine equation which includes age and gender (Adama alvarez al., NEJ, DOI: 10.1056/MVVDgf0905432)     Color Urine 12/09/2022 Yellow  Colorless, Straw, Light Yellow, Yellow Final     Appearance Urine 12/09/2022 Clear  Clear Final     Glucose Urine 12/09/2022 Negative  Negative mg/dL Final     Bilirubin Urine 12/09/2022 Negative  Negative Final     Ketones Urine 12/09/2022 Negative  Negative mg/dL Final     Specific Gravity Urine 12/09/2022 1.014  1.003 - 1.035 Final     Blood Urine 12/09/2022 Negative  Negative Final     pH Urine 12/09/2022 6.5  5.0 - 7.0 Final     Protein Albumin Urine 12/09/2022 Negative  Negative mg/dL Final     Urobilinogen Urine 12/09/2022 Normal  Normal, 2.0 mg/dL Final     Nitrite Urine 12/09/2022 Negative  Negative Final     Leukocyte Esterase Urine 12/09/2022 Negative  Negative Final      Mucus Urine 12/09/2022 Present (A)  None Seen /LPF Final     RBC Urine 12/09/2022 1  <=2 /HPF Final     WBC Urine 12/09/2022 1  <=5 /HPF Final     Squamous Epithelials Urine 12/09/2022 <1  <=1 /HPF Final     Hyaline Casts Urine 12/09/2022 1  <=2 /LPF Final

## 2023-04-28 NOTE — NURSING NOTE
Is the patient currently in the state of [place]? YES    Visit mode:VIDEO    If the visit is dropped, the patient can be reconnected by: VIDEO VISIT: Text to cell phone: [phone]    Will anyone else be joining the visit? NO      How would you like to obtain your AVS? MyChart    Are changes needed to the allergy or medication list? NO    Reason for visit: Video Visit

## 2023-04-28 NOTE — LETTER
4/28/2023       RE: Jose Francisco Gonzalez  [address]  [facility] [place] [facility] [address]     Dear Colleague,    Thank you for referring your patient, Jose Francisco Gonzalez, to the [facility] [place] at [facility]. Please see a copy of my visit note below.    Virtual Visit Details    Type of service:  Video Visit   Video Start Time: 9:04 AM  Video End Time:9:35 AM    Originating Location (pt. Location): Assisted Living    Distant Location (provider location):  On-site  Platform used for Video Visit: [facility]     Virtual Visit Details    Type of service:  Video Visit   Video Start Time: 09:04 AM  Video End Time:09:35 AM    Originating Location (pt. Location): Home    Distant Location (provider location):  On-site  Platform used for Video Visit: [facility]        Nephrology Clinic Note      April 28, 2023      CC: Hyponatremia    Pertinent Renal Hx:  - Chronic hyponatremia - as long as our records in 2011   - Hypertension  - Type 2 DM  - Hx of Microalbuminuria    HPI: Jose Francisco Gonzalez is a 82 year old male with PMHx of Hypertension and Type 2 DM without diabetic retinopathy, prior hx of microalbuminuria. He has had chronic hyponatremia for a long time and his levels have been mostly around 126- 128 with occasional low values of ~ 123. Prior lab work demonstrated elevated urine osmolality and elevated urine sodium suggestive of SIADH. His serum cortisol and TSH levels were within normal limits during prior check-up. He was recently hospitalized at [place] for low sodium levels (dropped to 123). During hospitalization he received urea packet and had improvement in sodium levels.      April 28, 2023: Recent hospitalization for weakness, dizziness. Now in rehab. Continues to have difficulty swallowing. Blood pressures in rehab have been running on the lower end and usually have been ranging between low 80s to low 100s systolic. He also reports having urinary  incontinence occassionally. Feels overall weak and ocassionally dizzy. Sodium levels have improved with Farxiga and are in lower 130s range after correction of hyperglycemia.    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 In past work-up for thyroid and cortisol has been normal. CXR and CT Abdomen and Pelvis in recent years, did not reveal occult malignancy. Elevated ADH levels have been felt to be secondary to his psychiatry medications.   - To aid with free water excretion we initiated him on SGLT2i. With this his sodium numbers did improve but he started feeling more dizzy and lightheaded. His blood pressures have been running low and he was recently hospitalized and now is recovering in rehab, where his blood pressures have continued to be low.   - Given this we will discontinue SGLT2i (Farxiga).   - With his dentures he has hard time chewing and this also limits his ability to restrict fluid intake.   - He often gags with salt tablets limiting their use.   - He has tried Urea packet recently during hospitalization in [place] and did had some success with improvement in his sodium levels, however did not like its taste.     - At this point for further management of hyponatremia we will continue with free water restriction of 1.2 L/day. Use more salt with the meals and start Urea packet 15 g two times a days. Discontinue Farxiga.   - This was discussed with patient, her spouse and the nurse in rehab unit. I will also send message to Dr Meaghan Tillman so that she is aware of this change.     Seen and discussed with Dr Mills, who agrees with the plan.     Andrew Hurley  Nephrology Fellow  [phone]       No Known Allergies    acetaminophen (TYLENOL) 500 MG tablet, Take 2 tablets (1,000 mg) by mouth every 8 hours as needed for mild pain  alendronate (FOSAMAX) 70 MG tablet, TAKE 1  TABLET(70 MG) BY MOUTH EVERY 7 DAYS  atorvastatin (LIPITOR) 20 MG tablet, TAKE 1 TABLET(20MG) BY MOUTH DAILY  blood glucose (NO BRAND SPECIFIED) lancets standard, Use to test blood sugar 3 times daily or as directed.  blood glucose (NO BRAND SPECIFIED) test strip, Please call IF accu-cks >350, as may need insulin adjusted.  Calcium Polycarbophil (FIBER) 625 MG tablet, Take 1 tablet by mouth every morning  Continuous Blood Gluc Sensor (FREESTYLE ODILIA 2 SENSOR) [employer], 1 each every 14 days Use 1 sensor every 14 days. Use to read blood sugars per 's instructions.  Contour Next EZ (CONTOUR NEXT EZ W/DEVICE KIT) w/Device KIT, USE TO TEST BLOOD SUGAR THREE TIMES DAILY OR AS DIRECTED  CONTOUR NEXT TEST test strip, USE TO TEST BLOOD SUGAR 3 TIMES DAILY OR AS DIRECTED  dapagliflozin (FARXIGA) 5 MG TABS tablet, Take 1 tablet (5 mg) by mouth daily  hydrOXYzine (ATARAX) 10 MG tablet, Take 10 mg by mouth 3 times daily as needed for itching  insulin aspart (NOVOLOG PEN) 100 UNIT/ML pen, Inject 1-10 Units Subcutaneous 3 times daily (before meals) HIGH INSULIN RESISTANCE DOSING     Do Not give Correction Insulin if Pre-Meal BG less than 140.   For Pre-Meal  - 164 give 1 unit.   For Pre-Meal  - 189 give 2 units.   For Pre-Meal  - 214 give 3 units.   For Pre-Meal  - 239 give 4 units.   For Pre-Meal  - 264 give 5 units.   For Pre-Meal  - 289 give 6 units.   For Pre-Meal  - 314 give 7 units.   For Pre-Meal  - 339 give 8 units.   For Pre-Meal  - 364 give 9 units.   For Pre-Meal BG greater than or equal to 365 give 10 units  insulin glargine (BASAGLAR KWIKPEN) 100 UNIT/ML pen, Inject 8 units under the skin once daily in the morning.  insulin pen needle (B-D U/F) 31G X 8 MM miscellaneous, Use 4 times a day  metoprolol succinate ER (TOPROL XL) 50 MG 24 hr tablet, Take 1 tablet (50 mg) by mouth daily (Patient taking differently: Take 50 mg by mouth daily HOLD if SBP <110 OR HR  < 60. Call if held.)  Mirtazapine (REMERON PO), Take 22.5 mg by mouth At Bedtime Depression/Anxiety  Multiple Vitamins-Minerals (PRESERVISION AREDS 2) CAPS, Take 2 capsules by mouth daily  order for DME, Test strips for pt's glucometer, brand as covered by insurance Test QID and prn. He is on insulin. Patients preferred brand-Verio One Touch.  Psyllium-Calcium (METAMUCIL PLUS CALCIUM) CAPS, Take 0.5 capsules by mouth nightly as needed (constipation)  sulfaSALAzine ER (AZULFIDINE EN) 500 MG EC tablet, Take 500 mg by mouth 2 times daily For RA  vitamin D3 (CHOLECALCIFEROL) 50 mcg (2000 units) tablet, Take 1 tablet by mouth daily  WOUND DRESSINGS EX, Externally apply topically daily    No current facility-administered medications on file prior to visit.      Past Medical History:   Diagnosis Date    Arthropathy, unspecified, site unspecified     palindromic rheumatism; takes aleve bid     Compression fracture of body of thoracic vertebra (H)     Compression fracture of L1 lumbar vertebra (H) 2019    see MRI    Hyperlipidemia LDL goal <100 2012    Was on fenofibrate plus simva, in Accord study; LDL 84 in Lipitor 20 in 2012; 97 in 3/13    Hyponatremia     Rheumatoid arthritis (H)     Sialoadenitis 2015    Syncope     Type 2 diabetes, HbA1C goal < 8% (H) 2012       Past Surgical History:   Procedure Laterality Date    ENT SURGERY  2009    nasal; removal of pyogenic granuloma L    EYE SURGERY  murray 15 and22 2009    cataract    HC TOOTH EXTRACTION W/FORCEP      TONSILLECTOMY         Social History     Tobacco Use    Smoking status: Former     Packs/day: 1.00     Years: 20.00     Pack years: 20.00     Types: Cigarettes     Quit date: 1993     Years since quittin.2    Smokeless tobacco: Never   Vaping Use    Vaping status: Never Used   Substance Use Topics    Alcohol use: Yes     Comment: 1-2 drinks per month    Drug use: No       Family History   Problem Relation Age of Onset    Heart Disease  Father     Coronary Artery Disease Father     Heart Disease Brother     Coronary Artery Disease Brother     Alzheimer Disease Sister     No Known Problems Mother     Substance Abuse Son         alcohol       ROS: A 12 system review of systems was negative other than noted here or above.     Exam:  There were no vitals taken for this visit.    GENERAL APPEARANCE: alert and no distress  EYES: PERRL, no scleral icterus  HENT: mouth without ulcers or lesions  NECK: supple, no adenopathy  RESP: lungs clear to auscultation   CV: regular rhythm, normal rate, no rub  Extremities: no clubbing, cyanosis, or edema  SKIN: no rash  NEURO: mentation intact and speech normal  PSYCH: affect normal/bright    Results:    Lab on 12/09/2022   Component Date Value Ref Range Status    Sodium 12/09/2022 131 (L)  136 - 145 mmol/L Final    Potassium 12/09/2022 4.4  3.4 - 5.3 mmol/L Final    Chloride 12/09/2022 93 (L)  98 - 107 mmol/L Final    Carbon Dioxide (CO2) 12/09/2022 30 (H)  22 - 29 mmol/L Final    Anion Gap 12/09/2022 8  7 - 15 mmol/L Final    Urea Nitrogen 12/09/2022 19.9  8.0 - 23.0 mg/dL Final    Creatinine 12/09/2022 0.76  0.67 - 1.17 mg/dL Final    Calcium 12/09/2022 9.3  8.8 - 10.2 mg/dL Final    Glucose 12/09/2022 208 (H)  70 - 99 mg/dL Final    GFR Estimate 12/09/2022 89  >60 mL/min/1.73m2 Final    Effective December 21, 2021 eGFRcr in adults is calculated using the 2021 CKD-EPI creatinine equation which includes age and gender (Adama et al., NEJ, DOI: 10.1056/FOVJvg8653283)    Color Urine 12/09/2022 Yellow  Colorless, Straw, Light Yellow, Yellow Final    Appearance Urine 12/09/2022 Clear  Clear Final    Glucose Urine 12/09/2022 Negative  Negative mg/dL Final    Bilirubin Urine 12/09/2022 Negative  Negative Final    Ketones Urine 12/09/2022 Negative  Negative mg/dL Final    Specific Gravity Urine 12/09/2022 1.014  1.003 - 1.035 Final    Blood Urine 12/09/2022 Negative  Negative Final    pH Urine 12/09/2022 6.5  5.0 - 7.0  Final    Protein Albumin Urine 12/09/2022 Negative  Negative mg/dL Final    Urobilinogen Urine 12/09/2022 Normal  Normal, 2.0 mg/dL Final    Nitrite Urine 12/09/2022 Negative  Negative Final    Leukocyte Esterase Urine 12/09/2022 Negative  Negative Final    Mucus Urine 12/09/2022 Present (A)  None Seen /LPF Final    RBC Urine 12/09/2022 1  <=2 /HPF Final    WBC Urine 12/09/2022 1  <=5 /HPF Final    Squamous Epithelials Urine 12/09/2022 <1  <=1 /HPF Final    Hyaline Casts Urine 12/09/2022 1  <=2 /LPF Final         Attestation signed by Lissy Mills MD at 5/13/2023  9:55 AM:  I was present with the resident during the history and exam.  I discussed the case with the resident and agree with the findings as documented in the assessment and plan.     Lissy Mills MD

## 2023-05-02 NOTE — LETTER
5/2/2023        RE: Jose Francisco Gonzalez  [address]  [facility] [address]        [facility] GERIATRICS    Chief Complaint   Patient presents with     Nursing Home Acute     HPI:  Jose Francisco Gonzalez is a 82 year old  (1940), who is being seen today for an episodic care visit at: [facility] ([facility]) [[id number]].     Patient seen today in U for recheck of multiple acute and chronic medical issues:    1. Generalized weakness    2. Weight loss    3. Dysphagia, unspecified type    4. Anxiety and depression    5. Physical deconditioning    6. Melanoma of back (H): new March 2023    7. Chronic hyponatremia    8. Type 2 diabetes mellitus with diabetic nephropathy, with long-term current use of insulin (H)    9. Hypertensive heart disease without heart failure    10. Hyperlipidemia, unspecified hyperlipidemia type    11. Macular degeneration, unspecified laterality, unspecified type    12. Rheumatoid arthritis, involving unspecified site, unspecified whether rheumatoid factor present (H)      Nursing reports concerns 2/2 patient and family wanting to pursue 5/4 dental procedure even though patient recently hospitalized and now in TCU. ? preop needed.    Patient found today sitting up in chair. Wife Dominique visiting and later daughter Osiris arrived. Patient alert, anxious, frustrated. Reports weight loss of approximately 20 lb since November 2022. Also progressive weakness. Has had trouble with implant related infection in past and so given chronic dental issues has struggled to find things he enjoys eating. Long discussion had with patient and family about upcoming dental appt for implants with IV sedation on 5/4 and food choices patient may enjoy to increase caloric intake. Writer went over risks and concerns regarding IV sedation and tolerance of procedure given patient's deconditioned and rather frail current state. Patient and family expressed concern about not getting  "procedure done as scheduled 2/2 wait involved for next appt and possible impact ongoing dental issues are having on weight loss and overall declining health status. Patient's wife Dominique reports she did update oral surgeon with patient's hospitalization and TCU placement and is awaiting a call back.    Writer placed call to Dr. Justice's office following above visit and was told that neither Dr. Justice or anesthesiologist were comfortable with proceeding on 5/4 and so have cancelled the procedure. This was relayed by writer to patient's wife Divya.             Allergies, and PMH/PSH reviewed in [employer] today.  REVIEW OF SYSTEMS:  4 point ROS including Respiratory, CV, GI and , other than that noted in the HPI,  is negative    Objective:   /73   Pulse 104   Temp 97.7  F (36.5  C)   Resp 18   Ht 1.778 m (5' 10\")   Wt 63.5 kg (140 lb)   SpO2 98%   BMI 20.09 kg/m      Resp: Effort WNL, LSCTA   CV: VS as above  Abd- soft, nontender, BS +  Musc- SHIPLEY- frail, generalized weakness  Psych- alert, anxious        Most Recent 3 CBC's:  Recent Labs   Lab Test 04/26/23  1359 04/22/23  0753 04/21/23  1052   WBC 6.2 12.1* 11.2*   HGB 12.7* 12.2* 12.5*   MCV 89 88 90    287 297     Most Recent 3 BMP's:  Recent Labs   Lab Test 05/02/23  0657 04/26/23  1359 04/25/23  1222 04/25/23  0906 04/24/23  0831 04/24/23  0824 04/22/23  0808 04/22/23  0753   * 128*  --   --   --  126*  --  127*   POTASSIUM 4.3 4.6  --  4.3  --  4.3   < > 3.9   CHLORIDE 90* 88*  --   --   --   --   --  92*   CO2 26 33*  --   --   --   --   --  27   BUN 29.7* 20.8  --   --   --   --   --  13.0   CR 0.61* 0.66*  --   --   --   --   --  0.55*   ANIONGAP 12 7  --   --   --   --   --  8   DENISE 9.8 9.9  --   --   --   --   --  9.1   * 290* 156*  --    < >  --    < > 200*    < > = values in this interval not displayed.       Assessment/Plan:  Generalized weakness  Weight loss  Dysphagia, unspecified type  Physical deconditioning  - " multifactorial. Approx 20 lb weight loss since November. On pureed diet with mildly thickened liquids. Is frustrated with this diet and having difficulty consuming appropriate amount of calories. Wife brings in Ensure but some challenges in terms of coordinating this with staff. Agrees to visit with dietician.   - dietician referral for consult and recommendations to help to increase caloric intake  - continue ST.   - follow intakes/weights closely    ? Extent of role dentition and associated issues are playing here but 5/4 dental implant procedure which was set up with IV sedation 2/2 patient level of anxiety was cancelled by oral surgeon. Family notified. This will need to be rescheduled. Oral surgeon did inquire with facility about ? Oral surgeon affiliated with [facility] who could schedule patient for procedure in hospital/monitored but unfortunately there is none.       Anxiety and depression  - chronic, exacerbated?, of issue at least to some extent in above. Primary NP recently increased Remeron back to 22.5 mg and monitoring Na+ level. B12, Vit D and TSH are all WNL today.  - writer passed along ? Recommendation to patient of ACP referral to MD when rounds later this week. Today's visit was not an opportune time to raise this issue 2/2 commotion around dental appt and intake.  - ongoing supportive approach and monitoring of mood and behaviors    Melanoma of back (H): new March 2023  0 s/p removal. Margins clear reportedly. Dr. Kasper's office did call back per nursing and gave ok for nursing here to remove stitches. Nurse Ida will remove 5/3.     Chronic hyponatremia  - chronic, stable 128 today. Nephrologist Dr. Morales sent note letting NP know is running out of good options to treat the hyponatremia. So is trialing Urea supplement.   - monitor patient's ability to take/tolerance of Urea supplement and monitor overall hydration status closely  - BMP ordered for 5/8. Dietician as  above for overall nutritional and hydration recommendations  - follow intake/weights    Type 2 diabetes mellitus with diabetic nephropathy, with long-term current use of insulin (H)  - chronic. Nephology recently stopped Faarxiga 2/2 side effects  - will increase insulin glargine (BASAGLAR KWIKPEN) 100 UNIT/ML pen; Inject 10 units under the skin once daily in the morning. (From 8 units) 2/2 some BGL elevations. Also remains on SSI    Hypertensive heart disease without heart failure  - chronic, has been having continued soft BP- SBP 90s. Some reported dizziness with standing at times  - ordered to decrease metoprolol succinate ER (TOPROL XL) 25 MG 24 hr tablet; Take 1 tablet (25 mg) by mouth daily (from 50mg) 2/2 nursing often holding and some mild tachycardia. Did continue hold parameter of SBP < 110.  - monitor BP/VS daily    Hyperlipidemia, unspecified hyperlipidemia type  - chronic, continue statin    Macular degeneration, unspecified laterality, unspecified type  - chronic, continue home regimen    Rheumatoid arthritis, involving unspecified site, unspecified whether rheumatoid factor present (H)  - chronic, no noted flares  - continues PTA sulfasalazine          Orders:  Written on unit and discussed with patient, wife and nursing    Electronically signed by: DEE DEE Schilling CNP           Sincerely,        DEE DEE Schilling CNP

## 2023-05-02 NOTE — PROGRESS NOTES
[facility] GERIATRICS    Chief Complaint   Patient presents with     Nursing Home Acute     HPI:  Jose Francisco Gonzalez is a 82 year old  (1940), who is being seen today for an episodic care visit at: [facility] ([facility]) [[id number]].     Patient seen today in TCU for recheck of multiple acute and chronic medical issues:    1. Generalized weakness    2. Weight loss    3. Dysphagia, unspecified type    4. Anxiety and depression    5. Physical deconditioning    6. Melanoma of back (H): new March 2023    7. Chronic hyponatremia    8. Type 2 diabetes mellitus with diabetic nephropathy, with long-term current use of insulin (H)    9. Hypertensive heart disease without heart failure    10. Hyperlipidemia, unspecified hyperlipidemia type    11. Macular degeneration, unspecified laterality, unspecified type    12. Rheumatoid arthritis, involving unspecified site, unspecified whether rheumatoid factor present (H)      Nursing reports concerns 2/2 patient and family wanting to pursue 5/4 dental procedure even though patient recently hospitalized and now in TCU. ? preop needed.    Patient found today sitting up in chair. Wife Dominique visiting and later daughter Osiris arrived. Patient alert, anxious, frustrated. Reports weight loss of approximately 20 lb since November 2022. Also progressive weakness. Has had trouble with implant related infection in past and so given chronic dental issues has struggled to find things he enjoys eating. Long discussion had with patient and family about upcoming dental appt for implants with IV sedation on 5/4 and food choices patient may enjoy to increase caloric intake. Writer went over risks and concerns regarding IV sedation and tolerance of procedure given patient's deconditioned and rather frail current state. Patient and family expressed concern about not getting procedure done as scheduled 2/2 wait involved for next appt and possible impact ongoing dental  "issues are having on weight loss and overall declining health status. Patient's wife Dominique reports she did update oral surgeon with patient's hospitalization and TCU placement and is awaiting a call back.    Writer placed call to Dr. Justice's office following above visit and was told that neither Dr. Justice or anesthesiologist were comfortable with proceeding on 5/4 and so have cancelled the procedure. This was relayed by writer to patient's wife Divya.             Allergies, and PMH/PSH reviewed in [facility] today.  REVIEW OF SYSTEMS:  4 point ROS including Respiratory, CV, GI and , other than that noted in the HPI,  is negative    Objective:   /73   Pulse 104   Temp 97.7  F (36.5  C)   Resp 18   Ht 1.778 m (5' 10\")   Wt 63.5 kg (140 lb)   SpO2 98%   BMI 20.09 kg/m      Resp: Effort WNL, LSCTA   CV: VS as above  Abd- soft, nontender, BS +  Musc- SHIPLEY- frail, generalized weakness  Psych- alert, anxious        Most Recent 3 CBC's:  Recent Labs   Lab Test 04/26/23  1359 04/22/23  0753 04/21/23  1052   WBC 6.2 12.1* 11.2*   HGB 12.7* 12.2* 12.5*   MCV 89 88 90    287 297     Most Recent 3 BMP's:  Recent Labs   Lab Test 05/02/23  0657 04/26/23  1359 04/25/23  1222 04/25/23  0906 04/24/23  0831 04/24/23  0824 04/22/23  0808 04/22/23  0753   * 128*  --   --   --  126*  --  127*   POTASSIUM 4.3 4.6  --  4.3  --  4.3   < > 3.9   CHLORIDE 90* 88*  --   --   --   --   --  92*   CO2 26 33*  --   --   --   --   --  27   BUN 29.7* 20.8  --   --   --   --   --  13.0   CR 0.61* 0.66*  --   --   --   --   --  0.55*   ANIONGAP 12 7  --   --   --   --   --  8   DENISE 9.8 9.9  --   --   --   --   --  9.1   * 290* 156*  --    < >  --    < > 200*    < > = values in this interval not displayed.       Assessment/Plan:  Generalized weakness  Weight loss  Dysphagia, unspecified type  Physical deconditioning  - multifactorial. Approx 20 lb weight loss since November. On pureed diet with mildly thickened liquids. Is " frustrated with this diet and having difficulty consuming appropriate amount of calories. Wife brings in Ensure but some challenges in terms of coordinating this with staff. Agrees to visit with dietician.   - dietician referral for consult and recommendations to help to increase caloric intake  - continue ST.   - follow intakes/weights closely    ? Extent of role dentition and associated issues are playing here but 5/4 dental implant procedure which was set up with IV sedation 2/2 patient level of anxiety was cancelled by oral surgeon. Family notified. This will need to be rescheduled. Oral surgeon did inquire with facility about ? Oral surgeon affiliated with [facility] who could schedule patient for procedure in hospital/monitored but unfortunately there is none.       Anxiety and depression  - chronic, exacerbated?, of issue at least to some extent in above. Primary NP recently increased Remeron back to 22.5 mg and monitoring Na+ level. B12, Vit D and TSH are all WNL today.  - writer passed along ? Recommendation to patient of ACP referral to MD when rounds later this week. Today's visit was not an opportune time to raise this issue 2/2 commotion around dental appt and intake.  - ongoing supportive approach and monitoring of mood and behaviors    Melanoma of back (H): new March 2023  0 s/p removal. Margins clear reportedly. Dr. Kasper's office did call back per nursing and gave ok for nursing here to remove stitches. Nurse Ida will remove 5/3.     Chronic hyponatremia  - chronic, stable 128 today. Nephrologist Dr. Morales sent note letting NP know is running out of good options to treat the hyponatremia. So is trialing Urea supplement.   - monitor patient's ability to take/tolerance of Urea supplement and monitor overall hydration status closely  - BMP ordered for 5/8. Dietician as above for overall nutritional and hydration recommendations  - follow intake/weights    Type 2 diabetes  mellitus with diabetic nephropathy, with long-term current use of insulin (H)  - chronic. Nephology recently stopped Faarxiga 2/2 side effects  - will increase insulin glargine (BASAGLAR KWIKPEN) 100 UNIT/ML pen; Inject 10 units under the skin once daily in the morning. (From 8 units) 2/2 some BGL elevations. Also remains on SSI    Hypertensive heart disease without heart failure  - chronic, has been having continued soft BP- SBP 90s. Some reported dizziness with standing at times  - ordered to decrease metoprolol succinate ER (TOPROL XL) 25 MG 24 hr tablet; Take 1 tablet (25 mg) by mouth daily (from 50mg) 2/2 nursing often holding and some mild tachycardia. Did continue hold parameter of SBP < 110.  - monitor BP/VS daily    Hyperlipidemia, unspecified hyperlipidemia type  - chronic, continue statin    Macular degeneration, unspecified laterality, unspecified type  - chronic, continue home regimen    Rheumatoid arthritis, involving unspecified site, unspecified whether rheumatoid factor present (H)  - chronic, no noted flares  - continues PTA sulfasalazine          Orders:  Written on unit and discussed with patient, wife and nursing    Electronically signed by: DEE DEE Schilling CNP

## 2023-05-04 NOTE — PROGRESS NOTES
Jose Francisco Gonzalez is a 82 year old male seen May 4, 2023 at [facility] TCU where he was admitted after [facility] hospitalization 4/19-25 for generalized weakness and weight loss that was determined to be multifactorial with significant precipitating factor noted to be extraction of his teeth with dental implant infection and uncomfortable dentures that led to increased gag reflex and poor PO intake.   Also related is 1.2 L daily fluid restriction for hyponatremia, and intractable anxiety and depression.  He was seen by Speech Therapy and Dietician in the hospital, placed on a minced/moist diet with thickened liquids.     He had a night of agitated delirium in the hospital, cleared.  Na stable.   He discharged to TCU for ongoing recovery and Rehab.        Seen in his room up to chair with wife present.   Still trouble swallowing, finds the ST exercises difficult.   Taking Ensure and other supplements to try and get enough nutrition.  Takes a long time to eat and he gets tired.  He had his teeth pulled in October 2022; in the process of getting implants which have been very slow to heal.   Has upper plates which he has trouble seating and may cause some discomfort.   Pt is extremely frustrated by this, works hard to articulate his wishes aside from wanting to be more comfortable.  Has moments of significant anxiety; hydroxyzine given PRN seems to help some.   Ida ADAMS took sutures out yesterday, on upper back.  Incision healed.      Back is okay now, no pain.   No recent falls.   Pt has chronic hyponatremia, back until 2011   Followed by Dr Rasheed in Nephrology    Was on Faxiga for just a few weeks, couldn't swallow the tablets.   Recent recommendation is Urea 15g bid        Past Medical History:   Diagnosis Date     Arthropathy, unspecified, site unspecified     palindromic rheumatism; takes aleve bid      Compression fracture of body of thoracic vertebra (H)      Compression fracture of L1  lumbar vertebra (H) 2019    see MRI     Hyperlipidemia LDL goal <100 2012    Was on fenofibrate plus simva, in [facility] study; LDL 84 in Lipitor 20 in 2012; 97 in 3/13     Hyponatremia      Rheumatoid arthritis (H)      Sialoadenitis 2015     Syncope      Type 2 diabetes, HbA1C goal < 8% (H) 2012       Past Surgical History:   Procedure Laterality Date     ENT SURGERY  2009    nasal; removal of pyogenic granuloma L     EYE SURGERY  murray 15 and22 2009    cataract     HC TOOTH EXTRACTION W/FORCEP       TONSILLECTOMY         Family History   Problem Relation Age of Onset     Heart Disease Father      Coronary Artery Disease Father      Heart Disease Brother      Coronary Artery Disease Brother      Alzheimer Disease Sister      No Known Problems Mother      Substance Abuse Son         alcohol       Social History     Tobacco Use     Smoking status: Former     Packs/day: 1.00     Years: 20.00     Pack years: 20.00     Types: Cigarettes     Quit date: 1993     Years since quittin.2     Smokeless tobacco: Never   Vaping Use     Vaping status: Never Used   Substance Use Topics     Alcohol use: Yes     Comment: 1-2 drinks per month      SH: [place]an [place]     Was a  at one time   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     Review Of Systems  Skin: negative   Eyes: impaired vision, glasses, macular degeneration with progressively worsening vision   Ears/Nose/Throat: hearing loss  Respiratory: No shortness of breath, dyspnea on exertion, cough, or hemoptysis, no h/o aspiration pneumonitis     Cardiovascular: negative  Gastrointestinal: as above  Genitourinary: incontinence  Musculoskeletal: RA recently dx'd   Independent to [facility] for ADLs, transfers with [facility], ambulates 40-50' with FWW and [facility]    TUG 35s   Neurologic: negative, SLUMS 28/30   Shuffled gait   Psychiatric: anxiety, depression and passive SI  Hematologic/Lymphatic/Immunologic: negative  Endocrine:  "diabetes     GENERAL APPEARANCE: thin and frail, alert, mild distress  /65   Pulse 102   Temp 97.9  F (36.6  C)   Resp 18   Ht 1.778 m (5' 10\")   Wt 61.6 kg (135 lb 12.8 oz)   SpO2 97%   BMI 19.49 kg/m     HEENT: normocephalic, no lesion or abnormalities  NECK: no adenopathy, no asymmetry, masses, or scars and thyroid normal to palpation  RESP: lungs clear to auscultation - no rales, rhonchi or wheezes  CV: regular rate and rhythm, normal S1 S2 very distant  ABDOMEN:  soft, nontender, no HSM or masses and bowel sounds normal  MS: extremities without edema, +sarcopenia  SKIN: no suspicious lesions or rashes  NEURO: speech soft and slow to respond, but tracking well.    PSYCH: affect anxious, sad     LYMPHATICS: No cervical,  or supraclavicular nodesd    Lab Results   Component Value Date     (L) 05/02/2023    POTASSIUM 4.3 05/02/2023    CHLORIDE 90 (L) 05/02/2023    CO2 26 05/02/2023    ANIONGAP 12 05/02/2023     (H) 05/02/2023    BUN 29.7 (H) 05/02/2023    CR 0.61 (L) 05/02/2023    GFRESTIMATED >90 05/02/2023    DENISE 9.8 05/02/2023     Lab Results   Component Value Date    AST 17 04/26/2023      ALBUMIN 3.9 04/26/2023      ALKPHOS 73 04/26/2023     Lab Results   Component Value Date    WBC 6.2 04/26/2023      HGB 12.7 04/26/2023      MCV 89 04/26/2023       04/26/2023      VIDEO SWALLOWING EVALUATION   4/24/2023   HISTORY: Dysphagia    Thin: Premature spill to the vallecula. Mild residue. Moderate flash penetration. Aspiration of residue.  Slightly thick: Premature spill to the piriforms. Mild residue. Otherwise normal.   Mildly thick: Premature spill to the piriforms. Mild residue. Otherwise normal.    BP Readings from Last 3 Encounters:   05/04/23 108/65   05/02/23 132/73   04/27/23 136/81      Pulse Readings from Last 4 Encounters:   05/04/23 102   05/02/23 104   04/27/23 95   04/25/23 113         IMP/PLAN:   (R13.10) Dysphagia, unspecified type  (primary encounter " diagnosis)  (R63.4) Weight loss  Comment: upper plates poorly fitting, recent dental work  Yesterday's scheduled dental procedure postponed until pt better able to tolerate it     Plan: dietician to follow, help find food choices he can tolerate   Okay drinking thickened liquid during visit today    Continue Speech Therapy        (R00.0) Tachycardia  Comment: last few days, pt's blood pressures have been low and metoprolol has been held, so HRs higher    Plan: change to metoprolol tartrate 12.5 mg bid with hold only if BP less than 95       (R53.1) Generalized weakness  Comment: sarcopenia, deconditioning   Plan: PHYSICAL THERAPY / OCCUPATIONAL THERAPY for transfers, ADLs, strengthening.   Pt and wife do not feel pt will be able to return home.   Discharge planning to look at higher level of care.         (F41.9,  F32.A) Anxiety and depression  Comment: profound   Plan: mirtazapine 22.5 mg/HS  Hydroxyzine 10 mg tid PRN, (watch for any s/s urinary retention)    Will add trazodone 25 mg bid PRN anxiety   Will ask ACP to see, pt agrees.       (E87.1) Chronic hyponatremia  Comment: recently 125-128     Plan: Urea 15g bid   Follow Na    Follow up with Dr Rasheed as scheduled     (E11.21,  Z79.4) Type 2 diabetes mellitus with diabetic nephropathy, with long-term current use of insulin (H)  Comment:   Lab Results   Component Value Date    A1C 6.9 04/12/2023      Plan: higher sugars during midday only; will add insulin aspart 5 units with breakfast and continue sliding scale   Lantus 10 units/day, BGM   He is on atorvastatin 20 mg/day    Not on ACEI secondary to low Na  Has follow up with Dr Kamara later this month    (H35.30) Macular degeneration, unspecified laterality, unspecified type  Comment: worsening vision limits independence   Plan: supportive care     (M06.9) Rheumatoid arthritis, involving unspecified site, unspecified whether rheumatoid factor present (H)  Comment: has carried this dx for several years, denies  any pain   Plan: remains on sulfasalazine 500 mg bid    Pt and his wife both question if he still needs this     (C43.59) Melanoma of back (H): new March 2023  Comment: recent excision, sutures removed yesterday    Plan: Dermatology follow up    (M81.0) Age-related osteoporosis without current pathological fracture  Comment: had lumbar compression fracture in 2019, has been on alendronate since then     Plan: will hold alendronate while in TCU, pt agreeable as this is difficult for him to take.        Advance directive: DNR/DNI, comfort focus per signed POLST   Pt is his own decision maker, understands his situation and reiterates those directives to me again today, with wife Divya present.     Usha Barnard MD

## 2023-05-04 NOTE — LETTER
5/4/2023        RE: Jose Francisco Gonzalez  [address]  [facility] [id number]        Jose Francisco Gonzalez is a 82 year old male [facility] May 4, 20[facility] after [facility] hospitalization 4/19-25 for generalized weakness and weight loss that was determined to be multifactorial with significant precipitating factor noted to be extraction of his teeth with dental implant infection and uncomfortable dentures that led to increased gag reflex and poor PO intake.   Also related is 1.2 L daily fluid restriction for hyponatremia, and intractable anxiety and depression.  He was seen by Speech Therapy and Dietician in the hospital, placed on a minced/moist diet with thickened liquids.     He had a night of agitated delirium in the hospital, cleared.  Na stable.   He discharged to TCU for ongoing recovery and Rehab.        Seen in his room up to chair with wife present.   Still trouble swallowing, finds the ST exercises difficult.   Taking Ensure and other supplements to try and get enough nutrition.  Takes a long time to eat and he gets tired.  He had his teeth pulled in October 2022; in the process of getting implants which have been very slow to heal.   Has upper plates which he has trouble seating and may cause some discomfort.   Pt is extremely frustrated by this, works hard to articulate his wishes aside from wanting to be more comfortable.  Has moments of significant anxiety; hydroxyzine given PRN seems to help some.   Ida ADAMS took sutures out yesterday, on upper back.  Incision healed.      Back is okay now, no pain.   No recent falls.   Pt has chronic hyponatremia, back until 2011   Followed by Dr Rasheed in Nephrology    Was on Faxiga for just a few weeks, couldn't swallow the tablets.   Recent recommendation is Urea 15g bid        Past Medical History:   Diagnosis Date     Arthropathy, unspecified, site unspecified     palindromic rheumatism; takes aleve bid       Compression fracture of body of thoracic vertebra (H)      Compression fracture of L1 lumbar vertebra (H) 2019    see MRI     Hyperlipidemia LDL goal <100 2012    Was on fenofibrate plus simva, in Accord study; LDL 84 in Lipitor 20 in 2012; 97 in 3/13     Hyponatremia      Rheumatoid arthritis (H)      Sialoadenitis 2015     Syncope      Type 2 diabetes, HbA1C goal < 8% (H) 2012       Past Surgical History:   Procedure Laterality Date     ENT SURGERY  2009    nasal; removal of pyogenic granuloma L     EYE SURGERY  murray 15 and22 2009    cataract     HC TOOTH EXTRACTION W/FORCEP       TONSILLECTOMY         Family History   Problem Relation Age of Onset     Heart Disease Father      Coronary Artery Disease Father      Heart Disease Brother      Coronary Artery Disease Brother      Alzheimer Disease Sister      No Known Problems Mother      Substance Abuse Son         alcohol       Social History     Tobacco Use     Smoking status: Former     Packs/day: 1.00     Years: 20.00     Pack years: 20.00     Types: Cigarettes     Quit date: 1993     Years since quittin.2     Smokeless tobacco: Never   Vaping Use     Vaping status: Never Used   Substance Use Topics     Alcohol use: Yes     Comment: 1-2 drinks per month      SH: [place]     Was a  at one time   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     Review Of Systems  Skin: negative   Eyes: impaired vision, glasses, macular degeneration with progressively worsening vision   Ears/Nose/Throat: hearing loss  Respiratory: No shortness of breath, dyspnea on exertion, cough, or hemoptysis, no h/o aspiration pneumonitis     Cardiovascular: negative  Gastrointestinal: as above  Genitourinary: incontinence  Musculoskeletal: RA recently dx'd   Independent to [facility] for ADLs, transfers with [facility], ambulates 40-50' with FWW and [facility]    TUG 35s   Neurologic: negative, SLUMS 28/30   Shuffled gait   Psychiatric:  "anxiety, depression and passive SI  Hematologic/Lymphatic/Immunologic: negative  Endocrine: diabetes     GENERAL APPEARANCE: thin and frail, alert, mild distress  /65   Pulse 102   Temp 97.9  F (36.6  C)   Resp 18   Ht 1.778 m (5' 10\")   Wt 61.6 kg (135 lb 12.8 oz)   SpO2 97%   BMI 19.49 kg/m     HEENT: normocephalic, no lesion or abnormalities  NECK: no adenopathy, no asymmetry, masses, or scars and thyroid normal to palpation  RESP: lungs clear to auscultation - no rales, rhonchi or wheezes  CV: regular rate and rhythm, normal S1 S2 very distant  ABDOMEN:  soft, nontender, no HSM or masses and bowel sounds normal  MS: extremities without edema, +sarcopenia  SKIN: no suspicious lesions or rashes  NEURO: speech soft and slow to respond, but tracking well.    PSYCH: affect anxious, sad     LYMPHATICS: No cervical,  or supraclavicular nodesd    Lab Results   Component Value Date     (L) 05/02/2023    POTASSIUM 4.3 05/02/2023    CHLORIDE 90 (L) 05/02/2023    CO2 26 05/02/2023    ANIONGAP 12 05/02/2023     (H) 05/02/2023    BUN 29.7 (H) 05/02/2023    CR 0.61 (L) 05/02/2023    GFRESTIMATED >90 05/02/2023    DENISE 9.8 05/02/2023     Lab Results   Component Value Date    AST 17 04/26/2023      ALBUMIN 3.9 04/26/2023      ALKPHOS 73 04/26/2023     Lab Results   Component Value Date    WBC 6.2 04/26/2023      HGB 12.7 04/26/2023      MCV 89 04/26/2023       04/26/2023      VIDEO SWALLOWING EVALUATION   4/24/2023   HISTORY: Dysphagia    Thin: Premature spill to the vallecula. Mild residue. Moderate flash penetration. Aspiration of residue.  Slightly thick: Premature spill to the piriforms. Mild residue. Otherwise normal.   Mildly thick: Premature spill to the piriforms. Mild residue. Otherwise normal.    BP Readings from Last 3 Encounters:   05/04/23 108/65   05/02/23 132/73   04/27/23 136/81      Pulse Readings from Last 4 Encounters:   05/04/23 102   05/02/23 104   04/27/23 95   04/25/23 113 "         IMP/PLAN:   (R13.10) Dysphagia, unspecified type  (primary encounter diagnosis)  (R63.4) Weight loss  Comment: upper plates poorly fitting, recent dental work  Yesterday's scheduled dental procedure postponed until pt better able to tolerate it     Plan: dietician to follow, help find food choices he can tolerate   Okay drinking thickened liquid during visit today    Continue Speech Therapy        (R00.0) Tachycardia  Comment: last few days, pt's blood pressures have been low and metoprolol has been held, so HRs higher    Plan: change to metoprolol tartrate 12.5 mg bid with hold only if BP less than 95       (R53.1) Generalized weakness  Comment: sarcopenia, deconditioning   Plan: PHYSICAL THERAPY / OCCUPATIONAL THERAPY for transfers, ADLs, strengthening.   Pt and wife do not feel pt will be able to return home.   Discharge planning to look at higher level of care.         (F41.9,  F32.A) Anxiety and depression  Comment: profound   Plan: mirtazapine 22.5 mg/HS  Hydroxyzine 10 mg tid PRN, (watch for any s/s urinary retention)    Will add trazodone 25 mg bid PRN anxiety   Will ask ACP to see, pt agrees.       (E87.1) Chronic hyponatremia  Comment: recently 125-128     Plan: Urea 15g bid   Follow Na    Follow up with Dr Rasheed as scheduled     (E11.21,  Z79.4) Type 2 diabetes mellitus with diabetic nephropathy, with long-term current use of insulin (H)  Comment:   Lab Results   Component Value Date    A1C 6.9 04/12/2023      Plan: higher sugars during midday only; will add insulin aspart 5 units with breakfast and continue sliding scale   Lantus 10 units/day, BGM   He is on atorvastatin 20 mg/day    Not on ACEI secondary to low Na  Has follow up with Dr Kamara later this month    (H35.30) Macular degeneration, unspecified laterality, unspecified type  Comment: worsening vision limits independence   Plan: supportive care     (M06.9) Rheumatoid arthritis, involving unspecified site, unspecified whether  rheumatoid factor present (H)  Comment: has carried this dx for several years, denies any pain   Plan: remains on sulfasalazine 500 mg bid    Pt and his wife both question if he still needs this     (C43.59) Melanoma of back (H): new March 2023  Comment: recent excision, sutures removed yesterday    Plan: Dermatology follow up    (M81.0) Age-related osteoporosis without current pathological fracture  Comment: had lumbar compression fracture in 2019, has been on alendronate since then     Plan: will hold alendronate while in TCU, pt agreeable as this is difficult for him to take.        Advance directive: DNR/DNI, comfort focus per signed POLST   Pt is his own decision maker, understands his situation and reiterates those directives to me again today, with wife Divya present.     Usha Barnard MD            Sincerely,        Usha Barnard MD

## 2023-05-08 PROBLEM — R63.0 POOR APPETITE: Status: ACTIVE | Noted: 2023-01-01

## 2023-05-08 PROBLEM — E43 SEVERE PROTEIN-CALORIE MALNUTRITION (H): Status: ACTIVE | Noted: 2023-01-01

## 2023-05-08 NOTE — PROGRESS NOTES
"[facility]  [place] Medical Record Number:  [id number]  Place of Service where encounter took place:  [facility] ([facility]) [[id number]]  Chief Complaint   Patient presents with     Nursing Home Acute       HPI:    Jose Francisco Gonzalez  is a 82 year old (1940), who is being seen today for an episodic care visit.  HPI information obtained from: facility chart records, facility staff, patient report and [facility] chart review. Today's concern is: per staff trazodone helps with am anxiety when given the past few days. Staff said he has gotten up alone--no falls--by himself.  The anxiety improves as day goes on also.  He told me today \"I just want to go\". When asked what that means, he said to die.     Anxiety and depression  Generalized weakness  Physical deconditioning  Dysphagia, unspecified type  Poor appetite  Weight loss  Severe protein-calorie malnutrition (H)  Chronic hyponatremia  Type 2 diabetes mellitus with diabetic nephropathy, with long-term current use of insulin (H)  Hypertensive heart disease without heart failure  Rheumatoid arthritis, involving unspecified site, unspecified whether rheumatoid factor present (H)      Past Medical and Surgical History reviewed in Epic today.    MEDICATIONS:     Current Outpatient Medications   Medication Sig Dispense Refill     Insulin Aspart (NOVOLOG IJ) Inject 5 Units as directed every morning (before breakfast)       metoprolol tartrate (LOPRESSOR) 12.5 mg TABS half-tab Take 12.5 mg by mouth 2 times daily HOLD IF HR <60 OR SBP <<95 AND NOTIFY STAFF       traZODone (DESYREL) 25 mg TABS half-tab Take 25 mg by mouth every morning At 0600--0630 and then q day prn with at least 10 hrs after the scheduled dose       Urea POWD 15 g 2 times daily FOR LOW NA PER RENAL MD ORDERS       acetaminophen (TYLENOL) 500 MG tablet Take 2 tablets (1,000 mg) by mouth every 8 hours as needed for mild pain       atorvastatin (LIPITOR) 20 MG " tablet TAKE 1 TABLET(20MG) BY MOUTH DAILY 90 tablet 3     blood glucose (NO BRAND SPECIFIED) lancets standard Use to test blood sugar 3 times daily or as directed. 300 each 1     blood glucose (NO BRAND SPECIFIED) test strip Please call IF accu-cks >350, as may need insulin adjusted.       Calcium Polycarbophil (FIBER) 625 MG tablet Take 1 tablet by mouth every morning       Continuous Blood Gluc Sensor (FREESTYLE ODILIA 2 SENSOR) MISC 1 each every 14 days Use 1 sensor every 14 days. Use to read blood sugars per 's instructions. 6 each 3     Contour Next EZ (CONTOUR NEXT EZ W/DEVICE KIT) w/Device KIT USE TO TEST BLOOD SUGAR THREE TIMES DAILY OR AS DIRECTED 1 kit 1     CONTOUR NEXT TEST test strip USE TO TEST BLOOD SUGAR 3 TIMES DAILY OR AS DIRECTED 300 strip 11     hydrOXYzine (ATARAX) 10 MG tablet Take 10 mg by mouth 3 times daily as needed for itching       insulin aspart (NOVOLOG PEN) 100 UNIT/ML pen Inject 1-10 Units Subcutaneous 3 times daily (before meals) HIGH INSULIN RESISTANCE DOSING     Do Not give Correction Insulin if Pre-Meal BG less than 140.   For Pre-Meal  - 164 give 1 unit.   For Pre-Meal  - 189 give 2 units.   For Pre-Meal  - 214 give 3 units.   For Pre-Meal  - 239 give 4 units.   For Pre-Meal  - 264 give 5 units.   For Pre-Meal  - 289 give 6 units.   For Pre-Meal  - 314 give 7 units.   For Pre-Meal  - 339 give 8 units.   For Pre-Meal  - 364 give 9 units.   For Pre-Meal BG greater than or equal to 365 give 10 units 15 mL      insulin glargine (BASAGLAR KWIKPEN) 100 UNIT/ML pen Inject 10 units under the skin once daily in the morning. 15 mL 1     insulin pen needle (B-D U/F) 31G X 8 MM miscellaneous Use 4 times a day 400 each 3     Mirtazapine (REMERON PO) Take 22.5 mg by mouth At Bedtime Depression/Anxiety       Multiple Vitamins-Minerals (PRESERVISION AREDS 2) CAPS Take 2 capsules by mouth daily       order for DME Test strips for  "pt's glucometer, brand as covered by insurance Test QID and prn. He is on insulin. Patients preferred brand-Verio One Touch. 400 each 1     Psyllium-Calcium (METAMUCIL PLUS CALCIUM) CAPS Take 0.5 capsules by mouth nightly as needed (constipation)       sulfaSALAzine ER (AZULFIDINE EN) 500 MG EC tablet Take 500 mg by mouth 2 times daily For RA       vitamin D3 (CHOLECALCIFEROL) 50 mcg (2000 units) tablet Take 1 tablet by mouth daily       WOUND DRESSINGS EX Externally apply topically daily          TODAY DURING EXAM/ROS:  No CP, SOB, Cough, dizziness HA, N/V, dysuria or Bowel Abnormalities. Appetite is down \"I don't want to eat today\"..  No pain today    Objective:  /70   Pulse 92   Temp 98  F (36.7  C)   Resp 18   Ht 1.778 m (5' 10\")   Wt 61.2 kg (135 lb)   SpO2 97%   BMI 19.37 kg/m    Exam:  GENERAL APPEARANCE:  Alert, in no distress, thin, anxious, cooperative  ENT:  Mouth with moist mucous membranes, normal hearing acuity  EYES:  Conjunctivae, lids, pupils and irises normal  RESP:  respiratory effort  of chest normal, lungs CTA, NAD  CV:  Auscultation of heart done , IRRR. no murmur or  edema, +2 pedal pulses  ABDOMEN:  normal bowel sounds, soft, nontender--thin  M/S:   SHIPLEY equally, is in bed  SKIN:  Inspection of skin at baseline--back not seen but has previous melanoma surgical sites--healed per staff.  NEURO:   Cranial nerves are grossly intact and at patient's baseline  PSYCH:  oriented X 3,   flat affect, anxious    Labs:   Recent Labs   Lab Test 05/08/23  0557 05/02/23  0657   * 128*   POTASSIUM 4.1 4.3   CHLORIDE 89* 90*   CO2 27 26   ANIONGAP 10 12   * 154*   BUN 27.4* 29.7*   CR 0.62* 0.61*   DENISE 10.4* 9.8     Hemoglobin   Date Value Ref Range Status   04/26/2023 12.7 (L) 13.3 - 17.7 g/dL Final   04/22/2023 12.2 (L) 13.3 - 17.7 g/dL Final   06/10/2021 14.7 13.3 - 17.7 g/dL Final   02/28/2021 15.1 13.3 - 17.7 g/dL Final      ASSESSMENT / PLAN:  (F41.9,  F32.A) Anxiety and " depression  (primary encounter diagnosis)  Comment/Plan: severe in the mornings.  Will schedule the trazodone at 0600 and keep daily prn also.  No changes to other meds--atarax or Remeron at this time.  Awaiting ACP visit--ordered last week    (R53.1) Generalized weakness  (R53.81) Physical deconditioning  Comment/Plan: PT OT    (R13.10) Dysphagia, unspecified type  (R63.0) Poor appetite  (R63.4) Weight loss  (E43) Severe protein-calorie malnutrition (H)   Comment/Plan: per PCC notes, no recent wt but has been eating better though less so past few days. Will ask for wts, RD seeing as on Dysphagia and altered diet due to dentition problem.     (E87.1) Chronic hyponatremia  Comment/Plan: on Urea per Nephrology, steady NA--check in 1-2 weeks or prn    (E11.21,  Z79.4) Type 2 diabetes mellitus with diabetic nephropathy, with long-term current use of insulin (H)  Comment/Plan:  sugars mostly mid 100 in the am (Fasting) an otherwise most <200. No changes today. Is on glargine, scheduled am Novolog and ssi.     (I11.9) Hypertensive heart disease without heart failure  Comment/Plan:  Better--106-130 SB mostly.  HR --No changes to metoprolol at this time. Is on statin also--?if nec at this time    (M06.9) Rheumatoid arthritis, involving unspecified site, unspecified whether rheumatoid factor present (H)  Comment/Plan: cont sulfasalazine, ? Consider discontinuation.    Electronically signed by:  DEE DEE Zamudio CNP

## 2023-05-09 PROBLEM — E16.2 HYPOGLYCEMIA: Status: ACTIVE | Noted: 2023-01-01
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
## 2023-05-09 NOTE — PROGRESS NOTES
"Chief Complaint: fell when getting up to Bathroom by self    HPI:    Jose Francisco Gonzalez is a 82 year old  (1940), who is being seen today for an episodic care visit at [facility]. Today's concern is:     Fall, initial encounter  Generalized weakness  Anxiety and depression  Type 2 diabetes mellitus with diabetic nephropathy, with long-term current use of insulin (H)  Hypoglycemia  Failure to thrive in adult      /61   Pulse 98   Temp 97.7  F (36.5  C)   Resp 18   Ht 1.803 m (5' 11\")   Wt 61.6 kg (135 lb 12.8 oz)   SpO2 96%   BMI 18.94 kg/m    TODAY'S EXAM:  SUBJECTIVE and OBJECTIVE DATA:   GENERAL APPEARANCE: Tired, \"I got up and was weak and fell\".  He is oriented.  Has no CP, Dizziness, HA, SOB, Cough.  Per wife, who is at bedside, his daughter noted a 78 blood sugar this am when she got up(his monitor is linked to her cell phone)  Has not eaten this am. Is  in no distress. Lungs CTA but decreased, RRR.. No  edema. Abdomen is soft, +BTS. No focal neurological deficits. Small scrape left and posterior of left eye--no hematoma.  Left elbow with skin dmitry.     Current Outpatient Medications   Medication Sig Dispense Refill     acetaminophen (TYLENOL) 500 MG tablet Take 2 tablets (1,000 mg) by mouth every 8 hours as needed for mild pain       atorvastatin (LIPITOR) 20 MG tablet TAKE 1 TABLET(20MG) BY MOUTH DAILY 90 tablet 3     blood glucose (NO BRAND SPECIFIED) lancets standard Use to test blood sugar 3 times daily or as directed. 300 each 1     blood glucose (NO BRAND SPECIFIED) test strip Please call IF accu-cks >350, as may need insulin adjusted.       Calcium Polycarbophil (FIBER) 625 MG tablet Take 1 tablet by mouth every morning       Continuous Blood Gluc Sensor (FREESTYLE ODILIA 2 SENSOR) MISC 1 each every 14 days Use 1 sensor every 14 days. Use to read blood sugars per 's instructions. 6 each 3     Contour Next EZ (CONTOUR NEXT EZ W/DEVICE KIT) w/Device KIT USE TO TEST BLOOD " SUGAR THREE TIMES DAILY OR AS DIRECTED 1 kit 1     CONTOUR NEXT TEST test strip USE TO TEST BLOOD SUGAR 3 TIMES DAILY OR AS DIRECTED 300 strip 11     hydrOXYzine (ATARAX) 10 MG tablet Take 10 mg by mouth 3 times daily as needed for itching       Insulin Aspart (NOVOLOG IJ) Inject 5 Units as directed every morning (before breakfast)       insulin aspart (NOVOLOG PEN) 100 UNIT/ML pen Inject 1-10 Units Subcutaneous 3 times daily (before meals) HIGH INSULIN RESISTANCE DOSING     Do Not give Correction Insulin if Pre-Meal BG less than 140.   For Pre-Meal  - 164 give 1 unit.   For Pre-Meal  - 189 give 2 units.   For Pre-Meal  - 214 give 3 units.   For Pre-Meal  - 239 give 4 units.   For Pre-Meal  - 264 give 5 units.   For Pre-Meal  - 289 give 6 units.   For Pre-Meal  - 314 give 7 units.   For Pre-Meal  - 339 give 8 units.   For Pre-Meal  - 364 give 9 units.   For Pre-Meal BG greater than or equal to 365 give 10 units 15 mL      insulin glargine (BASAGLAR KWIKPEN) 100 UNIT/ML pen Inject 10 units under the skin once daily in the morning. (Patient taking differently: Inject 6 Units Subcutaneous every morning 5/9/2023:Inject 6 units under the skin once daily in the morning. Hold if accucheck <100) 15 mL 1     insulin pen needle (B-D U/F) 31G X 8 MM miscellaneous Use 4 times a day 400 each 3     metoprolol tartrate (LOPRESSOR) 12.5 mg TABS half-tab Take 12.5 mg by mouth 2 times daily HOLD IF HR <60 OR SBP <<95 AND NOTIFY STAFF       Mirtazapine (REMERON PO) Take 22.5 mg by mouth At Bedtime Depression/Anxiety       order for DME Test strips for pt's glucometer, brand as covered by insurance Test QID and prn. He is on insulin. Patients preferred brand-Verio One Touch. 400 each 1     Psyllium-Calcium (METAMUCIL PLUS CALCIUM) CAPS Take 0.5 capsules by mouth nightly as needed (constipation)       traZODone (DESYREL) 25 mg TABS half-tab Take 12.5 mg by mouth every morning At  0600--0630 and then q day prn with at least 10 hrs after the scheduled dose       Urea POWD 15 g 2 times daily FOR LOW NA PER RENAL MD ORDERS       vitamin D3 (CHOLECALCIFEROL) 50 mcg (2000 units) tablet Take 1 tablet by mouth daily       WOUND DRESSINGS EX Externally apply topically daily       ASSESSMENT / PLAN:  (W19.XXXA) Fall, initial encounter  (primary encounter diagnosis)   (R53.1) Generalized weakness  Comment/Plan: reminded to not get up alone.  D/w wife and nurse at bedside--she does not want to have pt sent in--felt he would be fine. Will monitor per protocol.    (F41.9,  F32.A) Anxiety and depression  Comment/Plan: cont current meds but decrease am trazodone to 12.5 mg scheduled and prn. monitor.    (E11.21,  Z79.4) Type 2 diabetes mellitus with diabetic nephropathy, with long-term current use of insulin (H)   (E16.2) Hypoglycemia  Comment/Plan: due to poor and variable intake and frailty/getting up. Will decrease glargine to 6 units q am--keep rest of ssi and am Novolog same. Monitor.    (R62.7) Failure to thrive in adult  Comment/Plan: d/w pt and wife--to consider Hospice and relayed the conversation I had yest with pt--they will discuss.      Electronically Signed by:  DEE DEE Zamudio CNP

## 2023-05-11 NOTE — PROGRESS NOTES
"Chief Complaint: conts to not eat well--taking meds, supplements somewhat but told staff cant swallow his oatmeal.    HPI:    Jose Francisco Gonzalez is a 82 year old  (1940), who is being seen today for an episodic care visit at [facility].   Today's concern is: per staff     Fall, subsequent encounter  Generalized weakness  Physical deconditioning  Anxiety and depression  Failure to thrive in adult  Type 2 diabetes mellitus with diabetic nephropathy, with long-term current use of insulin (H)  Hypoglycemia      /71   Pulse 78   Temp 97.9  F (36.6  C)   Resp 18   Ht 1.803 m (5' 11\")   Wt 61.2 kg (135 lb)   SpO2 95%   BMI 18.83 kg/m    TODAY'S EXAM:  SUBJECTIVE and OBJECTIVE DATA:   GENERAL APPEARANCE: Tired, but awake in chair, A&O, NAD. No CP, Dizziness, HA, SOB, Cough.    Lungs CTA but decreased, RRR.. No  edema. Abdomen is soft, +BTS. No focal neurological deficits. Small scrape left temple area healed.  Left elbow with skin tear--drsg dry and intact.     Current Outpatient Medications   Medication Sig Dispense Refill     insulin glargine (LANTUS VIAL) 100 UNIT/ML vial Inject 6 Units Subcutaneous every morning 5/9/2023:Inject 6 units under the skin once daily in the morning. Hold if accucheck <100       acetaminophen (TYLENOL) 500 MG tablet Take 2 tablets (1,000 mg) by mouth every 8 hours as needed for mild pain       atorvastatin (LIPITOR) 20 MG tablet TAKE 1 TABLET(20MG) BY MOUTH DAILY 90 tablet 3     blood glucose (NO BRAND SPECIFIED) lancets standard Use to test blood sugar 3 times daily or as directed. 300 each 1     blood glucose (NO BRAND SPECIFIED) test strip Please call IF accu-cks >350, as may need insulin adjusted.       Calcium Polycarbophil (FIBER) 625 MG tablet Take 1 tablet by mouth every morning       Continuous Blood Gluc Sensor (FREESTYLE ODILIA 2 SENSOR) MISC 1 each every 14 days Use 1 sensor every 14 days. Use to read blood sugars per 's instructions. 6 each 3     " Contour Next EZ (CONTOUR NEXT EZ W/DEVICE KIT) w/Device KIT USE TO TEST BLOOD SUGAR THREE TIMES DAILY OR AS DIRECTED 1 kit 1     CONTOUR NEXT TEST test strip USE TO TEST BLOOD SUGAR 3 TIMES DAILY OR AS DIRECTED 300 strip 11     hydrOXYzine (ATARAX) 10 MG tablet Take 10 mg by mouth 3 times daily as needed for anxiety       Insulin Aspart (NOVOLOG IJ) Inject 5 Units as directed every morning (before breakfast)       insulin aspart (NOVOLOG PEN) 100 UNIT/ML pen Inject 1-10 Units Subcutaneous 3 times daily (before meals) HIGH INSULIN RESISTANCE DOSING     Do Not give Correction Insulin if Pre-Meal BG less than 140.   For Pre-Meal  - 164 give 1 unit.   For Pre-Meal  - 189 give 2 units.   For Pre-Meal  - 214 give 3 units.   For Pre-Meal  - 239 give 4 units.   For Pre-Meal  - 264 give 5 units.   For Pre-Meal  - 289 give 6 units.   For Pre-Meal  - 314 give 7 units.   For Pre-Meal  - 339 give 8 units.   For Pre-Meal  - 364 give 9 units.   For Pre-Meal BG greater than or equal to 365 give 10 units 15 mL      insulin pen needle (B-D U/F) 31G X 8 MM miscellaneous Use 4 times a day 400 each 3     metoprolol tartrate (LOPRESSOR) 12.5 mg TABS half-tab Take 12.5 mg by mouth 2 times daily HOLD IF HR <60 OR SBP <<95 AND NOTIFY STAFF       Mirtazapine (REMERON PO) Take 22.5 mg by mouth At Bedtime Depression/Anxiety       order for DME Test strips for pt's glucometer, brand as covered by insurance Test QID and prn. He is on insulin. Patients preferred brand-Verio One Touch. 400 each 1     Psyllium-Calcium (METAMUCIL PLUS CALCIUM) CAPS Take 0.5 capsules by mouth nightly as needed (constipation)       traZODone (DESYREL) 25 mg TABS half-tab Take 12.5 mg by mouth every morning At 0600--0630 and then q day prn with at least 10 hrs after the scheduled dose       Urea POWD 15 g 2 times daily FOR LOW NA PER RENAL MD ORDERS       vitamin D3 (CHOLECALCIFEROL) 50 mcg (2000 units) tablet Take  1 tablet by mouth daily       WOUND DRESSINGS EX Externally apply topically daily        ASSESSMENT / PLAN:  (W19.XXXD) Fall, subsequent encounter  (primary encounter diagnosis)  Comment/Plan: healing scrapes. monitor.    (R53.1) Generalized weakness  (R53.81) Physical deconditioning  Comment/Plan: plateauing--insurance company is cutting rehab stay and LCD is not 5/15--LTC versus AL versus hospice.    (F41.9,  F32.A) Anxiety and depression   (R62.7) Failure to thrive in adult  Comment/Plan: see above,  Declining--wt down--not eating well. Cont current trazodone, Remeron, prn atarax. No changes, monitor.      (E11.21,  Z79.4) Type 2 diabetes mellitus with diabetic nephropathy, with long-term current use of insulin (H)   (E16.2) Hypoglycemia  Comment/Plan: no further hypoglycemia, runs mostly mid to high 100's to mid 200's.  No changes due to labile eating/po intake.       Electronically Signed by:  DEE DEE Zamudio CNP

## 2023-05-15 NOTE — PROGRESS NOTES
[facility] DISCHARGE SUMMARY    PATIENT'S NAME: Jose Francisco Gonzalez  YOB: 1940    PRIMARY CARE PROVIDER AND CLINIC RESPONSIBLE AFTER TRANSFER: Meaghan Tillman RN, CNP and Dr Usha Barnard MD-[facility]     CODE STATUS: DNR/DNI    TRANSFERRING PROVIDERS: DE EDEE Zamudio CNP, Dr.Gretchen Akil MD      DATE OF SNF ADMISSION:  April / 25 / 2023.    DATE OF SNF DISCHARGE (including anticipating DC): May / 15 / 2023. to [facility].    DISCHARGE DISPOSITION: FM Provider    Nursing Facility: [facility] stay 4/19/23 to 4/25/23.     Condition on Discharge:  Declining.    Function:  Ambulates: 70 ft w/fww, Transfers: min a  Cognitive Scores: SLUMS 28/30    Physical Function: TUG 27/35  Equipment: walker  DME: Walker    DISCHARGE DIAGNOSIS:      Generalized weakness  Weight loss  Dysphagia, unspecified type  Anxiety and depression  Physical deconditioning  Melanoma of back (H)  Chronic hyponatremia  Type 2 diabetes mellitus with diabetic nephropathy, with long-term current use of insulin (H)  Hypertensive heart disease without heart failure  Hyperlipidemia, unspecified hyperlipidemia type  Macular degeneration, unspecified laterality, unspecified type  Rheumatoid arthritis, involving unspecified site, unspecified whether rheumatoid factor present (H)        HOSPITAL COURSE:  please see hospital and clinic records.  Pt  Also has  Records from [place] where he rhoades.  he was admitted with hypotension, weakness, weight loss and  Anxiety/depression. He was rehydrated, had been on salt tab and fluid restriction for ~ 5 mos.  Na ran 126-127, improved.  Seen by psychiatry and the Remeron was increased(*but unclear how it was then decreased in discharge orders*).  Also it sounds like he had recent dental implants with infection, and poorly fitting dentures, as well as dysphagia which is  "not clear in origin. Swallow Video showed mild \"spillover\" and rec'd pureed diet.  He also has chronic Hyponatremia and has had fluid restrictions for this at times.  He has been depressed and anxious and recently had a diagnoses of melanoma prior to hospital stay (*per family f/u was fine--no acute issues with this*). Also progressive vision decline.  Followed by endocrinology and nephrology for DM and hyponatremia.   Sent for rehab. Pt was changed to DNR.DNI in hospital after d/w pt and family.      TCU/SNF COURSE: pt has not progressed with PT and OT.  He has fits and starts of eating a bit better and partaking in therapies but since last week has stated he wants to \"go\" and says to die.  He had periods of severe anxiety, kendra in am but better with low dose trazodone.  Several discussions with pt and family and he will go to LTC tomorrow here at [facility] and have a Palliative care or Hospice Consult here soon.      PAST MEDICAL HISTORY:  Past Medical History:   Diagnosis Date     Arthropathy, unspecified, site unspecified     palindromic rheumatism; takes aleve bid      Compression fracture of body of thoracic vertebra (H)      Compression fracture of L1 lumbar vertebra (H) 06/2019    see MRI     Hyperlipidemia LDL goal <100 11/2/2012    Was on fenofibrate plus simva, in [facility]ord study; LDL 84 in Lipitor 20 in 05/2012; 97 in 3/13     Hyponatremia      Rheumatoid arthritis (H)      Sialoadenitis 6/13/2015     Syncope      Type 2 diabetes, HbA1C goal < 8% (H) 11/2/2012       DISCHARGE MEDICATIONS:  Current Outpatient Medications   Medication Sig Dispense Refill     acetaminophen (TYLENOL) 500 MG tablet Take 2 tablets (1,000 mg) by mouth every 8 hours as needed for mild pain       blood glucose (NO BRAND SPECIFIED) lancets standard Use to test blood sugar 3 times daily or as directed. 300 each 1     blood glucose (NO BRAND SPECIFIED) test strip Please call IF accu-cks >350, as may need insulin adjusted.       Calcium " Polycarbophil (FIBER) 625 MG tablet Take 1 tablet by mouth every morning       Continuous Blood Gluc Sensor (FREESTYLE ODILIA 2 SENSOR) MISC 1 each every 14 days Use 1 sensor every 14 days. Use to read blood sugars per 's instructions. 6 each 3     Contour Next EZ (CONTOUR NEXT EZ W/DEVICE KIT) w/Device KIT USE TO TEST BLOOD SUGAR THREE TIMES DAILY OR AS DIRECTED 1 kit 1     CONTOUR NEXT TEST test strip USE TO TEST BLOOD SUGAR 3 TIMES DAILY OR AS DIRECTED 300 strip 11     hydrOXYzine (ATARAX) 10 MG tablet Take 10 mg by mouth 3 times daily as needed for anxiety       insulin aspart (NOVOLOG VIAL) 100 UNITS/ML vial Inject 4 Units Subcutaneous 3 times daily (with meals) If accuchecks 301-400, give 4 units Novolog (aspart)    If > 400, call provider       insulin pen needle (B-D U/F) 31G X 8 MM miscellaneous Use 4 times a day 400 each 3     metoprolol tartrate (LOPRESSOR) 12.5 mg TABS half-tab Take 12.5 mg by mouth 2 times daily HOLD IF HR <60 OR SBP <<95 AND NOTIFY STAFF       Mirtazapine (REMERON PO) Take 22.5 mg by mouth At Bedtime Depression/Anxiety       order for DME Test strips for pt's glucometer, brand as covered by insurance Test QID and prn. He is on insulin. Patients preferred brand-Verio One Touch. 400 each 1     Psyllium-Calcium (METAMUCIL PLUS CALCIUM) CAPS Take 0.5 capsules by mouth nightly as needed (constipation)       traZODone (DESYREL) 25 mg TABS half-tab Take 12.5 mg by mouth every morning At 0600--0630 and then q day prn with at least 10 hrs after the scheduled dose       insulin glargine (LANTUS VIAL) 100 UNIT/ML vial Inject 6 Units Subcutaneous every morning 5/9/2023:Inject 6 units under the skin once daily in the morning. Hold if accucheck <100 (Patient not taking: Reported on 5/15/2023)       MED REC REQUIRED   Post Medication Reconciliation Status:  Discharge medications reconciled and changed, see notes/orders       MEDICATION CHANGES/RATIONALE:    today we dc'd scheduled glargine  "and Novolog s pt not eating consistently.  Have kept ssi. Also dc'd vit D, statin and urea as he has not wanted dot take meds.  /67   Pulse 98   Temp 97.6  F (36.4  C)   Resp 24   Ht 1.803 m (5' 11\")   Wt 59.1 kg (130 lb 6.4 oz)   SpO2 94%   BMI 18.19 kg/m      TODAY DURING EXAM/ROS: minimally engaged.  Denies: CP, SOB,  HA, N/V, dysuria or Bowel Abnormalities. Appetite is poor.  No pain      PHYSICAL EXAM Today:  A & O x 3, depressed, NAD. Lungs CTA but decreased, non labored. RRR, no edema.  Abdomen soft, nontender, hypoactive BT'S. No focal neurological deficits. SHIPLEY .       SNF LABS  Recent Labs   Lab Test 05/08/23  0557 05/02/23  0657   * 128*   POTASSIUM 4.1 4.3   CHLORIDE 89* 90*   CO2 27 26   ANIONGAP 10 12   * 154*   BUN 27.4* 29.7*   CR 0.62* 0.61*   DENISE 10.4* 9.8     CBC RESULTS: Recent Labs   Lab Test 04/26/23  1359   WBC 6.2   RBC 4.27*   HGB 12.7*   HCT 37.8*   MCV 89   MCH 29.7   MCHC 33.6   RDW 13.6                DISCHARGE PLAN:  From:  [facility] .  Follow-up with PCP in 7 days: 7 days.    Current [place] or other scheduled appointments:  Future Appointments   Date Time Provider Department Center   5/18/2023  2:00 PM Kraig Kamara MD [facility] referral needed and placed by this provider: none    Pending labs: none     Discharge Treatments:none       TOTAL DISCHARGE TIME:   Greater than 30 minutes    DEE DEE Zamudio [facility]              Documentation of Face-to-Face and Certification for Home Health Services     Patient: Jose Francisco Gonzalez   YOB: 1940  MR Number: [id number]  Today's Date: 5/15/2023    I certify that patient: Jose Francisco Gonzalez is under my care and that I, or a nurse practitioner or physician's assistant working with me, had a face-to-face encounter that meets the physician face-to-face encounter requirements with this patient on: 5/15/2023.    This encounter with the patient was in " whole, or in part, for the following medical condition, which is the primary reason for home health care:    Generalized weakness  Weight loss  Dysphagia, unspecified type  Anxiety and depression  Physical deconditioning  Melanoma of back (H)  Chronic hyponatremia  Type 2 diabetes mellitus with diabetic nephropathy, with long-term current use of insulin (H)  Hypertensive heart disease without heart failure  Hyperlipidemia, unspecified hyperlipidemia type  Macular degeneration, unspecified laterality, unspecified type  Rheumatoid arthritis, involving unspecified site, unspecified whether rheumatoid factor present (H)  .    I certify that, based on my findings, the following services are medically necessary home health services: hospice.    My clinical findings support the need for the above services because: Hospice    Further, I certify that my clinical findings support that this patient is homebound (i.e. absences from home require considerable and taxing effort and are for medical reasons or Christianity services or infrequently or of short duration when for other reasons) because: hospice, declining, not eating.    Based on the above findings. I certify that this patient is confined to the home and needs intermittent skilled nursing care, physical therapy and/or speech therapy.  The patient is under my care, and I have initiated the establishment of the plan of care.  This patient will be followed by a physician who will periodically review the plan of care.  Physician/Provider to provide follow up care:  Meaghan Tillman RN, RUFUS    Responsible Medicare certified PECOS Physician: Meaghan Tillman RN, RUFUS  Physician Signature: See electronic signature associated with these discharge orders.  Date: 5/15/2023

## 2023-05-18 NOTE — PROGRESS NOTES
Clinic Care Coordination Contact    Situation: Patient chart reviewed by care coordinator.    Background:   Patient was admitted to [facility] from 4/19/2023 to 4/25/2023 with a diagnoses of  Depression, generalized weakness, physical deconditioning and discharged to [facility].    Assessment:   On 5/15/2023 patient transferred care to [facility] LONG TER[facility]ARE due to physical/cognitive decline.    Plan/Recommendations:   No further care coordination outreaches at this time.     Soila Mai RN, BSN, PHN  Primary Care / Care Coordinator   [facility]  E-mail [email or web address]   [phone]

## 2023-05-22 PROBLEM — R29.6 FALLS FREQUENTLY: Status: ACTIVE | Noted: 2023-01-01

## 2023-05-22 PROBLEM — B37.0 THRUSH: Status: ACTIVE | Noted: 2023-01-01

## 2023-05-22 NOTE — PROGRESS NOTES
[facility]  [facility] Medical Record Number:  [id number]  Place of Service where encounter took place:  [facility] () [[id number]]  Chief Complaint   Patient presents with     [employer] Regulatory       HPI:    Jose Francisco Gonzalez  is a 82 year old (1940), who is being seen today for an episodic care visit.  HPI information obtained from: facility chart records, facility staff, patient report and [facility] chart review. Today's concern is: variable eating. Usually 25-50 occas more.     Generalized weakness  Falls frequently  Physical deconditioning  Anxiety and depression  Dysphagia, unspecified type  Failure to thrive in adult  Thrush  Type 2 diabetes mellitus with diabetic nephropathy, with long-term current use of insulin (H)  Hypertension, unspecified type  Rheumatoid arthritis, involving unspecified site, unspecified whether rheumatoid factor present (H)  Macular degeneration, unspecified laterality, unspecified type      Past Medical and Surgical History reviewed in Epic today.    MEDICATIONS:     Current Outpatient Medications   Medication Sig Dispense Refill     bisacodyl (DULCOLAX) 5 MG EC tablet Take 5 mg by mouth daily as needed for constipation       insulin aspart (NOVOLOG PEN) 100 UNIT/ML pen Inject 4-8 Units Subcutaneous 3 times daily (with meals) IF ACCUCK  >/250-300 GIVE 4 UNITS NOVOLOG subcutaneous  If ACCUCK 301-400,      GIVE  6 UNITS NOVOLOG subcutaneous  IF ACCUCK 401 -450, GIVE 8 UNITS NOVOLOG SUB C  IF ACCUCK >450, CALL ON CALL OR PCP       acetaminophen (TYLENOL) 500 MG tablet Take 2 tablets (1,000 mg) by mouth every 8 hours as needed for mild pain       blood glucose (NO BRAND SPECIFIED) lancets standard Use to test blood sugar 3 times daily or as directed. 300 each 1     Calcium Polycarbophil (FIBER) 625 MG tablet Take 1 tablet by mouth every morning       metoprolol tartrate (LOPRESSOR) 12.5 mg TABS half-tab Take 12.5 mg by mouth 2  "times daily HOLD IF HR <60 OR SBP <<95 AND NOTIFY STAFF       Mirtazapine (REMERON PO) Take 22.5 mg by mouth At Bedtime Depression/Anxiety       Psyllium-Calcium (METAMUCIL PLUS CALCIUM) CAPS Take 0.5 capsules by mouth nightly as needed (constipation)       traZODone (DESYREL) 25 mg TABS half-tab Take 12.5 mg by mouth every morning At 0600--0630 and then q day prn with at least 10 hrs after the scheduled dose       TODAY DURING EXAM/ROS: limited interaction/conversation: says No CP, SOB, Cough, N/V, dysuria or Bowel Abnormalities. Appetite is \"not hungry\".  No pains    Objective:  BP 94/58   Pulse 105   Temp 98  F (36.7  C)   Resp 18   Ht 1.803 m (5' 11\")   Wt 59.1 kg (130 lb 6.4 oz)   SpO2 99%   BMI 18.19 kg/m    Exam:  GENERAL APPEARANCE:  Alert, thin, appears ill, cooperative  ENT:  thrush on tongue  EYES:  Conjunctiva and lids normal  RESP:  respiratory effort and palpation of chest normal, lungs clear to auscultation , no respiratory distress  CV:  Palpation and auscultation of heart done , regular rate and rhythm, no murmur, rub, or gallop, no edema, + pedal pulses  ABDOMEN:  normal bowel sounds, soft, nontender  M/S:   seen in chair  SKIN:  Inspection of skin and subcutaneous tissue baseline, but limited as pt fully dressed  NEURO:   no acute focal deficits noted.  PSYCH:  flat affect, answers appropriately. SLUMS in TCU was 28/30    Labs:   Recent Labs   Lab Test 05/08/23  0557 05/02/23  0657   * 128*   POTASSIUM 4.1 4.3   CHLORIDE 89* 90*   CO2 27 26   ANIONGAP 10 12   * 154*   BUN 27.4* 29.7*   CR 0.62* 0.61*   DENISE 10.4* 9.8     Hemoglobin   Date Value Ref Range Status   04/26/2023 12.7 (L) 13.3 - 17.7 g/dL Final   04/22/2023 12.2 (L) 13.3 - 17.7 g/dL Final   06/10/2021 14.7 13.3 - 17.7 g/dL Final   02/28/2021 15.1 13.3 - 17.7 g/dL Final     ASSESSMENT / PLAN:  (R53.1) Generalized weakness  (primary encounter diagnosis)   (R29.6) Falls frequently   (R53.81) Physical " "deconditioning  Comment/Plan: high fall risk, gets up by self at times. encourage to ask for help, per staff    (F41.9,  F32.A) Anxiety and depression   (R13.10) Dysphagia, unspecified type   (R62.7) Failure to thrive in adult  Comment/Plan: variable eating, on Remeron, atrax prn, no changes today, monitor    (B37.0) Thrush  Comment/Plan: add nystatin, good oral care. Monitor.    (E11.21,  Z79.4) Type 2 diabetes mellitus with diabetic nephropathy, with long-term current use of insulin (H)  Comment/Plan: mostly high 100's to low 200's though occas >300, will adjust SSI, no long acting insulin as variable eating.    (I10) Hypertension, unspecified type  Comment/Plan: labile, may need to stop BB-- runs mid 90's to 120, rarely higher. Monitor.    (M06.9) Rheumatoid arthritis, involving unspecified site, unspecified whether rheumatoid factor present (H)  Comment/Plan: off RA meds, no C/O pain , monitor        Patient is seen today for a federally mandated E/M visit.    Case Management:  I have reviewed the care plan and MDS and do agree with the plan. Patient's desire to return to the community is present, but is not able due to care needs . Information reviewed:  Medications, vital signs, orders, and nursing notes.    I have reviewed the care plan and do agree with the plan., Discharge to the community  is not feasible because  24 hour care needs., I have reviewed the MDS and I have reviewed the care plan and do agree with the plan. and I have reviewed the immunization and preventative care.     **15 min conversation with wife.  She asked why Na not checked again and what we could do about it if low.  When asked if she wanted him treated again despite him declining meds. \"Ok I guess not\".  She stated was aware he was declining and did not want to eat.  When asked about Hospice meeting \"I don't know about that, but we will have a care conf on thursday\".  She agrees with stopping the Lynne but wondered of he should be on " long acting insulin--explained why--she agreed. She confirmed she didnot want him to go to hospital for furrtehr w/u or care when asked.**      Electronically signed by:  DEE DEE Zamudio CNP

## 2023-06-02 PROBLEM — E46 PROTEIN-CALORIE MALNUTRITION, UNSPECIFIED SEVERITY (H): Status: ACTIVE | Noted: 2023-01-01

## 2023-06-02 PROBLEM — E87.1 HYPO-OSMOLALITY AND HYPONATREMIA: Status: ACTIVE | Noted: 2023-01-01

## 2023-06-02 PROBLEM — R80.9 PROTEINURIA, UNSPECIFIED TYPE: Status: RESOLVED | Noted: 2021-10-31 | Resolved: 2023-01-01

## 2023-06-02 NOTE — PROGRESS NOTES
"Chief Complaint: anxiety and poor appetite    HPI:    Jose Francisco Gonzalez is a 82 year old  (1940), who is being seen today for an episodic care visit at [facility] (). Today's concern is: per staff pt seems more anxious at times with family present.  He is not eating much. Wt loss is 135.8 # on 4/28 and now 126.5#  on 5/27/23. Wife would like to discuss with NP today how pt is doing.     Hypo-osmolality and hyponatremia  Type 2 diabetes mellitus with diabetic nephropathy, with long-term current use of insulin (H)  Severe protein-calorie malnutrition (H)  Hypertensive heart disease without heart failure  Adjustment disorder with mixed anxiety and depressed mood      /72   Pulse 100   Temp 97.1  F (36.2  C)   Resp 18   Ht 1.803 m (5' 11\")   Wt 57.4 kg (126 lb 8 oz)   SpO2 93%   BMI 17.64 kg/m    TODAY'S EXAM:  SUBJECTIVE: minimally verbal today.  Became more anxious with questions and exam as well as when trying to talk  Denied CP, SOB, Pain, Nausea.  nodded is tired and not hungry. Said did not want to talk     OBJECTIVE DATA:   GENERAL APPEARANCE:  Alert,oriented x 3, (*Got very shaky so exam cut short*).  Lungs CTA but decreased, RRR, . No edema. Abdomen is soft, no  focal neurological deficits.     Current Outpatient Medications   Medication Sig Dispense Refill     acetaminophen (TYLENOL) 500 MG tablet Take 2 tablets (1,000 mg) by mouth every 8 hours as needed for mild pain       bisacodyl (DULCOLAX) 5 MG EC tablet Take 5 mg by mouth daily as needed for constipation       blood glucose (NO BRAND SPECIFIED) lancets standard Use to test blood sugar 3 times daily or as directed. 300 each 1     Calcium Polycarbophil (FIBER) 625 MG tablet Take 1 tablet by mouth every morning       insulin aspart (NOVOLOG PEN) 100 UNIT/ML pen Inject 4-8 Units Subcutaneous 3 times daily (with meals) IF ACCUCK  >/250-300 GIVE 4 UNITS NOVOLOG subcutaneous  If ACCUCK 301-400,      GIVE  " 6 UNITS NOVOLOG subcutaneous  IF ACCUCK 401 -450, GIVE 8 UNITS NOVOLOG SUB C  IF ACCUCK >450, CALL ON CALL OR PCP       metoprolol tartrate (LOPRESSOR) 12.5 mg TABS half-tab Take 12.5 mg by mouth 2 times daily HOLD IF HR <60 OR SBP <<95 AND NOTIFY STAFF       Mirtazapine (REMERON PO) Take 22.5 mg by mouth At Bedtime Depression/Anxiety       Psyllium-Calcium (METAMUCIL PLUS CALCIUM) CAPS Take 0.5 capsules by mouth nightly as needed (constipation)       traZODone (DESYREL) 25 mg TABS half-tab Take 12.5 mg by mouth every morning At 0600--0630 and then q day prn with at least 10 hrs after the scheduled dose       Recent Labs   Lab Test 05/24/23  1635 05/08/23  0557   * 126*   POTASSIUM 4.6 4.1   CHLORIDE 87* 89*   CO2 24 27   ANIONGAP 14 10   * 119*   BUN 37.5* 27.4*   CR 0.75 0.62*   DENISE 9.7 10.4*       ASSESSMENT / PLAN:  (E87.1) Hypo-osmolality and hyponatremia  (primary encounter diagnosis)  Comment/Plan:  Na 125 last week, steadily low. No treatment as pt cannot swallow pills.    (E11.21,  Z79.4) Type 2 diabetes mellitus with diabetic nephropathy, with long-term current use of insulin (H)  Comment/Plan: cont with ssi   Most sugars in the low to mid 200's occas >300 , tend to be the lowest ones in evening. No changes    (E43) Severe protein-calorie malnutrition (H)  (F43.23) Adjustment disorder with mixed anxiety and depressed mood  Comment/Plan: wt loss as noted above. Using trazodone in am and occas. Cont with current meds for now    (I11.9) Hypertensive heart disease without heart failure  Comment/Plan: runs <100 some times in the afternoon/evening and BB held. Otherwise 110-120.    HR is often 80, occas >100.  No changes today.    1. CALLED wife Divya, she had questions re the following :  Hospice , anxiety, tremors, po intake:  She would like hospice to see again next week, explained his anxiety is likely causing his shaking. She asked if it could be parkinson's or another issue.  I said unlikely  "but a neuro workup would be need to determine that and she said \"We don't want that and we don't want to go back to the hospital at all\".  2. [facility] and [facility] will set up appt to meet with pt and family again.    Electronically Signed by:  DE EDEE Zamudio CNP  "

## 2023-12-06 NOTE — TELEPHONE ENCOUNTER
Controlled Substance Refill Request for traMADol (ULTRAM) 50 MG tablet  Problem List Complete:  Yes    Last Written Prescription Date:  6/11/2019 END: 6/14/2019  Last Fill Quantity: 30 tablet,  # refills: 1   Last office visit: 6/11/2019 with prescribing provider:  Debra   Future Office Visit:   Next 5 appointments (look out 90 days)    Jul 02, 2019 10:00 AM CDT  Office Visit with Kvng Richardson MD  [facility] [address] [place] [id number]  [phone]             Controlled substance agreement:   Encounter-Level CSA:    There are no encounter-level csa.     Patient-Level CSA:    There are no patient-level csa.         Last Urine Drug Screen: No results found for: CDAUT, No results found for: COMDAT, No results found for: THC13, PCP13, COC13, MAMP13, OPI13, AMP13, BZO13, TCA13, MTD13, BAR13, OXY13, PPX13, BUP13     Processing:  Patient will  in clinic    [email or web address]   checked in past 3 months?  No, route to RN         This office note has been dictated.

## 2024-06-02 NOTE — TELEPHONE ENCOUNTER
Spoke with patient and was given providers name and he will call back to schedule.    no pain, swelling or deformity of joints

## 2024-11-26 NOTE — PROGRESS NOTES
Subjective     Jose Francisco Gonzalez is a 79 year old male who presents to clinic today for the following health issues:    HPI   Follow up from 6/7/2019- saw [facility] for Low back pain and Constipation.                  This patient has been dealing with low back pain, and altered bowel habits, for approximately 4 weeks now.                  [place] onto his buttocks and low back. He did not have immediate pain, but within the next day or 2 he developed bilateral constant, aching low back pain, both to the left and right of the midline, along with constipation. Straining at stool aggravated his back pain. Having a bowel movement eventually relieved some of his back pain. His pain is quite severe when he leans against a hard chair. He has pain with various movements. He can walk without additional pain. He can sleep, and does better in a soft bed. Tylenol has not been helpful. He went to one physical therapy session recently, and so far that has not been helpful. His bowels are a bit more regular now. He's been using a stool softener, and sometimes MiraLAX.         At times, there has been some mild, generalized abdominal soreness .              He is not having radicular leg symptoms.                                        This has been very stressful for him, now especially in view of a planned trip to [place] for which he is scheduled to leave in 3 days.                    Current Outpatient Medications   Medication Sig Dispense Refill     amLODIPine (NORVASC) 2.5 MG tablet TAKE 1 TABLET BY MOUTH EVERY DAY 90 tablet 2     aspirin 81 MG tablet Take  by mouth daily.       atorvastatin (LIPITOR) 20 MG tablet TAKE 1 TABLET(20MG) BY MOUTH DAILY 90 tablet 4     blood glucose (NO BRAND SPECIFIED) lancets standard Use to test blood sugar 2 times daily or as directed.                                        To use with his glucometer 1 Box 12     blood glucose monitoring (ONETOUCH  "VERIO IQ) test strip Use to test blood sugars 3 times daily or as directed. 300 strip 3     glipiZIDE (GLUCOTROL XL) 10 MG 24 hr tablet TAKE 1 TABLET(10 MG) BY MOUTH DAILY 90 tablet 4     hydroxychloroquine (PLAQUENIL) 200 MG tablet Take 1 tablet (200 mg) by mouth daily 30 tablet      insulin pen needle (B-D U/F) 31G X 8 MM miscellaneous USE DAILY AS DIRECTED 50 each 0     lisinopril (PRINIVIL/ZESTRIL) 10 MG tablet TAKE 1 TABLET BY MOUTH EVERY DAY 90 tablet 3     Multiple Vitamins-Minerals (PRESERVISION AREDS 2) CAPS 2 per day       order for DME Test strips for pt's glucometer, brand as covered by insurance Test QID and prn. He is on insulin. Patients preferred brand-Verio One Touch. 400 each 1     insulin glargine (BASAGLAR KWIKPEN) 100 UNIT/ML pen INJECT 26 UNITS DAILY OR AS DIRECTED 15 mL 1     Psyllium-Calcium (METAMUCIL PLUS CALCIUM) CAPS Take 2 capsules by mouth At Bedtime       BP Readings from Last 3 Encounters:   06/11/19 160/80   06/07/19 138/80   05/28/19 136/80    Wt Readings from Last 3 Encounters:   06/11/19 74.8 kg (165 lb)   06/07/19 74.8 kg (165 lb)   05/28/19 75.8 kg (167 lb)                      Reviewed and updated as needed this visit by Provider         Review of Systems see above plus  ROS COMP: CONSTITUTIONAL:NEGATIVE for fever, chills, change in weight and POSITIVE  for fatigue  GI: NEGATIVE for hematochezia and melena  : negative for hematuria  MUSCULOSKELETAL: NEGATIVE for paresthesias  NEURO: NEGATIVE for gait disturbance  PSYCHIATRIC: POSITIVE for stress      Objective    /80 (BP Location: Right arm, Patient Position: Chair, Cuff Size: Adult Regular)   Pulse 117   Temp 97.5  F (36.4  C)   Resp 20   Ht 1.803 m (5' 11\")   Wt 74.8 kg (165 lb)   SpO2 96%   BMI 23.01 kg/m    Body mass index is 23.01 kg/m .  Physical Exam   GENERAL APPEARANCE: alert and fatigued  Cardiovascular: His heart rate is regular, but initially the rate was greater than 100 bpm. His systolic blood " pressure is elevated  ABDOMEN: some generalized guarding  MS: decreased range of motion lumbar spine;he points to the paraspinal areas in the lumbar area as the location of his pain  PSYCH: anxious    Diagnostic Test Results:  Results for orders placed or performed in visit on 06/07/19   Lipid panel reflex to direct LDL Fasting   Result Value Ref Range    Cholesterol 131 <200 mg/dL    Triglycerides 55 <150 mg/dL    HDL Cholesterol 67 >39 mg/dL    LDL Cholesterol Calculated 53 <100 mg/dL    Non HDL Cholesterol 64 <130 mg/dL   Albumin Random Urine Quantitative with Creat Ratio   Result Value Ref Range    Creatinine Urine 50 mg/dL    Albumin Urine mg/L 68 mg/L    Albumin Urine mg/g Cr 135.82 (H) 0 - 17 mg/g Cr   ALT   Result Value Ref Range    ALT 27 0 - 70 U/L   Prostate spec antigen screen   Result Value Ref Range    PSA 1.47 0 - 4 ug/L   CBC with platelets differential   Result Value Ref Range    WBC 9.0 4.0 - 11.0 10e9/L    RBC Count 5.18 4.4 - 5.9 10e12/L    Hemoglobin 16.2 13.3 - 17.7 g/dL    Hematocrit 44.8 40.0 - 53.0 %    MCV 87 78 - 100 fl    MCH 31.3 26.5 - 33.0 pg    MCHC 36.2 31.5 - 36.5 g/dL    RDW 12.6 10.0 - 15.0 %    Platelet Count 321 150 - 450 10e9/L    % Neutrophils 84.4 %    % Lymphocytes 7.9 %    % Monocytes 7.2 %    % Eosinophils 0.3 %    % Basophils 0.2 %    Absolute Neutrophil 7.6 1.6 - 8.3 10e9/L    Absolute Lymphocytes 0.7 (L) 0.8 - 5.3 10e9/L    Absolute Monocytes 0.7 0.0 - 1.3 10e9/L    Absolute Eosinophils 0.0 0.0 - 0.7 10e9/L    Absolute Basophils 0.0 0.0 - 0.2 10e9/L    Diff Method Automated Method      Results for orders placed or performed in visit on 06/11/19   UA with Microscopic reflex to Culture   Result Value Ref Range    Color Urine Yellow     Appearance Urine Clear     Glucose Urine Negative NEG^Negative mg/dL    Bilirubin Urine Negative NEG^Negative    Ketones Urine Negative NEG^Negative mg/dL    Specific Gravity Urine 1.015 1.003 - 1.035    pH Urine 7.0 5.0 - 7.0 pH    Protein  Albumin Urine Trace (A) NEG^Negative mg/dL    Urobilinogen Urine 0.2 0.2 - 1.0 EU/dL    Nitrite Urine Negative NEG^Negative    Blood Urine Negative NEG^Negative    Leukocyte Esterase Urine Negative NEG^Negative    Source Midstream Urine     WBC Urine 0 - 5 OTO5^0 - 5 /HPF    RBC Urine O - 2 OTO2^O - 2 /HPF     Recent Results (from the past 24 hour(s))   XR Lumbar Spine 2/3 Views    Narrative    XR LUMBAR SPINE 2-3 VIEWS 6/11/2019 10:00 AM    COMPARISON: 4/10/2018    HISTORY: Acute bilateral low back pain without sciatica.      Impression    IMPRESSION: Vertebral heights are preserved without evidence of  fracture. Multilevel bridging endplate spurs again seen. No listhesis  identified at any level. Atherosclerotic calcifications of the  abdominal aorta and its branch vessels.    IRWIN MONTANEZ MD       Summary and implications:     In addition to the previous lab work, we did a urinalysis today which was unremarkable, and a spine x-ray.                  We reviewed multiple issues.           We reviewed all of the issues on the diagnoses list.                    We discussed his situation at length. It seems that his back pain was triggered by the fall that he took approximately 4 weeks ago.              We are not finding evidence of compression fracture,but his degree of distress is high.  With his altered bowel habits,and some abdominal discomfort, an intra-abdominal/retroperitoneal process needs to be considered.                      Patient Instructions   Let's do this: to keep your bowels moving, use both metamucil and the miralax.                             Add tramadol 50 mg with one Tylenol tablet, every 6 hrs as needed for pain relief.                    Add metoprolol to lower your blood pressure and slow your pulse a bit.                                Call for a CT scan appointment;         [phone].                                                      Assessment & Plan     Jose Francisco was seen today  for recheck.    Diagnoses and all orders for this visit:    Acute bilateral low back pain without sciatica since mid May, 2019   -     XR Lumbar Spine 2/3 Views; Future  -     UA with Microscopic reflex to Culture  -     traMADol (ULTRAM) 50 MG tablet; Take 1 tablet (50 mg) by mouth every 6 hours as needed for severe pain  -     CT Abdomen Pelvis w Contrast; Future    Change in bowel habits since mid May , 2019  -     CT Abdomen Pelvis w Contrast; Future    Hypertension goal BP (blood pressure) < 140/80  -     metoprolol succinate ER (TOPROL-XL) 50 MG 24 hr tablet; Take 1 tablet (50 mg) by mouth daily    Low back strain, subsequent encounter  -     traMADol (ULTRAM) 50 MG tablet; Take 1 tablet (50 mg) by mouth every 6 hours as needed for severe pain    Abdominal pain, generalized  -     CT Abdomen Pelvis w Contrast; Future       Summary and implications:     In addition to the previous lab work, we did a urinalysis today which was unremarkable, and a spine x-ray.                  We reviewed multiple issues.           We reviewed all of the issues on the diagnoses list.                    We discussed his situation at length. It seems that his back pain was triggered by the fall that he took approximately 4 weeks ago.              We are not finding evidence of compression fracture,but his degree of distress is high.  With his altered bowel habits,and some abdominal discomfort, an intra-abdominal/retroperitoneal process needs to be considered.                      Patient Instructions   Let's do this: to keep your bowels moving, use both metamucil and the miralax.                             Add tramadol 50 mg with one Tylenol tablet, every 6 hrs as needed for pain relief.                    Add metoprolol to lower your blood pressure and slow your pulse a bit.                                Call for a CT scan appointment;         [phone].                                                      Return in about  2 weeks (around 6/25/2019).    Kvng Richardson MD  [facility]    Addendum:   Recent Results (from the past 24 hour(s))   CT Abdomen Pelvis w Contrast    Narrative    CT ABDOMEN AND PELVIS WITH CONTRAST 6/12/2019 7:16 PM     HISTORY:  This patient has symptoms including lower back pain and  abdominal pain and constipation. Acute bilateral low back pain without  sciatica. Change in bowel habits. Abdominal pain, generalized.    COMPARISON: None.    TECHNIQUE: Volumetric helical acquisition of CT images from the lung  bases through the symphysis pubis after the administration of 83 mL  Isovue-370 intravenous contrast. Radiation dose for this scan was  reduced using automated exposure control, adjustment of the mA and/or  kV according to patient size, or iterative reconstruction technique.    FINDINGS: The liver, bilateral kidneys and adrenal glands, pancreas,  and spleen demonstrate no worrisome focal lesion. There are moderate  atherosclerotic changes of the visualized aorta and its branches.  There is no evidence of aortic dissection or aneurysm. No  hydronephrosis. Normal appendix. There is mild diverticulosis without  evidence for diverticulitis. Small to moderate amount of stool. Age  indeterminate compression deformity of L1. Although there is some  increased sclerosis, there is clearly a fracture line present  suggesting subacute to acute fracture. There is no free fluid in the  abdomen or pelvis. No free air in the abdomen. Bone windows reveal no  destructive lesions.  There are no abdominal or pelvic lymph nodes  that are abnormal by size criteria. The visualized lung bases are  unremarkable. There are no dilated loops of small bowel or colon.      Impression    IMPRESSION:   1. Acute vs. subacute compression deformity of L1. Some increased  sclerosis is seen in the vertebral body, consider lumbar spine MRI for  further evaluation of an underlying lesion.  2. Small to moderate  98.4 amount of stool throughout colon.    KAYCE HOPPER MD     D/w pt.             Plan MRI.           Stop physical therapy.              See me 2 wks.